# Patient Record
Sex: MALE | Race: WHITE | NOT HISPANIC OR LATINO | Employment: OTHER | URBAN - NONMETROPOLITAN AREA
[De-identification: names, ages, dates, MRNs, and addresses within clinical notes are randomized per-mention and may not be internally consistent; named-entity substitution may affect disease eponyms.]

---

## 2017-11-01 ENCOUNTER — TRANSFERRED RECORDS (OUTPATIENT)
Dept: HEALTH INFORMATION MANAGEMENT | Facility: HOSPITAL | Age: 62
End: 2017-11-01

## 2018-07-24 ENCOUNTER — HOSPITAL ENCOUNTER (OUTPATIENT)
Dept: NUCLEAR MEDICINE | Facility: HOSPITAL | Age: 63
Setting detail: NUCLEAR MEDICINE
End: 2018-07-24
Attending: FAMILY MEDICINE
Payer: MEDICARE

## 2018-07-24 ENCOUNTER — HOSPITAL ENCOUNTER (OUTPATIENT)
Dept: NUCLEAR MEDICINE | Facility: HOSPITAL | Age: 63
Setting detail: NUCLEAR MEDICINE
Discharge: HOME | End: 2018-07-24
Attending: FAMILY MEDICINE | Admitting: FAMILY MEDICINE
Payer: MEDICARE

## 2018-07-24 ENCOUNTER — HOSPITAL ENCOUNTER (OUTPATIENT)
Dept: GENERAL RADIOLOGY | Facility: HOSPITAL | Age: 63
Setting detail: NUCLEAR MEDICINE
End: 2018-07-24
Attending: FAMILY MEDICINE
Payer: MEDICARE

## 2018-07-24 DIAGNOSIS — Z94.0 H/O KIDNEY TRANSPLANT: ICD-10-CM

## 2018-07-24 DIAGNOSIS — Z95.5 H/O HEART ARTERY STENT: ICD-10-CM

## 2018-07-24 DIAGNOSIS — R06.02 SOB (SHORTNESS OF BREATH) ON EXERTION: ICD-10-CM

## 2018-07-24 DIAGNOSIS — E11.22 TYPE 2 DIABETES MELLITUS WITH CHRONIC KIDNEY DISEASE (H): ICD-10-CM

## 2018-07-24 PROCEDURE — 93017 CV STRESS TEST TRACING ONLY: CPT

## 2018-07-24 PROCEDURE — 25000128 H RX IP 250 OP 636: Performed by: INTERNAL MEDICINE

## 2018-07-24 PROCEDURE — 78452 HT MUSCLE IMAGE SPECT MULT: CPT | Mod: TC

## 2018-07-24 PROCEDURE — 93016 CV STRESS TEST SUPVJ ONLY: CPT | Performed by: INTERNAL MEDICINE

## 2018-07-24 PROCEDURE — A9500 TC99M SESTAMIBI: HCPCS | Performed by: RADIOLOGY

## 2018-07-24 PROCEDURE — 93018 CV STRESS TEST I&R ONLY: CPT | Performed by: INTERNAL MEDICINE

## 2018-07-24 PROCEDURE — 34300033 ZZH RX 343: Performed by: RADIOLOGY

## 2018-07-24 RX ORDER — REGADENOSON 0.08 MG/ML
0.4 INJECTION, SOLUTION INTRAVENOUS ONCE
Status: COMPLETED | OUTPATIENT
Start: 2018-07-24 | End: 2018-07-24

## 2018-07-24 RX ORDER — AMINOPHYLLINE 25 MG/ML
INJECTION, SOLUTION INTRAVENOUS
Status: DISCONTINUED
Start: 2018-07-24 | End: 2018-07-24 | Stop reason: WASHOUT

## 2018-07-24 RX ADMIN — Medication 30 MILLICURIE: at 09:37

## 2018-07-24 RX ADMIN — Medication 10 MILLICURIE: at 07:23

## 2018-07-24 RX ADMIN — REGADENOSON 0.4 MG: 0.08 INJECTION, SOLUTION INTRAVENOUS at 09:39

## 2018-12-26 ENCOUNTER — RESULTS ONLY (OUTPATIENT)
Dept: LAB | Age: 63
End: 2018-12-26

## 2018-12-26 LAB
ALT SERPL W P-5'-P-CCNC: 43 U/L (ref 0–70)
BILIRUB SERPL-MCNC: 0.4 MG/DL (ref 0.2–1.3)
BUN SERPL-MCNC: 88 MG/DL (ref 7–30)
CREAT SERPL-MCNC: 6.51 MG/DL (ref 0.66–1.25)
ERYTHROCYTE [DISTWIDTH] IN BLOOD BY AUTOMATED COUNT: 14 % (ref 10–15)
GFR SERPL CREATININE-BSD FRML MDRD: 8 ML/MIN/{1.73_M2}
HCT VFR BLD AUTO: 22 % (ref 40–53)
HGB BLD-MCNC: 7.3 G/DL (ref 13.3–17.7)
MCH RBC QN AUTO: 29.9 PG (ref 26.5–33)
MCHC RBC AUTO-ENTMCNC: 33.2 G/DL (ref 31.5–36.5)
MCV RBC AUTO: 90 FL (ref 78–100)
PLATELET # BLD AUTO: 125 10E9/L (ref 150–450)
RBC # BLD AUTO: 2.44 10E12/L (ref 4.4–5.9)
WBC # BLD AUTO: 4.3 10E9/L (ref 4–11)

## 2019-06-21 DIAGNOSIS — N17.9 ACUTE KIDNEY FAILURE, UNSPECIFIED (H): Primary | ICD-10-CM

## 2019-06-24 ENCOUNTER — HOSPITAL ENCOUNTER (OUTPATIENT)
Dept: ULTRASOUND IMAGING | Facility: HOSPITAL | Age: 64
Discharge: HOME | End: 2019-06-24
Attending: INTERNAL MEDICINE | Admitting: INTERNAL MEDICINE
Payer: MEDICARE

## 2019-06-24 DIAGNOSIS — N17.9 ACUTE KIDNEY INJURY (H): ICD-10-CM

## 2019-06-24 DIAGNOSIS — N17.9 ACUTE KIDNEY FAILURE, UNSPECIFIED (H): ICD-10-CM

## 2019-06-24 LAB
ALBUMIN SERPL-MCNC: 3.7 G/DL (ref 3.4–5)
ALBUMIN UR-MCNC: 30 MG/DL
ANION GAP SERPL CALCULATED.3IONS-SCNC: 10 MMOL/L (ref 3–14)
APPEARANCE UR: CLEAR
BACTERIA #/AREA URNS HPF: ABNORMAL /HPF
BASOPHILS # BLD AUTO: 0 10E9/L (ref 0–0.2)
BASOPHILS NFR BLD AUTO: 0.4 %
BILIRUB UR QL STRIP: NEGATIVE
BUN SERPL-MCNC: 62 MG/DL (ref 7–30)
CALCIUM SERPL-MCNC: 10 MG/DL (ref 8.5–10.1)
CHLORIDE SERPL-SCNC: 101 MMOL/L (ref 94–109)
CO2 SERPL-SCNC: 26 MMOL/L (ref 20–32)
COLOR UR AUTO: ABNORMAL
CREAT SERPL-MCNC: 2.15 MG/DL (ref 0.66–1.25)
DIFFERENTIAL METHOD BLD: ABNORMAL
EOSINOPHIL # BLD AUTO: 0.1 10E9/L (ref 0–0.7)
EOSINOPHIL NFR BLD AUTO: 1.2 %
ERYTHROCYTE [DISTWIDTH] IN BLOOD BY AUTOMATED COUNT: 14.2 % (ref 10–15)
ERYTHROCYTE [SEDIMENTATION RATE] IN BLOOD BY WESTERGREN METHOD: 40 MM/H (ref 0–20)
GFR SERPL CREATININE-BSD FRML MDRD: 32 ML/MIN/{1.73_M2}
GLUCOSE SERPL-MCNC: 135 MG/DL (ref 70–99)
GLUCOSE UR STRIP-MCNC: NEGATIVE MG/DL
HCT VFR BLD AUTO: 35.8 % (ref 40–53)
HGB BLD-MCNC: 12.2 G/DL (ref 13.3–17.7)
HGB UR QL STRIP: ABNORMAL
HYALINE CASTS #/AREA URNS LPF: 1 /LPF
IMM GRANULOCYTES # BLD: 0.1 10E9/L (ref 0–0.4)
IMM GRANULOCYTES NFR BLD: 0.9 %
KETONES UR STRIP-MCNC: NEGATIVE MG/DL
LEUKOCYTE ESTERASE UR QL STRIP: NEGATIVE
LYMPHOCYTES # BLD AUTO: 2.4 10E9/L (ref 0.8–5.3)
LYMPHOCYTES NFR BLD AUTO: 22.7 %
MCH RBC QN AUTO: 30.2 PG (ref 26.5–33)
MCHC RBC AUTO-ENTMCNC: 34.1 G/DL (ref 31.5–36.5)
MCV RBC AUTO: 89 FL (ref 78–100)
MONOCYTES # BLD AUTO: 1 10E9/L (ref 0–1.3)
MONOCYTES NFR BLD AUTO: 9.1 %
MUCOUS THREADS #/AREA URNS LPF: PRESENT /LPF
NEUTROPHILS # BLD AUTO: 6.9 10E9/L (ref 1.6–8.3)
NEUTROPHILS NFR BLD AUTO: 65.7 %
NITRATE UR QL: NEGATIVE
NRBC # BLD AUTO: 0 10*3/UL
NRBC BLD AUTO-RTO: 0 /100
PH UR STRIP: 5.5 PH (ref 4.7–8)
PHOSPHATE SERPL-MCNC: 4 MG/DL (ref 2.5–4.5)
PLATELET # BLD AUTO: 181 10E9/L (ref 150–450)
POTASSIUM SERPL-SCNC: 4 MMOL/L (ref 3.4–5.3)
RBC # BLD AUTO: 4.04 10E12/L (ref 4.4–5.9)
RBC #/AREA URNS AUTO: 1 /HPF (ref 0–2)
SODIUM SERPL-SCNC: 137 MMOL/L (ref 133–144)
SOURCE: ABNORMAL
SP GR UR STRIP: 1.01 (ref 1–1.03)
UROBILINOGEN UR STRIP-MCNC: NORMAL MG/DL (ref 0–2)
WBC # BLD AUTO: 10.5 10E9/L (ref 4–11)
WBC #/AREA URNS AUTO: 5 /HPF (ref 0–5)

## 2019-06-24 PROCEDURE — 85652 RBC SED RATE AUTOMATED: CPT | Performed by: INTERNAL MEDICINE

## 2019-06-24 PROCEDURE — 36415 COLL VENOUS BLD VENIPUNCTURE: CPT | Performed by: INTERNAL MEDICINE

## 2019-06-24 PROCEDURE — 76776 US EXAM K TRANSPL W/DOPPLER: CPT | Mod: TC

## 2019-06-24 PROCEDURE — 85025 COMPLETE CBC W/AUTO DIFF WBC: CPT | Performed by: INTERNAL MEDICINE

## 2019-06-24 PROCEDURE — 80069 RENAL FUNCTION PANEL: CPT | Performed by: INTERNAL MEDICINE

## 2019-06-24 PROCEDURE — 81001 URINALYSIS AUTO W/SCOPE: CPT | Performed by: INTERNAL MEDICINE

## 2019-11-08 ENCOUNTER — TRANSFERRED RECORDS (OUTPATIENT)
Dept: HEALTH INFORMATION MANAGEMENT | Facility: CLINIC | Age: 64
End: 2019-11-08

## 2019-11-08 ENCOUNTER — MEDICAL CORRESPONDENCE (OUTPATIENT)
Dept: HEALTH INFORMATION MANAGEMENT | Facility: CLINIC | Age: 64
End: 2019-11-08

## 2019-11-27 ENCOUNTER — HOSPITAL ENCOUNTER (OUTPATIENT)
Dept: RESPIRATORY THERAPY | Facility: HOSPITAL | Age: 64
Discharge: HOME | End: 2019-11-27
Attending: FAMILY MEDICINE | Admitting: FAMILY MEDICINE
Payer: MEDICARE

## 2019-11-27 LAB
BASE EXCESS BLDA CALC-SCNC: 0.5 MMOL/L
COHGB MFR BLD: 0.9 % (ref 0–2)
HCO3 BLD-SCNC: 25 MMOL/L (ref 21–28)
HGB BLD-MCNC: 12.4 G/DL (ref 13.3–17.7)
O2/TOTAL GAS SETTING VFR VENT: ABNORMAL %
OXYHGB MFR BLD: 95 % (ref 92–100)
PCO2 BLD: 39 MM HG (ref 35–45)
PH BLD: 7.42 PH (ref 7.35–7.45)
PO2 BLD: 78 MM HG (ref 80–105)

## 2019-11-27 PROCEDURE — 94729 DIFFUSING CAPACITY: CPT | Mod: 26 | Performed by: INTERNAL MEDICINE

## 2019-11-27 PROCEDURE — 36600 WITHDRAWAL OF ARTERIAL BLOOD: CPT

## 2019-11-27 PROCEDURE — 94010 BREATHING CAPACITY TEST: CPT | Mod: 26 | Performed by: INTERNAL MEDICINE

## 2019-11-27 PROCEDURE — 94729 DIFFUSING CAPACITY: CPT

## 2019-11-27 PROCEDURE — 94726 PLETHYSMOGRAPHY LUNG VOLUMES: CPT

## 2019-11-27 PROCEDURE — 94010 BREATHING CAPACITY TEST: CPT

## 2019-11-27 PROCEDURE — 82805 BLOOD GASES W/O2 SATURATION: CPT | Performed by: FAMILY MEDICINE

## 2019-11-27 PROCEDURE — 94726 PLETHYSMOGRAPHY LUNG VOLUMES: CPT | Mod: 26 | Performed by: INTERNAL MEDICINE

## 2019-11-27 PROCEDURE — 82375 ASSAY CARBOXYHB QUANT: CPT | Performed by: FAMILY MEDICINE

## 2019-11-27 PROCEDURE — 85018 HEMOGLOBIN: CPT | Performed by: FAMILY MEDICINE

## 2019-11-27 RX ORDER — ALBUTEROL SULFATE 0.83 MG/ML
2.5 SOLUTION RESPIRATORY (INHALATION) ONCE
Status: DISCONTINUED | OUTPATIENT
Start: 2019-11-27 | End: 2019-11-28 | Stop reason: HOSPADM

## 2020-01-08 ENCOUNTER — TRANSFERRED RECORDS (OUTPATIENT)
Dept: HEALTH INFORMATION MANAGEMENT | Facility: CLINIC | Age: 65
End: 2020-01-08

## 2020-01-09 DIAGNOSIS — J44.9 COPD (CHRONIC OBSTRUCTIVE PULMONARY DISEASE) (H): Primary | ICD-10-CM

## 2020-01-21 ENCOUNTER — DOCUMENTATION ONLY (OUTPATIENT)
Dept: SLEEP MEDICINE | Facility: HOSPITAL | Age: 65
End: 2020-01-21
Attending: INTERNAL MEDICINE
Payer: MEDICARE

## 2020-01-21 PROCEDURE — 99207 ZZC NO CHARGE NURSE ONLY: CPT

## 2020-01-21 NOTE — PROGRESS NOTES
Patient returned the oximeter and the data was downloaded and a report sent to Dr Canchola and to be scanned The patient was also given a copy for his records.

## 2020-01-31 DIAGNOSIS — G47.33 OSA (OBSTRUCTIVE SLEEP APNEA): Primary | ICD-10-CM

## 2020-02-13 ENCOUNTER — TRANSFERRED RECORDS (OUTPATIENT)
Dept: HEALTH INFORMATION MANAGEMENT | Facility: CLINIC | Age: 65
End: 2020-02-13

## 2020-02-25 DIAGNOSIS — G47.33 OSA (OBSTRUCTIVE SLEEP APNEA): Primary | ICD-10-CM

## 2020-03-06 ENCOUNTER — TELEPHONE (OUTPATIENT)
Dept: SLEEP MEDICINE | Facility: HOSPITAL | Age: 65
End: 2020-03-06

## 2020-03-09 ENCOUNTER — OFFICE VISIT (OUTPATIENT)
Dept: SLEEP MEDICINE | Facility: HOSPITAL | Age: 65
End: 2020-03-09
Attending: INTERNAL MEDICINE
Payer: COMMERCIAL

## 2020-03-09 DIAGNOSIS — R09.02 HYPOXIA: Primary | ICD-10-CM

## 2020-03-09 PROCEDURE — 99207 ZZC NO CHARGE NURSE ONLY: CPT

## 2020-03-09 NOTE — NURSING NOTE
Patient picked up over night oximeter number [id number]. Patient was instructed on use of device. Patient verbalized understanding.     Patient will return device tomorrow by: noon

## 2020-03-10 ENCOUNTER — DOCUMENTATION ONLY (OUTPATIENT)
Dept: SLEEP MEDICINE | Facility: HOSPITAL | Age: 65
End: 2020-03-10
Attending: INTERNAL MEDICINE
Payer: COMMERCIAL

## 2020-03-10 PROCEDURE — 99207 ZZC NO CHARGE NURSE ONLY: CPT

## 2020-03-10 NOTE — PROGRESS NOTES
Patient returned the oximeter , the data was downloaded and the report sent to Dr. Canchola and to scan , I did do a compliance download and detail report to send with the oximetry.

## 2021-02-27 ENCOUNTER — HOSPITAL ENCOUNTER (EMERGENCY)
Facility: HOSPITAL | Age: 66
Discharge: HOME | End: 2021-02-27
Attending: PHYSICIAN ASSISTANT | Admitting: PHYSICIAN ASSISTANT
Payer: COMMERCIAL

## 2021-02-27 ENCOUNTER — APPOINTMENT (OUTPATIENT)
Dept: GENERAL RADIOLOGY | Facility: HOSPITAL | Age: 66
End: 2021-02-27
Attending: PHYSICIAN ASSISTANT
Payer: COMMERCIAL

## 2021-02-27 VITALS
SYSTOLIC BLOOD PRESSURE: 151 MMHG | TEMPERATURE: 99 F | HEART RATE: 59 BPM | RESPIRATION RATE: 16 BRPM | OXYGEN SATURATION: 97 % | DIASTOLIC BLOOD PRESSURE: 81 MMHG

## 2021-02-27 DIAGNOSIS — M79.671 RIGHT FOOT PAIN: ICD-10-CM

## 2021-02-27 PROCEDURE — 73630 X-RAY EXAM OF FOOT: CPT | Mod: RT

## 2021-02-27 PROCEDURE — 99213 OFFICE O/P EST LOW 20 MIN: CPT | Performed by: PHYSICIAN ASSISTANT

## 2021-02-27 PROCEDURE — G0463 HOSPITAL OUTPT CLINIC VISIT: HCPCS

## 2021-02-27 ASSESSMENT — ENCOUNTER SYMPTOMS
FATIGUE: 0
CHILLS: 0
CONFUSION: 0
DIZZINESS: 0
DYSURIA: 0
ABDOMINAL PAIN: 0
SORE THROAT: 0
WOUND: 0
SHORTNESS OF BREATH: 0
COUGH: 0
SINUS PAIN: 0
FEVER: 0

## 2021-02-27 NOTE — ED PROVIDER NOTES
History     Chief Complaint   Patient presents with     Foot Pain     right     HPI  Eduar Isaacs is a 65 year old male with history of R 5th met fracture s/p fixation, peripheral neuropathy and LE edema presents for evaluation of R foot pain.     Onset, duration: Yesterday 2/26 evening, he tripped over dog and landed on outer side of R foot. Pain has persisted without worsening.  Location: Lateral R foot  Assoc. symptoms: Denies new numbness, wounds, swelling, bruising or redness.  Quality, severity: Aching  Provocative: Weight bearing  Palliative: Rest  Previous Treatments: Tried left over oxycodone from surgery last night which helped some. Does not have any of these left.    Presents in wheelchair today.     Allergies:  Allergies   Allergen Reactions     Codeine Shortness Of Breath     Vancomycin Rash     Cefepime      Amoxicillin Itching and Rash     Niacin Rash     Prilosec [Omeprazole] Itching and Rash       Problem List:    There are no active problems to display for this patient.       Past Medical History:    History reviewed. No pertinent past medical history.    Past Surgical History:    Past Surgical History:   Procedure Laterality Date     COLONOSCOPY - HIM SCAN  04/08/2012    [facility], polyps, adenomatous       Family History:    History reviewed. No pertinent family history.    Social History:  Marital Status:   [5]  Social History     Tobacco Use     Smoking status: Former Smoker     Smokeless tobacco: Never Used   Substance Use Topics     Alcohol use: Yes     Comment: rare     Drug use: Never        Medications:    ALLOPURINOL PO  aspirin - buffered (ASCRIPTIN) 325 MG TABS  budesonide-formoterol (SYMBICORT) 160-4.5 MCG/ACT inhaler  CALCITRIOL PO  calcium carbonate (TUMS) 500 MG chewable tablet  CELLCEPT 500 MG PO TABLET  LEVOTHYROXINE SODIUM PO  Lisinopril (PRINIVIL PO)  metaxalone (SKELAXIN) 800 MG tablet  methylPREDNISolone (MEDROL DOSEPAK) 4 MG tablet  mupirocin (BACTROBAN) 2 %  ointment  Omega-3 Fatty Acids (OMEGA-3 FISH OIL PO)  Rosuvastatin Calcium (CRESTOR PO)  VITAMIN D, CHOLECALCIFEROL, PO  cetirizine (ZYRTEC) 10 MG tablet  DiphenhydrAMINE HCl (BENADRYL PO)  HYDROcodone-acetaminophen (NORCO) 5-325 MG per tablet  nitroglycerin (NITRODUR) 0.4 MG/HR  NITROGLYCERIN SL          Review of Systems   Constitutional: Negative for chills, fatigue and fever.   HENT: Negative for congestion, sinus pain and sore throat.    Respiratory: Negative for cough and shortness of breath.    Cardiovascular: Positive for leg swelling. Negative for chest pain.   Gastrointestinal: Negative for abdominal pain.   Genitourinary: Negative for dysuria.   Musculoskeletal:        Foot pain     Skin: Negative for wound.   Neurological: Negative for dizziness.   Psychiatric/Behavioral: Negative for confusion.       Physical Exam   BP: 151/81  Pulse: 59  Temp: 99  F (37.2  C)  Resp: 16  SpO2: 97 %      Physical Exam  Vitals signs reviewed.   Constitutional:       General: He is not in acute distress.     Appearance: Normal appearance. He is obese.   HENT:      Head: Normocephalic and atraumatic.      Right Ear: External ear normal.      Left Ear: External ear normal.      Nose: Nose normal.   Eyes:      Extraocular Movements: Extraocular movements intact.      Conjunctiva/sclera: Conjunctivae normal.   Neck:      Musculoskeletal: Normal range of motion.   Cardiovascular:      Pulses:           Dorsalis pedis pulses are 0 on the right side.        Posterior tibial pulses are 0 on the right side.   Pulmonary:      Effort: Pulmonary effort is normal.   Musculoskeletal:      Right foot: Decreased range of motion.   Feet:      Right foot:      Skin integrity: Dry skin present. No ulcer, erythema or warmth.      Toenail Condition: Right toenails are abnormally thick.      Comments: Tenderness to palpation of the 5th metatarsal head and shaft. No erythema, ecchymosis or edema.  Neurological:      Mental Status: He is alert.          ED Course       Results for orders placed or performed during the hospital encounter of 02/27/21 (from the past 24 hour(s))   Foot  XR, G/E 3 views, right    Narrative    PROCEDURE:  XR FOOT RT G/E 3 VW    HISTORY: pain to right lateral foot since yesterday when he was trying  to not step on his dog..    COMPARISON:  None.    TECHNIQUE:  3 views right foot.    FINDINGS:  Postoperative changes of remote prior fifth metatarsal  fracture fixation are seen. The fractures healed. The hardware is  intact. No new fracture is identified.       Impression    IMPRESSION: No acute fracture.      MARY PIEDRA MD       Medications - No data to display    Assessments & Plan (with Medical Decision Making)   Eduar Isaacs is a 65 year old year old male with history of peripheral neuropathy and R 5th metatarsal fracture s/p fixation who presented today with right foot injury and lateral right foot pain concerning for possible fracture, strain or contusion.      Right foot XRs were reviewed and upon interpretation was found to be negative, hardware intact.     A diagnosis of Right foot pain was made and patient will be treated with rest, ice, NSAIDs if tolerated. Wear CAM boot during the day while walking 1-2 weeks, then gradually reduce use.     Patient understood instructions provided and all questions were answered.   Recommend follow up with primary care provider as needed.  Follow up with podiatrist in 1-2 weeks if pain still persists.   Return to ED for new, persistent or worsening symptoms as needed.     Disposition: home    I have reviewed the nursing notes.    I have reviewed the findings, diagnosis, plan and need for follow up with the patient.    Discharge Medication List as of 2/27/2021  4:40 PM          Final diagnoses:   Right foot pain       2/27/2021   [facility]

## 2021-02-27 NOTE — ED TRIAGE NOTES
Pt presents with right lateral foot pain since yesterday when he was trying to avoid stepping on his little dog.

## 2021-02-27 NOTE — ED TRIAGE NOTES
Patient stepped wrong stepping over door frame trying to avoid the dog between his legs. Right foot painful. Not bad at rest can go up to 10 when stepping on it

## 2021-02-27 NOTE — DISCHARGE INSTRUCTIONS
No fractures were seen on XR, hardware is intact.   Avoid increased activity, rest foot, ice and elevate when possible. Tylenol as needed.  Wear walking boot every day for 1-2 weeks, then gradually reduce use if tolerated.  Follow up with podiatrist in 2 weeks if not improved or worsened.

## 2021-05-21 ENCOUNTER — APPOINTMENT (OUTPATIENT)
Dept: GENERAL RADIOLOGY | Facility: HOSPITAL | Age: 66
DRG: 872 | End: 2021-05-21
Attending: NURSE PRACTITIONER
Payer: COMMERCIAL

## 2021-05-21 ENCOUNTER — HOSPITAL ENCOUNTER (INPATIENT)
Facility: HOSPITAL | Age: 66
LOS: 6 days | Discharge: HOME | DRG: 872 | End: 2021-05-27
Attending: EMERGENCY MEDICINE | Admitting: INTERNAL MEDICINE
Payer: COMMERCIAL

## 2021-05-21 DIAGNOSIS — R50.9 FEVER, UNKNOWN ORIGIN: ICD-10-CM

## 2021-05-21 DIAGNOSIS — A41.9 SEPSIS (H): ICD-10-CM

## 2021-05-21 DIAGNOSIS — N10 PYELONEPHRITIS, ACUTE: Primary | ICD-10-CM

## 2021-05-21 LAB
ALBUMIN SERPL-MCNC: 3.3 G/DL (ref 3.4–5)
ALBUMIN UR-MCNC: 100 MG/DL
ALP SERPL-CCNC: 52 U/L (ref 40–150)
ALT SERPL W P-5'-P-CCNC: 18 U/L (ref 0–70)
ANION GAP SERPL CALCULATED.3IONS-SCNC: 9 MMOL/L (ref 3–14)
APPEARANCE UR: CLEAR
AST SERPL W P-5'-P-CCNC: 15 U/L (ref 0–45)
BACTERIA #/AREA URNS HPF: ABNORMAL /HPF
BASOPHILS # BLD AUTO: 0 10E9/L (ref 0–0.2)
BASOPHILS NFR BLD AUTO: 0.2 %
BILIRUB SERPL-MCNC: 0.7 MG/DL (ref 0.2–1.3)
BILIRUB UR QL STRIP: NEGATIVE
BUN SERPL-MCNC: 35 MG/DL (ref 7–30)
CALCIUM SERPL-MCNC: 8.8 MG/DL (ref 8.5–10.1)
CHLORIDE SERPL-SCNC: 95 MMOL/L (ref 94–109)
CO2 SERPL-SCNC: 26 MMOL/L (ref 20–32)
COLOR UR AUTO: ABNORMAL
CREAT SERPL-MCNC: 1.47 MG/DL (ref 0.66–1.25)
D DIMER PPP FEU-MCNC: 0.6 UG/ML FEU (ref 0–0.5)
DIFFERENTIAL METHOD BLD: ABNORMAL
EOSINOPHIL # BLD AUTO: 0 10E9/L (ref 0–0.7)
EOSINOPHIL NFR BLD AUTO: 0.1 %
ERYTHROCYTE [DISTWIDTH] IN BLOOD BY AUTOMATED COUNT: 14.6 % (ref 10–15)
EST. AVERAGE GLUCOSE BLD GHB EST-MCNC: 160 MG/DL
GFR SERPL CREATININE-BSD FRML MDRD: 50 ML/MIN/{1.73_M2}
GLUCOSE SERPL-MCNC: 172 MG/DL (ref 70–99)
GLUCOSE UR STRIP-MCNC: NEGATIVE MG/DL
HBA1C MFR BLD: 7.2 % (ref 0–5.6)
HCT VFR BLD AUTO: 38.8 % (ref 40–53)
HGB BLD-MCNC: 12.7 G/DL (ref 13.3–17.7)
HGB UR QL STRIP: ABNORMAL
IMM GRANULOCYTES # BLD: 0.1 10E9/L (ref 0–0.4)
IMM GRANULOCYTES NFR BLD: 0.4 %
KETONES UR STRIP-MCNC: NEGATIVE MG/DL
LABORATORY COMMENT REPORT: NORMAL
LACTATE BLD-SCNC: 1.8 MMOL/L (ref 0.7–2)
LEUKOCYTE ESTERASE UR QL STRIP: ABNORMAL
LYMPHOCYTES # BLD AUTO: 1.4 10E9/L (ref 0.8–5.3)
LYMPHOCYTES NFR BLD AUTO: 9.9 %
MCH RBC QN AUTO: 29.2 PG (ref 26.5–33)
MCHC RBC AUTO-ENTMCNC: 32.7 G/DL (ref 31.5–36.5)
MCV RBC AUTO: 89 FL (ref 78–100)
MONOCYTES # BLD AUTO: 1.8 10E9/L (ref 0–1.3)
MONOCYTES NFR BLD AUTO: 12.6 %
NEUTROPHILS # BLD AUTO: 10.7 10E9/L (ref 1.6–8.3)
NEUTROPHILS NFR BLD AUTO: 76.8 %
NITRATE UR QL: NEGATIVE
NRBC # BLD AUTO: 0 10*3/UL
NRBC BLD AUTO-RTO: 0 /100
NT-PROBNP SERPL-MCNC: 792 PG/ML (ref 0–900)
PH UR STRIP: 6 PH (ref 4.7–8)
PLATELET # BLD AUTO: 159 10E9/L (ref 150–450)
POTASSIUM SERPL-SCNC: 4.7 MMOL/L (ref 3.4–5.3)
PROCALCITONIN SERPL-MCNC: 0.74 NG/ML
PROT SERPL-MCNC: 7.1 G/DL (ref 6.8–8.8)
RBC # BLD AUTO: 4.35 10E12/L (ref 4.4–5.9)
RBC #/AREA URNS AUTO: <1 /HPF (ref 0–2)
SARS-COV-2 RNA RESP QL NAA+PROBE: NEGATIVE
SODIUM SERPL-SCNC: 130 MMOL/L (ref 133–144)
SOURCE: ABNORMAL
SP GR UR STRIP: 1.02 (ref 1–1.03)
SPECIMEN SOURCE: NORMAL
SQUAMOUS #/AREA URNS AUTO: 0 /HPF (ref 0–1)
TROPONIN I SERPL-MCNC: <0.015 UG/L (ref 0–0.04)
UROBILINOGEN UR STRIP-MCNC: NORMAL MG/DL (ref 0–2)
WBC # BLD AUTO: 14 10E9/L (ref 4–11)
WBC #/AREA URNS AUTO: 12 /HPF (ref 0–5)

## 2021-05-21 PROCEDURE — 81001 URINALYSIS AUTO W/SCOPE: CPT | Performed by: NURSE PRACTITIONER

## 2021-05-21 PROCEDURE — 250N000011 HC RX IP 250 OP 636: Performed by: NURSE PRACTITIONER

## 2021-05-21 PROCEDURE — 87086 URINE CULTURE/COLONY COUNT: CPT | Performed by: NURSE PRACTITIONER

## 2021-05-21 PROCEDURE — 250N000013 HC RX MED GY IP 250 OP 250 PS 637: Performed by: NURSE PRACTITIONER

## 2021-05-21 PROCEDURE — 87186 SC STD MICRODIL/AGAR DIL: CPT | Performed by: NURSE PRACTITIONER

## 2021-05-21 PROCEDURE — 87040 BLOOD CULTURE FOR BACTERIA: CPT | Performed by: NURSE PRACTITIONER

## 2021-05-21 PROCEDURE — 999N001182 HC STATISTIC ESTIMATED AVERAGE GLUCOSE: Performed by: INTERNAL MEDICINE

## 2021-05-21 PROCEDURE — 36415 COLL VENOUS BLD VENIPUNCTURE: CPT | Performed by: NURSE PRACTITIONER

## 2021-05-21 PROCEDURE — 83880 ASSAY OF NATRIURETIC PEPTIDE: CPT | Performed by: INTERNAL MEDICINE

## 2021-05-21 PROCEDURE — 83036 HEMOGLOBIN GLYCOSYLATED A1C: CPT | Performed by: INTERNAL MEDICINE

## 2021-05-21 PROCEDURE — 87088 URINE BACTERIA CULTURE: CPT | Performed by: NURSE PRACTITIONER

## 2021-05-21 PROCEDURE — 84484 ASSAY OF TROPONIN QUANT: CPT | Performed by: NURSE PRACTITIONER

## 2021-05-21 PROCEDURE — 258N000003 HC RX IP 258 OP 636: Performed by: INTERNAL MEDICINE

## 2021-05-21 PROCEDURE — U0003 INFECTIOUS AGENT DETECTION BY NUCLEIC ACID (DNA OR RNA); SEVERE ACUTE RESPIRATORY SYNDROME CORONAVIRUS 2 (SARS-COV-2) (CORONAVIRUS DISEASE [COVID-19]), AMPLIFIED PROBE TECHNIQUE, MAKING USE OF HIGH THROUGHPUT TECHNOLOGIES AS DESCRIBED BY CMS-2020-01-R: HCPCS | Performed by: NURSE PRACTITIONER

## 2021-05-21 PROCEDURE — 99223 1ST HOSP IP/OBS HIGH 75: CPT | Performed by: INTERNAL MEDICINE

## 2021-05-21 PROCEDURE — C9803 HOPD COVID-19 SPEC COLLECT: HCPCS

## 2021-05-21 PROCEDURE — 99285 EMERGENCY DEPT VISIT HI MDM: CPT | Mod: 25

## 2021-05-21 PROCEDURE — 85379 FIBRIN DEGRADATION QUANT: CPT | Performed by: NURSE PRACTITIONER

## 2021-05-21 PROCEDURE — U0005 INFEC AGEN DETEC AMPLI PROBE: HCPCS | Performed by: NURSE PRACTITIONER

## 2021-05-21 PROCEDURE — 96366 THER/PROPH/DIAG IV INF ADDON: CPT

## 2021-05-21 PROCEDURE — 99284 EMERGENCY DEPT VISIT MOD MDM: CPT | Performed by: NURSE PRACTITIONER

## 2021-05-21 PROCEDURE — 120N000001 HC R&B MED SURG/OB

## 2021-05-21 PROCEDURE — 96365 THER/PROPH/DIAG IV INF INIT: CPT

## 2021-05-21 PROCEDURE — 80053 COMPREHEN METABOLIC PANEL: CPT | Performed by: NURSE PRACTITIONER

## 2021-05-21 PROCEDURE — 250N000012 HC RX MED GY IP 250 OP 636 PS 637: Performed by: INTERNAL MEDICINE

## 2021-05-21 PROCEDURE — 84145 PROCALCITONIN (PCT): CPT | Performed by: NURSE PRACTITIONER

## 2021-05-21 PROCEDURE — 250N000011 HC RX IP 250 OP 636: Performed by: INTERNAL MEDICINE

## 2021-05-21 PROCEDURE — 258N000003 HC RX IP 258 OP 636: Performed by: NURSE PRACTITIONER

## 2021-05-21 PROCEDURE — 83605 ASSAY OF LACTIC ACID: CPT | Performed by: NURSE PRACTITIONER

## 2021-05-21 PROCEDURE — 71045 X-RAY EXAM CHEST 1 VIEW: CPT

## 2021-05-21 PROCEDURE — 250N000013 HC RX MED GY IP 250 OP 250 PS 637: Performed by: INTERNAL MEDICINE

## 2021-05-21 PROCEDURE — 85025 COMPLETE CBC W/AUTO DIFF WBC: CPT | Performed by: NURSE PRACTITIONER

## 2021-05-21 RX ORDER — DIPHENHYDRAMINE HCL 25 MG
25 CAPSULE ORAL
Status: DISCONTINUED | OUTPATIENT
Start: 2021-05-21 | End: 2021-05-27 | Stop reason: HOSPADM

## 2021-05-21 RX ORDER — ACETAMINOPHEN 325 MG/1
650 TABLET ORAL EVERY 4 HOURS PRN
Status: DISCONTINUED | OUTPATIENT
Start: 2021-05-21 | End: 2021-05-27 | Stop reason: HOSPADM

## 2021-05-21 RX ORDER — HYDROCODONE BITARTRATE AND ACETAMINOPHEN 5; 325 MG/1; MG/1
2 TABLET ORAL EVERY 6 HOURS PRN
Status: DISCONTINUED | OUTPATIENT
Start: 2021-05-21 | End: 2021-05-27 | Stop reason: HOSPADM

## 2021-05-21 RX ORDER — MYCOPHENOLATE MOFETIL 500 MG/1
1000 TABLET ORAL 2 TIMES DAILY
Status: DISCONTINUED | OUTPATIENT
Start: 2021-05-21 | End: 2021-05-27 | Stop reason: HOSPADM

## 2021-05-21 RX ORDER — CETIRIZINE HYDROCHLORIDE 10 MG/1
10 TABLET ORAL DAILY
Status: DISCONTINUED | OUTPATIENT
Start: 2021-05-22 | End: 2021-05-27 | Stop reason: HOSPADM

## 2021-05-21 RX ORDER — ALLOPURINOL 100 MG/1
100 TABLET ORAL DAILY
Status: DISCONTINUED | OUTPATIENT
Start: 2021-05-22 | End: 2021-05-27 | Stop reason: HOSPADM

## 2021-05-21 RX ORDER — METAXALONE 800 MG/1
800 TABLET ORAL 3 TIMES DAILY
Status: DISCONTINUED | OUTPATIENT
Start: 2021-05-21 | End: 2021-05-24

## 2021-05-21 RX ORDER — LEVOTHYROXINE SODIUM 50 UG/1
50 TABLET ORAL DAILY
Status: DISCONTINUED | OUTPATIENT
Start: 2021-05-22 | End: 2021-05-27 | Stop reason: HOSPADM

## 2021-05-21 RX ORDER — NALOXONE HYDROCHLORIDE 0.4 MG/ML
0.2 INJECTION, SOLUTION INTRAMUSCULAR; INTRAVENOUS; SUBCUTANEOUS
Status: DISCONTINUED | OUTPATIENT
Start: 2021-05-21 | End: 2021-05-27 | Stop reason: HOSPADM

## 2021-05-21 RX ORDER — CALCITRIOL 0.25 UG/1
0.25 CAPSULE, LIQUID FILLED ORAL DAILY
Status: DISCONTINUED | OUTPATIENT
Start: 2021-05-22 | End: 2021-05-24

## 2021-05-21 RX ORDER — NITROGLYCERIN 80 MG/1
1 PATCH TRANSDERMAL DAILY
Status: DISCONTINUED | OUTPATIENT
Start: 2021-05-22 | End: 2021-05-24

## 2021-05-21 RX ORDER — NALOXONE HYDROCHLORIDE 0.4 MG/ML
0.4 INJECTION, SOLUTION INTRAMUSCULAR; INTRAVENOUS; SUBCUTANEOUS
Status: DISCONTINUED | OUTPATIENT
Start: 2021-05-21 | End: 2021-05-27 | Stop reason: HOSPADM

## 2021-05-21 RX ORDER — NITROGLYCERIN 0.4 MG/1
0.4 TABLET SUBLINGUAL EVERY 5 MIN PRN
Status: DISCONTINUED | OUTPATIENT
Start: 2021-05-21 | End: 2021-05-27 | Stop reason: HOSPADM

## 2021-05-21 RX ORDER — ONDANSETRON 2 MG/ML
4 INJECTION INTRAMUSCULAR; INTRAVENOUS EVERY 6 HOURS PRN
Status: DISCONTINUED | OUTPATIENT
Start: 2021-05-21 | End: 2021-05-27 | Stop reason: HOSPADM

## 2021-05-21 RX ORDER — ASPIRIN 81 MG/1
324 TABLET, CHEWABLE ORAL DAILY
Status: DISCONTINUED | OUTPATIENT
Start: 2021-05-22 | End: 2021-05-27 | Stop reason: HOSPADM

## 2021-05-21 RX ORDER — CALCIUM CARBONATE 500 MG/1
1000 TABLET, CHEWABLE ORAL 4 TIMES DAILY PRN
Status: DISCONTINUED | OUTPATIENT
Start: 2021-05-21 | End: 2021-05-27 | Stop reason: HOSPADM

## 2021-05-21 RX ORDER — LIDOCAINE 40 MG/G
CREAM TOPICAL
Status: DISCONTINUED | OUTPATIENT
Start: 2021-05-21 | End: 2021-05-27 | Stop reason: HOSPADM

## 2021-05-21 RX ORDER — CIPROFLOXACIN 2 MG/ML
400 INJECTION, SOLUTION INTRAVENOUS EVERY 12 HOURS
Status: DISCONTINUED | OUTPATIENT
Start: 2021-05-22 | End: 2021-05-22

## 2021-05-21 RX ORDER — ACETAMINOPHEN 325 MG/1
975 TABLET ORAL ONCE
Status: COMPLETED | OUTPATIENT
Start: 2021-05-21 | End: 2021-05-21

## 2021-05-21 RX ORDER — LISINOPRIL 5 MG/1
5 TABLET ORAL DAILY
Status: DISCONTINUED | OUTPATIENT
Start: 2021-05-22 | End: 2021-05-27 | Stop reason: HOSPADM

## 2021-05-21 RX ORDER — ONDANSETRON 4 MG/1
4 TABLET, ORALLY DISINTEGRATING ORAL EVERY 6 HOURS PRN
Status: DISCONTINUED | OUTPATIENT
Start: 2021-05-21 | End: 2021-05-27 | Stop reason: HOSPADM

## 2021-05-21 RX ORDER — IBUPROFEN 400 MG/1
400 TABLET, FILM COATED ORAL EVERY 4 HOURS PRN
Status: DISCONTINUED | OUTPATIENT
Start: 2021-05-21 | End: 2021-05-21

## 2021-05-21 RX ORDER — LACTOBACILLUS RHAMNOSUS GG 10B CELL
1 CAPSULE ORAL 2 TIMES DAILY
Status: DISCONTINUED | OUTPATIENT
Start: 2021-05-21 | End: 2021-05-27 | Stop reason: HOSPADM

## 2021-05-21 RX ORDER — ROSUVASTATIN CALCIUM 10 MG/1
40 TABLET, COATED ORAL DAILY
Status: DISCONTINUED | OUTPATIENT
Start: 2021-05-22 | End: 2021-05-27 | Stop reason: HOSPADM

## 2021-05-21 RX ORDER — LEVOFLOXACIN 5 MG/ML
750 INJECTION, SOLUTION INTRAVENOUS ONCE
Status: COMPLETED | OUTPATIENT
Start: 2021-05-21 | End: 2021-05-21

## 2021-05-21 RX ADMIN — ONDANSETRON 4 MG: 2 INJECTION INTRAMUSCULAR; INTRAVENOUS at 20:48

## 2021-05-21 RX ADMIN — ACETAMINOPHEN 650 MG: 325 TABLET, FILM COATED ORAL at 22:01

## 2021-05-21 RX ADMIN — SODIUM CHLORIDE 250 ML: 9 INJECTION, SOLUTION INTRAVENOUS at 15:03

## 2021-05-21 RX ADMIN — Medication 1 CAPSULE: at 21:49

## 2021-05-21 RX ADMIN — MYCOPHENOLATE MOFETIL 1000 MG: 500 TABLET ORAL at 22:01

## 2021-05-21 RX ADMIN — LEVOFLOXACIN 750 MG: 5 INJECTION, SOLUTION INTRAVENOUS at 17:14

## 2021-05-21 RX ADMIN — METAXALONE 800 MG: 800 TABLET ORAL at 22:01

## 2021-05-21 RX ADMIN — SODIUM CHLORIDE 1000 ML: 9 INJECTION, SOLUTION INTRAVENOUS at 20:48

## 2021-05-21 RX ADMIN — ACETAMINOPHEN 975 MG: 325 TABLET, FILM COATED ORAL at 17:09

## 2021-05-21 ASSESSMENT — ENCOUNTER SYMPTOMS
DIARRHEA: 0
SINUS PAIN: 1
SHORTNESS OF BREATH: 1
FLANK PAIN: 0
LIGHT-HEADEDNESS: 0
VOMITING: 0
ACTIVITY CHANGE: 1
FEVER: 1
HEMATURIA: 0
HEADACHES: 0
DIZZINESS: 1
NAUSEA: 0
ABDOMINAL PAIN: 0
EYE DISCHARGE: 1
CHILLS: 1
COUGH: 1
BACK PAIN: 0
DYSURIA: 0
SINUS PRESSURE: 1

## 2021-05-21 ASSESSMENT — ACTIVITIES OF DAILY LIVING (ADL)
DRESSING/BATHING_DIFFICULTY: YES
DIFFICULTY_COMMUNICATING: NO
EQUIPMENT_CURRENTLY_USED_AT_HOME: CANE, STRAIGHT
DRESSING/BATHING: BATHING DIFFICULTY, REQUIRES EQUIPMENT;DRESSING DIFFICULTY, REQUIRES EQUIPMENT
WEAR_GLASSES_OR_BLIND: YES
DIFFICULTY_EATING/SWALLOWING: NO
DESCRIBE_HEARING_LOSS: HEARING LOSS ON RIGHT SIDE;HEARING LOSS ON LEFT SIDE
WALKING_OR_CLIMBING_STAIRS_DIFFICULTY: YES
WHICH_OF_THE_ABOVE_FUNCTIONAL_RISKS_HAD_A_RECENT_ONSET_OR_CHANGE?: AMBULATION;TRANSFERRING
FALL_HISTORY_WITHIN_LAST_SIX_MONTHS: NO
WERE_AUXILIARY_AIDS_OFFERED?: NO
THE_FOLLOWING_AIDS_WERE_PROVIDED;: PATIENT DECLINED OFFER OF AUXILIARY AIDS
PATIENT'S_PREFERRED_MEANS_OF_COMMUNICATION: VERBAL
TOILETING_ISSUES: NO
HEARING_DIFFICULTY_OR_DEAF: YES
CONCENTRATING,_REMEMBERING_OR_MAKING_DECISIONS_DIFFICULTY: NO
WALKING_OR_CLIMBING_STAIRS: AMBULATION DIFFICULTY, REQUIRES EQUIPMENT;TRANSFERRING DIFFICULTY, REQUIRES EQUIPMENT
DOING_ERRANDS_INDEPENDENTLY_DIFFICULTY: NO

## 2021-05-21 ASSESSMENT — MIFFLIN-ST. JEOR: SCORE: 2299.5

## 2021-05-21 NOTE — ED PROVIDER NOTES
History     Chief Complaint   Patient presents with     Fever     Shortness of Breath     HPI  Eduar Isaacs is a 66 year old male who is is is accompanied per his sister.  He has no history of being sick for the past 1 and half days.  He presents with symptoms fevers, chills, sinus pain and pressure, cough, shortness of breath, watery eyes, dizziness, and decreased urination.  History of kidney transplant 14 years ago and a cardiac stent last year. Is on cellcept. Is on medication for high blood pressure.  No known sick contacts.  Had his second dose of Covid vaccine at the beginning of March.  Has taken Tylenol at home without relief.  Denies chest pain and chest tightness.  Denies nausea, vomiting, diarrhea, and abdominal pain.    Allergies:  Allergies   Allergen Reactions     Codeine Shortness Of Breath     Vancomycin Rash     Cefepime      Amoxicillin Itching and Rash     Niacin Rash     Prilosec [Omeprazole] Itching and Rash       Problem List:    Patient Active Problem List    Diagnosis Date Noted     Fever, unknown origin 05/21/2021     Priority: Medium     Sepsis (H) 05/21/2021     Priority: Medium        Past Medical History:    No past medical history on file.    Past Surgical History:    Past Surgical History:   Procedure Laterality Date     COLONOSCOPY - HIM SCAN  04/08/2012    [place], polyps, adenomatous       Family History:    No family history on file.    Social History:  Marital Status:   5  Social History     Tobacco Use     Smoking status: Former Smoker     Smokeless tobacco: Never Used   Substance Use Topics     Alcohol use: Yes     Comment: rare     Drug use: Never        Medications:    ALLOPURINOL PO  aspirin - buffered (ASCRIPTIN) 325 MG TABS  budesonide-formoterol (SYMBICORT) 160-4.5 MCG/ACT inhaler  CALCITRIOL PO  calcium carbonate (TUMS) 500 MG chewable tablet  CELLCEPT 500 MG PO TABLET  cetirizine (ZYRTEC) 10 MG tablet  DiphenhydrAMINE HCl (BENADRYL  PO)  HYDROcodone-acetaminophen (NORCO) 5-325 MG per tablet  LEVOTHYROXINE SODIUM PO  Lisinopril (PRINIVIL PO)  metaxalone (SKELAXIN) 800 MG tablet  methylPREDNISolone (MEDROL DOSEPAK) 4 MG tablet  mupirocin (BACTROBAN) 2 % ointment  nitroglycerin (NITRODUR) 0.4 MG/HR  NITROGLYCERIN SL  Omega-3 Fatty Acids (OMEGA-3 FISH OIL PO)  Rosuvastatin Calcium (CRESTOR PO)  VITAMIN D, CHOLECALCIFEROL, PO          Review of Systems   Constitutional: Positive for activity change, chills and fever.   HENT: Positive for sinus pressure and sinus pain.    Eyes: Positive for discharge (watery eyes).   Respiratory: Positive for cough and shortness of breath.    Gastrointestinal: Negative for abdominal pain, diarrhea, nausea and vomiting.   Genitourinary: Positive for decreased urine volume. Negative for dysuria, flank pain and hematuria.   Musculoskeletal: Negative for back pain (not worse than normal).   Neurological: Positive for dizziness. Negative for light-headedness and headaches.       Physical Exam   BP: 148/73  Pulse: 75  Temp: (!) 102.8  F (39.3  C)  Resp: 24  SpO2: 96 %      Physical Exam  Vitals signs and nursing note reviewed.   Constitutional:       Appearance: He is overweight. He is ill-appearing.   HENT:      Head: Normocephalic.   Cardiovascular:      Rate and Rhythm: Normal rate and regular rhythm.      Heart sounds: Normal heart sounds. No murmur.   Pulmonary:      Effort: Pulmonary effort is normal. No respiratory distress.      Breath sounds: Normal breath sounds. No wheezing.   Abdominal:      General: Abdomen is protuberant. There is no distension.      Palpations: There is no hepatomegaly or splenomegaly.      Tenderness: There is no abdominal tenderness.   Skin:     General: Skin is warm and dry.   Neurological:      Mental Status: He is alert and oriented to person, place, and time.   Psychiatric:         Behavior: Behavior normal.         ED Course        Procedures             Results for orders placed or  performed during the hospital encounter of 05/21/21 (from the past 24 hour(s))   D dimer quantitative   Result Value Ref Range    D Dimer 0.6 (H) 0.0 - 0.50 ug/ml FEU   CBC with platelets differential   Result Value Ref Range    WBC 14.0 (H) 4.0 - 11.0 10e9/L    RBC Count 4.35 (L) 4.4 - 5.9 10e12/L    Hemoglobin 12.7 (L) 13.3 - 17.7 g/dL    Hematocrit 38.8 (L) 40.0 - 53.0 %    MCV 89 78 - 100 fl    MCH 29.2 26.5 - 33.0 pg    MCHC 32.7 31.5 - 36.5 g/dL    RDW 14.6 10.0 - 15.0 %    Platelet Count 159 150 - 450 10e9/L    Diff Method Automated Method     % Neutrophils 76.8 %    % Lymphocytes 9.9 %    % Monocytes 12.6 %    % Eosinophils 0.1 %    % Basophils 0.2 %    % Immature Granulocytes 0.4 %    Nucleated RBCs 0 0 /100    Absolute Neutrophil 10.7 (H) 1.6 - 8.3 10e9/L    Absolute Lymphocytes 1.4 0.8 - 5.3 10e9/L    Absolute Monocytes 1.8 (H) 0.0 - 1.3 10e9/L    Absolute Eosinophils 0.0 0.0 - 0.7 10e9/L    Absolute Basophils 0.0 0.0 - 0.2 10e9/L    Abs Immature Granulocytes 0.1 0 - 0.4 10e9/L    Absolute Nucleated RBC 0.0    Comprehensive metabolic panel   Result Value Ref Range    Sodium 130 (L) 133 - 144 mmol/L    Potassium 4.7 3.4 - 5.3 mmol/L    Chloride 95 94 - 109 mmol/L    Carbon Dioxide 26 20 - 32 mmol/L    Anion Gap 9 3 - 14 mmol/L    Glucose 172 (H) 70 - 99 mg/dL    Urea Nitrogen 35 (H) 7 - 30 mg/dL    Creatinine 1.47 (H) 0.66 - 1.25 mg/dL    GFR Estimate 50 (L) >60 mL/min/[1.73_m2]    GFR Estimate If Black 58 (L) >60 mL/min/[1.73_m2]    Calcium 8.8 8.5 - 10.1 mg/dL    Bilirubin Total 0.7 0.2 - 1.3 mg/dL    Albumin 3.3 (L) 3.4 - 5.0 g/dL    Protein Total 7.1 6.8 - 8.8 g/dL    Alkaline Phosphatase 52 40 - 150 U/L    ALT 18 0 - 70 U/L    AST 15 0 - 45 U/L   Lactic acid whole blood   Result Value Ref Range    Lactic Acid 1.8 0.7 - 2.0 mmol/L   Troponin I   Result Value Ref Range    Troponin I ES <0.015 0.000 - 0.045 ug/L   Procalcitonin   Result Value Ref Range    Procalcitonin 0.74 ng/ml   UA reflex to  Microscopic and Culture    Specimen: Urine clean catch; Midstream Urine   Result Value Ref Range    Color Urine Light Yellow     Appearance Urine Clear     Glucose Urine Negative NEG^Negative mg/dL    Bilirubin Urine Negative NEG^Negative    Ketones Urine Negative NEG^Negative mg/dL    Specific Gravity Urine 1.016 1.003 - 1.035    Blood Urine Small (A) NEG^Negative    pH Urine 6.0 4.7 - 8.0 pH    Protein Albumin Urine 100 (A) NEG^Negative mg/dL    Urobilinogen mg/dL Normal 0.0 - 2.0 mg/dL    Nitrite Urine Negative NEG^Negative    Leukocyte Esterase Urine Small (A) NEG^Negative    Source Midstream Urine     RBC Urine <1 0 - 2 /HPF    WBC Urine 12 (H) 0 - 5 /HPF    Bacteria Urine Few (A) NEG^Negative /HPF    Squamous Epithelial /HPF Urine 0 0 - 1 /HPF   Nt probnp inpatient   Result Value Ref Range    N-Terminal Pro BNP Inpatient 792 0 - 900 pg/mL   Symptomatic SARS-CoV-2 COVID-19 Virus (Coronavirus) by PCR    Specimen: Nasopharyngeal   Result Value Ref Range    SARS-CoV-2 Virus Specimen Source Nasopharyngeal     SARS-CoV-2 PCR Result NEGATIVE     SARS-CoV-2 PCR Comment       Testing was performed using the Xpert Xpress SARS-CoV-2 Assay on the Cepheid Gene-Xpert   Instrument Systems. Additional information about this Emergency Use Authorization (EUA)   assay can be found via the Lab Guide.     XR Chest Port 1 View    Narrative    Procedure:XR CHEST PORT 1 VIEW    Clinical history:Male, 66 years, fever unknown origin.    Technique: Single view was obtained.    Comparison: None    Findings: The cardiac silhouette is mildly enlarged. The pulmonary  vasculature is distended and indistinct.    The lungs demonstrate interstitial thickening without evidence of  pleural effusion or well-defined consolidation. Bony structures are  unremarkable.      Impression    Impression:   Findings suggesting congestive heart failure without evidence of  pulmonary edema or pleural effusion. No evidence of focal pneumonia.    AAMIR  MD THAD       Medications   0.9% sodium chloride BOLUS (0 mLs Intravenous Stopped 5/21/21 1534)   levofloxacin (LEVAQUIN) infusion 750 mg (0 mg Intravenous Stopped 5/21/21 1846)   acetaminophen (TYLENOL) tablet 975 mg (975 mg Oral Given 5/21/21 1709)       Assessments & Plan (with Medical Decision Making)     I have reviewed the nursing notes.    I have reviewed the findings, diagnosis, plan and need for follow up with the patient.    (R50.9) Fever, unknown origin  Comment: 66 year old male who is is is accompanied per his sister.  He has no history of being sick for the past 1 and half days.  He presents with symptoms fevers, chills, sinus pain and pressure, cough, shortness of breath, watery eyes, dizziness, and decreased urination.  History of kidney transplant 14 years ago and a cardiac stent last year. Is on cellcept. Is on medication for high blood pressure.  No known sick contacts.  Had his second dose of Covid vaccine at the beginning of March.  Has taken Tylenol at home without relief.  Denies chest pain and chest tightness.  Denies nausea, vomiting, diarrhea, and abdominal pain.    MDM:NHT. Lungs CTA.     Blood cultures pending.  Covid screen negative    Procalcitonin, lactic acid, and troponin negative.  CMP: creatinine 1.47, urea nitrogen 35, and GFR 58. Corrected sodium with glucose 172 is 132.  CBC has elevated WBC of 14 and absolute neutrophils of 10.7. hgb 12.7 and hematocrit 38.8  D-dimer 0.6.Will observe No CTA will be completed at this time.     BNP pending    UA has small amount of blood and leukocytes. WBC 12. Protein 100  UC pending    CXR reviewed and per radiology:  Impression:   Findings suggesting congestive heart failure without evidence of  pulmonary edema or pleural effusion. No evidence of focal pneumonia.    Levofloxacin given for possible UTI. history of kidney transplant 14 years ago. On Cellcept, immunocompromised.    Acetaminophen 975 mg did decrease his fever from 104 to  100.7    Plan: consulted Dr. Bell for admission. Will admit to Dr. Bell.      New Prescriptions    No medications on file       Final diagnoses:   Fever, unknown origin   Sepsis (H)       5/21/2021   [facility]     Angelika Castellon, CNP  05/21/21 [id number]

## 2021-05-21 NOTE — ED TRIAGE NOTES
"Pt states he developed a fever and tired feeling yesterday. Sister states pt is more SOB than usual. History of kidney transplant 20 years ago. States his 'flow\" has been slow the last 2 days. Dry cough in triage.  "

## 2021-05-22 ENCOUNTER — APPOINTMENT (OUTPATIENT)
Dept: CT IMAGING | Facility: HOSPITAL | Age: 66
DRG: 872 | End: 2021-05-22
Attending: INTERNAL MEDICINE
Payer: COMMERCIAL

## 2021-05-22 LAB
ALBUMIN SERPL-MCNC: 2.8 G/DL (ref 3.4–5)
ALP SERPL-CCNC: 49 U/L (ref 40–150)
ALT SERPL W P-5'-P-CCNC: 15 U/L (ref 0–70)
ANION GAP SERPL CALCULATED.3IONS-SCNC: 7 MMOL/L (ref 3–14)
AST SERPL W P-5'-P-CCNC: 18 U/L (ref 0–45)
BILIRUB SERPL-MCNC: 0.5 MG/DL (ref 0.2–1.3)
BUN SERPL-MCNC: 34 MG/DL (ref 7–30)
CALCIUM SERPL-MCNC: 8.2 MG/DL (ref 8.5–10.1)
CHLORIDE SERPL-SCNC: 98 MMOL/L (ref 94–109)
CK SERPL-CCNC: 583 U/L (ref 30–300)
CO2 SERPL-SCNC: 25 MMOL/L (ref 20–32)
CREAT SERPL-MCNC: 1.9 MG/DL (ref 0.66–1.25)
ERYTHROCYTE [DISTWIDTH] IN BLOOD BY AUTOMATED COUNT: 15 % (ref 10–15)
GFR SERPL CREATININE-BSD FRML MDRD: 36 ML/MIN/{1.73_M2}
GLUCOSE SERPL-MCNC: 163 MG/DL (ref 70–99)
HCT VFR BLD AUTO: 36.6 % (ref 40–53)
HGB BLD-MCNC: 11.9 G/DL (ref 13.3–17.7)
LACTATE BLD-SCNC: 1.4 MMOL/L (ref 0.7–2)
MCH RBC QN AUTO: 29.2 PG (ref 26.5–33)
MCHC RBC AUTO-ENTMCNC: 32.5 G/DL (ref 31.5–36.5)
MCV RBC AUTO: 90 FL (ref 78–100)
PLATELET # BLD AUTO: 139 10E9/L (ref 150–450)
POTASSIUM SERPL-SCNC: 4.4 MMOL/L (ref 3.4–5.3)
PROT SERPL-MCNC: 6.5 G/DL (ref 6.8–8.8)
RBC # BLD AUTO: 4.08 10E12/L (ref 4.4–5.9)
SODIUM SERPL-SCNC: 130 MMOL/L (ref 133–144)
WBC # BLD AUTO: 18.4 10E9/L (ref 4–11)

## 2021-05-22 PROCEDURE — 36415 COLL VENOUS BLD VENIPUNCTURE: CPT | Performed by: INTERNAL MEDICINE

## 2021-05-22 PROCEDURE — 99233 SBSQ HOSP IP/OBS HIGH 50: CPT | Performed by: INTERNAL MEDICINE

## 2021-05-22 PROCEDURE — 250N000013 HC RX MED GY IP 250 OP 250 PS 637: Performed by: INTERNAL MEDICINE

## 2021-05-22 PROCEDURE — 83605 ASSAY OF LACTIC ACID: CPT | Performed by: INTERNAL MEDICINE

## 2021-05-22 PROCEDURE — 82550 ASSAY OF CK (CPK): CPT | Performed by: INTERNAL MEDICINE

## 2021-05-22 PROCEDURE — 250N000012 HC RX MED GY IP 250 OP 636 PS 637: Performed by: INTERNAL MEDICINE

## 2021-05-22 PROCEDURE — 80053 COMPREHEN METABOLIC PANEL: CPT | Performed by: INTERNAL MEDICINE

## 2021-05-22 PROCEDURE — 87496 CYTOMEG DNA AMP PROBE: CPT | Performed by: INTERNAL MEDICINE

## 2021-05-22 PROCEDURE — 258N000003 HC RX IP 258 OP 636: Performed by: INTERNAL MEDICINE

## 2021-05-22 PROCEDURE — 85027 COMPLETE CBC AUTOMATED: CPT | Performed by: INTERNAL MEDICINE

## 2021-05-22 PROCEDURE — 120N000001 HC R&B MED SURG/OB

## 2021-05-22 PROCEDURE — 87449 NOS EACH ORGANISM AG IA: CPT | Performed by: INTERNAL MEDICINE

## 2021-05-22 PROCEDURE — 71250 CT THORAX DX C-: CPT

## 2021-05-22 PROCEDURE — 250N000011 HC RX IP 250 OP 636: Performed by: INTERNAL MEDICINE

## 2021-05-22 PROCEDURE — 87040 BLOOD CULTURE FOR BACTERIA: CPT | Performed by: INTERNAL MEDICINE

## 2021-05-22 PROCEDURE — 87799 DETECT AGENT NOS DNA QUANT: CPT | Performed by: INTERNAL MEDICINE

## 2021-05-22 PROCEDURE — 87899 AGENT NOS ASSAY W/OPTIC: CPT | Performed by: INTERNAL MEDICINE

## 2021-05-22 RX ORDER — METHYLPREDNISOLONE 4 MG/1
8 TABLET ORAL
Status: DISCONTINUED | OUTPATIENT
Start: 2021-05-23 | End: 2021-05-27 | Stop reason: HOSPADM

## 2021-05-22 RX ORDER — SODIUM CHLORIDE 9 MG/ML
INJECTION, SOLUTION INTRAVENOUS CONTINUOUS
Status: DISCONTINUED | OUTPATIENT
Start: 2021-05-22 | End: 2021-05-27 | Stop reason: HOSPADM

## 2021-05-22 RX ORDER — LEVOFLOXACIN 5 MG/ML
500 INJECTION, SOLUTION INTRAVENOUS EVERY 24 HOURS
Status: DISCONTINUED | OUTPATIENT
Start: 2021-05-22 | End: 2021-05-23

## 2021-05-22 RX ADMIN — ACETAMINOPHEN 650 MG: 325 TABLET, FILM COATED ORAL at 21:35

## 2021-05-22 RX ADMIN — ALLOPURINOL 100 MG: 100 TABLET ORAL at 08:40

## 2021-05-22 RX ADMIN — FLUTICASONE FUROATE AND VILANTEROL TRIFENATATE 1 PUFF: 200; 25 POWDER RESPIRATORY (INHALATION) at 08:40

## 2021-05-22 RX ADMIN — METAXALONE 800 MG: 800 TABLET ORAL at 08:40

## 2021-05-22 RX ADMIN — DAPTOMYCIN 615 MG: 350 INJECTION, POWDER, LYOPHILIZED, FOR SOLUTION INTRAVENOUS at 09:45

## 2021-05-22 RX ADMIN — GUAIFENESIN 10 ML: 100 SOLUTION ORAL at 08:44

## 2021-05-22 RX ADMIN — CALCITRIOL 0.25 MCG: 0.25 CAPSULE, LIQUID FILLED ORAL at 08:39

## 2021-05-22 RX ADMIN — ACETAMINOPHEN 650 MG: 325 TABLET, FILM COATED ORAL at 02:26

## 2021-05-22 RX ADMIN — Medication 1 CAPSULE: at 08:40

## 2021-05-22 RX ADMIN — SODIUM CHLORIDE 500 ML: 9 INJECTION, SOLUTION INTRAVENOUS at 17:40

## 2021-05-22 RX ADMIN — CETIRIZINE HYDROCHLORIDE 10 MG: 10 TABLET, FILM COATED ORAL at 08:39

## 2021-05-22 RX ADMIN — ONDANSETRON 4 MG: 4 TABLET, ORALLY DISINTEGRATING ORAL at 09:49

## 2021-05-22 RX ADMIN — ACETAMINOPHEN 650 MG: 325 TABLET, FILM COATED ORAL at 12:47

## 2021-05-22 RX ADMIN — GUAIFENESIN 5 ML: 100 SOLUTION ORAL at 21:35

## 2021-05-22 RX ADMIN — MYCOPHENOLATE MOFETIL 1000 MG: 500 TABLET ORAL at 08:39

## 2021-05-22 RX ADMIN — METAXALONE 800 MG: 800 TABLET ORAL at 21:34

## 2021-05-22 RX ADMIN — LEVOFLOXACIN 500 MG: 5 INJECTION, SOLUTION INTRAVENOUS at 17:34

## 2021-05-22 RX ADMIN — ACETAMINOPHEN 650 MG: 325 TABLET, FILM COATED ORAL at 06:12

## 2021-05-22 RX ADMIN — ACETAMINOPHEN 650 MG: 325 TABLET, FILM COATED ORAL at 16:38

## 2021-05-22 RX ADMIN — LEVOTHYROXINE SODIUM 50 MCG: 0.05 TABLET ORAL at 06:13

## 2021-05-22 RX ADMIN — GUAIFENESIN 10 ML: 100 SOLUTION ORAL at 16:37

## 2021-05-22 RX ADMIN — GUAIFENESIN 10 ML: 100 SOLUTION ORAL at 12:47

## 2021-05-22 RX ADMIN — METAXALONE 800 MG: 800 TABLET ORAL at 14:32

## 2021-05-22 RX ADMIN — NITROGLYCERIN 1 PATCH: 0.4 PATCH TRANSDERMAL at 08:46

## 2021-05-22 RX ADMIN — LISINOPRIL 5 MG: 5 TABLET ORAL at 08:40

## 2021-05-22 RX ADMIN — MYCOPHENOLATE MOFETIL 1000 MG: 500 TABLET ORAL at 21:34

## 2021-05-22 RX ADMIN — ASPIRIN 324 MG: 81 TABLET, CHEWABLE ORAL at 08:39

## 2021-05-22 RX ADMIN — ROSUVASTATIN CALCIUM 40 MG: 10 TABLET, FILM COATED ORAL at 08:39

## 2021-05-22 RX ADMIN — SODIUM CHLORIDE, PRESERVATIVE FREE: 5 INJECTION INTRAVENOUS at 08:39

## 2021-05-22 RX ADMIN — Medication 1 CAPSULE: at 21:34

## 2021-05-22 RX ADMIN — SODIUM CHLORIDE, PRESERVATIVE FREE: 5 INJECTION INTRAVENOUS at 22:37

## 2021-05-22 ASSESSMENT — ACTIVITIES OF DAILY LIVING (ADL)
ADLS_ACUITY_SCORE: 20
ADLS_ACUITY_SCORE: 19
ADLS_ACUITY_SCORE: 18

## 2021-05-22 NOTE — H&P
[facility]    History and Physical - Hospitalist Service       Date of Admission:  5/21/2021    Assessment & Plan   Eduar Isaacs is a 66 year old male admitted on 5/21/2021.     Acute cystitis with hematuria: will use empiric Cipro for coverage and prostate penetration, monitor culture results, bladder scan    Associated sepsis. Sepsis criteria met (WBC 14.0 + fever 102.8). lactic acid is 1.8. IV fluids, trend lactic acid    Established renal transplant: trend renal function, continue with established medical management.    Hyperglycemia (172): check HgA1c     Diet:   combination  DVT Prophylaxis: Pneumatic Compression Devices  Manley Catheter: not present  Code Status:   modified: no intubation         Disposition Plan   Expected discharge: 2 - 3 days, recommended to prior living arrangement once discharge plan established.  Entered: Angelito Bell DO 05/21/2021, 7:59 PM     The patient's care was discussed with the Patient and his sister, who helps with cares    Angelito Bell DO  [facility]  Contact information available via [facility] Paging/Directory      ______________________________________________________________________    Chief Complaint   Fevers measured at home (>101) today, chills,  low grade fevers last night, urinary frequency, weakness    History is obtained from the patient, ER Provider, EHR review    History of Present Illness   Eduar Isaacs is a 66 year old male who is a twenty year renal transplant survivor; bilateral loss of renal function, blamed on HTN. He has had one other hospitalization for UTI about three years ago ([facility]). He has recent established CAD with stenting and COPD, as a former smoker    He had been feeling fine until the onset of fever last night. Today, the fevers were higher, he had chills and was beginning to feel ill.    ER Course: vitals showed fever (102.8) and /88; there was no distress. Lab diagnostics resulted a heme profile  with elevated WBC (14.0) and left shift. The chemistry profile showed a slight dehydration with borderline stage 2/3 renal performance. Lactic acid was 1.8, procalcitonin was 0.74, troponin was normal. the UA was with small leukocyte esterase and bacteria. The chest film was read as suggestive of CHF, but his respiratory status has remained stable (always short of breath) and BNP was normal. He was given fluids and Levaquin.     Review of Systems       Constitutional:  fever and chills, some generalized weakness, diminished appetite  Ears, Nose & Throat: no sore throat, no nasal drainage, no congestion. No ear pain, no ear drainage, no particular change in hearing  Eyes: no particular change in vision, no redness, no drainage  Cardiovascular: No chest pain at rest, no chest pain with familiar activities.  Pulmonary: No cough, no particular change in work of breathing (always short of breath), no particular change in shortness of breath with position changes or familiar activites  Gastrointestinal: No abdominal pain, no nausea, no vomiting, no diarrhea. No particular change in bowel movement pattern, no black stools, no bloody stools  Genitourinary:  He measures his urine at home and, starting yesterday, he was urinating more frequently and having much smaller amounts each time  Skin: No particular change in bruising, no rashes  Musculoskeletal: no particular change in strength, no particular change in muscle development  Neurological: no numbness and tingling, no headache, no particular change in balance, no dizziness  Psychologic: No particular change in depression and/or anxiety  Endocrine: No particular change in heat or cold intolerance        Past Medical History    I have reviewed this patient's medical history and updated it with pertinent information if needed.   No past medical history on file.    Past Surgical History   I have reviewed this patient's surgical history and updated it with pertinent  information if needed.  Past Surgical History:   Procedure Laterality Date     COLONOSCOPY - HIM SCAN  04/08/2012    [place], polyps, adenomatous       Social History   I have reviewed this patient's social history and updated it with pertinent information if needed.  Social History     Tobacco Use     Smoking status: Former Smoker     Smokeless tobacco: Never Used   Substance Use Topics     Alcohol use: Yes     Comment: rare     Drug use: Never       Family History      Not relevant to this admission    Prior to Admission Medications   Prior to Admission Medications   Prescriptions Last Dose Informant Patient Reported? Taking?   ALLOPURINOL PO   Yes No   Sig: Take 100 mg by mouth daily   CALCITRIOL PO   Yes No   Sig: Take 0.25 mcg by mouth daily   CELLCEPT 500 MG PO TABLET   Yes No   Sig: Take 1,000 mg by mouth 2 times daily   DiphenhydrAMINE HCl (BENADRYL PO)   Yes No   Sig: Take 25 mg by mouth nightly as needed   HYDROcodone-acetaminophen (NORCO) 5-325 MG per tablet   Yes No   Sig: Take 2 tablets by mouth every 6 hours as needed for moderate to severe pain   LEVOTHYROXINE SODIUM PO   Yes No   Sig: Take 50 mcg by mouth daily   Lisinopril (PRINIVIL PO)   Yes No   Sig: Take 5 mg by mouth daily   NITROGLYCERIN SL   Yes No   Sig: Place 0.4 mg under the tongue   Omega-3 Fatty Acids (OMEGA-3 FISH OIL PO)   Yes No   Sig: Take 1 g by mouth daily   Rosuvastatin Calcium (CRESTOR PO)   Yes No   Sig: Take 40 mg by mouth daily   VITAMIN D, CHOLECALCIFEROL, PO   Yes No   Sig: Take 1,000 Units by mouth daily   aspirin - buffered (ASCRIPTIN) 325 MG TABS   Yes No   Sig: Take 325 mg by mouth daily   budesonide-formoterol (SYMBICORT) 160-4.5 MCG/ACT inhaler   Yes No   Sig: Inhale 2 puffs into the lungs 2 times daily   calcium carbonate (TUMS) 500 MG chewable tablet   Yes No   Sig: Take 1 chew tab by mouth daily   cetirizine (ZYRTEC) 10 MG tablet   Yes No   Sig: Take 10 mg by mouth daily   metaxalone (SKELAXIN) 800 MG tablet   Yes  "No   Sig: Take 800 mg by mouth 3 times daily   methylPREDNISolone (MEDROL DOSEPAK) 4 MG tablet   Yes No   Sig: Take 2 mg by mouth See Admin Instructions follow package directions   mupirocin (BACTROBAN) 2 % ointment   Yes No   Sig: Apply topically 3 times daily   nitroglycerin (NITRODUR) 0.4 MG/HR   Yes No   Sig: Place 1 patch onto the skin daily      Facility-Administered Medications: None     Allergies   Allergies   Allergen Reactions     Codeine Shortness Of Breath     Vancomycin Rash     Cefepime      Amoxicillin Itching and Rash     Niacin Rash     Prilosec [Omeprazole] Itching and Rash       Physical Exam   Vital Signs: Temp: 100.7  F (38.2  C) Temp src: Oral BP: 138/65 Pulse: 84   Resp: 19 SpO2: 93 % O2 Device: None (Room air)    Weight: 0 lbs 0 oz     Case reviewed with the ER Provider, EHR reviewed; patient seen in ER room 5 with his sister present    Vital signs:  Temp: (S) (!) 102.3  F (39.1  C)(NP aware) Temp src: Oral BP: 160/80 Pulse: 92(SR)   Resp: 24 SpO2: 95 % O2 Device: None (Room air)        Estimated body mass index is 33.23 kg/m  as calculated from the following:    Height as of 4/28/16: 1.753 m (5' 9\").    Weight as of 4/28/16: 102.1 kg (225 lb).      General: No distress, interactive  Head: normocephalic, no obvious trauma  Eyes: Gaze directed normally, sclera clear, no discharge, no abnormal ocular movements  Ears: Normal appearing age-related external ears, no discharge, stable hearing acuity loss  Nose: Normal age-related appearance  Mouth: Normal appearing oral mucosa, Gums and throat appear age-related normal  Neck: Normal age-related appearance, age-related range of motion, supple, no adenopathy  Pulmonary: Normal work of breathing, at rest, some expiratory delay, no coarseness, no wheezing  Cardiovascular: Distant heart sounds, regular rhythm   Abdomen: No obvious distention, soft, bowel sounds present with normal frequency and pitch  Rectal: Deferred  Back: Age-related normal  Skin: " Age-related normal, no rashes  Extremities: Not tender, no lower extremity edema. Moving upper and lower extremities  Neurological: Grossly in tact  Psychiatric: Mood is stable, appropriately interactive        Data   Data reviewed today: I reviewed all medications, new labs and imaging results over the last 24 hours.

## 2021-05-22 NOTE — PLAN OF CARE
"Reason for hospital stay:  Fever of unknown origin   Living situation PTA: Home  Most recent vitals: /61   Pulse 92   Temp (!) 101.8  F (38.8  C) (Tympanic)   Resp 22   Ht 1.727 m (5' 8\")   Wt (!) 153.5 kg (338 lb 6.5 oz)   SpO2 93%   BMI 51.45 kg/m    Pt has been awake, on and off throughout shift, A&O x4 with VS and Assessments. Continues to deny pain but getting PRN Tylenol for fevers. MD aware of VS. Continued to ice pack, encourage fluids, popsicles, fan and sheet without blanket.     IVF:  NS per MAR  ABX: Administered per MAR  ADL's:  A1  Ambulation: SBA  Safety:  Call button within reach, calls appropriately and makes needs known.     Face to face report given with opportunity to observe patient.    Report given to LILIANA Tamayo RN   5/22/2021  3:41 PM    "

## 2021-05-22 NOTE — PLAN OF CARE
VSS with exception of Temp 101.6-102.6, administering Tylenol q4hrs et fan on pt all noc.Lung sounds clear et bowel sounds active. Saline locked after bolus completed.   Temp 0611 104.1 administered Tylenol, ICE, et fan. Updated

## 2021-05-22 NOTE — PLAN OF CARE
[facility] Inpatient Admission Note:    Patient admitted to 3232/3232-1 at approximately 2100 via bed accompanied by transport tech from emergency room . Report received from Fely FORD in SBAR format at 2000 via telephone. Patient ambulated to bed via self.. Patient is alert and oriented X 3, denies pain; rates at 0 on 0-10 scale.  Patient oriented to room, unit, hourly rounding, and plan of care. Explained admission packet and patient handbook with patient bill of rights brochure. Will continue to monitor and document as needed.     Inpatient Nursing criteria listed below was met:    Health care directives status obtained and documented: Yes    Care Everywhere authorization obtained No    MRSA swab completed for patient 65 years and older: N/A    Patient identifies a surrogate decision maker: No If yes, who:n/a Contact Information:n/a     If initial lactic acid >2.0, repeat lactic acid drawn within one hour of arrival to unit: No. If no, state reason: n/a    Vaccination assessment and education completed: Yes   Vaccinations received prior to admission: Pneumovax yes  Influenza(seasonal)  YES   Vaccination(s) ordered: patient declines    Clergy visit ordered if patient requests: No    Skin issues/needs documented: No    Isolation Patient: no Education given, correct sign in place and documentation row added to PCS:  No    Fall Prevention Yes: Care plan updated, education given and documented, sticker and magnet in place: Yes    Care Plan initiated: Yes    Education Documented (including assessment): Yes    Patient has discharge needs : No If yes, please explain: will continue to monitor

## 2021-05-22 NOTE — PLAN OF CARE
"Most recent vitals: BP (!) 91/47   Pulse 82   Temp 98.3  F (36.8  C) (Tympanic)   Resp 20   Ht 1.727 m (5' 8\")   Wt (!) 153.5 kg (338 lb 6.5 oz)   SpO2 97%   BMI 51.45 kg/m      Lethargic but oriented. Arouses to voice. Fever of 101.5, tylenol given. Poor historian. Heart rhythm regular, tachycardic with movement. Lungs clear bilaterally. RIVERA. Sats low 90s on room air. Bowel sounds active. Scattered bruising and scabs throughout. Varicose veins to lower extremities. Bolus of 1000 mls infusing. Assist x1. Alarms active.      Face to face report given with opportunity to observe patient.    Report given to Elina Worthy RN   5/21/2021  10:26 PM      "

## 2021-05-22 NOTE — PLAN OF CARE
Orders received for PT eval.  Per Dr. Almanzar pt having fevers and not appropriate today.  Will see how pt mobilizes with nursing, Dr. Almanzar is not sure if PT will be needed.  Will reassess on Monday and complete PT eval if indicated.

## 2021-05-22 NOTE — PLAN OF CARE
"Most recent vitals: BP (!) 147/45   Pulse 104   Temp 100.1  F (37.8  C) (Temporal)   Resp (!) 28   Ht 1.727 m (5' 8\")   Wt (!) 153.5 kg (338 lb 6.5 oz)   SpO2 95%   BMI 51.45 kg/m      Lethargic but oriented. Arouses to voice. Fever of 101.5, tylenol given, fever came down to 100.1. Poor historian. Heart rhythm regular, tachycardic with movement. Patient was nauseated and dry heaving when he got up to the floor, zofran given with effective relief. Lungs clear bilaterally. RIVERA. Sats low 90s on room air. Bowel sounds active. Scattered bruising and scabs throughout. Varicose veins to lower extremities. Bolus of 1000 mls infusing. Assist x1. Alarms active.    Face to face report given with opportunity to observe patient.    Report given to Elina Worthy RN   5/21/2021  11:20 PM        "

## 2021-05-22 NOTE — PLAN OF CARE
Had CT scan.  Dr. Almanzar seen pt. After scan.  Temp- 104.3- tylenol  Given. Resting in bed.  Has chills. Daughter here.

## 2021-05-22 NOTE — PHARMACY
Pharmacy Antimicrobial Stewardship Assessment     Current Antimicrobial Therapy:  Anti-infectives (From now, onward)    Start     Dose/Rate Route Frequency Ordered Stop    05/22/21 1700  levofloxacin (LEVAQUIN) infusion 500 mg      500 mg  100 mL/hr over 60 Minutes Intravenous EVERY 24 HOURS 05/22/21 1258      05/22/21 0900  DAPTOmycin (CUBICIN) 615 mg in sodium chloride 0.9 % 100 mL intermittent infusion      6 mg/kg × 102.4 kg (Adjusted)  over 30 Minutes Intravenous EVERY 24 HOURS 05/22/21 0645            Indication: Sepsis/UTI    Days of Therapy: 2 (Levaquin), 1 (daptomycin)     Pertinent Labs:  WBC   Date Value Ref Range Status   05/22/2021 18.4 (H) 4.0 - 11.0 10e9/L Final   05/21/2021 14.0 (H) 4.0 - 11.0 10e9/L Final   06/24/2019 10.5 4.0 - 11.0 10e9/L Final     Procalcitonin   Date Value Ref Range Status   05/21/2021 0.74 ng/ml Final     Comment:     0.50-1.99 ng/ml  Moderate risk of systemic infection.  Recommendation:   Recommend antibiotics. Evaluate culture results and clinical features to   target antibacterial therapy. Obtain blood cultures and other relevant   cultures if not done.  If empiric antibiotics were started, recheck PCT in: 2 days to guide   antibiotic de-escalation.  Discontinue or de-escalate antibiotics when PCT   concentration is <80% of peak or abs PCT <0.5. If empiric antibiotics were NOT   started, recheck PCT in 6-24 hours to re-evaluate need for antibiotics.       No results found for: CRP    Culture Results:   05/21/2021 1452 05/22/2021 0943 Urine Culture Aerobic Bacterial [[id number]]   (Abnormal)   Midstream Urine    Preliminary result Component Value   Specimen Description Midstream Urine   Culture Micro 50,000 to 100,000 colonies/mL   Gram positive cocci   Identification and susceptibility to follow.   Abnormal P        05/21/2021 1452 05/22/2021 0720 Blood culture [[id number]]   Blood    Preliminary result Component Value   Specimen Description Blood   Culture Micro No growth after 16  hours P        Collected Updated Procedure Result Status    05/22/2021 0652 05/22/2021 0653 Blood culture [[id number]]   Blood    In process Component Value   No component results           05/22/2021 0644 05/22/2021 0645 Blood culture [[id number]]   Blood    In process Component Value   No component results           05/21/2021 1516 05/22/2021 0720 Blood culture [[id number]]   Blood    Preliminary result Component Value   Specimen Description Blood   Culture Micro No growth after 16 hours P        5/21/21: COVID negative          Recommendations/Interventions:  1. On Daptomycin: monitor CK, Scr & weight as patient's weight >111 kg. Next CK level is on 5/25.  2. Sensitivities pending. Will continue to follow. Levaquin instead of Cipro due to interaction between Cipro & mycophenolate mofetil.       Batool Saini, [facility]  May 22, 2021

## 2021-05-23 ENCOUNTER — HOSPITAL ENCOUNTER (OUTPATIENT)
Age: 66
End: 2021-05-23
Attending: INTERNAL MEDICINE | Admitting: INTERNAL MEDICINE

## 2021-05-23 LAB
ALBUMIN SERPL-MCNC: 2.5 G/DL (ref 3.4–5)
ALBUMIN UR-MCNC: 30 MG/DL
ALP SERPL-CCNC: 53 U/L (ref 40–150)
ALT SERPL W P-5'-P-CCNC: 20 U/L (ref 0–70)
AMORPH CRY #/AREA URNS HPF: ABNORMAL /HPF
ANION GAP SERPL CALCULATED.3IONS-SCNC: 11 MMOL/L (ref 3–14)
ANION GAP SERPL CALCULATED.3IONS-SCNC: 9 MMOL/L (ref 3–14)
APPEARANCE UR: ABNORMAL
AST SERPL W P-5'-P-CCNC: 55 U/L (ref 0–45)
BACTERIA #/AREA URNS HPF: ABNORMAL /HPF
BILIRUB SERPL-MCNC: 0.5 MG/DL (ref 0.2–1.3)
BILIRUB UR QL STRIP: NEGATIVE
BUN SERPL-MCNC: 40 MG/DL (ref 7–30)
BUN SERPL-MCNC: 41 MG/DL (ref 7–30)
C DIFF TOX B STL QL: NEGATIVE
CALCIUM SERPL-MCNC: 7 MG/DL (ref 8.5–10.1)
CALCIUM SERPL-MCNC: 7.3 MG/DL (ref 8.5–10.1)
CHLORIDE SERPL-SCNC: 101 MMOL/L (ref 94–109)
CHLORIDE SERPL-SCNC: 97 MMOL/L (ref 94–109)
CO2 SERPL-SCNC: 19 MMOL/L (ref 20–32)
CO2 SERPL-SCNC: 21 MMOL/L (ref 20–32)
COLOR UR AUTO: ABNORMAL
CREAT SERPL-MCNC: 2.49 MG/DL (ref 0.66–1.25)
CREAT SERPL-MCNC: 2.7 MG/DL (ref 0.66–1.25)
CRYPTOC AG SPEC QL: NORMAL
ERYTHROCYTE [DISTWIDTH] IN BLOOD BY AUTOMATED COUNT: 15.2 % (ref 10–15)
GFR SERPL CREATININE-BSD FRML MDRD: 24 ML/MIN/{1.73_M2}
GFR SERPL CREATININE-BSD FRML MDRD: 26 ML/MIN/{1.73_M2}
GLUCOSE SERPL-MCNC: 142 MG/DL (ref 70–99)
GLUCOSE SERPL-MCNC: 219 MG/DL (ref 70–99)
GLUCOSE UR STRIP-MCNC: NEGATIVE MG/DL
HCT VFR BLD AUTO: 32.1 % (ref 40–53)
HGB BLD-MCNC: 10.6 G/DL (ref 13.3–17.7)
HGB UR QL STRIP: ABNORMAL
KETONES UR STRIP-MCNC: NEGATIVE MG/DL
LACTATE BLD-SCNC: 1.4 MMOL/L (ref 0.7–2)
LEUKOCYTE ESTERASE UR QL STRIP: ABNORMAL
MCH RBC QN AUTO: 29.2 PG (ref 26.5–33)
MCHC RBC AUTO-ENTMCNC: 33 G/DL (ref 31.5–36.5)
MCV RBC AUTO: 88 FL (ref 78–100)
NITRATE UR QL: NEGATIVE
PH UR STRIP: 5.5 PH (ref 4.7–8)
PLATELET # BLD AUTO: 121 10E9/L (ref 150–450)
POTASSIUM SERPL-SCNC: 4 MMOL/L (ref 3.4–5.3)
POTASSIUM SERPL-SCNC: 4.2 MMOL/L (ref 3.4–5.3)
PROCALCITONIN SERPL-MCNC: 37.4 NG/ML
PROT SERPL-MCNC: 6.1 G/DL (ref 6.8–8.8)
RBC # BLD AUTO: 3.63 10E12/L (ref 4.4–5.9)
RBC #/AREA URNS AUTO: 1 /HPF (ref 0–2)
SODIUM SERPL-SCNC: 129 MMOL/L (ref 133–144)
SODIUM SERPL-SCNC: 129 MMOL/L (ref 133–144)
SOURCE: ABNORMAL
SP GR UR STRIP: 1.01 (ref 1–1.03)
SPECIMEN SOURCE: NORMAL
SPECIMEN SOURCE: NORMAL
SQUAMOUS #/AREA URNS AUTO: 0 /HPF (ref 0–1)
UROBILINOGEN UR STRIP-MCNC: NORMAL MG/DL (ref 0–2)
WBC # BLD AUTO: 17.5 10E9/L (ref 4–11)
WBC #/AREA URNS AUTO: 10 /HPF (ref 0–5)

## 2021-05-23 PROCEDURE — 120N000001 HC R&B MED SURG/OB

## 2021-05-23 PROCEDURE — 250N000013 HC RX MED GY IP 250 OP 250 PS 637: Performed by: INTERNAL MEDICINE

## 2021-05-23 PROCEDURE — 85027 COMPLETE CBC AUTOMATED: CPT | Performed by: INTERNAL MEDICINE

## 2021-05-23 PROCEDURE — 87493 C DIFF AMPLIFIED PROBE: CPT | Performed by: INTERNAL MEDICINE

## 2021-05-23 PROCEDURE — 99233 SBSQ HOSP IP/OBS HIGH 50: CPT | Performed by: INTERNAL MEDICINE

## 2021-05-23 PROCEDURE — 80048 BASIC METABOLIC PNL TOTAL CA: CPT | Performed by: INTERNAL MEDICINE

## 2021-05-23 PROCEDURE — 81001 URINALYSIS AUTO W/SCOPE: CPT | Performed by: INTERNAL MEDICINE

## 2021-05-23 PROCEDURE — 36415 COLL VENOUS BLD VENIPUNCTURE: CPT | Performed by: INTERNAL MEDICINE

## 2021-05-23 PROCEDURE — 80053 COMPREHEN METABOLIC PANEL: CPT | Performed by: INTERNAL MEDICINE

## 2021-05-23 PROCEDURE — 250N000011 HC RX IP 250 OP 636: Performed by: INTERNAL MEDICINE

## 2021-05-23 PROCEDURE — 83605 ASSAY OF LACTIC ACID: CPT | Performed by: INTERNAL MEDICINE

## 2021-05-23 PROCEDURE — 84145 PROCALCITONIN (PCT): CPT | Performed by: INTERNAL MEDICINE

## 2021-05-23 PROCEDURE — 258N000003 HC RX IP 258 OP 636: Performed by: INTERNAL MEDICINE

## 2021-05-23 PROCEDURE — 250N000012 HC RX MED GY IP 250 OP 636 PS 637: Performed by: INTERNAL MEDICINE

## 2021-05-23 RX ORDER — LEVOFLOXACIN 5 MG/ML
250 INJECTION, SOLUTION INTRAVENOUS EVERY 24 HOURS
Status: DISCONTINUED | OUTPATIENT
Start: 2021-05-23 | End: 2021-05-26

## 2021-05-23 RX ADMIN — CETIRIZINE HYDROCHLORIDE 10 MG: 10 TABLET, FILM COATED ORAL at 09:01

## 2021-05-23 RX ADMIN — SODIUM CHLORIDE, PRESERVATIVE FREE: 5 INJECTION INTRAVENOUS at 18:16

## 2021-05-23 RX ADMIN — ASPIRIN 324 MG: 81 TABLET, CHEWABLE ORAL at 09:01

## 2021-05-23 RX ADMIN — DAPTOMYCIN 615 MG: 350 INJECTION, POWDER, LYOPHILIZED, FOR SOLUTION INTRAVENOUS at 08:55

## 2021-05-23 RX ADMIN — Medication 1 CAPSULE: at 21:30

## 2021-05-23 RX ADMIN — MYCOPHENOLATE MOFETIL 1000 MG: 500 TABLET ORAL at 09:01

## 2021-05-23 RX ADMIN — SODIUM CHLORIDE 1000 ML: 9 INJECTION, SOLUTION INTRAVENOUS at 07:42

## 2021-05-23 RX ADMIN — LISINOPRIL 5 MG: 5 TABLET ORAL at 09:01

## 2021-05-23 RX ADMIN — SODIUM CHLORIDE 500 ML: 9 INJECTION, SOLUTION INTRAVENOUS at 16:38

## 2021-05-23 RX ADMIN — ALLOPURINOL 100 MG: 100 TABLET ORAL at 09:01

## 2021-05-23 RX ADMIN — ACETAMINOPHEN 650 MG: 325 TABLET, FILM COATED ORAL at 01:47

## 2021-05-23 RX ADMIN — NITROGLYCERIN 1 PATCH: 0.4 PATCH TRANSDERMAL at 09:18

## 2021-05-23 RX ADMIN — Medication 1 CAPSULE: at 09:01

## 2021-05-23 RX ADMIN — SODIUM CHLORIDE, PRESERVATIVE FREE: 5 INJECTION INTRAVENOUS at 13:33

## 2021-05-23 RX ADMIN — CALCITRIOL 0.25 MCG: 0.25 CAPSULE, LIQUID FILLED ORAL at 09:01

## 2021-05-23 RX ADMIN — METHYLPREDNISOLONE 8 MG: 4 TABLET ORAL at 09:01

## 2021-05-23 RX ADMIN — LEVOTHYROXINE SODIUM 50 MCG: 0.05 TABLET ORAL at 07:41

## 2021-05-23 RX ADMIN — LEVOFLOXACIN 250 MG: 5 INJECTION, SOLUTION INTRAVENOUS at 21:30

## 2021-05-23 RX ADMIN — METAXALONE 800 MG: 800 TABLET ORAL at 09:01

## 2021-05-23 RX ADMIN — FLUTICASONE FUROATE AND VILANTEROL TRIFENATATE 1 PUFF: 200; 25 POWDER RESPIRATORY (INHALATION) at 08:59

## 2021-05-23 RX ADMIN — SODIUM CHLORIDE, PRESERVATIVE FREE: 5 INJECTION INTRAVENOUS at 08:54

## 2021-05-23 RX ADMIN — ACETAMINOPHEN 650 MG: 325 TABLET, FILM COATED ORAL at 06:03

## 2021-05-23 RX ADMIN — MYCOPHENOLATE MOFETIL 1000 MG: 500 TABLET ORAL at 21:30

## 2021-05-23 RX ADMIN — METAXALONE 800 MG: 800 TABLET ORAL at 14:17

## 2021-05-23 RX ADMIN — SODIUM CHLORIDE, PRESERVATIVE FREE: 5 INJECTION INTRAVENOUS at 06:06

## 2021-05-23 RX ADMIN — ROSUVASTATIN CALCIUM 40 MG: 10 TABLET, FILM COATED ORAL at 09:01

## 2021-05-23 RX ADMIN — METAXALONE 800 MG: 800 TABLET ORAL at 21:30

## 2021-05-23 RX ADMIN — SODIUM CHLORIDE 500 ML: 9 INJECTION, SOLUTION INTRAVENOUS at 12:53

## 2021-05-23 ASSESSMENT — ACTIVITIES OF DAILY LIVING (ADL)
ADLS_ACUITY_SCORE: 18
ADLS_ACUITY_SCORE: 19
ADLS_ACUITY_SCORE: 18
ADLS_ACUITY_SCORE: 19

## 2021-05-23 NOTE — PROGRESS NOTES
Urine cultures are back now with Enterococcus faecalis.  Certainly should be covered with the daptomycin.  Did discuss with [facility] they currently have no beds available.  They are more than happy to accept in one of the bed does become available.

## 2021-05-23 NOTE — PROGRESS NOTES
[facility]  Transfer Triage Note    Date of call: 05/23/21  Time of call: 9:09 AM    Is pandemic COVID-19 a concern? NO    Reason for transfer: Further diagnostic work up, management, and consultation for specialized care   Diagnosis: KAYLAN in renal transplant    Outside Records: Available  Additional records requested to be faxed to [phone].    Stability of Patient: Patient is vitally stable, with no critical labs, and will likely remain stable throughout the transfer process  ICU: No    Expected Time of Arrival for Transfer: 8-24 hours    Arrival Location:  [address] [address] Phone: [phone]    Recommendations for Management and Stabilization: Given    Additional Comments     Received transfer request from [facility]    66M h/o renal transplant 20 years ago  P/w fevers, rigors two days ago  t104F there  Cr 1.3 baseline, now 2.7   Mental status alert  Hemodynamically stable  Lactate 1.4   No source identified  Blood cultures NGTD  CT c/a/p unremarkable  50-100k enterococcus colonies in urine  On daptomycin and levofloxacin for sepsis  Had interstitial nephritis on cephalosporin in past  Continuing to get IVF  covid negative     Accepted for med/surg bed at [facility] to manage KAYLAN in renal transplant. No beds available this morning, hope later today/evening. Currently stable at sending facility. Offered renal transplant consult on phone while awaiting bed, sending provider deferred for now. Will call back if any clinical change.     Celestine Garvey MD

## 2021-05-23 NOTE — PROGRESS NOTES
[facility]    Hospitalist Progress Note      Assessment & Plan   Eduar Isaacs is a 66 year old male who was admitted on 5/21/2021.      1.  Sepsis and renal transplant patient: 66-year-old gentleman 20 years status post living related donor transplant.  Baseline creatinine 1.3.  Remains on mycophenolate and methylprednisolone.  Presented with couple days of not feeling well chills and rigors.  No other specific symptoms or complaints.  Initial presentation temp 102.8.  Hemodynamically stable.  Normal room air sats.  Blood cultures performed.  Urinalysis with a few bacteria 12 white cells 1 red cell.  White blood cell count 14,000 procalcitonin low.  Initially started on IV Cipro by the admitting physician.  Switch patient over to IV daptomycin and IV Levaquin.  He has had allergies to vancomycin, penicillin and cefepime.  Continue to have fevers up to 104.  2 sets of blood cultures have been performed remain negative.  Urine is growing out some gram-positive cocci ,000 colonies.  Has remained hemodynamically stable.  Has had some diarrhea.  C. difficile was negative.  CT scan of chest abdomen pelvis performed.  Unremarkable.  Does have gallstones but no evidence of cholecystitis.  Procalcitonin today is now up to 37.  White count 17,500.  Lactic acid 1.4.  Temperature this morning is now 101.5.  He has no other new specific complaints.  Source for his sepsis remains unclear.  Did send off some viral studies.  His Covid was negative on admission.    2.  Acute renal injury: Patient presented with a creatinine of 1.44.  Went to 1.90 yesterday and today is 2.70.  Potassium is okay at 4.2 bicarb is 21.  Some of this may be volume related.  He has been hemodynamically stable throughout.  Although has had diarrhea.  Giving him a further IV fluid bolus this morning 1 L of saline.  Urine outputs been marginal although he has been incontinent at times.  So exact output is not been calculated.  Have not  placed Manley catheter.  He remains on his mycophenolate and methylprednisolone.    3.  History of coronary artery disease.  He is status post stenting.  Back in August 2019 he had interventions performed at [facility].  Last echocardiogram showed an EF of 55% with no regional wall motion abnormalities.  He has not had any recent chest pains or evidence of congestive heart failure.    4.  Left testicular enlargement.  This is been evaluated by urology as an outpatient they feel that it is a cystocele.    5.  GI status: Liver function tests remain normal.  Has had some diarrhea.  Negative for C. difficile last evening.    6.  Pulmonary status: His O2 sats remained stable on room air.  His lungs and chest x-ray are clear.      Given patient's current status with fevers with unclear source.  And now worsening renal function in a transplant patient feel that he may be best served at a tertiary care center.  Unfortunately [facility] is on divert.  [facility] where he had transplant is also on divert they have a waiting list.  Currently talking to the [facility] as to whether or not they would be able to accept the patient in transfer.      Diet: Combination Diet Low Saturated Fat Na <2400mg Diet  Fluids: Saline 100 cc an hour    DVT Prophylaxis: Heparin  Code Status: Special Code    Disposition: Expected discharge unclear given his current illness    [facility]    Interval History   This morning patient remains alert.  Does not have any new complaints.  Does have increased diarrhea last night.  C. difficile is negative.  No abdominal pain.  Temperature is down somewhat this morning finally 101.5.  White count remains elevated.  Procalcitonin is markedly higher today at 37.  Creatinine has bumped up to 2.7.  Some of this may be volume related.  Is on clear we will give him further IV fluids.  Blood pressure remained stable.  Lactic acid is normal.  Blood cultures remain  negative.  He has had 2 separate sets.  Urine culture has gram-positive cocci ,000 colonies.    Patient remains on daptomycin and IV Levaquin.  Patient had a previous hospital stay back in 2018 around December.  He came in with fevers.  He had extensive work-up which was basically unrevealing.  Actually had a renal biopsy which showed severe pyelonephritis with microabscesses.  He was on broad-spectrum antibiotics which included cefepime and vancomycin.  However he developed a significant rash that they felt was due to the vancomycin that was stopped and the rash went away.  He then a week after his discharge developed acute interstitial nephritis with a creatinine of 7 they felt this was likely due to acute interstitial nephritis from the cephalosporin.  The cefepime was stopped and his renal function did improve.  All of his cultures never grew out anything.  His PCR studies were negative for Mycoplasma hominis, Vitallium and Ureaplasma.  His BK virus is negative as was his tick panel, histo, blasto, TB, fungi tell, cryptococcus, CMV and EBV.    For the first time his temperatures are 101 today maybe he is turning the corner however at this point I still have no clear-cut source for his infection.  With his worsening of his renal function it is concerning that he may be better served at a tertiary care center with subspecialists available.    -Data reviewed today: I reviewed all new labs and imaging results over the last 24 hours. I personally reviewed no images or EKG's today.    Physical Exam   Temp: 101.2  F (38.4  C) Temp src: Tympanic BP: 142/61 Pulse: 104   Resp: 22 SpO2: 96 % O2 Device: None (Room air)    Vitals:    05/21/21 2100   Weight: (!) 153.5 kg (338 lb 6.5 oz)     Vital Signs with Ranges  Temp:  [99.5  F (37.5  C)-104.3  F (40.2  C)] 101.2  F (38.4  C)  Pulse:  [] 104  Resp:  [22] 22  BP: (123-142)/(47-69) 142/61  SpO2:  [93 %-96 %] 96 %  I/O last 3 completed shifts:  In: 2142 [P.O.:120;  I.V.:2022]  Out: 201 [Urine:200; Stool:1]    Peripheral IV 05/21/21 Right Other (Comment) (Active)   Site Assessment WDL 05/22/21 1600   Line Status Saline locked 05/22/21 1600   Phlebitis Scale 0-->no symptoms 05/22/21 1600   Infiltration Scale 0 05/22/21 1600   Number of days: 2     No line/device    Constitutional: Alert and oriented x3. No distress    HEENT: Normocephalic/atraumatic, PERRL, EOMI, mouth moist, neck supple, no LN.     Cardiovascular: RRR. no Murmur, no  rubs, or gallops.  JVD unable to assess.  Bruits no.  Pulses 2+    Respiratory: Clear to auscultation bilaterally    Abdomen:Obese Soft, nontender, nondistended, no organomegaly. Bowel sounds present  Back: previous surgery site is  well healed no open areas or drainage.    Extremities: Warm/dry. Trace some chronic venous stasis changes noted. No open areas    Neuro:   Non focal, cranial nerves intact, Moves all extremities.  Medications     sodium chloride 100 mL/hr at 05/23/21 0606       sodium chloride 0.9%  1,000 mL Intravenous Once     allopurinol  100 mg Oral Daily     aspirin  324 mg Oral Daily     calcitRIOL  0.25 mcg Oral Daily     cetirizine  10 mg Oral Daily     DAPTOmycin  6 mg/kg (Adjusted) Intravenous Q24H     fluticasone-vilanterol  1 puff Inhalation Daily     lactobacillus rhamnosus (GG)  1 capsule Oral BID     levofloxacin  500 mg Intravenous Q24H     levothyroxine  50 mcg Oral Daily     lisinopril  5 mg Oral Daily     metaxalone  800 mg Oral TID     methylPREDNISolone  8 mg Oral Q48H     mycophenolate  1,000 mg Oral BID     nitroGLYcerin  1 patch Transdermal Daily     nitroGLYcerin   Transdermal Q8H     rosuvastatin  40 mg Oral Daily     sodium chloride (PF)  3 mL Intracatheter Q8H       Data   Recent Labs   Lab 05/23/21  0545 05/22/21  0529 05/21/21  1452   WBC 17.5* 18.4* 14.0*   HGB 10.6* 11.9* 12.7*   MCV 88 90 89   * 139* 159   * 130* 130*   POTASSIUM 4.2 4.4 4.7   CHLORIDE 97 98 95   CO2 21 25 26   BUN 40* 34*  35*   CR 2.70* 1.90* 1.47*   ANIONGAP 11 7 9   HANNAH 7.3* 8.2* 8.8   * 163* 172*   ALBUMIN 2.5* 2.8* 3.3*   PROTTOTAL 6.1* 6.5* 7.1   BILITOTAL 0.5 0.5 0.7   ALKPHOS 53 49 52   ALT 20 15 18   AST 55* 18 15   TROPI  --   --  <0.015       Recent Results (from the past 24 hour(s))   CT Chest Abdomen Pelvis w/o Contrast    Narrative    CT CHEST ABDOMEN PELVIS W/O CONTRAST    CLINICAL HISTORY: Male, age 66 years, Sepsis;    Comparison:  None.    TECHNIQUE:  CT was performed of the chest, abdomen and pelvis without  contrast.   Sagittal, coronal, axial and MIP reconstructions were reviewed.     FINDINGS:  Chest CT:   Aside from minimal peripheral atelectasis and tiny calcified nodules  in the dependent portions of the right lung, lungs are clear.    Atherosclerotic calcifications are seen within the aortic arch. Dense  calcifications are seen within the coronary arteries. No evidence of  pericardial effusion.     No evidence of pathologic lymph node enlargement. The esophagus is  normal.    There is a healed fracture in the dorsal lateral aspect of the left  eighth rib. No evidence of acute bony abnormality within the thoracic  spine or rib cage.    Abdomen/Pelvis CT:  The stomach and duodenum are normal.    Written stones are seen within the gallbladder lumen. There is no CT  evidence that would suggest cholecystitis.    The liver, spleen and pancreas are normal. Adrenal glands are normal.    Native kidneys are atrophic. There is a transplant kidney seen in the  left lower quadrant/left hemipelvis. The urinary bladder and the  transplant ureter are grossly normal.    Atherosclerotic calcifications are seen within the origins of the  renal arteries as well as the superior mesenteric artery. Number of  normal-sized lymph nodes are seen throughout the retroperitoneum.  Large and small bowel are grossly normal.    No evidence of abnormal intraperitoneal or retrograde. No fluid  collection.    Bony structures demonstrate  postoperative changes in the lower lumbar  spine and ankylosis of the left SI joint.      Impression    IMPRESSION:   No evidence of acute abnormality within the chest, abdomen or pelvis.  Chronic changes as described above.    Radiodense gallstones. No CT evidence of cholecystitis. If concern  exists, ultrasound evaluation may be helpful.    AAMIR ONEIL MD

## 2021-05-23 NOTE — PLAN OF CARE
HR tachy Temp 99.5-101.8. Administered Tylenol every 4hrs to manage temps. Lung sounds clear sats 96% on room air. Bowel sounds audible et active x4, reports of diarrhea, obtained order to check C-Diff et was negative. NS at 100/hr, started 1liter bolus at 0715.  Face to face report given with opportunity to observe patient.    Report given to [facility] LILIANA Parekh RN   5/23/2021  8:22 AM

## 2021-05-23 NOTE — PLAN OF CARE
"Reason for hospital stay:  Fever of unknown origin   Living situation PTA: Home  Most recent vitals: /57   Pulse 93   Temp 99.5  F (37.5  C) (Tympanic)   Resp 20   Ht 1.727 m (5' 8\")   Wt (!) 153.5 kg (338 lb 6.5 oz)   SpO2 96%   BMI 51.45 kg/m    Pt has been awake on and off throughout shift, A&Ox4. VS and Assessments as charted. Was bolused this afternoon dt soft BP 80s/40s and asymptomatic. NS bolus improved BP to 101/57. Temps decreased throughout day, did not given any PRN Tylenol this shift.  LOC:  Awake on and off throughout out shift. A&O x4   Respiratory:  Dyspnea appears to be improving when he is sleeping   Skin Issues:    IVF:  NS infusing 150 ml/ hr.   ABX:  Administered per MAR    Nutrition: regular diet   ADL's:  A1  Ambulation:SBA   Safety:  Call button within reach,calls appropriately and makes needs known. Daughter remains in room.       Face to face report given with opportunity to observe patient.    Report given to LILIANA Tamayo RN   5/23/2021  3:36 PM      "

## 2021-05-23 NOTE — PROVIDER NOTIFICATION
DATE:  5/23/2021   TIME OF RECEIPT FROM LAB:  0630  LAB TEST:  Procal  LAB VALUE:  37.40  RESULTS GIVEN WITH READ-BACK TO (PROVIDER):  Dr. Almanzar  TIME LAB VALUE REPORTED TO PROVIDER:   0613

## 2021-05-23 NOTE — PHARMACY
Pharmacy Antimicrobial Stewardship Assessment     Current Antimicrobial Therapy:  Anti-infectives (From now, onward)    Start     Dose/Rate Route Frequency Ordered Stop    05/23/21 1700  levofloxacin (LEVAQUIN) infusion 250 mg      250 mg  50 mL/hr over 60 Minutes Intravenous EVERY 24 HOURS 05/23/21 1433      05/22/21 0900  DAPTOmycin (CUBICIN) 615 mg in sodium chloride 0.9 % 100 mL intermittent infusion      6 mg/kg × 102.4 kg (Adjusted)  over 30 Minutes Intravenous EVERY 24 HOURS 05/22/21 0645          Indication: Sepsis     Days of Therapy: 2 (Dapto), 3 (Levaquin)      Pertinent Labs:  WBC   Date Value Ref Range Status   05/23/2021 17.5 (H) 4.0 - 11.0 10e9/L Final   05/22/2021 18.4 (H) 4.0 - 11.0 10e9/L Final   05/21/2021 14.0 (H) 4.0 - 11.0 10e9/L Final     Procalcitonin   Date Value Ref Range Status   05/23/2021 37.40 (HH) ng/ml Final     Comment:     >/= 10.00 ng/ml   Very high likelihood of severe sepsis or septic shock.  Recommendation: Strongly recommend initiating or continuing antibiotics.   Evaluate culture results and clinical features to target antibacterial   therapy. Obtain blood cultures and other relevant cultures if not done.Repeat   PCT in 2 days to guide antibiotic de-escalation. Consider de-escalating   antibiotics when PCT concentration is <80% of peak or abs PCT <1.  Critical Value called to and read back by  CARMEN GRAFF @ 0631 ON 05/23/21 BY [facility]     05/21/2021 0.74 ng/ml Final     Comment:     0.50-1.99 ng/ml  Moderate risk of systemic infection.  Recommendation:   Recommend antibiotics. Evaluate culture results and clinical features to   target antibacterial therapy. Obtain blood cultures and other relevant   cultures if not done.  If empiric antibiotics were started, recheck PCT in: 2 days to guide   antibiotic de-escalation.  Discontinue or de-escalate antibiotics when PCT   concentration is <80% of peak or abs PCT <0.5. If empiric antibiotics were NOT   started, recheck PCT in 6-24  hours to re-evaluate need for antibiotics.       No results found for: CRP    Culture Results:   05/21/2021 1452 05/23/2021 0815 Urine Culture Aerobic Bacterial [[id number]]    (Abnormal)   Midstream Urine    Final result Component Value   Specimen Description Midstream Urine   Culture Micro 50,000 to 100,000 colonies/mL   Enterococcus faecalis   Abnormal              05/22/2021 1733 05/22/2021 1733 1,3 Beta D glucan fungitell [[id number]]   Blood    In process Component Value   No component results           05/22/2021 1732 05/23/2021 1246 Cytomegalovirus Qualitative PCR [[id number]]   Blood    Edited Result - FINAL Component Value   CMV Qualitative PCR Specimen Source Whole blood, EDTA anticoagulant C   CORRECTED ON 05/23 AT 1246: PREVIOUSLY REPORTED AS Blood   CMV Qualitative PCR PENDING           05/22/2021 0652 05/23/2021 0713 Blood culture [[id number]]   Blood    Preliminary result Component Value   Specimen Description Blood   Culture Micro No growth after 1 day P           05/22/2021 0644 05/23/2021 0713 Blood culture [[id number]]   Blood    Preliminary result Component Value   Specimen Description Blood   Culture Micro No growth after 1 day P           05/21/2021 1516 05/23/2021 0713 Blood culture [[id number]]   Blood    Preliminary result Component Value   Specimen Description Blood   Culture Micro No growth after 2 days P        05/21/2021 1452 05/23/2021 0713 Blood culture [[id number]]   Blood    Preliminary result Component Value   Specimen Description Blood   Culture Micro No growth after 2 days P        5/23/21: C. Diff negative  5/21/21: COVID negative    Recommendations/Interventions:  1. Renally dose adjusted Levaquin to 250 mg q24h. Dapto covers E faecalis. Unsure clear source of infection per provider. Will continue to follow.  2. On Daptomycin: monitor CK, Scr & weight as patient's weight >111 kg. Next CK level is on 5/25.  No renal dose adjustment necessary yet for Dapto per [facility]al Dose Guideline.     Batool HEBERT  [place], [facility]  May 23, 2021

## 2021-05-23 NOTE — PLAN OF CARE
"BP (!) 87/45   Pulse 93   Temp 99.5  F (37.5  C) (Tympanic)   Resp 20   Ht 1.727 m (5' 8\")   Wt (!) 153.5 kg (338 lb 6.5 oz)   SpO2 96%   BMI 51.45 kg/m    MD sent FYI text page with VS.   "

## 2021-05-23 NOTE — PLAN OF CARE
Pt alert and orientated. Sipping on fluids.  Pt states having loose stools and voiding small amount- measured one void 200cc-cloudy. Will check stool, specie pans. on toilet for output for stool and urine.  Temp- 104, 102.4 after tylenol, and 104.3 at 2130, Tylenol given. Ice pack put on back of neck. Face to face report given with opportunity to observe patient.    Report given to fady Hawthorne RN   5/22/2021  11:37 PM

## 2021-05-24 ENCOUNTER — APPOINTMENT (OUTPATIENT)
Dept: PHYSICAL THERAPY | Facility: HOSPITAL | Age: 66
DRG: 872 | End: 2021-05-24
Attending: INTERNAL MEDICINE
Payer: COMMERCIAL

## 2021-05-24 LAB
1,3 BETA GLUCAN SER-MCNC: <31 PG/ML
ALBUMIN SERPL-MCNC: 2.3 G/DL (ref 3.4–5)
ALP SERPL-CCNC: 55 U/L (ref 40–150)
ALT SERPL W P-5'-P-CCNC: 25 U/L (ref 0–70)
ANION GAP SERPL CALCULATED.3IONS-SCNC: 8 MMOL/L (ref 3–14)
AST SERPL W P-5'-P-CCNC: 55 U/L (ref 0–45)
B-D GLUCAN INTERPRETATION (1,3): NEGATIVE
BACTERIA SPEC CULT: ABNORMAL
BILIRUB SERPL-MCNC: 0.3 MG/DL (ref 0.2–1.3)
BKV DNA # SPEC NAA+PROBE: NORMAL COPIES/ML
BKV DNA SPEC NAA+PROBE-LOG#: NORMAL LOG COPIES/ML
BUN SERPL-MCNC: 41 MG/DL (ref 7–30)
CALCIUM SERPL-MCNC: 7.3 MG/DL (ref 8.5–10.1)
CHLORIDE SERPL-SCNC: 108 MMOL/L (ref 94–109)
CO2 SERPL-SCNC: 20 MMOL/L (ref 20–32)
CREAT SERPL-MCNC: 1.84 MG/DL (ref 0.66–1.25)
ERYTHROCYTE [DISTWIDTH] IN BLOOD BY AUTOMATED COUNT: 15.3 % (ref 10–15)
GFR SERPL CREATININE-BSD FRML MDRD: 38 ML/MIN/{1.73_M2}
GLUCOSE SERPL-MCNC: 191 MG/DL (ref 70–99)
HCT VFR BLD AUTO: 30.7 % (ref 40–53)
HGB BLD-MCNC: 10.1 G/DL (ref 13.3–17.7)
MCH RBC QN AUTO: 28.9 PG (ref 26.5–33)
MCHC RBC AUTO-ENTMCNC: 32.9 G/DL (ref 31.5–36.5)
MCV RBC AUTO: 88 FL (ref 78–100)
PLATELET # BLD AUTO: 140 10E9/L (ref 150–450)
POTASSIUM SERPL-SCNC: 4.2 MMOL/L (ref 3.4–5.3)
PROCALCITONIN SERPL-MCNC: 23.38 NG/ML
PROT SERPL-MCNC: 6.2 G/DL (ref 6.8–8.8)
RBC # BLD AUTO: 3.49 10E12/L (ref 4.4–5.9)
SODIUM SERPL-SCNC: 136 MMOL/L (ref 133–144)
SPECIMEN SOURCE: ABNORMAL
SPECIMEN SOURCE: NORMAL
WBC # BLD AUTO: 11.5 10E9/L (ref 4–11)

## 2021-05-24 PROCEDURE — 85027 COMPLETE CBC AUTOMATED: CPT | Performed by: INTERNAL MEDICINE

## 2021-05-24 PROCEDURE — 120N000001 HC R&B MED SURG/OB

## 2021-05-24 PROCEDURE — 250N000013 HC RX MED GY IP 250 OP 250 PS 637: Performed by: INTERNAL MEDICINE

## 2021-05-24 PROCEDURE — 97161 PT EVAL LOW COMPLEX 20 MIN: CPT | Mod: GP

## 2021-05-24 PROCEDURE — 250N000012 HC RX MED GY IP 250 OP 636 PS 637: Performed by: INTERNAL MEDICINE

## 2021-05-24 PROCEDURE — 258N000003 HC RX IP 258 OP 636: Performed by: INTERNAL MEDICINE

## 2021-05-24 PROCEDURE — 250N000011 HC RX IP 250 OP 636: Performed by: INTERNAL MEDICINE

## 2021-05-24 PROCEDURE — 84145 PROCALCITONIN (PCT): CPT | Performed by: INTERNAL MEDICINE

## 2021-05-24 PROCEDURE — 80053 COMPREHEN METABOLIC PANEL: CPT | Performed by: INTERNAL MEDICINE

## 2021-05-24 PROCEDURE — 99233 SBSQ HOSP IP/OBS HIGH 50: CPT | Performed by: INTERNAL MEDICINE

## 2021-05-24 PROCEDURE — 97530 THERAPEUTIC ACTIVITIES: CPT | Mod: GP

## 2021-05-24 PROCEDURE — 36415 COLL VENOUS BLD VENIPUNCTURE: CPT | Performed by: INTERNAL MEDICINE

## 2021-05-24 RX ORDER — LORAZEPAM 0.5 MG/1
0.5 TABLET ORAL
Status: ON HOLD | COMMUNITY
End: 2023-06-26

## 2021-05-24 RX ORDER — MUPIROCIN 20 MG/G
OINTMENT TOPICAL 2 TIMES DAILY PRN
Status: ON HOLD | COMMUNITY
Start: 2021-02-01 | End: 2023-06-26

## 2021-05-24 RX ORDER — MYCOPHENOLATE MOFETIL 500 MG/1
1000 TABLET ORAL 2 TIMES DAILY
COMMUNITY
Start: 2021-04-22

## 2021-05-24 RX ORDER — ALBUTEROL SULFATE 90 UG/1
2 AEROSOL, METERED RESPIRATORY (INHALATION) EVERY 6 HOURS PRN
Status: DISCONTINUED | OUTPATIENT
Start: 2021-05-24 | End: 2021-05-27 | Stop reason: HOSPADM

## 2021-05-24 RX ORDER — CLOPIDOGREL BISULFATE 75 MG/1
75 TABLET ORAL EVERY MORNING
COMMUNITY
Start: 2021-03-10

## 2021-05-24 RX ORDER — ATENOLOL 25 MG/1
25 TABLET ORAL AT BEDTIME
Status: ON HOLD | COMMUNITY
Start: 2021-04-29 | End: 2024-04-29

## 2021-05-24 RX ORDER — METHOCARBAMOL 750 MG/1
750 TABLET, FILM COATED ORAL 4 TIMES DAILY
Status: DISCONTINUED | OUTPATIENT
Start: 2021-05-24 | End: 2021-05-27 | Stop reason: HOSPADM

## 2021-05-24 RX ORDER — FUROSEMIDE 40 MG
80 TABLET ORAL ONCE
Status: COMPLETED | OUTPATIENT
Start: 2021-05-24 | End: 2021-05-24

## 2021-05-24 RX ORDER — EZETIMIBE 10 MG/1
10 TABLET ORAL EVERY MORNING
COMMUNITY
Start: 2021-04-22

## 2021-05-24 RX ORDER — HYDROCODONE BITARTRATE AND ACETAMINOPHEN 5; 325 MG/1; MG/1
1 TABLET ORAL EVERY 6 HOURS PRN
Status: ON HOLD | COMMUNITY
End: 2021-05-24

## 2021-05-24 RX ORDER — METHOCARBAMOL 750 MG/1
750 TABLET, FILM COATED ORAL 4 TIMES DAILY
Status: ON HOLD | COMMUNITY
Start: 2021-04-19 | End: 2022-11-14

## 2021-05-24 RX ORDER — FUROSEMIDE 40 MG
40 TABLET ORAL AT BEDTIME
Status: ON HOLD | COMMUNITY
Start: 2021-04-22 | End: 2024-05-02

## 2021-05-24 RX ORDER — CLOPIDOGREL BISULFATE 75 MG/1
75 TABLET ORAL DAILY
Status: DISCONTINUED | OUTPATIENT
Start: 2021-05-25 | End: 2021-05-27 | Stop reason: HOSPADM

## 2021-05-24 RX ORDER — ALBUTEROL SULFATE 90 UG/1
2 AEROSOL, METERED RESPIRATORY (INHALATION) EVERY 6 HOURS PRN
COMMUNITY
Start: 2021-05-18

## 2021-05-24 RX ORDER — GABAPENTIN 300 MG/1
300 CAPSULE ORAL 2 TIMES DAILY
COMMUNITY
Start: 2021-05-19

## 2021-05-24 RX ADMIN — ASPIRIN 324 MG: 81 TABLET, CHEWABLE ORAL at 08:37

## 2021-05-24 RX ADMIN — METAXALONE 800 MG: 800 TABLET ORAL at 08:37

## 2021-05-24 RX ADMIN — METHOCARBAMOL 750 MG: 750 TABLET ORAL at 21:11

## 2021-05-24 RX ADMIN — METHOCARBAMOL 750 MG: 750 TABLET ORAL at 08:51

## 2021-05-24 RX ADMIN — DAPTOMYCIN 615 MG: 350 INJECTION, POWDER, LYOPHILIZED, FOR SOLUTION INTRAVENOUS at 08:38

## 2021-05-24 RX ADMIN — METHOCARBAMOL 750 MG: 750 TABLET ORAL at 17:41

## 2021-05-24 RX ADMIN — CALCITRIOL 0.25 MCG: 0.25 CAPSULE, LIQUID FILLED ORAL at 08:37

## 2021-05-24 RX ADMIN — GUAIFENESIN 10 ML: 100 SOLUTION ORAL at 21:13

## 2021-05-24 RX ADMIN — FLUTICASONE FUROATE AND VILANTEROL TRIFENATATE 1 PUFF: 200; 25 POWDER RESPIRATORY (INHALATION) at 08:38

## 2021-05-24 RX ADMIN — LEVOTHYROXINE SODIUM 50 MCG: 0.05 TABLET ORAL at 08:37

## 2021-05-24 RX ADMIN — MYCOPHENOLATE MOFETIL 1000 MG: 500 TABLET ORAL at 08:37

## 2021-05-24 RX ADMIN — METHOCARBAMOL 750 MG: 750 TABLET ORAL at 13:06

## 2021-05-24 RX ADMIN — GUAIFENESIN 10 ML: 100 SOLUTION ORAL at 08:42

## 2021-05-24 RX ADMIN — ACETAMINOPHEN 650 MG: 325 TABLET, FILM COATED ORAL at 17:43

## 2021-05-24 RX ADMIN — ACETAMINOPHEN 650 MG: 325 TABLET, FILM COATED ORAL at 21:45

## 2021-05-24 RX ADMIN — FUROSEMIDE 80 MG: 40 TABLET ORAL at 17:41

## 2021-05-24 RX ADMIN — MYCOPHENOLATE MOFETIL 1000 MG: 500 TABLET ORAL at 21:11

## 2021-05-24 RX ADMIN — Medication 1 CAPSULE: at 08:37

## 2021-05-24 RX ADMIN — ROSUVASTATIN CALCIUM 40 MG: 10 TABLET, FILM COATED ORAL at 08:37

## 2021-05-24 RX ADMIN — Medication 1 CAPSULE: at 21:11

## 2021-05-24 RX ADMIN — SODIUM CHLORIDE, PRESERVATIVE FREE: 5 INJECTION INTRAVENOUS at 01:15

## 2021-05-24 RX ADMIN — ALLOPURINOL 100 MG: 100 TABLET ORAL at 08:38

## 2021-05-24 RX ADMIN — LEVOFLOXACIN 250 MG: 5 INJECTION, SOLUTION INTRAVENOUS at 17:38

## 2021-05-24 RX ADMIN — CETIRIZINE HYDROCHLORIDE 10 MG: 10 TABLET, FILM COATED ORAL at 08:37

## 2021-05-24 RX ADMIN — SODIUM CHLORIDE, PRESERVATIVE FREE: 5 INJECTION INTRAVENOUS at 08:45

## 2021-05-24 ASSESSMENT — ACTIVITIES OF DAILY LIVING (ADL)
ADLS_ACUITY_SCORE: 20
ADLS_ACUITY_SCORE: 18
ADLS_ACUITY_SCORE: 18
ADLS_ACUITY_SCORE: 19

## 2021-05-24 ASSESSMENT — MIFFLIN-ST. JEOR
SCORE: 2342.5
SCORE: 2354.5

## 2021-05-24 NOTE — PROGRESS NOTES
[facility]    Hospitalist Progress Note      Assessment & Plan   Eduar Isaacs is a 66 year old male who was admitted on 5/21/2021.        1.  Sepsis renal transplant patient: Came in with several days of fevers and rigors came in here with a temperature of 102.8.  Is been going up as high as 104 degrees over the first 24 to 48 hours.  Blood cultures were drawn and was negative so far.  Urinalysis has grown out some Enterococcus only ,000 colonies.  No other clear source of infection.  CT scan of chest abdomen pelvis showed no focal abnormalities.  He does have gallstones however no evidence of cholecystitis.  He has been on daptomycin and Levaquin due to multiple allergies.  Likely now after 48 hours he has defervesced.  He has had no significant fevers for the last 24 hours.  Blood cultures remain negative.  His procalcitonin dropped down to 23.4.  White counts 11,500.  Blood pressures were little soft but now remain stable.  Sepsis from most part has resolved.    2.  ID: As above all cultures negative with the exception of the urine showing Enterococcus.  Treating with Levaquin and daptomycin.  It appears that he is turning the corner at this time with no fevers.  Procalcitonin is coming down.  The big question that I will have is what to put him on for oral antibiotics.  I will call infectious disease and get their recommendations.    3.  Acute renal injury on chronic kidney disease stage III: Patient's renal transplantation.  Remains on mycophenolate and steroids.  His creatinine bumped up to 2.7 yesterday.  Had a lot of concerns.  Felt that he was dry was given IV fluids and has improved down today to 1.84 potassium stable at 4.2.  Urine output has picked up.  Blood pressure is 125/61.  We will continue to monitor closely.  He did receive a fair amount of fluid given his sepsis and softer blood pressures.  Does have about 2-3+ edema of his lower extremities although has been sitting in  the chair most of the time.  Likely be able to restart some of his Lasix as time goes on here.    4.  Coronary artery disease: History of this no chest pains no arrhythmias at all no signs of heart failure.  His last echo had EF of 55% roughly about a year ago.    5.  Disposition: At this time really feel that he probably can stay here at our facility.  I attempted yesterday multiple facilities including [facility] both [facility] and [facility] all were unable to accept the patient.  He is much more stable today with improving renal function defervesced seen in terms of his fevers.  We will continue to treat the patient here.  We will consult renal and infectious disease regarding further cares.            Diet: Combination Diet Regular Diet Adult  Fluids: Saline will cut back to 50 cc an hour    DVT Prophylaxis: Heparin SQ  Code Status: Special Code    Disposition: Expected discharge in 2-5 days once on oral agents for infection.    Carlos Almanzar    Interval History   Patient this morning is maybe a bit more crabby.  Kind of aches everywhere.  Feet are little more swollen.  But overall clinically is markedly improved.  No significant fevers the last 24 hours.  Renal function is improved.  Procalcitonin is coming down.  Overall I think he is turning the corner with all this.  Discussed with him staying here rather than being transferred.  He is okay with that.  Will discuss later today with infectious disease and renal regarding ongoing treatment.  We feel that the source of his infection at this point appears to be renal once again with Enterococcus growing out.  Left physical therapy see him.  Cut back on his IV fluids.  Perhaps give him some diuresis later today or tomorrow.    -Data reviewed today: I reviewed all new labs and imaging results over the last 24 hours. I personally reviewed no images or EKG's today.    Physical Exam   Temp: 99.1  F (37.3  C) Temp src: Tympanic BP: 125/61  Pulse: 82   Resp: 20 SpO2: 97 % O2 Device: None (Room air)    Vitals:    05/21/21 2100 05/24/21 0508   Weight: (!) 153.5 kg (338 lb 6.5 oz) (!) 157.8 kg (347 lb 14.2 oz)     Vital Signs with Ranges  Temp:  [98.2  F (36.8  C)-100  F (37.8  C)] 99.1  F (37.3  C)  Pulse:  [72-93] 82  Resp:  [20] 20  BP: ()/(45-61) 125/61  SpO2:  [96 %-99 %] 97 %  I/O last 3 completed shifts:  In: 4243 [I.V.:4243]  Out: 1750 [Urine:1750]    Peripheral IV 05/21/21 Right Other (Comment) (Active)   Site Assessment WDL 05/24/21 0200   Line Status Infusing 05/24/21 0200   Phlebitis Scale 0-->no symptoms 05/24/21 0200   Infiltration Scale 0 05/24/21 0200   Number of days: 3     No line/device    Constitutional: Alert and oriented x3. No distress    HEENT: Normocephalic/atraumatic, PERRL, EOMI, mouth moist, neck supple, no LN.     Cardiovascular: RRR. no Murmur, no  rubs, or gallops.  JVD unable.  Bruits no.  Pulses 2+    Respiratory: Clear to auscultation bilaterally    Abdomen:obse Soft, nontender, nondistended, no organomegaly. Bowel sounds present    Extremities: Warm/dry. 2-3+ legs edema    Neuro:   Non focal, cranial nerves intact, Moves all extremities.  Medications     sodium chloride 150 mL/hr at 05/24/21 0845       allopurinol  100 mg Oral Daily     aspirin  324 mg Oral Daily     cetirizine  10 mg Oral Daily     DAPTOmycin  6 mg/kg (Adjusted) Intravenous Q24H     lactobacillus rhamnosus (GG)  1 capsule Oral BID     levofloxacin  250 mg Intravenous Q24H     levothyroxine  50 mcg Oral Daily     [Held by provider] lisinopril  5 mg Oral Daily     methocarbamol  750 mg Oral 4x Daily     methylPREDNISolone  8 mg Oral Q48H     mycophenolate  1,000 mg Oral BID     rosuvastatin  40 mg Oral Daily     sodium chloride (PF)  3 mL Intracatheter Q8H       Data   Recent Labs   Lab 05/24/21  0526 05/23/21  1401 05/23/21  0545 05/22/21  0529 05/21/21  1452   WBC 11.5*  --  17.5* 18.4* 14.0*   HGB 10.1*  --  10.6* 11.9* 12.7*   MCV 88  --  88 90  89   *  --  121* 139* 159    129* 129* 130* 130*   POTASSIUM 4.2 4.0 4.2 4.4 4.7   CHLORIDE 108 101 97 98 95   CO2 20 19* 21 25 26   BUN 41* 41* 40* 34* 35*   CR 1.84* 2.49* 2.70* 1.90* 1.47*   ANIONGAP 8 9 11 7 9   HANNAH 7.3* 7.0* 7.3* 8.2* 8.8   * 219* 142* 163* 172*   ALBUMIN 2.3*  --  2.5* 2.8* 3.3*   PROTTOTAL 6.2*  --  6.1* 6.5* 7.1   BILITOTAL 0.3  --  0.5 0.5 0.7   ALKPHOS 55  --  53 49 52   ALT 25  --  20 15 18   AST 55*  --  55* 18 15   TROPI  --   --   --   --  <0.015       No results found for this or any previous visit (from the past 24 hour(s)).

## 2021-05-24 NOTE — PLAN OF CARE
Prior to Admission Medication Reconciliation:     Medications added:   [] None  [x] As listed below:    Albuterol    Asa    Atenolol    plavix    zetia    Lasix    Gabapentin    Lorazepam    Robaxin    Mupirocin    trelegy    Medications deleted:   [] None  [x] As listed below:    symbicort (ordered)    Calcitriol (ordered)    Benadryl (ordered)    norco (ordered)    Skelaxin 800 mg (ordered)    Nitrostat patch (ordered)    Zyrtec (ordered)    Medications marked for review/removal by attending:  [x] None  [] As listed below:    Changes made to existing medications:   [x] None  [] As listed below:    Last times/dates taken verified with patient:  [x] Yes- completed myself  [] Prepared PTA medlist for review only. (will not be available to review personally)  [] Did not review with patient. Rx verification only. Review completed by nursing.    [] Nurse completed no changes made (double checked entries)  [] Unable to review with patient at this time:  [] Nurse completed/changes made:     Allergies listed at another location:  []Not applicable   []See below:    Allergy review:    [x]Did not review: reviewed by nursing  []Did not review: pt unable at this time  []Patient/MAR verified NKDA  []Patient/MAR verified current existing allergies: no changes made    Medication reconciliation sources:   [x]Patient  []Patient family member/emergency contact: **  [x][facility] Report Review:  Home Medications     - Last Reconciled 05/20/21 by Jennie Macias, Med Assist    [x]albuterol sulfate 90 mcg/actuation aerosol inhaler (Ventolin HFA) 2 puffs inhalation Q6H PRN   [x]allopurinol 100 mg tablet 100 mg PO DAILY   [x]aspirin 81 mg tablet,delayed release 81 mg PO DAILY   [x]atenolol 25 mg tablet 25 mg PO BID   blood sugar diagnostic (Accu-Chek Shweta Plus test strp) test daily   blood-glucose meter (Accu-Chek Shweta Plus Meter) Test daily Dx: E11.22   [x]calcium carbonate 400 mg calcium (1,000 mg) chewable tablet (Tums Ultra) 800 mg PO  DAILY   [x]cholecalciferol (vitamin D3) 125 mcg (5,000 unit) tablet 5,000 units PO DAILY   [x]clopidogrel 75 mg tablet 75 mg PO DAILY   [x]ezetimibe 10 mg tablet 10 mg PO DAILY   [x]fluticasone fur. 100 mcg-umeclid 62.5 mcg-vilant 25 mcg inhalat.powder (Trelegy Ellipta) 1 inh inhalation DAILY   [x]furosemide 40 mg tablet 80 mg (2 x 40 mg) PO DAILY   [x]gabapentin 100 mg capsule 100 mg PO TID   lancets (Accu-Chek Softclix Lancets)TESTING DAILY   [x]levothyroxine 50 mcg tablet 50 mcg PO DAILY   [x]lisinopril 5 mg tablet 5 mg PO DAILY   [x]lorazepam 0.5 mg tablet 0.5 mg PO BEDTIME PRN   [x]methocarbamol 750 mg tablet 750 mg PO QID   [x]methylprednisolone 4 mg tablet 8 mg orally every 2 days   [x]mupirocin 2 % topical ointment 1 applic topical BID PRN   [x]mycophenolate mofetil 500 mg tablet 1,000 mg PO BID   [x]nitroglycerin 0.4 mg sublingual tablet 0.4 mg sublingual Q5M PRN   [x]omega 3-dha-epa-fish oil 1,000 mg (120 mg-180 mg) capsule (Fish Oil) 1 cap PO DAILY   [x]rosuvastatin 40 mg tablet (Crestor) 40 mg PO QPM   []Epic Chart Review  []Care Everywhere review  []Pharmacy med list: **  []Pharmacy phone call  []Outside meds dispense report  []Nursing home or Assisted Living MAR:  []Other: **    [facility]    Is patient on coumadin?  [x]No      Requests for consultation by provider or pharmacist:   [x] Patient understands why all of their meds were prescribed and how to take them. No questions.   [] Managing party has no questions.   [] Patient/ managing party has questions about the following:  [] Did not review with patient. Cannot assess.     Fill dates and reported compliancy:  [x] Fill dates coincide with compliancy for all/most maintenance meds.   [] Fill dates do not coincide with compliancy with maintenance meds. See notes in PTA medlist and in comments.    [] Fill dates do not coincide with the following medications but pt reports compliancy:  [] Did not review with patient. Cannot  assess.     Historian accuracy:  [] Excellent- alert and oriented, understands why meds were prescribed and how to take, able to answer specifics  [x] Good- alert and oriented, understands why meds were prescribed and how to take, some confusion   [] Fair- alert and oriented, doesn't know medications without list, cannot answer specifics about medications, but has a decent process for which to take at home  [] Poor- does not know medications, may not have a process to take at home, may be cognitively unable to review at this time  []Medication management done by family member or facility, no concerns about historian accuracy.   [] Did not review with patient. Cannot assess.     Medication Management:  [x] Manages meds independently  [] Family member/ other party manages meds:  [] Meds managed by staff at facility  [] Meds set up by home care, family/other party helps administer  [] Meds set up by home care, self administers  [] Did not review with patient. Cannot assess.     Other medications aside from PTA:  [x] Denies taking any medications aside from those listed in PTA meds  [] Reports taking another medication(s) but cannot recall the name(s)  [] Refuses to say.  [] Did not review with patient. Cannot assess.     Comments: none.     Krista Mattson on 5/24/2021 at 7:31 AM       Discrepancies: [x] No []Not Applicable []Yes: listed below    Time spent on medication reconciliation:   []5-20 mins  [x]20-40 mins  []> 40 mins    Issues completing PTA medication reconciliation:  [] On hold for a long time  [] Waited for a call back  [] Fax didn't come through  [] Fax took a long time  [] Other:    Notifying appropriate party of changes/additions/discrepancies:  []No pertinent changes made, notification not necessary.   [x] Notified attending provider via text page/phone call  [] Notified attending provider in person  [] Notified pharmacy  [] Notified nurse  [] Attending provider not available, left detailed notes  [] Pt  is not admitted to floor yet, PTA meds completed before admission.   Medications Prior to Admission   Medication Sig Dispense Refill Last Dose     allopurinol (ZYLOPRIM) 100 MG tablet Take 100 mg by mouth daily    5/21/2021 at am     aspirin (ASA) 81 MG EC tablet Take 81 mg by mouth At Bedtime    Past Week at HS     atenolol (TENORMIN) 25 MG tablet Take 25 mg by mouth 2 times daily   Past Week at AM/HS     calcium carbonate (TUMS) 500 MG chewable tablet Take 1 chew tab by mouth daily   5/21/2021 at Unknown time     cholecalciferol (VITAMIN D3) 125 mcg (5000 units) capsule Take 125 mcg by mouth daily   Past Week at AM     clopidogrel (PLAVIX) 75 MG tablet Take 75 mg by mouth daily   Past Week at AM     ezetimibe (ZETIA) 10 MG tablet Take 10 mg by mouth daily   Past Week at AM     fish oil-omega-3 fatty acids (OMEGA-3 FISH OIL) 1000 MG capsule Take 1 g by mouth daily    Past Week at AM     furosemide (LASIX) 40 MG tablet Take 80 mg by mouth daily And take an additional 40 mg daily as needed.   Past Week at unknown     gabapentin (NEURONTIN) 100 MG capsule Take 100 mg by mouth 3 times daily   Past Month at Unknown time     levothyroxine (SYNTHROID/LEVOTHROID) 50 MCG tablet Take 50 mcg by mouth daily    5/21/2021 at am     lisinopril (PRINIVIL) 5 MG tablet Take 5 mg by mouth daily    5/21/2021 at am     methocarbamol (ROBAXIN) 750 MG tablet Take 750 mg by mouth 4 times daily   Past Week at Unknown time     methylPREDNISolone (MEDROL) 4 MG tablet Take 8 mg by mouth every other day    5/21/2021 at am     mycophenolate (GENERIC EQUIVALENT) 500 MG tablet Take 1,000 mg by mouth 2 times daily   Past Week at AM/HS     rosuvastatin (CRESTOR) 40 MG tablet Take 40 mg by mouth At Bedtime    5/21/2021 at Unknown time     TRELEGY ELLIPTA 100-62.5-25 MCG/INH oral inhaler Inhale 1 puff into the lungs daily   Past Week at AM     albuterol (PROAIR HFA/PROVENTIL HFA/VENTOLIN HFA) 108 (90 Base) MCG/ACT inhaler Inhale 2 puffs into the  lungs every 6 hours as needed   More than a month at Unknown time     LORazepam (ATIVAN) 0.5 MG tablet Take 0.5 mg by mouth nightly as needed   More than a month at Unknown time     mupirocin (BACTROBAN) 2 % external ointment Apply topically 2 times daily as needed   More than a month at Unknown time     nitroGLYcerin (NITROSTAT) 0.4 MG sublingual tablet Place 0.4 mg under the tongue every 5 minutes as needed    Unknown at Unknown time         Medication Dispense History (from 5/24/2020 to 5/24/2021)  Expand All  Collapse All  Albuterol Sulfate   Dispensed Days Supply Quantity Provider Pharmacy   albuterol sulfate HFA 90 mcg/actuation aerosol inhaler 05/18/2021 25 8.5 g [facility] - ...         Allopurinol   Dispensed Days Supply Quantity Provider Pharmacy   ALLOPURINOL 100 MG TABLET 04/26/2021 90 90 Units MEIR [facility] 14966 [facility] - ...   ALLOPURINOL 100 MG TABLET 01/27/2021 90 90 Units [facility] 53441 [facility] - ...   ALLOPURINOL 100 MG TABLET 11/05/2020 90 90 Units [facility] 02219 [facility] - ...   ALLOPURINOL 100 MG TABLET 08/18/2020 90 90 Units [facility] 29919 [facility] - ...         Atenolol   Dispensed Days Supply Quantity Provider Pharmacy   atenolol 25 mg tablet 04/29/2021 90 180 tablet [facility] - [facility]...   atenolol 25 mg tablet 02/09/2021 90 180 tablet [facility] - Min...   atenolol 25 mg tablet 11/16/2020 90 180 tablet [facility] - Min...   atenolol 25 mg tablet 08/17/2020 90 180 tablet [facility] - Min...         Blood Glucose Monitoring Suppl   Dispensed Days Supply Quantity Provider Pharmacy   ACCU-CHEK BASILIO PLUS METER 10/14/2020 30 1 Units [facility]IR [facility] 57000 [facility] - ...   ACCU-CHEK BASILIO PLUS METER 07/08/2020 1 1 Units [facility] 14311 [facility] - ...         Clopidogrel Bisulfate   Dispensed Days Supply Quantity Provider  Pharmacy   CLOPIDOGREL 75 MG TABLET 03/10/2021 90 90 Units LINA [facility] 05264 [facility] - ...   CLOPIDOGREL 75 MG TABLET 12/14/2020 90 90 Units [facility] 02889 [facility] - ...   CLOPIDOGREL 75 MG TABLET 09/13/2020 90 90 Units [facility] 63686 [facility] - ...   CLOPIDOGREL 75 MG TABLET 06/17/2020 90 90 Units [facility] 81072 [facility] - ...         Ezetimibe   Dispensed Days Supply Quantity Provider Pharmacy   ezetimibe 10 mg tablet 04/22/2021 90 90 tablet [facility] 71191 [facility] - ...   EZETIMIBE 10 MG TABLET 01/27/2021 90 90 Units [facility]INA [facility] 67614 [facility] - ...   EZETIMIBE 10 MG TABLET 11/05/2020 90 90 Units [facility] 99821 [facility] - ...   EZETIMIBE 10 MG TABLET 08/20/2020 90 90 Units [facility] 39995 [facility] - ...   EZETIMIBE 10 MG TABLET 05/28/2020 90 90 Units MARISSA MARCH [facility] 77617 [facility] - ...         Fluticasone-Umeclidin-Vilant   Dispensed Days Supply Quantity Provider Pharmacy   TRELEGY ELLIPTA 100-62.5-25 05/18/2021 30 60 Units [facility] 00892 [facility] - ...   TRELEGY ELLIPTA 100-62.5-25 03/29/2021 30 60 Units [facility] 56686 [facility] - ...   TRELEGY ELLIPTA 100-62.5-25 02/22/2021 30 60 Units [facility] 45363 [facility] - ...   TRELEGY ELLIPTA 100-62.5-25 12/16/2020 30 60 Units [facility] 44897 [facility] - ...   TRELEGY ELLIPTA 100-62.5-25 11/04/2020 30 60 Units [facility] 48135 [facility] - ...   TRELEGY ELLIPTA 100-62.5-25 10/06/2020 30 60 Units [facility] 23992 [facility] - ...   TRELEGY ELLIPTA 100-62.5-25 08/19/2020 30 60 Units [facility] 71862 [facility] - ...   TRELEGY ELLIPTA 100-62.5-25 07/13/2020 30 60 Units [facility] 55592 [facility] - ...   TRELEGY ELLIPTA 100-62.5-25 05/27/2020 30 60 Units [facility] 81052 [facility] - ...         Furosemide   Dispensed  Days Supply Quantity Provider Pharmacy   FUROSEMIDE 40 MG TABLET 04/22/2021 90 180 Units [facility] 58453 [facility] - ...   FUROSEMIDE 40 MG TABLET 01/21/2021 90 180 Units [facility]IR BANUG[facility] 30433 [facility] - ...   FUROSEMIDE 40 MG TABLET 10/22/2020 90 180 Units [facility] 02559 [facility] - ...         Gabapentin   Dispensed Days Supply Quantity Provider Pharmacy   gabapentin 100 mg capsule 05/19/2021 30 90 capsule RADHA [facility] 74477 [facility] - ...   GABAPENTIN 100 MG CAPSULE 03/18/2021 30 90 Units DAINA P[facility]REZ [facility] 14384 [facility] - ...   GABAPENTIN 100 MG CAPSULE 02/15/2021 30 90 Units [facility] P[facility]REZ [facility] 36828 [facility] - ...   GABAPENTIN 100 MG CAPSULE 01/06/2021 30 90 Units RADHA [facility] 39297 [facility] - ...   GABAPENTIN 100 MG CAPSULE 12/10/2020 30 90 Units [facility] 65293 [facility] - ...         Glucose Blood   Dispensed Days Supply Quantity Provider Pharmacy   ACCU-CHEK BASILIO PLUS TEST STRP 03/28/2021 90 100 Units [facility]IR [facility] 93710 [facility] - ...   ACCU-CHEK BASILIO PLUS TEST STRP 12/23/2020 90 100 Units LE KAY [facility] 53624 [facility] - ...   ACCU-CHEK BASILIO PLUS TEST STRP 10/07/2020 90 100 Units [facility] 08713 [facility] - ...   ACCU-CHEK BASILIO PLUS TEST STRP 07/21/2020 90 100 Units LE KAY [facility] 69048 [facility] - ...         HYDROcodone-Acetaminophen   Dispensed Days Supply Quantity Provider Pharmacy   HYDROCODONE-ACETAMIN 5-325 MG 01/13/2021 7 30 Units [facility] 93199 [facility] - ...   HYDROCODONE-ACETAMIN 5-325 MG 12/10/2020 7 30 Units RADHA VERON[facility] 41812 [facility] - ...   HYDROCODONE-ACETAMIN 5-325 MG 11/13/2020 7 30 Units [facility] 16713 [facility] - ...   HYDROCODONE-ACETAMIN 5-325 MG 11/05/2020 7 28 Units [facility] 50005 [facility] - ...         Lancets   Dispensed Days Supply Quantity Provider Pharmacy   ACCU-CHEK SOFTCLIX LANCETS 02/22/2021  90 100 Units [facility] 75743 [facility] - ...   ACCU-CHEK SOFTCLIX LANCETS 10/04/2020 90 100 Units [facility] 23686 [facility] - ...   ACCU-CHEK SOFTCLIX LANCETS 07/21/2020 90 100 Units [facility] 23775 [facility] - ...         Levothyroxine Sodium   Dispensed Days Supply Quantity Provider Pharmacy   levothyroxine 50 mcg tablet 04/29/2021 90 90 tablet [facility] - Min...   levothyroxine 50 mcg tablet 02/09/2021 90 90 tablet [facility] - Min...   levothyroxine 50 mcg tablet 11/16/2020 90 90 tablet [facility] - Min...   levothyroxine 50 mcg tablet 08/17/2020 90 90 tablet [facility] - Min...         Lisinopril   Dispensed Days Supply Quantity Provider Pharmacy   LISINOPRIL 5 MG TABLET 04/22/2021 90 90 Units [facility] 89394 [facility] - ...   LISINOPRIL 5 MG TABLET 01/21/2021 90 90 Units [facility] 07914 [facility] - ...   LISINOPRIL 5 MG TABLET 10/26/2020 90 90 Units [facility] 05278 [facility] - ...   LISINOPRIL 5 MG TABLET 07/29/2020 90 90 Units [facility] 90274 [facility] - ...         Methocarbamol   Dispensed Days Supply Quantity Provider Pharmacy   METHOCARBAMOL 750 MG TABLET 04/19/2021 22 90 Units [facility]HA [facility] 08685 [facility] - ...   METHOCARBAMOL 750 MG TABLET 03/31/2021 22 90 Units [facility]HA [facility] 65076 [facility] - ...   METHOCARBAMOL 750 MG TABLET 02/21/2021 22 90 Units [facility]HA [facility] 87966 [facility] - ...   METHOCARBAMOL 750 MG TABLET 02/02/2021 22 90 Units [facility] 63776 [facility] - ...         Mupirocin   Dispensed Days Supply Quantity Provider Pharmacy   MUPIROCIN 2% OINTMENT 02/01/2021 22 22 g [facility] 33687 [facility] - ...   MUPIROCIN 2% OINTMENT 01/13/2021 22 22 g MEIR BANGURA [facility] 80071 IN [facility] - ...         Mycophenolate Mofetil   Dispensed Days Supply Quantity Provider Pharmacy    MYCOPHENOLATE 500 MG TABLET 04/22/2021 30 120 Units SILKENSEN,MERYL [facility] [place]...   Mycophenolate 500 MG Tablet 03/23/2021 30 120 Units SILKENSEN,MERYL [facility] [place], M...   MYCOPHENOLATE 500 MG TABLET 02/22/2021 30 120 Units SILKENSEN,MERYL [facility] [place], M...   MYCOPHENOLATE 500 MG TABLET 01/25/2021 30 120 Units SILKENSEN,MERYL [facility] [place], M...   MYCOPHENOLATE 500 MG TABLET 12/28/2020 30 120 Units SILKENSEN,MERYL [facility] [place], M...   MYCOPHENOLATE 500 MG TABLET 11/25/2020 30 120 Units SILKENSEN,MERYL [facility] [place], M...   MYCOPHENOLATE 500 MG TABLET 10/26/2020 30 120 Units [facility]IPIN CH[facility]STINA M...   MYCOPHENOLATE 500 MG TABLET 09/28/2020 30 120 Units [facility]IPIN CH[facility]STINA M...   MYCOPHENOLATE 500 MG TABLET 05/27/2020 30 120 each [facility]STINA M...         Nitroglycerin   Dispensed Days Supply Quantity Provider Pharmacy   NITROGLYCERIN 0.4 MG TABLET SL 03/31/2021 8 25 Units [facility]INA [facility]98 [facility] - ...         Rosuvastatin Calcium   Dispensed Days Supply Quantity Provider Pharmacy   rosuvastatin 40 mg tablet 04/29/2021 90 90 tablet [facility] - Min...   rosuvastatin 40 mg tablet 02/09/2021 90 90 tablet [facility] - Min...   rosuvastatin 40 mg tablet 11/16/2020 90 90 tablet [facility] - Min...   rosuvastatin 40 mg tablet 08/17/2020 90 90 tablet [facility] - Min...         methylPREDNISolone   Dispensed Days Supply Quantity Provider Pharmacy   methylprednisolone 4 mg tablet 04/29/2021 90 90 tablet [facility] - Min...   methylprednisolone 4 mg tablet 02/09/2021 90 90 tablet [facility] - Min...   methylprednisolone 4 mg tablet 11/16/2020 90 90 tablet [facility] - Min...   methylprednisolone 4 mg tablet 08/17/2020 90 90 tablet [facility] - Min...         oxyCODONE  HCl   Dispensed Days Supply Quantity Provider Pharmacy   OXYCODONE HCL 5 MG TABLET 02/02/2021 7 60 Units RADHA VERONICA [facility] 09115 [facility] - ...

## 2021-05-24 NOTE — PLAN OF CARE
Face to face report given with opportunity to observe patient.    Report given to Staci Parekh RN   5/24/2021  2:52 PM

## 2021-05-24 NOTE — PLAN OF CARE
VSS Afebrile. Lung sounds clear, has dry non-productive cough-administered Alejandro DM per request with adequate relief. Denies pain.  NS 50/hr et Daptomycin for abx

## 2021-05-24 NOTE — PLAN OF CARE
Face to face report given with opportunity to observe patient.    Report given to fady Hawthorne RN   5/23/2021  11:26 PM

## 2021-05-24 NOTE — PROVIDER NOTIFICATION
DATE:  5/24/2021   TIME OF RECEIPT FROM LAB:  0617  LAB TEST:  P Tyrese  LAB VALUE: 23.38   RESULTS GIVEN WITH READ-BACK TO (PROVIDER):  Carlos Almanzar MD  TIME LAB VALUE REPORTED TO PROVIDER:   0621

## 2021-05-24 NOTE — PROGRESS NOTES
I discussed the case with Dr. Lilly Vagn of infectious disease at [facility].  She recommends we eventually switch over to oral linezolid would treat him for 2 weeks 600 mg every 12 hours.  Monitor his renal function etc. very closely.  She agreed with the current regimen of daptomycin and Levaquin.  Patient's been basically afebrile for 24 hours and like to go at least another 24 hours and then consider switching him over to oral agents.

## 2021-05-24 NOTE — PROGRESS NOTES
Inpatient Physical Therapy Evaluation    Name: Eduar Isaacs MRN# [id number]   Age: 66 year old YOB: 1955     Date of Consultation: May 24, 2021  Consultation is requested by:  Dr. Almanzar  Specific Consultation Request:  Assess needs, prevent further deconditioning  Primary care provider: [place] Fry    General Information:   Onset Date: 2021     History of Current Problem/Admission: Fever, unknown origin [R50.9]  Sepsis (H) [A41.9]    Prior Level of Function: Independent, though patient recently had spinal fusion.  Just finished PT for his back and had restrictions lifted, but previously was using FWW during recovery stage.  He is familiar with FWW and set up for it.   Ambulation: 0 - Independent   Transferrin - Independent   Toiletin - Independent    Bathin - Independent   Dressin - Not assessed  Eatin - Not assessed  Communication: 0 - Understands/communicates without difficulty  Swallowing:   Cognition: 0 - no cognitive issues reported    Fall history within the last 6 months: No    Current Living Situation: Patient has 1 stairs to enter the home. Patient does have family available to assist with getting groceries, meals, and other home tasks.     Current Equipment Used at Home: FWW intermittently     Patient & Family Goals: To return to walking w/o assistive device     Past Medical History:   No past medical history on file.    Past Surgical History:  Past Surgical History:   Procedure Laterality Date     COLONOSCOPY - HIM SCAN  2012    [facility], polyps, adenomatous       Medications:   Current Facility-Administered Medications   Medication     acetaminophen (TYLENOL) tablet 650 mg     albuterol (PROAIR HFA/PROVENTIL HFA/VENTOLIN HFA) 108 (90 Base) MCG/ACT inhaler 2 puff     allopurinol (ZYLOPRIM) tablet 100 mg     aspirin (ASA) chewable tablet 324 mg     calcium carbonate (TUMS) chewable tablet 1,000 mg     cetirizine (zyrTEC) tablet 10 mg     DAPTOmycin  (CUBICIN) 615 mg in sodium chloride 0.9 % 100 mL intermittent infusion     diphenhydrAMINE (BENADRYL) capsule 25 mg     guaiFENesin (ROBITUSSIN) 20 mg/mL solution 10 mL     HYDROcodone-acetaminophen (NORCO) 5-325 MG per tablet 2 tablet     lactobacillus rhamnosus (GG) (CULTURELL) capsule 1 capsule     levofloxacin (LEVAQUIN) infusion 250 mg     levothyroxine (SYNTHROID/LEVOTHROID) tablet 50 mcg     lidocaine (LMX4) kit     lidocaine 1 % 0.1-1 mL     [Held by provider] lisinopril (ZESTRIL) tablet 5 mg     magnesium hydroxide (MILK OF MAGNESIA) suspension 30 mL     melatonin tablet 5 mg     methocarbamol (ROBAXIN) tablet 750 mg     methylPREDNISolone (MEDROL) tablet 8 mg     mycophenolate (GENERIC EQUIVALENT) tablet 1,000 mg     naloxone (NARCAN) injection 0.2 mg    Or     naloxone (NARCAN) injection 0.4 mg    Or     naloxone (NARCAN) injection 0.2 mg    Or     naloxone (NARCAN) injection 0.4 mg     nitroGLYcerin (NITROSTAT) sublingual tablet 0.4 mg     ondansetron (ZOFRAN-ODT) ODT tab 4 mg    Or     ondansetron (ZOFRAN) injection 4 mg     rosuvastatin (CRESTOR) tablet 40 mg     sodium chloride (PF) 0.9% PF flush 3 mL     sodium chloride (PF) 0.9% PF flush 3 mL     sodium chloride 0.9% infusion       Weight Bearing Status: WBAT     Cognitive Status Examination:  Orientation: awake and alert  Level of Consciousness: alert  Follows Commands and Answers Questions: 100% of the time  Personal Safety and Judgement: Intact  Memory: Immediate recall intact  Comments:     Pain:   Currently in pain? Yes  Pain Location? low back  Pain Rating: aching in low back - from sitting/laying too much    Physical Therapy Evaluation:   Integumentary/Edema: Yes - Increased pitting edema in lower extremity, patient c/o increased fluid in LE with hospitalization  ROM: WFL  Strength: WFL  Bed Mobility: NT  Transfers: CGA for sit <> stand  Gait: 200' w/ FWW w/ multiple standing rest breaks   Stairs: NT  Balance: Good  Sensory:  Good  Coordination: Good    Physical Therapy Interventions: Strengthening and Progressive Activity/Exercise     Clinical Impressions:  Criteria for Skilled Therapeutic Intervention Met: Yes, treatment indicated  PT Diagnosis: General Weakness, Gait impairments   Influenced by the following impairments: General weakness, decreased activity tolerance  Functional limitations due to impairment: walking, self care, transfers  Clinical presentation: Stable/Uncomplicated  Clinical presentation rationale: therapist discretion  Clinical Decision making (complexity): Low Complexity  Frequency: 6 times/week  Predicted Duration of Therapy Intervention (days/wks): 5 days  Anticipated Discharge Disposition: Home with Outpatient Therapy   Anticipated Equipment Needs at Discharge:   Risks and Benefits of therapy have been explained: Yes  Patient, Family & other staff in agreement with plan of care: Yes  Clinical Impression Comments: Patient presents w/ episode of sepsis, causing general weakness and malaise.  Fatigue and shortness of breath w/ minimal activity.  Decreased activity tolerance able to ambulate short distances.  Recently had lumbar fusion, so patient is comfortable w/ FWW and has one available as well as his home being set up appropriately.  He will have assistance from his children, and I anticipate he will be able to return home safely w/ OP PT for activity tolerance when stable to discharge.         Total Eval Time: 10

## 2021-05-24 NOTE — PROGRESS NOTES
Care Transitions focused note:      Pt assessment was done face to face. Pt has [employer] Medicare Advantage.Pt's PCP is Dr. Fry. Pt receives medications through [facility] et [facility]. Pt lives alone in [facility] [place] [facility]. Pt is independent w/all adl's. Pt does use a CPAP and 2 inhalers. Pt does not have any needs from CTS at this time. Pt intends to return home via family to provide transportation.

## 2021-05-24 NOTE — PLAN OF CARE
Pt alert and orientated. Ate well for supper- good appetite .  Sitting up in chair.  IV infusing. Bolus x1 for BP91/47, now 101/59- sitting in chair. Temps 98.3, 97.8.  feet puffy and legs 2+ edema.

## 2021-05-24 NOTE — PHARMACY-MEDICATION REGIMEN REVIEW
Pharmacy Antimicrobial Stewardship Assessment     Current Antimicrobial Therapy:  Anti-infectives (From now, onward)    Start     Dose/Rate Route Frequency Ordered Stop    05/23/21 1700  levofloxacin (LEVAQUIN) infusion 250 mg      250 mg  50 mL/hr over 60 Minutes Intravenous EVERY 24 HOURS 05/23/21 1433      05/22/21 0900  DAPTOmycin (CUBICIN) 615 mg in sodium chloride 0.9 % 100 mL intermittent infusion      6 mg/kg × 102.4 kg (Adjusted)  over 30 Minutes Intravenous EVERY 24 HOURS 05/22/21 0645            Indication: Sepsis    Days of Therapy: 3 Daptomycin, Day 4 Levaquin     Pertinent Labs:  WBC   Date Value Ref Range Status   05/24/2021 11.5 (H) 4.0 - 11.0 10e9/L Final   05/23/2021 17.5 (H) 4.0 - 11.0 10e9/L Final   05/22/2021 18.4 (H) 4.0 - 11.0 10e9/L Final     Procalcitonin   Date Value Ref Range Status   05/24/2021 23.38 (HH) ng/ml Final     Comment:     Critical Value called to and read back by  Yulia Sanchez at 0616 on 5/24/21 by NMVICTORIA  >/= 10.00 ng/ml   Very high likelihood of severe sepsis or septic shock.  Recommendation: Strongly recommend initiating or continuing antibiotics.   Evaluate culture results and clinical features to target antibacterial   therapy. Obtain blood cultures and other relevant cultures if not done.Repeat   PCT in 2 days to guide antibiotic de-escalation. Consider de-escalating   antibiotics when PCT concentration is <80% of peak or abs PCT <1.     05/23/2021 37.40 (HH) ng/ml Final     Comment:     >/= 10.00 ng/ml   Very high likelihood of severe sepsis or septic shock.  Recommendation: Strongly recommend initiating or continuing antibiotics.   Evaluate culture results and clinical features to target antibacterial   therapy. Obtain blood cultures and other relevant cultures if not done.Repeat   PCT in 2 days to guide antibiotic de-escalation. Consider de-escalating   antibiotics when PCT concentration is <80% of peak or abs PCT <1.  Critical Value called to and read back by  CARMEN  PRERNA @ 0631 ON 05/23/21 BY [facility]     05/21/2021 0.74 ng/ml Final     Comment:     0.50-1.99 ng/ml  Moderate risk of systemic infection.  Recommendation:   Recommend antibiotics. Evaluate culture results and clinical features to   target antibacterial therapy. Obtain blood cultures and other relevant   cultures if not done.  If empiric antibiotics were started, recheck PCT in: 2 days to guide   antibiotic de-escalation.  Discontinue or de-escalate antibiotics when PCT   concentration is <80% of peak or abs PCT <0.5. If empiric antibiotics were NOT   started, recheck PCT in 6-24 hours to re-evaluate need for antibiotics.             Culture Results:   (5/23/21) C. Diff = negative  (5/22/21) 1,3 Beta D glucan fungitell  (5/22/21) CMV   (5/22/21) Blood  (5/21/21) Blood  (5/21/21) COVID  (5/21/21) Urine = Enterococcus faecalis     Recommendations/Interventions:  1. Trend procalcitonin  2. Oral options based on sensitivities would be Augmentin (pencillin allergy = itching/rash), Nitrofurantoin, Linezolid (pt takes no SSRIs), or Fluroquinolone  3. Stop daptomycin as Levaquin covers enterococcus in urine per discussion with microbiology lab  4. If daptomycin continued,[place] Daptomycin Recommendations:      Daniel Car, [facility]  May 24, 2021

## 2021-05-25 LAB
ALBUMIN SERPL-MCNC: 2.5 G/DL (ref 3.4–5)
ALP SERPL-CCNC: 54 U/L (ref 40–150)
ALT SERPL W P-5'-P-CCNC: 55 U/L (ref 0–70)
ANION GAP SERPL CALCULATED.3IONS-SCNC: 9 MMOL/L (ref 3–14)
AST SERPL W P-5'-P-CCNC: 87 U/L (ref 0–45)
BILIRUB SERPL-MCNC: 0.4 MG/DL (ref 0.2–1.3)
BUN SERPL-MCNC: 33 MG/DL (ref 7–30)
CALCIUM SERPL-MCNC: 8.2 MG/DL (ref 8.5–10.1)
CHLORIDE SERPL-SCNC: 106 MMOL/L (ref 94–109)
CK SERPL-CCNC: 789 U/L (ref 30–300)
CMV DNA SPEC QL NAA+PROBE: NOT DETECTED
CO2 SERPL-SCNC: 21 MMOL/L (ref 20–32)
CREAT SERPL-MCNC: 1.71 MG/DL (ref 0.66–1.25)
ERYTHROCYTE [DISTWIDTH] IN BLOOD BY AUTOMATED COUNT: 15.1 % (ref 10–15)
GFR SERPL CREATININE-BSD FRML MDRD: 41 ML/MIN/{1.73_M2}
GLUCOSE SERPL-MCNC: 144 MG/DL (ref 70–99)
HCT VFR BLD AUTO: 32.1 % (ref 40–53)
HGB BLD-MCNC: 10.6 G/DL (ref 13.3–17.7)
MCH RBC QN AUTO: 29 PG (ref 26.5–33)
MCHC RBC AUTO-ENTMCNC: 33 G/DL (ref 31.5–36.5)
MCV RBC AUTO: 88 FL (ref 78–100)
PLATELET # BLD AUTO: 153 10E9/L (ref 150–450)
POTASSIUM SERPL-SCNC: 4 MMOL/L (ref 3.4–5.3)
PROCALCITONIN SERPL-MCNC: 12.76 NG/ML
PROT SERPL-MCNC: 6.4 G/DL (ref 6.8–8.8)
RBC # BLD AUTO: 3.66 10E12/L (ref 4.4–5.9)
SODIUM SERPL-SCNC: 136 MMOL/L (ref 133–144)
SPECIMEN SOURCE: NORMAL
WBC # BLD AUTO: 7.9 10E9/L (ref 4–11)

## 2021-05-25 PROCEDURE — 250N000013 HC RX MED GY IP 250 OP 250 PS 637: Performed by: INTERNAL MEDICINE

## 2021-05-25 PROCEDURE — 120N000001 HC R&B MED SURG/OB

## 2021-05-25 PROCEDURE — 82550 ASSAY OF CK (CPK): CPT | Performed by: INTERNAL MEDICINE

## 2021-05-25 PROCEDURE — 258N000003 HC RX IP 258 OP 636: Performed by: INTERNAL MEDICINE

## 2021-05-25 PROCEDURE — 80053 COMPREHEN METABOLIC PANEL: CPT | Performed by: INTERNAL MEDICINE

## 2021-05-25 PROCEDURE — 250N000012 HC RX MED GY IP 250 OP 636 PS 637: Performed by: INTERNAL MEDICINE

## 2021-05-25 PROCEDURE — 36415 COLL VENOUS BLD VENIPUNCTURE: CPT | Performed by: INTERNAL MEDICINE

## 2021-05-25 PROCEDURE — 85027 COMPLETE CBC AUTOMATED: CPT | Performed by: INTERNAL MEDICINE

## 2021-05-25 PROCEDURE — 250N000011 HC RX IP 250 OP 636: Performed by: INTERNAL MEDICINE

## 2021-05-25 PROCEDURE — 99232 SBSQ HOSP IP/OBS MODERATE 35: CPT | Performed by: INTERNAL MEDICINE

## 2021-05-25 PROCEDURE — 84145 PROCALCITONIN (PCT): CPT | Performed by: INTERNAL MEDICINE

## 2021-05-25 RX ORDER — FUROSEMIDE 40 MG
80 TABLET ORAL DAILY
Status: DISCONTINUED | OUTPATIENT
Start: 2021-05-25 | End: 2021-05-27 | Stop reason: HOSPADM

## 2021-05-25 RX ADMIN — METHOCARBAMOL 750 MG: 750 TABLET ORAL at 17:03

## 2021-05-25 RX ADMIN — LEVOTHYROXINE SODIUM 50 MCG: 0.05 TABLET ORAL at 06:40

## 2021-05-25 RX ADMIN — GUAIFENESIN 10 ML: 100 SOLUTION ORAL at 09:20

## 2021-05-25 RX ADMIN — METHOCARBAMOL 750 MG: 750 TABLET ORAL at 13:07

## 2021-05-25 RX ADMIN — ROSUVASTATIN CALCIUM 40 MG: 10 TABLET, FILM COATED ORAL at 09:08

## 2021-05-25 RX ADMIN — METHOCARBAMOL 750 MG: 750 TABLET ORAL at 09:08

## 2021-05-25 RX ADMIN — CLOPIDOGREL BISULFATE 75 MG: 75 TABLET ORAL at 09:08

## 2021-05-25 RX ADMIN — LEVOFLOXACIN 250 MG: 5 INJECTION, SOLUTION INTRAVENOUS at 17:04

## 2021-05-25 RX ADMIN — Medication 1 CAPSULE: at 21:22

## 2021-05-25 RX ADMIN — DAPTOMYCIN 615 MG: 350 INJECTION, POWDER, LYOPHILIZED, FOR SOLUTION INTRAVENOUS at 09:08

## 2021-05-25 RX ADMIN — METHOCARBAMOL 750 MG: 750 TABLET ORAL at 21:22

## 2021-05-25 RX ADMIN — METHYLPREDNISOLONE 8 MG: 4 TABLET ORAL at 09:23

## 2021-05-25 RX ADMIN — Medication 5 MG: at 01:35

## 2021-05-25 RX ADMIN — CETIRIZINE HYDROCHLORIDE 10 MG: 10 TABLET, FILM COATED ORAL at 09:08

## 2021-05-25 RX ADMIN — FUROSEMIDE 80 MG: 40 TABLET ORAL at 10:05

## 2021-05-25 RX ADMIN — Medication 1 CAPSULE: at 09:08

## 2021-05-25 RX ADMIN — MYCOPHENOLATE MOFETIL 1000 MG: 500 TABLET ORAL at 09:08

## 2021-05-25 RX ADMIN — MYCOPHENOLATE MOFETIL 1000 MG: 500 TABLET ORAL at 21:22

## 2021-05-25 RX ADMIN — ALLOPURINOL 100 MG: 100 TABLET ORAL at 09:08

## 2021-05-25 RX ADMIN — ASPIRIN 324 MG: 81 TABLET, CHEWABLE ORAL at 09:07

## 2021-05-25 RX ADMIN — HYDROCODONE BITARTRATE AND ACETAMINOPHEN 2 TABLET: 5; 325 TABLET ORAL at 01:35

## 2021-05-25 RX ADMIN — ACETAMINOPHEN 650 MG: 325 TABLET, FILM COATED ORAL at 09:20

## 2021-05-25 ASSESSMENT — ACTIVITIES OF DAILY LIVING (ADL)
ADLS_ACUITY_SCORE: 18

## 2021-05-25 ASSESSMENT — MIFFLIN-ST. JEOR: SCORE: 2328.5

## 2021-05-25 NOTE — PLAN OF CARE
Pt declined PT this morning. Reports legs aren't feeling good and he doesn't want to leave room due to having taken lasix. Will attempt again when able

## 2021-05-25 NOTE — PROGRESS NOTES
[facility]    Hospitalist Progress Note      Assessment & Plan   Eduar Isaacs is a 66 year old male who was admitted on 5/21/2021.      1.  Sepsis/renal transplant: The most part this has resolved.  Did have a temperature spike last night 102.  Hemodynamically stable.  White count continues to decrease as does his procalcitonin.    2.  Enterococcus UTI/probable pyelonephritis: The only positive culture has been Enterococcus faecalis.  It is pansensitive also to Levaquin which microneedle did test.  Given his allergies he has been on the daptomycin and the Levaquin.  For the most part things seem to be resolving with exception of this temperature spike last evening.  We will continue with daptomycin and Levaquin through today tomorrow likely will switch over to oral linezolid as recommended by infectious disease.  Probably also oral Levaquin.    3.  Renal transplant: Patient's creatinine continues to decrease.  Down to 1.71 today.  Electrolytes remain normal.  His urine outputs have been extremely good he started to mobilize all the third spaced fluid indicating 80 mg of oral Lasix.  He put out 7200 cc.    4.  Edema: He is probably still has at least 10 kg of fluid.  Patient states in the chair most of the time so is a lot of dependent extremity edema.  Continue with his overall diuresis.  Hemodynamically he is stable for this.    5.  Weakness: Did get up with physical therapy yesterday.  He is a little bit hesitant to do so but encouraged him he has to get up and be more mobile otherwise by time of discharge, scale from the nursing home    6.  Coronary artery disease: He had no chest pains or arrhythmias.  No real signs of failure he is just third spacing a lot of fluid.  He has normal function on last echo 55%.  I did restart his aspirin and Plavix.  I held them initially + sure if any interventions were going to be performed.    7.  Mild hyponatremia: Been running 129-130 range since admission.   Likely this is due to his underlying excess volume status.  He is being diuresed at this time.  Do not feel is playing a significant role in his current medical status    Diet: Combination Diet Regular Diet Adult  Fluids: lock    DVT Prophylaxis: Heparin SQ  Code Status: Special Code    Disposition: Expected discharge in 1-2 days once no further fever spikes.    Carlos Almanzar    Interval History   Patient looks morning.  Very talkative.  Still has 3+ edema in his lower extremities does not like lying in the bed.  States breathing is better still a little tickle in his throat.  No nausea vomiting at all.  One fever spike 102 last evening.  Otherwise labs are better.  Creatinine is continues to decrease.  Procalcitonin down to 12 white count normal range.  Continue with IV antibiotics through today consider switching tomorrow continue diuresis and ambulation.    -Data reviewed today: I reviewed all new labs and imaging results over the last 24 hours. I personally reviewed no images or EKG's today.    Physical Exam   Temp: 100.5  F (38.1  C) Temp src: Tympanic BP: 127/60 Pulse: 80   Resp: 18 SpO2: 97 % O2 Device: None (Room air)    Vitals:    05/24/21 0508 05/24/21 1601 05/25/21 0441   Weight: (!) 157.8 kg (347 lb 14.2 oz) (!) 159 kg (350 lb 8.5 oz) (!) 156.4 kg (344 lb 12.8 oz)     Vital Signs with Ranges  Temp:  [99.4  F (37.4  C)-102  F (38.9  C)] 100.5  F (38.1  C)  Pulse:  [80-96] 80  Resp:  [18-20] 18  BP: (127-153)/(60-80) 127/60  SpO2:  [97 %-98 %] 97 %  I/O last 3 completed shifts:  In: 2108 [P.O.:600; I.V.:1508]  Out: 7200 [Urine:7200]    Peripheral IV 05/21/21 Right Other (Comment) (Active)   Site Assessment WDL 05/24/21 1540   Line Status Infusing 05/24/21 1540   Phlebitis Scale 0-->no symptoms 05/24/21 1540   Infiltration Scale 0 05/24/21 1540   Number of days: 4     No line/device    Constitutional: Alert and oriented x3. No distress    HEENT: Normocephalic/atraumatic, PERRL, EOMI, mouth moist, neck  supple, no LN.     Cardiovascular: RRR. no Murmur, no  rubs, or gallops.  JVD no.  Bruits no.  Pulses 2+    Respiratory: Clear to auscultation bilaterally    Abdomen:Obese Soft, nontender, nondistended, no organomegaly. Bowel sounds present    Extremities: Warm/dry. 3+edema    Neuro:   Non focal, cranial nerves intact, Moves all extremities.  Medications     sodium chloride 10 mL/hr at 05/24/21 1544       allopurinol  100 mg Oral Daily     aspirin  324 mg Oral Daily     cetirizine  10 mg Oral Daily     clopidogrel  75 mg Oral Daily     DAPTOmycin  6 mg/kg (Adjusted) Intravenous Q24H     lactobacillus rhamnosus (GG)  1 capsule Oral BID     levofloxacin  250 mg Intravenous Q24H     levothyroxine  50 mcg Oral Daily     [Held by provider] lisinopril  5 mg Oral Daily     methocarbamol  750 mg Oral 4x Daily     methylPREDNISolone  8 mg Oral Q48H     mycophenolate  1,000 mg Oral BID     rosuvastatin  40 mg Oral Daily     sodium chloride (PF)  3 mL Intracatheter Q8H       Data   Recent Labs   Lab 05/25/21  0527 05/24/21  0526 05/23/21  1401 05/23/21  0545 05/21/21  1452 05/21/21  1452   WBC 7.9 11.5*  --  17.5*   < > 14.0*   HGB 10.6* 10.1*  --  10.6*   < > 12.7*   MCV 88 88  --  88   < > 89    140*  --  121*   < > 159    136 129* 129*   < > 130*   POTASSIUM 4.0 4.2 4.0 4.2   < > 4.7   CHLORIDE 106 108 101 97   < > 95   CO2 21 20 19* 21   < > 26   BUN 33* 41* 41* 40*   < > 35*   CR 1.71* 1.84* 2.49* 2.70*   < > 1.47*   ANIONGAP 9 8 9 11   < > 9   HANNAH 8.2* 7.3* 7.0* 7.3*   < > 8.8   * 191* 219* 142*   < > 172*   ALBUMIN 2.5* 2.3*  --  2.5*   < > 3.3*   PROTTOTAL 6.4* 6.2*  --  6.1*   < > 7.1   BILITOTAL 0.4 0.3  --  0.5   < > 0.7   ALKPHOS 54 55  --  53   < > 52   ALT 55 25  --  20   < > 18   AST 87* 55*  --  55*   < > 15   TROPI  --   --   --   --   --  <0.015    < > = values in this interval not displayed.       No results found for this or any previous visit (from the past 24 hour(s)).

## 2021-05-25 NOTE — PLAN OF CARE
"Alert and oriented.  Lungs clear.  Dyspnea with exertion.  Requested and medicated with Robitussin at HS for \"tickle in throat\".  Temp 100.1 at the highest this shift.  Lasix po given, voiding without difficulty.  Urine pale.  Levaquin continues.  Up to chair for shift.  Refuses to get into bed.  Encouraged to elevated legs, refused.  3-4+ edema to BLE.   Requested IVF to be held at night, locked flushed.    Face to face report given with opportunity to observe patient.    Report given to Gomez Luna RN   5/24/2021  11:19 PM    "

## 2021-05-25 NOTE — PLAN OF CARE
"Most recent vitals: /60 (BP Location: Left arm)   Pulse 80   Temp 100.5  F (38.1  C) (Tympanic)   Resp 18   Ht 1.727 m (5' 8\")   Wt (!) 156.4 kg (344 lb 12.8 oz)   SpO2 97%   BMI 52.43 kg/m    Febrile throughout the night   Pain Management:  Utilized PRN oral medications for body aches.   LOC:  A&O  Cardiac:  Tachycardic following activity. BP stable   Respiratory:  RIVERA. Room air   GI: Denies nausea   : Voiding without difficulty   Skin Issues: As charted     IVF:  IV SL   ABX: Daptomycin & levaquin     Nutrition: Reg  ADL's:  Up independently in room   Ambulation: Denies dizziness or lightheadedness when ambulating   Safety:  Call light within reach     Face to face report given with opportunity to observe patient.    Report given to Sharif Gutierres RN   5/25/2021  7:59 AM     "

## 2021-05-25 NOTE — PLAN OF CARE
"Reason for hospital stay:  Fever   Living situation PTA: Home   Most recent vitals: /66 (BP Location: Left arm)   Pulse 79   Temp 99.1  F (37.3  C) (Tympanic)   Resp 18   Ht 1.727 m (5' 8\")   Wt (!) 156.4 kg (344 lb 12.8 oz)   SpO2 96%   BMI 52.43 kg/m    Pt has been awake on and off throughout shift, A&Ox4 with VS and Assessments, both as charted. Continues to deny pain. No changes to edema.     IVF:  SL  ABX:  Given per MAR.     Nutrition: Regular   ADL's: A1  Ambulation: SBA  Safety:  Call button within reach, calls appropriately and makes needs known.     Discharge care conference held.   Attendees: Nursing, Physical Therapy, Occupational Therapy, Pharmacy, Respiratory Therapy, and Physician  Comments:    Face to face report given with opportunity to observe patient.    Report given to LILIANA Small RN   5/25/2021  3:57 PM      "

## 2021-05-25 NOTE — PHARMACY-MEDICATION REGIMEN REVIEW
Pharmacy Antimicrobial Stewardship Assessment     Current Antimicrobial Therapy:  Anti-infectives (From now, onward)    Start     Dose/Rate Route Frequency Ordered Stop    05/23/21 1700  levofloxacin (LEVAQUIN) infusion 250 mg      250 mg  50 mL/hr over 60 Minutes Intravenous EVERY 24 HOURS 05/23/21 1433      05/22/21 0900  DAPTOmycin (CUBICIN) 615 mg in sodium chloride 0.9 % 100 mL intermittent infusion      6 mg/kg × 102.4 kg (Adjusted)  over 30 Minutes Intravenous EVERY 24 HOURS 05/22/21 0645          Indication: Sepsis    Days of Therapy: 5 (Levaquin), 4 (Daptomycin)     Pertinent Labs:  WBC   Date Value Ref Range Status   05/25/2021 7.9 4.0 - 11.0 10e9/L Final   05/24/2021 11.5 (H) 4.0 - 11.0 10e9/L Final   05/23/2021 17.5 (H) 4.0 - 11.0 10e9/L Final     Procalcitonin   Date Value Ref Range Status   05/25/2021 12.76 (HH) ng/ml Final     Comment:     Critical Value called to and read back by  Pia Menchaca at 0631 on 5/25/21 by NMA.  >/= 10.00 ng/ml   Very high likelihood of severe sepsis or septic shock.  Recommendation: Strongly recommend initiating or continuing antibiotics.   Evaluate culture results and clinical features to target antibacterial   therapy. Obtain blood cultures and other relevant cultures if not done.Repeat   PCT in 2 days to guide antibiotic de-escalation. Consider de-escalating   antibiotics when PCT concentration is <80% of peak or abs PCT <1.     05/24/2021 23.38 (HH) ng/ml Final     Comment:     Critical Value called to and read back by  Yluia Sanchez at 0616 on 5/24/21 by NMA  >/= 10.00 ng/ml   Very high likelihood of severe sepsis or septic shock.  Recommendation: Strongly recommend initiating or continuing antibiotics.   Evaluate culture results and clinical features to target antibacterial   therapy. Obtain blood cultures and other relevant cultures if not done.Repeat   PCT in 2 days to guide antibiotic de-escalation. Consider de-escalating   antibiotics when PCT concentration is  <80% of peak or abs PCT <1.     05/23/2021 37.40 (HH) ng/ml Final     Comment:     >/= 10.00 ng/ml   Very high likelihood of severe sepsis or septic shock.  Recommendation: Strongly recommend initiating or continuing antibiotics.   Evaluate culture results and clinical features to target antibacterial   therapy. Obtain blood cultures and other relevant cultures if not done.Repeat   PCT in 2 days to guide antibiotic de-escalation. Consider de-escalating   antibiotics when PCT concentration is <80% of peak or abs PCT <1.  Critical Value called to and read back by  CARMEN PRERNA @ 3250 ON 05/23/21 BY [facility]       No results found for: CRP    Culture Results:   5/32/21: C diff=  negative  5/22/21: Beta-glucan fungitell = negative  5/22/21: Cytomegalovirus Qualitave PCR = pending  5/22/21: Blood culture = no growth after 3 days  5/21/21: Blood culture = no growth after 4 days  5/21/21: COVID = negative  05/21/2021 1452 05/24/2021 1535 Urine Culture Aerobic Bacterial [[id number]]    (Abnormal)   Midstream Urine    Edited Result - FINAL Component Value   Specimen Description Midstream Urine   Culture Micro 50,000 to 100,000 colonies/mL   Enterococcus faecalis               Recommendations/Interventions:  1. Per provider's note, plan is to switch to PO Linezolid + Levaquin tomorrow. No recommendations at this time. Will continue to monitor.   2. Of note, CK continues to be elevated but does not meet criteria for discontinuing Daptomycin.       Batool Saini, [facility]  May 25, 2021

## 2021-05-26 ENCOUNTER — APPOINTMENT (OUTPATIENT)
Dept: PHYSICAL THERAPY | Facility: HOSPITAL | Age: 66
DRG: 872 | End: 2021-05-26
Payer: COMMERCIAL

## 2021-05-26 LAB
ALBUMIN SERPL-MCNC: 2.4 G/DL (ref 3.4–5)
ALP SERPL-CCNC: 56 U/L (ref 40–150)
ALT SERPL W P-5'-P-CCNC: 51 U/L (ref 0–70)
ANION GAP SERPL CALCULATED.3IONS-SCNC: 6 MMOL/L (ref 3–14)
AST SERPL W P-5'-P-CCNC: 51 U/L (ref 0–45)
BILIRUB SERPL-MCNC: 0.3 MG/DL (ref 0.2–1.3)
BUN SERPL-MCNC: 29 MG/DL (ref 7–30)
CALCIUM SERPL-MCNC: 8.4 MG/DL (ref 8.5–10.1)
CHLORIDE SERPL-SCNC: 104 MMOL/L (ref 94–109)
CO2 SERPL-SCNC: 26 MMOL/L (ref 20–32)
CREAT SERPL-MCNC: 1.46 MG/DL (ref 0.66–1.25)
ERYTHROCYTE [DISTWIDTH] IN BLOOD BY AUTOMATED COUNT: 14.7 % (ref 10–15)
GFR SERPL CREATININE-BSD FRML MDRD: 50 ML/MIN/{1.73_M2}
GLUCOSE SERPL-MCNC: 161 MG/DL (ref 70–99)
HCT VFR BLD AUTO: 32.3 % (ref 40–53)
HGB BLD-MCNC: 10.8 G/DL (ref 13.3–17.7)
MCH RBC QN AUTO: 29.1 PG (ref 26.5–33)
MCHC RBC AUTO-ENTMCNC: 33.4 G/DL (ref 31.5–36.5)
MCV RBC AUTO: 87 FL (ref 78–100)
PLATELET # BLD AUTO: 160 10E9/L (ref 150–450)
POTASSIUM SERPL-SCNC: 3.9 MMOL/L (ref 3.4–5.3)
PROCALCITONIN SERPL-MCNC: 6.27 NG/ML
PROT SERPL-MCNC: 6.7 G/DL (ref 6.8–8.8)
RBC # BLD AUTO: 3.71 10E12/L (ref 4.4–5.9)
SODIUM SERPL-SCNC: 136 MMOL/L (ref 133–144)
WBC # BLD AUTO: 7.3 10E9/L (ref 4–11)

## 2021-05-26 PROCEDURE — 85027 COMPLETE CBC AUTOMATED: CPT | Performed by: INTERNAL MEDICINE

## 2021-05-26 PROCEDURE — 84145 PROCALCITONIN (PCT): CPT | Performed by: INTERNAL MEDICINE

## 2021-05-26 PROCEDURE — 120N000001 HC R&B MED SURG/OB

## 2021-05-26 PROCEDURE — 94640 AIRWAY INHALATION TREATMENT: CPT

## 2021-05-26 PROCEDURE — 250N000013 HC RX MED GY IP 250 OP 250 PS 637: Performed by: INTERNAL MEDICINE

## 2021-05-26 PROCEDURE — 99232 SBSQ HOSP IP/OBS MODERATE 35: CPT | Performed by: NURSE PRACTITIONER

## 2021-05-26 PROCEDURE — 97530 THERAPEUTIC ACTIVITIES: CPT | Mod: GP

## 2021-05-26 PROCEDURE — 80053 COMPREHEN METABOLIC PANEL: CPT | Performed by: INTERNAL MEDICINE

## 2021-05-26 PROCEDURE — 258N000003 HC RX IP 258 OP 636: Performed by: INTERNAL MEDICINE

## 2021-05-26 PROCEDURE — 36415 COLL VENOUS BLD VENIPUNCTURE: CPT | Performed by: INTERNAL MEDICINE

## 2021-05-26 PROCEDURE — 250N000012 HC RX MED GY IP 250 OP 636 PS 637: Performed by: INTERNAL MEDICINE

## 2021-05-26 PROCEDURE — 250N000013 HC RX MED GY IP 250 OP 250 PS 637: Performed by: NURSE PRACTITIONER

## 2021-05-26 PROCEDURE — 250N000011 HC RX IP 250 OP 636: Performed by: INTERNAL MEDICINE

## 2021-05-26 RX ORDER — LEVOFLOXACIN 250 MG/1
250 TABLET, FILM COATED ORAL DAILY
Status: DISCONTINUED | OUTPATIENT
Start: 2021-05-26 | End: 2021-05-27 | Stop reason: HOSPADM

## 2021-05-26 RX ORDER — LINEZOLID 600 MG/1
600 TABLET, FILM COATED ORAL EVERY 12 HOURS SCHEDULED
Status: DISCONTINUED | OUTPATIENT
Start: 2021-05-27 | End: 2021-05-27 | Stop reason: HOSPADM

## 2021-05-26 RX ADMIN — METHOCARBAMOL 750 MG: 750 TABLET ORAL at 17:06

## 2021-05-26 RX ADMIN — ASPIRIN 324 MG: 81 TABLET, CHEWABLE ORAL at 08:45

## 2021-05-26 RX ADMIN — CETIRIZINE HYDROCHLORIDE 10 MG: 10 TABLET, FILM COATED ORAL at 08:46

## 2021-05-26 RX ADMIN — Medication 1 CAPSULE: at 20:55

## 2021-05-26 RX ADMIN — FUROSEMIDE 80 MG: 40 TABLET ORAL at 08:46

## 2021-05-26 RX ADMIN — Medication 1 CAPSULE: at 08:46

## 2021-05-26 RX ADMIN — CLOPIDOGREL BISULFATE 75 MG: 75 TABLET ORAL at 08:46

## 2021-05-26 RX ADMIN — METHOCARBAMOL 750 MG: 750 TABLET ORAL at 08:46

## 2021-05-26 RX ADMIN — LEVOTHYROXINE SODIUM 50 MCG: 0.05 TABLET ORAL at 07:00

## 2021-05-26 RX ADMIN — MYCOPHENOLATE MOFETIL 1000 MG: 500 TABLET ORAL at 20:55

## 2021-05-26 RX ADMIN — FLUTICASONE FUROATE AND VILANTEROL TRIFENATATE 1 PUFF: 100; 25 POWDER RESPIRATORY (INHALATION) at 08:28

## 2021-05-26 RX ADMIN — ACETAMINOPHEN 650 MG: 325 TABLET, FILM COATED ORAL at 20:54

## 2021-05-26 RX ADMIN — METHOCARBAMOL 750 MG: 750 TABLET ORAL at 20:55

## 2021-05-26 RX ADMIN — UMECLIDINIUM 1 PUFF: 62.5 AEROSOL, POWDER ORAL at 08:28

## 2021-05-26 RX ADMIN — DAPTOMYCIN 615 MG: 350 INJECTION, POWDER, LYOPHILIZED, FOR SOLUTION INTRAVENOUS at 08:46

## 2021-05-26 RX ADMIN — ALLOPURINOL 100 MG: 100 TABLET ORAL at 08:46

## 2021-05-26 RX ADMIN — METHOCARBAMOL 750 MG: 750 TABLET ORAL at 14:12

## 2021-05-26 RX ADMIN — LEVOFLOXACIN 250 MG: 250 TABLET, FILM COATED ORAL at 17:06

## 2021-05-26 RX ADMIN — ROSUVASTATIN CALCIUM 40 MG: 10 TABLET, FILM COATED ORAL at 08:45

## 2021-05-26 RX ADMIN — MYCOPHENOLATE MOFETIL 1000 MG: 500 TABLET ORAL at 08:45

## 2021-05-26 ASSESSMENT — ACTIVITIES OF DAILY LIVING (ADL)
ADLS_ACUITY_SCORE: 18
ADLS_ACUITY_SCORE: 19

## 2021-05-26 ASSESSMENT — MIFFLIN-ST. JEOR: SCORE: 2291.5

## 2021-05-26 NOTE — PLAN OF CARE
Face to face report given with opportunity to observe patient.    Report given to Yulia Parekh RN   5/26/2021  3:02 PM

## 2021-05-26 NOTE — PROVIDER NOTIFICATION
DATE:  5/26/2021   TIME OF RECEIPT FROM LAB: 0647  LAB TEST:  Procalcitonin  LAB VALUE:  6.27  RESULTS GIVEN WITH READ-BACK TO (PROVIDER):  Nicolette Cooper, NP  TIME LAB VALUE REPORTED TO PROVIDER:   0651

## 2021-05-26 NOTE — PLAN OF CARE
Pt with max temp of 100.1- received Tylenol, VS as charted.  His O2 sats are in the upper 90's on RA, lung sounds are clear.  He has denied any pain/discomfort.  His saline lock was symptomatic - removed, good oral intake/output. He does have 3-4+ dependent edema.  His antibiotics converted or oral. He has been up ambulating in hallway with wife.  He has remained free of falls, call light within reach - does make his needs known, frequent rounding done to assess needs.

## 2021-05-26 NOTE — PROGRESS NOTES
05/26/21 0857   Signing Clinician's Name / Credentials   Signing clinician's name / credentials Brooke Maynard PTA   Quick Adds   Rehab Discipline PT   PT Assistant Visit Number 1   Therapeutic Activity   Minutes of Treatment 23 minutes   Symptoms Noted During/After Treatment Fatigue;Shortness of breath;Increased pain   Treatment Detail Pt was up in the recliner this morning but was willing to participate in PT this morning. Pt ambulated 300 feet with FWW CGA1 to SBA1 SOB but otherwise did not feel to bad this morning. Pt was SBA1 with transferring in and out of the recliner.    PT Discharge Planning    PT Discharge Recommendation (DC Rec) home with outpatient physical therapy   PT Rationale for DC Rec Patient very steady w/ transfers and gait, limited by fatigue, COPD, and general deconditioning due to illness.  Has a FWW at home, and his home is set up already for FWW following his previous lumbar fusion.  Would benefit from continued outpatient therapy for motivation and strengthening to return to ambulation w/o assitive device.  Previously went to [facility].   PT Brief overview of current status  Pt was SBA1 to CGA1 with sit to stands and pivot transfers with the walker. Pt walked 300 feet with FWW CGA1 to SBA1.    Additional Documentation   PT Plan progress activity tolerance as able   Total Session Time   Total Session Time (minutes) 23 minutes

## 2021-05-26 NOTE — PLAN OF CARE
Afebrile.  Continues on Levaquin.  Appetite good.  Denies pain.  Voiding without difficulty.  Edema 3+ BLE.  Room air.  IV remains locked.   Remains up in chair, refuses to elevate legs.  IV locked.      Face to face report given with opportunity to observe patient.    Report given to Pia Luna RN   5/25/2021  11:34 PM

## 2021-05-26 NOTE — PLAN OF CARE
VSS-Afebrile. Up ad abbi in room independently, uses urinal at bedside. IV  Daptomycin otherwise saline locked. Lung sounds clear et Bowel sounds active.

## 2021-05-26 NOTE — PHARMACY
Pharmacy Antimicrobial Stewardship Assessment     Current Antimicrobial Therapy:  Anti-infectives (From now, onward)    Start     Dose/Rate Route Frequency Ordered Stop    05/27/21 0800  linezolid (ZYVOX) tablet 600 mg      600 mg Oral EVERY 12 HOURS SCHEDULED 05/26/21 1120      05/26/21 1700  levofloxacin (LEVAQUIN) tablet 250 mg      250 mg Oral DAILY 05/26/21 1120            Indication: UTI    Days of Therapy: 1 (Linezolid), 6 (Levaquin--day 1 of oral)  ~previously on 4 days Daptomycin    Patient has many drug allergies listed below:        Pertinent Labs:  WBC   Date Value Ref Range Status   05/26/2021 7.3 4.0 - 11.0 10e9/L Final   05/25/2021 7.9 4.0 - 11.0 10e9/L Final   05/24/2021 11.5 (H) 4.0 - 11.0 10e9/L Final     Procalcitonin   Date Value Ref Range Status   05/26/2021 6.27 (HH) ng/ml Final     Comment:     Critical Value called to and read back by  Rafaela Carroll at 0650 on 5/26/21 by NMA  2.00-9.99 ng/ml  High risk for progression to severe sepsis.  Recommendation: Strongly recommend initiating or continuing antibiotics.   Evaluate culture results and clinical features to target antibacterial   therapy. Obtain blood cultures and other relevant cultures if not done. Repeat   PCT in 2 days to guide antibiotic de-escalation. Consider de-escalating   antibiotics when PCT concentration is <80% of peak or abs PCT <0.5.     05/25/2021 12.76 (HH) ng/ml Final     Comment:     Critical Value called to and read back by  Pia Menchaca at 0631 on 5/25/21 by NMA.  >/= 10.00 ng/ml   Very high likelihood of severe sepsis or septic shock.  Recommendation: Strongly recommend initiating or continuing antibiotics.   Evaluate culture results and clinical features to target antibacterial   therapy. Obtain blood cultures and other relevant cultures if not done.Repeat   PCT in 2 days to guide antibiotic de-escalation. Consider de-escalating   antibiotics when PCT concentration is <80% of peak or abs PCT <1.     05/24/2021  23.38 (HH) ng/ml Final     Comment:     Critical Value called to and read back by  Yulia Sanchez at 0616 on 5/24/21 by NMA  >/= 10.00 ng/ml   Very high likelihood of severe sepsis or septic shock.  Recommendation: Strongly recommend initiating or continuing antibiotics.   Evaluate culture results and clinical features to target antibacterial   therapy. Obtain blood cultures and other relevant cultures if not done.Repeat   PCT in 2 days to guide antibiotic de-escalation. Consider de-escalating   antibiotics when PCT concentration is <80% of peak or abs PCT <1.       No results found for: CRP    Culture Results:   5/32/21: C diff=  negative  5/22/21: Beta-glucan fungitell = negative  5/22/21: Cytomegalovirus Qualitave PCR = not detected  5/22/21: Blood culture = no growth after 3 days  5/21/21: Blood culture = no growth after 4 days  5/21/21: COVID = negative    05/21/2021 1452 05/24/2021 1535 Urine Culture Aerobic Bacterial [[id number]]    (Abnormal)   Midstream Urine    Edited Result - FINAL Component Value   Specimen Description Midstream Urine   Culture Micro 50,000 to 100,000 colonies/mL   Enterococcus faecalis   Abnormal           Recommendations/Interventions:  1. Linezolid Monitoring:           LFTs           CBC with differential           Platelets weekly in any patient on linezolid for greater than 2 weeks (linezolid can cause bone marrow suppression).           Peripheral neuropathy and retinopathy have also been reported with prolonged use      Batool Saini, [facility]  May 26, 2021

## 2021-05-26 NOTE — PLAN OF CARE
Pt is A&O x 4. VS as charted, afebrile, denies pain. Independent in room. Slept most of night in bed. Iv saline locked. Call light in reach makes needs known.      Face to face report given with opportunity to observe patient.    Report given to LILIANA Antoine RN   5/26/2021  7:04 AM

## 2021-05-27 VITALS
HEART RATE: 96 BPM | TEMPERATURE: 98.5 F | DIASTOLIC BLOOD PRESSURE: 69 MMHG | OXYGEN SATURATION: 97 % | HEIGHT: 68 IN | WEIGHT: 315 LBS | BODY MASS INDEX: 47.74 KG/M2 | RESPIRATION RATE: 18 BRPM | SYSTOLIC BLOOD PRESSURE: 128 MMHG

## 2021-05-27 LAB
ANION GAP SERPL CALCULATED.3IONS-SCNC: 6 MMOL/L (ref 3–14)
BACTERIA SPEC CULT: NORMAL
BACTERIA SPEC CULT: NORMAL
BASOPHILS # BLD AUTO: 0 10E9/L (ref 0–0.2)
BASOPHILS NFR BLD AUTO: 0.4 %
BUN SERPL-MCNC: 28 MG/DL (ref 7–30)
CALCIUM SERPL-MCNC: 8.8 MG/DL (ref 8.5–10.1)
CHLORIDE SERPL-SCNC: 102 MMOL/L (ref 94–109)
CO2 SERPL-SCNC: 26 MMOL/L (ref 20–32)
CREAT SERPL-MCNC: 1.45 MG/DL (ref 0.66–1.25)
DIFFERENTIAL METHOD BLD: ABNORMAL
EOSINOPHIL # BLD AUTO: 0.1 10E9/L (ref 0–0.7)
EOSINOPHIL NFR BLD AUTO: 0.8 %
ERYTHROCYTE [DISTWIDTH] IN BLOOD BY AUTOMATED COUNT: 14.7 % (ref 10–15)
GFR SERPL CREATININE-BSD FRML MDRD: 51 ML/MIN/{1.73_M2}
GLUCOSE SERPL-MCNC: 148 MG/DL (ref 70–99)
HCT VFR BLD AUTO: 35.4 % (ref 40–53)
HGB BLD-MCNC: 11.6 G/DL (ref 13.3–17.7)
IMM GRANULOCYTES # BLD: 0.1 10E9/L (ref 0–0.4)
IMM GRANULOCYTES NFR BLD: 0.7 %
LYMPHOCYTES # BLD AUTO: 1.3 10E9/L (ref 0.8–5.3)
LYMPHOCYTES NFR BLD AUTO: 16.1 %
MCH RBC QN AUTO: 28.6 PG (ref 26.5–33)
MCHC RBC AUTO-ENTMCNC: 32.8 G/DL (ref 31.5–36.5)
MCV RBC AUTO: 87 FL (ref 78–100)
MONOCYTES # BLD AUTO: 0.9 10E9/L (ref 0–1.3)
MONOCYTES NFR BLD AUTO: 11.4 %
NEUTROPHILS # BLD AUTO: 5.8 10E9/L (ref 1.6–8.3)
NEUTROPHILS NFR BLD AUTO: 70.6 %
NRBC # BLD AUTO: 0 10*3/UL
NRBC BLD AUTO-RTO: 0 /100
PLATELET # BLD AUTO: 205 10E9/L (ref 150–450)
POTASSIUM SERPL-SCNC: 3.7 MMOL/L (ref 3.4–5.3)
PROCALCITONIN SERPL-MCNC: 3.8 NG/ML
RBC # BLD AUTO: 4.05 10E12/L (ref 4.4–5.9)
SODIUM SERPL-SCNC: 134 MMOL/L (ref 133–144)
SPECIMEN SOURCE: NORMAL
SPECIMEN SOURCE: NORMAL
WBC # BLD AUTO: 8.2 10E9/L (ref 4–11)

## 2021-05-27 PROCEDURE — 99239 HOSP IP/OBS DSCHRG MGMT >30: CPT | Performed by: NURSE PRACTITIONER

## 2021-05-27 PROCEDURE — 250N000013 HC RX MED GY IP 250 OP 250 PS 637: Performed by: INTERNAL MEDICINE

## 2021-05-27 PROCEDURE — 85025 COMPLETE CBC W/AUTO DIFF WBC: CPT | Performed by: NURSE PRACTITIONER

## 2021-05-27 PROCEDURE — 36415 COLL VENOUS BLD VENIPUNCTURE: CPT | Performed by: NURSE PRACTITIONER

## 2021-05-27 PROCEDURE — 250N000012 HC RX MED GY IP 250 OP 636 PS 637: Performed by: INTERNAL MEDICINE

## 2021-05-27 PROCEDURE — 84145 PROCALCITONIN (PCT): CPT | Performed by: NURSE PRACTITIONER

## 2021-05-27 PROCEDURE — 80048 BASIC METABOLIC PNL TOTAL CA: CPT | Performed by: NURSE PRACTITIONER

## 2021-05-27 PROCEDURE — 250N000013 HC RX MED GY IP 250 OP 250 PS 637: Performed by: NURSE PRACTITIONER

## 2021-05-27 RX ORDER — LINEZOLID 600 MG/1
600 TABLET, FILM COATED ORAL EVERY 12 HOURS
Qty: 20 TABLET | Refills: 0 | Status: SHIPPED | OUTPATIENT
Start: 2021-05-27 | End: 2021-06-06

## 2021-05-27 RX ADMIN — CLOPIDOGREL BISULFATE 75 MG: 75 TABLET ORAL at 08:27

## 2021-05-27 RX ADMIN — CETIRIZINE HYDROCHLORIDE 10 MG: 10 TABLET, FILM COATED ORAL at 08:26

## 2021-05-27 RX ADMIN — ROSUVASTATIN CALCIUM 40 MG: 10 TABLET, FILM COATED ORAL at 08:26

## 2021-05-27 RX ADMIN — ALLOPURINOL 100 MG: 100 TABLET ORAL at 08:27

## 2021-05-27 RX ADMIN — LINEZOLID 600 MG: 600 TABLET, FILM COATED ORAL at 08:27

## 2021-05-27 RX ADMIN — METHYLPREDNISOLONE 8 MG: 4 TABLET ORAL at 08:26

## 2021-05-27 RX ADMIN — FLUTICASONE FUROATE AND VILANTEROL TRIFENATATE 1 PUFF: 100; 25 POWDER RESPIRATORY (INHALATION) at 08:26

## 2021-05-27 RX ADMIN — METHOCARBAMOL 750 MG: 750 TABLET ORAL at 08:26

## 2021-05-27 RX ADMIN — FUROSEMIDE 80 MG: 40 TABLET ORAL at 08:26

## 2021-05-27 RX ADMIN — ASPIRIN 324 MG: 81 TABLET, CHEWABLE ORAL at 08:26

## 2021-05-27 RX ADMIN — UMECLIDINIUM 1 PUFF: 62.5 AEROSOL, POWDER ORAL at 08:26

## 2021-05-27 RX ADMIN — ACETAMINOPHEN 650 MG: 325 TABLET, FILM COATED ORAL at 05:08

## 2021-05-27 RX ADMIN — MYCOPHENOLATE MOFETIL 1000 MG: 500 TABLET ORAL at 08:26

## 2021-05-27 RX ADMIN — Medication 1 CAPSULE: at 08:26

## 2021-05-27 RX ADMIN — LEVOTHYROXINE SODIUM 50 MCG: 0.05 TABLET ORAL at 06:46

## 2021-05-27 ASSESSMENT — MIFFLIN-ST. JEOR: SCORE: 2274.5

## 2021-05-27 ASSESSMENT — ACTIVITIES OF DAILY LIVING (ADL)
ADLS_ACUITY_SCORE: 18
ADLS_ACUITY_SCORE: 18
ADLS_ACUITY_SCORE: 19
ADLS_ACUITY_SCORE: 18

## 2021-05-27 NOTE — PLAN OF CARE
Face to face report given with opportunity to observe patient.    Report given to Tierney Sanchez RN   5/26/2021  11:14 PM

## 2021-05-27 NOTE — PLAN OF CARE
"Most recent vitals: /84 (BP Location: Left arm)   Pulse 89   Temp 99.3  F (37.4  C) (Tympanic)   Resp 17   Ht 1.727 m (5' 8\")   Wt (!) 151 kg (332 lb 14.3 oz)   SpO2 95%   BMI 50.62 kg/m       A&O, makes needs known, uses call light appropriately, uses urinal to void, independent in room. Temp 99.2 and 100.6 this AM. Patient given acetaminophen x1 this shift, after reassessment, temp 99.3. Denies pain. Lungs clear, oxygen 95% on RA, denies SOB, some RIVERA. Edema +3-4 in BLE. PO levaquin daily. Patient slept most of this shift. Call light and personal items within reach.     Face to face report given with opportunity to observe patient.    Report given to LILIANA Antoine RN   5/27/2021  7:04 AM      "

## 2021-05-27 NOTE — PLAN OF CARE
Patient discharged at 12:15 PM via wheel chair accompanied by daughter and staff. Prescriptions - Sent to preferred pharmacy. All belongings sent with patient.     Discharge instructions reviewed with pt. Listed belongings gathered and returned to patient. yes    Patient discharged to home.   Report called to n/a    Core Measures and Vaccines  Core Measures applicable during stay: No. If yes, state diagnosis:  n/a  Pneumonia and Influenza given prior to discharge, if indicated: N/A    Surgical Patient   Surgical Procedures during stay: no  Did patient receive discharge instruction on wound care and recognition of infection symptoms? N/A    MISC  Follow up appointment made:  Yes  Home and hospital aquired medications returned to patient: Yes  Patient reports pain was well managed at discharge: Yes

## 2021-05-27 NOTE — PROGRESS NOTES
Name: Eduar Isaacs    MRN#: [id number]    Reason for Hospitalization: Fever, unknown origin [R50.9]  Sepsis (H) [A41.9]    JONATHAN: Low    Discharge Date: May 27, 2021    Medicare IM Discharge letter reviewed with patient    Patient / Family response to discharge plan: Patient was involved in discharge planning.    Follow-Up Appt: No future appointments.    Other Providers (Care Coordinator, County Services, PCA services etc): No    Discharge Disposition: home via dtr/ Leeanne        Pt or health care agent verbalized understanding.         Snow Arambula CM RN

## 2021-05-27 NOTE — DISCHARGE INSTRUCTIONS
Appointments: You have Lab scheduled for 9:30am on June1st at [facility]. You also have a Hospital Follow up appointment scheduled at 10:30am on June 2nd with Dr. Fry if you need to reschedule please call [phone]

## 2021-05-27 NOTE — PROGRESS NOTES
[facility]    Hospitalist Progress Note    Date of Service (when I saw the patient): 05/26/2021    Assessment & Plan     1.  Sepsis/renal transplant: The most part this has resolved.  Did have a temperature spike last night 102.  Hemodynamically stable.  White count continues to decrease as does his procalcitonin.     2.  Enterococcus UTI/probable pyelonephritis: The only positive culture has been Enterococcus faecalis.  Started on daptomycin and leavquin. Discussed with ID on 5/25 with recommendation for 14 days linezolid. Will stop dapto and start linezolid in AM. Watch fever curve.      3.  Renal transplant: Patient's creatinine continues to decrease.   Electrolytes remain normal.  His urine outputs have been extremely good he started to mobilize all the third spaced fluid, continue 80mg lasix as at home.      4.  Edema:  Patient states in the chair most of the time so is a lot of dependent extremity edema.  Continue with his overall diuresis as at home. He is mobilizing well.     5.  Weakness: Did get up with physical therapy yesterday. Needs encouragement to increase activity.      6.  Coronary artery disease: He had no chest pains or arrhythmias.  No real signs of failure he is just third spacing a lot of fluid.  He has normal function on last echo 55%.  I did restart his aspirin and Plavix.  I held them initially + sure if any interventions were going to be performed.      DVT Prophylaxis: Pneumatic Compression Devices  Code Status: Special Code    Disposition: Expected discharge in 1-2 days once fever curve stable.    Nicolette Cooper,  CNP    Interval History   Feels well. Denies back pain, abdominal pain or dyspnea. No chest pain or dyspnea.    -Data reviewed today: I reviewed all new labs and imaging results over the last 24 hours. I personally reviewed .    Physical Exam   Temp: 98.6  F (37  C) Temp src: Tympanic BP: 152/76 Pulse: 86   Resp: 20 SpO2: 97 % O2 Device: None (Room air)     Vitals:    05/24/21 1601 05/25/21 0441 05/26/21 0521   Weight: (!) 159 kg (350 lb 8.5 oz) (!) 156.4 kg (344 lb 12.8 oz) (!) 152.7 kg (336 lb 10.3 oz)     Vital Signs with Ranges  Temp:  [98.1  F (36.7  C)-98.9  F (37.2  C)] 98.6  F (37  C)  Pulse:  [78-86] 86  Resp:  [20] 20  BP: (132-152)/(69-76) 152/76  SpO2:  [96 %-97 %] 97 %  I/O last 3 completed shifts:  In: -   Out: 7300 [Urine:7300]       Line/device assessment(s) completed for medical necessity    Constitutional: Awake,alert, no acute distress  Respiratory: Clear bilaterally, no wheezes,crackles, rhonchi.  Cardiovascular: HRR, no murmurs, rubs, thrills.   GI: Firm, nontender, bowel sounds positive.  Skin/Integumen: No rashes, open areas or bruising.         Medications     sodium chloride 10 mL/hr at 05/24/21 1544       allopurinol  100 mg Oral Daily     aspirin  324 mg Oral Daily     cetirizine  10 mg Oral Daily     clopidogrel  75 mg Oral Daily     fluticasone-vilanterol  1 puff Inhalation Daily     furosemide  80 mg Oral Daily     lactobacillus rhamnosus (GG)  1 capsule Oral BID     levofloxacin  250 mg Oral Daily     levothyroxine  50 mcg Oral Daily     [START ON 5/27/2021] linezolid  600 mg Oral Q12H [facility]     [Held by provider] lisinopril  5 mg Oral Daily     methocarbamol  750 mg Oral 4x Daily     methylPREDNISolone  8 mg Oral Q48H     mycophenolate  1,000 mg Oral BID     rosuvastatin  40 mg Oral Daily     sodium chloride (PF)  3 mL Intracatheter Q8H     umeclidinium  1 puff Inhalation Daily       Data   Recent Labs   Lab 05/26/21  0615 05/25/21  0527 05/24/21  0526 05/21/21  1452 05/21/21  1452   WBC 7.3 7.9 11.5*   < > 14.0*   HGB 10.8* 10.6* 10.1*   < > 12.7*   MCV 87 88 88   < > 89    153 140*   < > 159    136 136   < > 130*   POTASSIUM 3.9 4.0 4.2   < > 4.7   CHLORIDE 104 106 108   < > 95   CO2 26 21 20   < > 26   BUN 29 33* 41*   < > 35*   CR 1.46* 1.71* 1.84*   < > 1.47*   ANIONGAP 6 9 8   < > 9   HANNAH 8.4* 8.2* 7.3*   < > 8.8   GLC  161* 144* 191*   < > 172*   ALBUMIN 2.4* 2.5* 2.3*   < > 3.3*   PROTTOTAL 6.7* 6.4* 6.2*   < > 7.1   BILITOTAL 0.3 0.4 0.3   < > 0.7   ALKPHOS 56 54 55   < > 52   ALT 51 55 25   < > 18   AST 51* 87* 55*   < > 15   TROPI  --   --   --   --  <0.015    < > = values in this interval not displayed.       No results found for this or any previous visit (from the past 24 hour(s)).

## 2021-05-27 NOTE — PLAN OF CARE
Physical Therapy Discharge Summary    Reason for therapy discharge:    Discharged to home.    Progress towards therapy goal(s). [facility] for goal details.  Goals not met.  Barriers to achieving goals:   discharge from facility.    Therapy recommendation(s):    No further therapy is recommended. Pt not seen by this writer, refer to notes for further details.

## 2021-05-27 NOTE — PHARMACY
Pharmacy Antimicrobial Stewardship Assessment     Current Antimicrobial Therapy:  Anti-infectives (From now, onward)    Start     Dose/Rate Route Frequency Ordered Stop    05/27/21 0800  linezolid (ZYVOX) tablet 600 mg      600 mg Oral EVERY 12 HOURS SCHEDULED 05/26/21 1120      05/26/21 1700  levofloxacin (LEVAQUIN) tablet 250 mg      250 mg Oral DAILY 05/26/21 1120          Indication: UTI    Days of Therapy: 1 (Linezolid), 7 (Levaquin--day 2 oral)  ~previously on 4 days Daptomycin     Pertinent Labs:  WBC   Date Value Ref Range Status   05/27/2021 8.2 4.0 - 11.0 10e9/L Final   05/26/2021 7.3 4.0 - 11.0 10e9/L Final   05/25/2021 7.9 4.0 - 11.0 10e9/L Final     Procalcitonin   Date Value Ref Range Status   05/27/2021 3.80 ng/ml Final     Comment:     2.00-9.99 ng/ml  High risk for progression to severe sepsis.  Recommendation: Strongly recommend initiating or continuing antibiotics.   Evaluate culture results and clinical features to target antibacterial   therapy. Obtain blood cultures and other relevant cultures if not done. Repeat   PCT in 2 days to guide antibiotic de-escalation. Consider de-escalating   antibiotics when PCT concentration is <80% of peak or abs PCT <0.5.     05/26/2021 6.27 (HH) ng/ml Final     Comment:     Critical Value called to and read back by  Rafaela Carroll at 0650 on 5/26/21 by NMA  2.00-9.99 ng/ml  High risk for progression to severe sepsis.  Recommendation: Strongly recommend initiating or continuing antibiotics.   Evaluate culture results and clinical features to target antibacterial   therapy. Obtain blood cultures and other relevant cultures if not done. Repeat   PCT in 2 days to guide antibiotic de-escalation. Consider de-escalating   antibiotics when PCT concentration is <80% of peak or abs PCT <0.5.     05/25/2021 12.76 (HH) ng/ml Final     Comment:     Critical Value called to and read back by  Pia Menchaca at 0631 on 5/25/21 by NMA.  >/= 10.00 ng/ml   Very high  likelihood of severe sepsis or septic shock.  Recommendation: Strongly recommend initiating or continuing antibiotics.   Evaluate culture results and clinical features to target antibacterial   therapy. Obtain blood cultures and other relevant cultures if not done.Repeat   PCT in 2 days to guide antibiotic de-escalation. Consider de-escalating   antibiotics when PCT concentration is <80% of peak or abs PCT <1.       No results found for: CRP    Culture Results:   5/32/21: C diff=  negative  5/22/21: Beta-glucan fungitell = negative  5/22/21: Cytomegalovirus Qualitave PCR = not detected  5/22/21: Blood culture = no growth after 5 days  5/21/21: Blood culture = no growth after 6 days  5/21/21: Blood culture = no growth after 6 days  5/21/21: COVID = negative        Patient has many drug allergies listed below:        Recommendations/Interventions:  1. Linezolid Monitoring:          - LFTs          - CBC with differential          - Platelets weekly in any patient on linezolid for greater than 2 weeks (linezolid can cause bone marrow suppression).          - Peripheral neuropathy and retinopathy have also been reported with prolonged use    Batool Saini, [facility]  May 27, 2021

## 2021-05-27 NOTE — DISCHARGE SUMMARY
[facility]    Discharge Summary  Hospitalist    Date of Admission:  5/21/2021  Date of Discharge:  5/27/2021 12:15 PM  Discharging Provider: Nicolette Cooper CNP  Date of Service (when I saw the patient): 5/27/21    Discharge Diagnoses   Active Problems:    Acute cystitis without hematuria    Sepsis (H)      History of Present Illness   From admission: Eduar Isaacs is a 66 year old male who is a twenty year renal transplant survivor; bilateral loss of renal function, blamed on HTN. He has had one other hospitalization for UTI about three years ago ([facility]). He has recent established CAD with stenting and COPD, as a former smoker     He had been feeling fine until the onset of fever last night. Today, the fevers were higher, he had chills and was beginning to feel ill.     ER Course: vitals showed fever (102.8) and /88; there was no distress. Lab diagnostics resulted a heme profile with elevated WBC (14.0) and left shift. The chemistry profile showed a slight dehydration with borderline stage 2/3 renal performance. Lactic acid was 1.8, procalcitonin was 0.74, troponin was normal. the UA was with small leukocyte esterase and bacteria. The chest film was read as suggestive of CHF, but his respiratory status has remained stable (always short of breath) and BNP was normal. He was given fluids and Levaquin.     Hospital Course     1.  Sepsis/renal transplant: Resolved.  Did have a temperature spike last night 102 48 hours ago, now less than 101.  Hemodynamically stable.  White count continues to decrease as does his procalcitonin.     2.  Enterococcus UTI/probable pyelonephritis: The only positive culture has been Enterococcus faecalis.  Started on daptomycin and leavquin. Discussed with ID on 5/25 with recommendation for 14 days linezolid. Linezolid was started today. Will have him follow-up as already scheduled with nephrology at [facility] next week. He will watch for fever sat home and  return if he gets fevers, or any other return of symptoms.       3.  Renal transplant with KAYLAN: Patient's creatinine elevated on arrival but no near baseline and stable x48 hours.   Electrolytes remain normal.  His urine outputs have been extremely good he started to mobilize all the third spaced fluid, continue 80mg lasix at home as previous.      4.  Edema:  Patient states in the chair most of the time so is a lot of dependent extremity edema which is not unusual for him periodically. He is mobilizing well, continue regular home dose of lasix.      5.  Weakness: resolved. He is ambulating good distances.     6.  Coronary artery disease: He had no chest pains or arrhythmias.  No real signs of failure he is just third spacing a lot of fluid.  He has normal function on last echo 55%.  I did restart his aspirin and Plavix.  I held them initially + sure if any interventions were going to be performed.        Nicolette Cooper CNP        Pending Results     Unresulted Labs Ordered in the Past 30 Days of this Admission     Date and Time Order Name Status Description    5/22/2021 0624 Blood culture Preliminary     5/22/2021 0624 Blood culture Preliminary           Code Status   Full Code       Primary Care Physician   Chico Fry    Discharge Disposition   Discharged to home  Condition at discharge: Stable    Consultations This Hospital Stay   PHYSICAL THERAPY ADULT IP CONSULT  PHARMACY TO DOSE VANCO  PHYSICAL THERAPY ADULT IP CONSULT    Time Spent on this Encounter   INicolette NP, personally saw the patient today and spent greater than 30 minutes discharging this patient.    Discharge Orders      Reason for your hospital stay    Pyelonephritis     Follow-up and recommended labs and tests     Follow up with primary care provider, Chico Fry, within 4 days for hospital follow- up.  The following labs/tests are recommended: CBC,BMP.  Follow-up as already scheduled with neurology at [facility] on June  7th.     Activity    Your activity upon discharge: activity as tolerated     When to contact your care team    Call your primary doctor if you have any of the following: fever, chills, low back pain, decreased urine output. .     Full Code     Diet    Follow this diet upon discharge: Orders Placed This Encounter      Combination Diet Regular Diet Adult     Discharge Medications   Current Discharge Medication List      START taking these medications    Details   linezolid (ZYVOX) 600 MG tablet Take 1 tablet (600 mg) by mouth every 12 hours for 10 days  Qty: 20 tablet, Refills: 0    Associated Diagnoses: Pyelonephritis, acute         CONTINUE these medications which have NOT CHANGED    Details   allopurinol (ZYLOPRIM) 100 MG tablet Take 100 mg by mouth daily       aspirin (ASA) 81 MG EC tablet Take 81 mg by mouth At Bedtime       atenolol (TENORMIN) 25 MG tablet Take 25 mg by mouth 2 times daily      calcium carbonate (TUMS) 500 MG chewable tablet Take 1 chew tab by mouth daily      cholecalciferol (VITAMIN D3) 125 mcg (5000 units) capsule Take 125 mcg by mouth daily      clopidogrel (PLAVIX) 75 MG tablet Take 75 mg by mouth daily      ezetimibe (ZETIA) 10 MG tablet Take 10 mg by mouth daily      fish oil-omega-3 fatty acids (OMEGA-3 FISH OIL) 1000 MG capsule Take 1 g by mouth daily       furosemide (LASIX) 40 MG tablet Take 80 mg by mouth daily And take an additional 40 mg daily as needed.      gabapentin (NEURONTIN) 100 MG capsule Take 100 mg by mouth 3 times daily      levothyroxine (SYNTHROID/LEVOTHROID) 50 MCG tablet Take 50 mcg by mouth daily       lisinopril (PRINIVIL) 5 MG tablet Take 5 mg by mouth daily       methocarbamol (ROBAXIN) 750 MG tablet Take 750 mg by mouth 4 times daily      methylPREDNISolone (MEDROL) 4 MG tablet Take 8 mg by mouth every other day       mycophenolate (GENERIC EQUIVALENT) 500 MG tablet Take 1,000 mg by mouth 2 times daily      rosuvastatin (CRESTOR) 40 MG tablet Take 40 mg by mouth  At Bedtime       TRELEGY ELLIPTA 100-62.5-25 MCG/INH oral inhaler Inhale 1 puff into the lungs daily      albuterol (PROAIR HFA/PROVENTIL HFA/VENTOLIN HFA) 108 (90 Base) MCG/ACT inhaler Inhale 2 puffs into the lungs every 6 hours as needed    Comments: Pharmacy may dispense brand covered by insurance (Proair, or proventil or ventolin or generic albuterol inhaler)      LORazepam (ATIVAN) 0.5 MG tablet Take 0.5 mg by mouth nightly as needed      mupirocin (BACTROBAN) 2 % external ointment Apply topically 2 times daily as needed      nitroGLYcerin (NITROSTAT) 0.4 MG sublingual tablet Place 0.4 mg under the tongue every 5 minutes as needed          STOP taking these medications       CELLCEPT 500 MG PO TABLET Comments: Duplicate entry  Reason for Stopping:         cetirizine (ZYRTEC) 10 MG tablet Comments:   Reason for Stopping:         DiphenhydrAMINE HCl (BENADRYL PO) Comments:   Reason for Stopping:         HYDROcodone-acetaminophen (NORCO) 5-325 MG per tablet Comments:   Reason for Stopping:             Allergies   Allergies   Allergen Reactions     Codeine Shortness Of Breath     Vancomycin Rash     Cefepime      Amoxicillin Itching and Rash     Niacin Rash     Prilosec [Omeprazole] Itching and Rash     Data   Most Recent 3 CBC's:  Recent Labs   Lab Test 05/27/21 0524 05/26/21 0615 05/25/21 0527   WBC 8.2 7.3 7.9   HGB 11.6* 10.8* 10.6*   MCV 87 87 88    160 153      Most Recent 3 BMP's:  Recent Labs   Lab Test 05/27/21 0524 05/26/21  0615 05/25/21 0527    136 136   POTASSIUM 3.7 3.9 4.0   CHLORIDE 102 104 106   CO2 26 26 21   BUN 28 29 33*   CR 1.45* 1.46* 1.71*   ANIONGAP 6 6 9   HANNAH 8.8 8.4* 8.2*   * 161* 144*     Most Recent 2 LFT's:  Recent Labs   Lab Test 05/26/21 0615 05/25/21 0527   AST 51* 87*   ALT 51 55   ALKPHOS 56 54   BILITOTAL 0.3 0.4     Most Recent INR's and Anticoagulation Dosing History:  Anticoagulation Dose History     There is no flowsheet data to display.         Most Recent 3 Troponin's:  Recent Labs   Lab Test 05/21/21  1452   TROPI <0.015     Most Recent Cholesterol Panel:No lab results found.  Most Recent 6 Bacteria Isolates From Any Culture (See EPIC Reports for Culture Details):  Recent Labs   Lab Test 05/22/21  0652 05/22/21  0644 05/21/21  1516 05/21/21  1452   CULT No growth after 6 days No growth after 6 days No growth after 6 days No growth after 6 days  50,000 to 100,000 colonies/mL  Enterococcus faecalis  *     Most Recent TSH, T4 and A1c Labs:  Recent Labs   Lab Test 05/21/21  1452   A1C 7.2*     Results for orders placed or performed during the hospital encounter of 05/21/21   XR Chest Port 1 View    Narrative    Procedure:XR CHEST PORT 1 VIEW    Clinical history:Male, 66 years, fever unknown origin.    Technique: Single view was obtained.    Comparison: None    Findings: The cardiac silhouette is mildly enlarged. The pulmonary  vasculature is distended and indistinct.    The lungs demonstrate interstitial thickening without evidence of  pleural effusion or well-defined consolidation. Bony structures are  unremarkable.      Impression    Impression:   Findings suggesting congestive heart failure without evidence of  pulmonary edema or pleural effusion. No evidence of focal pneumonia.    AAMIR ONEIL MD   CT Chest Abdomen Pelvis w/o Contrast    Narrative    CT CHEST ABDOMEN PELVIS W/O CONTRAST    CLINICAL HISTORY: Male, age 66 years, Sepsis;    Comparison:  None.    TECHNIQUE:  CT was performed of the chest, abdomen and pelvis without  contrast.   Sagittal, coronal, axial and MIP reconstructions were reviewed.     FINDINGS:  Chest CT:   Aside from minimal peripheral atelectasis and tiny calcified nodules  in the dependent portions of the right lung, lungs are clear.    Atherosclerotic calcifications are seen within the aortic arch. Dense  calcifications are seen within the coronary arteries. No evidence of  pericardial effusion.     No evidence of  pathologic lymph node enlargement. The esophagus is  normal.    There is a healed fracture in the dorsal lateral aspect of the left  eighth rib. No evidence of acute bony abnormality within the thoracic  spine or rib cage.    Abdomen/Pelvis CT:  The stomach and duodenum are normal.    Written stones are seen within the gallbladder lumen. There is no CT  evidence that would suggest cholecystitis.    The liver, spleen and pancreas are normal. Adrenal glands are normal.    Native kidneys are atrophic. There is a transplant kidney seen in the  left lower quadrant/left hemipelvis. The urinary bladder and the  transplant ureter are grossly normal.    Atherosclerotic calcifications are seen within the origins of the  renal arteries as well as the superior mesenteric artery. Number of  normal-sized lymph nodes are seen throughout the retroperitoneum.  Large and small bowel are grossly normal.    No evidence of abnormal intraperitoneal or retrograde. No fluid  collection.    Bony structures demonstrate postoperative changes in the lower lumbar  spine and ankylosis of the left SI joint.      Impression    IMPRESSION:   No evidence of acute abnormality within the chest, abdomen or pelvis.  Chronic changes as described above.    Radiodense gallstones. No CT evidence of cholecystitis. If concern  exists, ultrasound evaluation may be helpful.    AAMIR ONEIL MD

## 2021-05-28 LAB
BACTERIA SPEC CULT: NORMAL
BACTERIA SPEC CULT: NORMAL
SPECIMEN SOURCE: NORMAL
SPECIMEN SOURCE: NORMAL

## 2021-07-20 ENCOUNTER — HOSPITAL ENCOUNTER (OUTPATIENT)
Dept: CT IMAGING | Facility: HOSPITAL | Age: 66
Discharge: HOME | End: 2021-07-20
Attending: STUDENT IN AN ORGANIZED HEALTH CARE EDUCATION/TRAINING PROGRAM | Admitting: STUDENT IN AN ORGANIZED HEALTH CARE EDUCATION/TRAINING PROGRAM
Payer: COMMERCIAL

## 2021-07-20 DIAGNOSIS — M48.061 LUMBAR STENOSIS: ICD-10-CM

## 2021-07-20 PROCEDURE — 72131 CT LUMBAR SPINE W/O DYE: CPT

## 2022-02-02 DIAGNOSIS — J44.9 COPD (CHRONIC OBSTRUCTIVE PULMONARY DISEASE) (H): Primary | ICD-10-CM

## 2022-04-26 ENCOUNTER — HOSPITAL ENCOUNTER (OUTPATIENT)
Dept: RESPIRATORY THERAPY | Facility: HOSPITAL | Age: 67
Discharge: HOME | End: 2022-04-26
Attending: INTERNAL MEDICINE | Admitting: INTERNAL MEDICINE
Payer: COMMERCIAL

## 2022-04-26 DIAGNOSIS — J44.9 COPD (CHRONIC OBSTRUCTIVE PULMONARY DISEASE) (H): ICD-10-CM

## 2022-04-26 PROCEDURE — 94726 PLETHYSMOGRAPHY LUNG VOLUMES: CPT

## 2022-04-26 PROCEDURE — 94010 BREATHING CAPACITY TEST: CPT

## 2022-04-26 PROCEDURE — 94729 DIFFUSING CAPACITY: CPT

## 2022-04-26 RX ORDER — ALBUTEROL SULFATE 0.83 MG/ML
2.5 SOLUTION RESPIRATORY (INHALATION)
Status: DISCONTINUED | OUTPATIENT
Start: 2022-04-26 | End: 2022-04-27 | Stop reason: HOSPADM

## 2022-08-23 ENCOUNTER — TRANSFERRED RECORDS (OUTPATIENT)
Dept: HEALTH INFORMATION MANAGEMENT | Facility: CLINIC | Age: 67
End: 2022-08-23

## 2022-08-23 LAB — RETINOPATHY: NEGATIVE

## 2022-09-14 ENCOUNTER — TRANSFERRED RECORDS (OUTPATIENT)
Dept: HEALTH INFORMATION MANAGEMENT | Facility: CLINIC | Age: 67
End: 2022-09-14

## 2022-10-18 ENCOUNTER — TRANSFERRED RECORDS (OUTPATIENT)
Dept: HEALTH INFORMATION MANAGEMENT | Facility: CLINIC | Age: 67
End: 2022-10-18

## 2022-10-18 LAB
CHOLESTEROL (EXTERNAL): 145 MG/DL
HBA1C MFR BLD: 11.1 % (ref 4–5.6)
HDLC SERPL-MCNC: 30 MG/DL
LDL CHOLESTEROL CALCULATED (EXTERNAL): 49 MG/DL
TRIGLYCERIDES (EXTERNAL): 528 MG/DL

## 2022-11-12 ENCOUNTER — HOSPITAL ENCOUNTER (INPATIENT)
Facility: HOSPITAL | Age: 67
LOS: 1 days | Discharge: HOME | DRG: 638 | End: 2022-11-15
Attending: EMERGENCY MEDICINE | Admitting: INTERNAL MEDICINE
Payer: COMMERCIAL

## 2022-11-12 DIAGNOSIS — E11.9 DIABETES MELLITUS WITHOUT COMPLICATION (H): Primary | ICD-10-CM

## 2022-11-12 DIAGNOSIS — E86.0 DEHYDRATION: ICD-10-CM

## 2022-11-12 DIAGNOSIS — R73.9 HYPERGLYCEMIA: ICD-10-CM

## 2022-11-12 DIAGNOSIS — E87.1 HYPONATREMIA: ICD-10-CM

## 2022-11-12 PROBLEM — E66.01 MORBID OBESITY (H): Status: ACTIVE | Noted: 2022-11-12

## 2022-11-12 PROBLEM — I25.10 CORONARY ARTERY DISEASE INVOLVING NATIVE CORONARY ARTERY: Status: ACTIVE | Noted: 2022-11-12

## 2022-11-12 PROBLEM — R79.89 ELEVATED SERUM CREATININE: Status: ACTIVE | Noted: 2022-11-12

## 2022-11-12 PROBLEM — G47.33 OSA (OBSTRUCTIVE SLEEP APNEA): Status: ACTIVE | Noted: 2022-11-12

## 2022-11-12 LAB
ALBUMIN SERPL BCG-MCNC: 4 G/DL (ref 3.5–5.2)
ALBUMIN UR-MCNC: 100 MG/DL
ALP SERPL-CCNC: 82 U/L (ref 40–129)
ALT SERPL W P-5'-P-CCNC: 16 U/L (ref 10–50)
ANION GAP SERPL CALCULATED.3IONS-SCNC: 12 MMOL/L (ref 7–15)
ANION GAP SERPL CALCULATED.3IONS-SCNC: 12 MMOL/L (ref 7–15)
ANION GAP SERPL CALCULATED.3IONS-SCNC: 14 MMOL/L (ref 7–15)
APPEARANCE UR: CLEAR
AST SERPL W P-5'-P-CCNC: 23 U/L (ref 10–50)
BACTERIA #/AREA URNS HPF: ABNORMAL /HPF
BASE EXCESS BLDV CALC-SCNC: 13.7 MMOL/L (ref -7.7–1.9)
BASOPHILS # BLD AUTO: 0 10E3/UL (ref 0–0.2)
BASOPHILS NFR BLD AUTO: 0 %
BILIRUB SERPL-MCNC: 0.7 MG/DL
BILIRUB UR QL STRIP: NEGATIVE
BUN SERPL-MCNC: 50.1 MG/DL (ref 8–23)
BUN SERPL-MCNC: 50.3 MG/DL (ref 8–23)
BUN SERPL-MCNC: 54.5 MG/DL (ref 8–23)
CALCIUM SERPL-MCNC: 10 MG/DL (ref 8.8–10.2)
CALCIUM SERPL-MCNC: 10.4 MG/DL (ref 8.8–10.2)
CALCIUM SERPL-MCNC: 10.6 MG/DL (ref 8.8–10.2)
CHLORIDE SERPL-SCNC: 69 MMOL/L (ref 98–107)
CHLORIDE SERPL-SCNC: 74 MMOL/L (ref 98–107)
CHLORIDE SERPL-SCNC: 75 MMOL/L (ref 98–107)
COLOR UR AUTO: ABNORMAL
CREAT SERPL-MCNC: 1.9 MG/DL (ref 0.67–1.17)
CREAT SERPL-MCNC: 1.91 MG/DL (ref 0.67–1.17)
CREAT SERPL-MCNC: 2.07 MG/DL (ref 0.67–1.17)
DEPRECATED HCO3 PLAS-SCNC: 32 MMOL/L (ref 22–29)
DEPRECATED HCO3 PLAS-SCNC: 35 MMOL/L (ref 22–29)
DEPRECATED HCO3 PLAS-SCNC: 37 MMOL/L (ref 22–29)
EOSINOPHIL # BLD AUTO: 0 10E3/UL (ref 0–0.7)
EOSINOPHIL NFR BLD AUTO: 0 %
ERYTHROCYTE [DISTWIDTH] IN BLOOD BY AUTOMATED COUNT: 13.5 % (ref 10–15)
GFR SERPL CREATININE-BSD FRML MDRD: 35 ML/MIN/1.73M2
GFR SERPL CREATININE-BSD FRML MDRD: 38 ML/MIN/1.73M2
GFR SERPL CREATININE-BSD FRML MDRD: 39 ML/MIN/1.73M2
GLUCOSE BLDC GLUCOMTR-MCNC: >600 MG/DL (ref 70–99)
GLUCOSE BLDC GLUCOMTR-MCNC: >600 MG/DL (ref 70–99)
GLUCOSE SERPL-MCNC: 559 MG/DL (ref 70–99)
GLUCOSE SERPL-MCNC: 642 MG/DL (ref 70–99)
GLUCOSE SERPL-MCNC: 781 MG/DL (ref 70–99)
GLUCOSE UR STRIP-MCNC: >1000 MG/DL
HCO3 BLDV-SCNC: 39 MMOL/L (ref 21–28)
HCT VFR BLD AUTO: 40.5 % (ref 40–53)
HGB BLD-MCNC: 14.1 G/DL (ref 13.3–17.7)
HGB UR QL STRIP: ABNORMAL
HOLD SPECIMEN: NORMAL
HOLD SPECIMEN: NORMAL
IMM GRANULOCYTES # BLD: 0.1 10E3/UL
IMM GRANULOCYTES NFR BLD: 1 %
KETONES BLD-SCNC: 0.6 MMOL/L (ref 0–0.6)
KETONES UR STRIP-MCNC: NEGATIVE MG/DL
LEUKOCYTE ESTERASE UR QL STRIP: NEGATIVE
LYMPHOCYTES # BLD AUTO: 1.1 10E3/UL (ref 0.8–5.3)
LYMPHOCYTES NFR BLD AUTO: 12 %
MCH RBC QN AUTO: 29.6 PG (ref 26.5–33)
MCHC RBC AUTO-ENTMCNC: 34.8 G/DL (ref 31.5–36.5)
MCV RBC AUTO: 85 FL (ref 78–100)
MONOCYTES # BLD AUTO: 0.7 10E3/UL (ref 0–1.3)
MONOCYTES NFR BLD AUTO: 7 %
NEUTROPHILS # BLD AUTO: 7.8 10E3/UL (ref 1.6–8.3)
NEUTROPHILS NFR BLD AUTO: 80 %
NITRATE UR QL: NEGATIVE
NRBC # BLD AUTO: 0 10E3/UL
NRBC BLD AUTO-RTO: 0 /100
O2/TOTAL GAS SETTING VFR VENT: 21 %
OXYHGB MFR BLDV: 81 % (ref 70–75)
PCO2 BLDV: 50 MM HG (ref 40–50)
PH BLDV: 7.5 [PH] (ref 7.32–7.43)
PH UR STRIP: 6 [PH] (ref 4.7–8)
PLATELET # BLD AUTO: 206 10E3/UL (ref 150–450)
PO2 BLDV: 45 MM HG (ref 25–47)
POTASSIUM SERPL-SCNC: 2.9 MMOL/L (ref 3.4–5.3)
POTASSIUM SERPL-SCNC: 3.3 MMOL/L (ref 3.4–5.3)
POTASSIUM SERPL-SCNC: 3.3 MMOL/L (ref 3.4–5.3)
PROT SERPL-MCNC: 6.7 G/DL (ref 6.4–8.3)
RBC # BLD AUTO: 4.76 10E6/UL (ref 4.4–5.9)
RBC URINE: 1 /HPF
SARS-COV-2 RNA RESP QL NAA+PROBE: NEGATIVE
SODIUM SERPL-SCNC: 116 MMOL/L (ref 136–145)
SODIUM SERPL-SCNC: 120 MMOL/L (ref 136–145)
SODIUM SERPL-SCNC: 124 MMOL/L (ref 136–145)
SP GR UR STRIP: 1.02 (ref 1–1.03)
SQUAMOUS EPITHELIAL: <1 /HPF
T4 FREE SERPL-MCNC: 1.32 NG/DL (ref 0.9–1.7)
TSH SERPL DL<=0.005 MIU/L-ACNC: 4.82 UIU/ML (ref 0.3–4.2)
UROBILINOGEN UR STRIP-MCNC: NORMAL MG/DL
WBC # BLD AUTO: 9.8 10E3/UL (ref 4–11)
WBC URINE: 3 /HPF

## 2022-11-12 PROCEDURE — 99285 EMERGENCY DEPT VISIT HI MDM: CPT | Performed by: EMERGENCY MEDICINE

## 2022-11-12 PROCEDURE — 99285 EMERGENCY DEPT VISIT HI MDM: CPT | Mod: 25

## 2022-11-12 PROCEDURE — 250N000012 HC RX MED GY IP 250 OP 636 PS 637: Performed by: INTERNAL MEDICINE

## 2022-11-12 PROCEDURE — 93005 ELECTROCARDIOGRAM TRACING: CPT

## 2022-11-12 PROCEDURE — U0005 INFEC AGEN DETEC AMPLI PROBE: HCPCS | Performed by: EMERGENCY MEDICINE

## 2022-11-12 PROCEDURE — 82805 BLOOD GASES W/O2 SATURATION: CPT | Performed by: EMERGENCY MEDICINE

## 2022-11-12 PROCEDURE — 80048 BASIC METABOLIC PNL TOTAL CA: CPT | Performed by: INTERNAL MEDICINE

## 2022-11-12 PROCEDURE — 96372 THER/PROPH/DIAG INJ SC/IM: CPT | Performed by: INTERNAL MEDICINE

## 2022-11-12 PROCEDURE — 250N000011 HC RX IP 250 OP 636

## 2022-11-12 PROCEDURE — 82010 KETONE BODYS QUAN: CPT | Performed by: EMERGENCY MEDICINE

## 2022-11-12 PROCEDURE — 85048 AUTOMATED LEUKOCYTE COUNT: CPT | Performed by: EMERGENCY MEDICINE

## 2022-11-12 PROCEDURE — 36415 COLL VENOUS BLD VENIPUNCTURE: CPT | Performed by: EMERGENCY MEDICINE

## 2022-11-12 PROCEDURE — 250N000012 HC RX MED GY IP 250 OP 636 PS 637: Performed by: EMERGENCY MEDICINE

## 2022-11-12 PROCEDURE — 250N000011 HC RX IP 250 OP 636: Performed by: INTERNAL MEDICINE

## 2022-11-12 PROCEDURE — 36415 COLL VENOUS BLD VENIPUNCTURE: CPT | Performed by: INTERNAL MEDICINE

## 2022-11-12 PROCEDURE — C9803 HOPD COVID-19 SPEC COLLECT: HCPCS

## 2022-11-12 PROCEDURE — G0378 HOSPITAL OBSERVATION PER HR: HCPCS

## 2022-11-12 PROCEDURE — 84443 ASSAY THYROID STIM HORMONE: CPT | Performed by: EMERGENCY MEDICINE

## 2022-11-12 PROCEDURE — 250N000013 HC RX MED GY IP 250 OP 250 PS 637: Performed by: INTERNAL MEDICINE

## 2022-11-12 PROCEDURE — 96372 THER/PROPH/DIAG INJ SC/IM: CPT | Performed by: EMERGENCY MEDICINE

## 2022-11-12 PROCEDURE — 258N000003 HC RX IP 258 OP 636: Performed by: EMERGENCY MEDICINE

## 2022-11-12 PROCEDURE — 81001 URINALYSIS AUTO W/SCOPE: CPT | Performed by: EMERGENCY MEDICINE

## 2022-11-12 PROCEDURE — 93010 ELECTROCARDIOGRAM REPORT: CPT | Performed by: INTERNAL MEDICINE

## 2022-11-12 PROCEDURE — 99223 1ST HOSP IP/OBS HIGH 75: CPT | Mod: AI | Performed by: INTERNAL MEDICINE

## 2022-11-12 PROCEDURE — 84439 ASSAY OF FREE THYROXINE: CPT | Performed by: EMERGENCY MEDICINE

## 2022-11-12 PROCEDURE — 80053 COMPREHEN METABOLIC PANEL: CPT | Performed by: EMERGENCY MEDICINE

## 2022-11-12 RX ORDER — GABAPENTIN 100 MG/1
200 CAPSULE ORAL 3 TIMES DAILY
Status: DISCONTINUED | OUTPATIENT
Start: 2022-11-12 | End: 2022-11-12

## 2022-11-12 RX ORDER — DEXTROSE MONOHYDRATE 25 G/50ML
25-50 INJECTION, SOLUTION INTRAVENOUS
Status: DISCONTINUED | OUTPATIENT
Start: 2022-11-12 | End: 2022-11-15 | Stop reason: HOSPADM

## 2022-11-12 RX ORDER — ALBUTEROL SULFATE 90 UG/1
2 AEROSOL, METERED RESPIRATORY (INHALATION) EVERY 6 HOURS PRN
Status: DISCONTINUED | OUTPATIENT
Start: 2022-11-12 | End: 2022-11-15 | Stop reason: HOSPADM

## 2022-11-12 RX ORDER — ASPIRIN 81 MG/1
81 TABLET ORAL AT BEDTIME
Status: DISCONTINUED | OUTPATIENT
Start: 2022-11-12 | End: 2022-11-15 | Stop reason: HOSPADM

## 2022-11-12 RX ORDER — LIDOCAINE 40 MG/G
CREAM TOPICAL
Status: DISCONTINUED | OUTPATIENT
Start: 2022-11-12 | End: 2022-11-15 | Stop reason: HOSPADM

## 2022-11-12 RX ORDER — SODIUM CHLORIDE AND POTASSIUM CHLORIDE 150; 900 MG/100ML; MG/100ML
INJECTION, SOLUTION INTRAVENOUS
Status: DISPENSED
Start: 2022-11-12 | End: 2022-11-13

## 2022-11-12 RX ORDER — ATENOLOL 25 MG/1
25 TABLET ORAL 2 TIMES DAILY
Status: DISCONTINUED | OUTPATIENT
Start: 2022-11-12 | End: 2022-11-14

## 2022-11-12 RX ORDER — CLOPIDOGREL BISULFATE 75 MG/1
75 TABLET ORAL DAILY
Status: DISCONTINUED | OUTPATIENT
Start: 2022-11-13 | End: 2022-11-15 | Stop reason: HOSPADM

## 2022-11-12 RX ORDER — MYCOPHENOLATE MOFETIL 500 MG/1
1000 TABLET ORAL 2 TIMES DAILY
Status: DISCONTINUED | OUTPATIENT
Start: 2022-11-12 | End: 2022-11-15 | Stop reason: HOSPADM

## 2022-11-12 RX ORDER — LISINOPRIL 5 MG/1
5 TABLET ORAL DAILY
Status: DISCONTINUED | OUTPATIENT
Start: 2022-11-13 | End: 2022-11-12

## 2022-11-12 RX ORDER — FLUTICASONE FUROATE AND VILANTEROL 100; 25 UG/1; UG/1
1 POWDER RESPIRATORY (INHALATION) DAILY
Status: DISCONTINUED | OUTPATIENT
Start: 2022-11-12 | End: 2022-11-13 | Stop reason: ALTCHOICE

## 2022-11-12 RX ORDER — LEVOTHYROXINE SODIUM 25 UG/1
50 TABLET ORAL DAILY
Status: DISCONTINUED | OUTPATIENT
Start: 2022-11-13 | End: 2022-11-15 | Stop reason: HOSPADM

## 2022-11-12 RX ORDER — GABAPENTIN 300 MG/1
300 CAPSULE ORAL 2 TIMES DAILY
Status: DISCONTINUED | OUTPATIENT
Start: 2022-11-13 | End: 2022-11-15 | Stop reason: HOSPADM

## 2022-11-12 RX ORDER — ONDANSETRON 2 MG/ML
4 INJECTION INTRAMUSCULAR; INTRAVENOUS EVERY 6 HOURS PRN
Status: DISCONTINUED | OUTPATIENT
Start: 2022-11-12 | End: 2022-11-15 | Stop reason: HOSPADM

## 2022-11-12 RX ORDER — EZETIMIBE 10 MG/1
10 TABLET ORAL DAILY
Status: DISCONTINUED | OUTPATIENT
Start: 2022-11-13 | End: 2022-11-15 | Stop reason: HOSPADM

## 2022-11-12 RX ORDER — LORAZEPAM 0.5 MG/1
0.5 TABLET ORAL
Status: DISCONTINUED | OUTPATIENT
Start: 2022-11-12 | End: 2022-11-15 | Stop reason: HOSPADM

## 2022-11-12 RX ORDER — CALCIUM CARBONATE 500 MG/1
500 TABLET, CHEWABLE ORAL DAILY
Status: DISCONTINUED | OUTPATIENT
Start: 2022-11-13 | End: 2022-11-15 | Stop reason: HOSPADM

## 2022-11-12 RX ORDER — PREDNISONE 5 MG/1
5 TABLET ORAL DAILY
Status: DISCONTINUED | OUTPATIENT
Start: 2022-11-13 | End: 2022-11-15 | Stop reason: HOSPADM

## 2022-11-12 RX ORDER — ROSUVASTATIN CALCIUM 10 MG/1
40 TABLET, COATED ORAL AT BEDTIME
Status: DISCONTINUED | OUTPATIENT
Start: 2022-11-12 | End: 2022-11-15 | Stop reason: HOSPADM

## 2022-11-12 RX ORDER — NICOTINE POLACRILEX 4 MG
15-30 LOZENGE BUCCAL
Status: DISCONTINUED | OUTPATIENT
Start: 2022-11-12 | End: 2022-11-15 | Stop reason: HOSPADM

## 2022-11-12 RX ORDER — ALLOPURINOL 100 MG/1
100 TABLET ORAL DAILY
Status: DISCONTINUED | OUTPATIENT
Start: 2022-11-13 | End: 2022-11-15 | Stop reason: HOSPADM

## 2022-11-12 RX ORDER — SODIUM CHLORIDE AND POTASSIUM CHLORIDE 150; 900 MG/100ML; MG/100ML
INJECTION, SOLUTION INTRAVENOUS CONTINUOUS
Status: DISCONTINUED | OUTPATIENT
Start: 2022-11-12 | End: 2022-11-15 | Stop reason: HOSPADM

## 2022-11-12 RX ORDER — ONDANSETRON 4 MG/1
4 TABLET, ORALLY DISINTEGRATING ORAL EVERY 6 HOURS PRN
Status: DISCONTINUED | OUTPATIENT
Start: 2022-11-12 | End: 2022-11-15 | Stop reason: HOSPADM

## 2022-11-12 RX ORDER — METHYLPREDNISOLONE 4 MG/1
8 TABLET ORAL EVERY OTHER DAY
Status: DISCONTINUED | OUTPATIENT
Start: 2022-11-13 | End: 2022-11-13

## 2022-11-12 RX ORDER — HEPARIN SODIUM 5000 [USP'U]/.5ML
5000 INJECTION, SOLUTION INTRAVENOUS; SUBCUTANEOUS EVERY 12 HOURS
Status: DISCONTINUED | OUTPATIENT
Start: 2022-11-12 | End: 2022-11-15 | Stop reason: HOSPADM

## 2022-11-12 RX ORDER — PREDNISONE 5 MG/1
10 TABLET ORAL EVERY MORNING
COMMUNITY

## 2022-11-12 RX ADMIN — HEPARIN SODIUM 5000 UNITS: 5000 INJECTION, SOLUTION INTRAVENOUS; SUBCUTANEOUS at 22:56

## 2022-11-12 RX ADMIN — GABAPENTIN 200 MG: 100 CAPSULE ORAL at 22:55

## 2022-11-12 RX ADMIN — ASPIRIN 81 MG: 81 TABLET, COATED ORAL at 22:55

## 2022-11-12 RX ADMIN — MYCOPHENOLATE MOFETIL 1000 MG: 500 TABLET ORAL at 22:56

## 2022-11-12 RX ADMIN — SODIUM CHLORIDE 1000 ML: 9 INJECTION, SOLUTION INTRAVENOUS at 15:24

## 2022-11-12 RX ADMIN — INSULIN ASPART 10 UNITS: 100 INJECTION, SOLUTION INTRAVENOUS; SUBCUTANEOUS at 16:11

## 2022-11-12 RX ADMIN — POTASSIUM CHLORIDE AND SODIUM CHLORIDE: 900; 150 INJECTION, SOLUTION INTRAVENOUS at 21:39

## 2022-11-12 ASSESSMENT — ACTIVITIES OF DAILY LIVING (ADL)
ADLS_ACUITY_SCORE: 35
ADLS_ACUITY_SCORE: 33
ADLS_ACUITY_SCORE: 35
ADLS_ACUITY_SCORE: 24
ADLS_ACUITY_SCORE: 35

## 2022-11-12 NOTE — ED NOTES
DATE:  11/12/2022   TIME OF RECEIPT FROM LAB:  5282  LAB TEST:  Na+  Blood Glucose   LAB VALUE:  116 >600   RESULTS GIVEN WITH READ-BACK TO (PROVIDER):  Tj Gonzales MD  TIME LAB VALUE REPORTED TO PROVIDER:   1842

## 2022-11-12 NOTE — ED TRIAGE NOTES
"C/o high blood sugars and dizziness for 6 days. States his glucometer has not been working properly and he has come in to be seen for this recently. States he was seen in  for the dizziness a few days ago. Denies pain. BEFAST negative. States \"I just feel dry, like I need to keep drinking and drinking.\"       "

## 2022-11-12 NOTE — ED PROVIDER NOTES
EMERGENCY DEPARTMENT ENCOUNTER      NAME: Eduar Isaacs  AGE: 67 year old male  YOB: 1955  MRN: [id number]  EVALUATION DATE & TIME: 2022  2:55 PM    PCP: [place] Fry    ED PROVIDER: Tj Gonzales M.D.      Chief Complaint   Patient presents with     Hyperglycemia     Dizziness         FINAL IMPRESSION:  1. Hyperglycemia    2. Hyponatremia    3. Dehydration          ED COURSE & MEDICAL DECISION MAKIN year old male presents to the Emergency Department for evaluation of dizziness and elevated blood sugar.  He has a history of kidney transplant in the remote past, also with insulin-dependent type 2 diabetes.  He is vitally stable when he arrives to the emergency department.  Did undergo lab evaluation which was notable for severe hyperglycemia to greater than 700, with some associated hyponatremia and acute kidney injury.  He was started on IV fluids, initially with a single liter of saline bolus here, will try to use caution to avoid over correcting his sodium acutely given the unclear chronicity probably at least 2 weeks of the symptoms.  This also could be some degree of pseudohyponatremia given the severe hyperglycemia.  He does not have any anion gap or severe acidosis associated with this so I think he is stable for subcutaneous insulin administration to start to correct things along with IV fluids.  He was given 10 units of aspart insulin here, will be cautious given his renal disease and the fact that he is only on 14 units of long-acting insulin daily.  No signs of infection, he is afebrile, white blood cell count normal, urine analysis negative for infection, no pulmonary symptoms.   patient will be kept in the hospital under observation for continued insulin and management of hyperglycemia and dehydration.  Discussed case with hospitalist.        MEDICATIONS GIVEN IN THE EMERGENCY:  Medications   0.9% sodium chloride BOLUS (1,000 mLs Intravenous New Bag 22 1524)  "  insulin aspart (NovoLOG) injection (RAPID ACTING) (10 Units Subcutaneous Given 11/12/22 1611)       NEW PRESCRIPTIONS STARTED AT TODAY'S ER VISIT  New Prescriptions    No medications on file          =================================================================    HPI    Patient information was obtained from: Patient      Eduar Isaacs is a 67 year old male with a pertinent history of DMII, CHF, COPD, renal transplant who presents to this ED today for evaluation of dizziness.  Patient has a history of insulin-dependent diabetes, reports that he recently started taking insulin and sugars have been severely increased, intermittently reading greater than 600 over the last couple of weeks.  He feels like he has become progressively dizzy, particularly over the last few days.  He is now needing a cane to ambulate where he was previously able to ambulate independently.  He denies any specific pain concerns namely headache, chest pain, abdominal pain.  Denies any nausea or vomiting.  He says he has been using 14 units of Lantus most recently, does not take any short acting insulin or other oral agents for his diabetes currently.  He says he has been following in clinic for this.  Did follow-up a couple of weeks ago with his transplant nephrologist.  He says he will intermittently have blurred vision, says he gets this transiently and it seems like his vision will \"click \"and adjust.  He denies any current vision symptoms but does say he still feels somewhat unsteady and dizzy.  Denies any syncope.      REVIEW OF SYSTEMS   All systems reviewed and negative except as noted in HPI.    PAST MEDICAL HISTORY:  Past Medical History:   Diagnosis Date     Arthritis      Congestive heart failure (H)      COPD (chronic obstructive pulmonary disease) (H)      Diabetes (H)      Hypertension      Myocardial infarction (H)      Neuromuscular disorder (H)        PAST SURGICAL HISTORY:  Past Surgical History:   Procedure " Laterality Date     CARDIAC SURGERY       COLONOSCOPY - HIM SCAN  04/08/2012    [facility], polyps, adenomatous     ORTHOPEDIC SURGERY       TRANSPLANT             CURRENT MEDICATIONS:    No current facility-administered medications for this encounter.     Current Outpatient Medications   Medication     allopurinol (ZYLOPRIM) 100 MG tablet     aspirin (ASA) 81 MG EC tablet     atenolol (TENORMIN) 25 MG tablet     clopidogrel (PLAVIX) 75 MG tablet     furosemide (LASIX) 40 MG tablet     gabapentin (NEURONTIN) 100 MG capsule     levothyroxine (SYNTHROID/LEVOTHROID) 50 MCG tablet     lisinopril (ZESTRIL) 5 MG tablet     methocarbamol (ROBAXIN) 750 MG tablet     methylPREDNISolone (MEDROL) 4 MG tablet     mupirocin (BACTROBAN) 2 % external ointment     nitroGLYcerin (NITROSTAT) 0.4 MG sublingual tablet     rosuvastatin (CRESTOR) 40 MG tablet     TRELEGY ELLIPTA 100-62.5-25 MCG/INH oral inhaler     albuterol (PROAIR HFA/PROVENTIL HFA/VENTOLIN HFA) 108 (90 Base) MCG/ACT inhaler     calcium carbonate (TUMS) 500 MG chewable tablet     cholecalciferol (VITAMIN D3) 125 mcg (5000 units) capsule     ezetimibe (ZETIA) 10 MG tablet     fish oil-omega-3 fatty acids (OMEGA-3 FISH OIL) 1000 MG capsule     LORazepam (ATIVAN) 0.5 MG tablet     mycophenolate (GENERIC EQUIVALENT) 500 MG tablet         ALLERGIES:  Allergies   Allergen Reactions     Codeine Shortness Of Breath     Vancomycin Rash     Atorvastatin Other (See Comments)     Cefepime      Semaglutide Diarrhea     Amoxicillin Itching and Rash     Niacin Rash     Prilosec [Omeprazole] Itching and Rash       FAMILY HISTORY:  History reviewed. No pertinent family history.    SOCIAL HISTORY:   Social History     Socioeconomic History     Marital status:      Spouse name: None     Number of children: None     Years of education: None     Highest education level: None   Tobacco Use     Smoking status: Former     Types: Cigarettes     Smokeless tobacco: Never   Substance and  "Sexual Activity     Alcohol use: Yes     Comment: rare     Drug use: Never       VITALS:  /77   Pulse 63   Temp (!) 96.5  F (35.8  C) (Tympanic)   Resp 18   Ht 1.727 m (5' 8\")   Wt 134.7 kg (297 lb)   SpO2 94%   BMI 45.16 kg/m      PHYSICAL EXAM    Constitutional: Obese elderly male patient, sitting in bed, no acute distress  HENT: Normocephalic.  Mucous membranes are dry.  Neck is supple  Eyes: EOMI, Conjunctiva normal.  Respiratory: Breathing comfortably on room air. Speaks full sentences easily. Lungs clear to ascultation.  Cardiovascular: Normal heart rate, Regular rhythm. No peripheral edema.  Abdomen: Soft  Musculoskeletal: Good range of motion in all major joints. No major deformities noted.  Integument: Warm, Dry.  Neurologic: Awake, alert, oriented.  Face symmetric.  Speech is normal.  Strength is 5 out of 5 throughout bilateral upper and lower extremities.  Sensation light touch intact x4.  Patient is ambulatory although with a somewhat unsteady gait requiring a cane.  He does have minimal dysmetria with finger-nose-finger testing, this is bilateral  Psychiatric: Cooperative. Affect appropriate.     LAB:  All pertinent labs reviewed and interpreted.  Labs Ordered and Resulted from Time of ED Arrival to Time of ED Departure   GLUCOSE BY METER - Abnormal       Result Value    GLUCOSE BY METER POCT >600 (*)    BLOOD GAS VENOUS WITH OXYHEMOGLOBIN - Abnormal    pH Venous 7.50 (*)     pCO2 Venous 50      pO2 Venous 45      Bicarbonate Venous 39 (*)     FIO2 21      Oxyhemoglobin Venous 81 (*)     Base Excess/Deficit (+/-) 13.7 (*)    ROUTINE UA WITH MICROSCOPIC REFLEX TO CULTURE - Abnormal    Color Urine Light Yellow      Appearance Urine Clear      Glucose Urine >1000 (*)     Bilirubin Urine Negative      Ketones Urine Negative      Specific Gravity Urine 1.021      Blood Urine Small (*)     pH Urine 6.0      Protein Albumin Urine 100 (*)     Urobilinogen Urine Normal      Nitrite Urine Negative   "    Leukocyte Esterase Urine Negative      Bacteria Urine Few (*)     RBC Urine 1      WBC Urine 3      Squamous Epithelials Urine <1     COMPREHENSIVE METABOLIC PANEL - Abnormal    Sodium 116 (*)     Potassium 3.3 (*)     Chloride 69 (*)     Carbon Dioxide (CO2) 35 (*)     Anion Gap 12      Urea Nitrogen 54.5 (*)     Creatinine 2.07 (*)     Calcium 10.4 (*)     Glucose 781 (*)     Alkaline Phosphatase 82      AST 23      ALT 16      Protein Total 6.7      Albumin 4.0      Bilirubin Total 0.7      GFR Estimate 35 (*)    TSH WITH FREE T4 REFLEX - Abnormal    TSH 4.82 (*)    GLUCOSE BY METER - Abnormal    GLUCOSE BY METER POCT >600 (*)    KETONE BETA-HYDROXYBUTYRATE QUANTITATIVE, RAPID - Normal    Ketone (Beta-Hydroxybutyrate) Quantitative 0.6     COVID-19 VIRUS (CORONAVIRUS) BY PCR - Normal    SARS CoV2 PCR Negative     T4 FREE - Normal    Free T4 1.32     CBC WITH PLATELETS AND DIFFERENTIAL    WBC Count 9.8      RBC Count 4.76      Hemoglobin 14.1      Hematocrit 40.5      MCV 85      MCH 29.6      MCHC 34.8      RDW 13.5      Platelet Count 206      % Neutrophils 80      % Lymphocytes 12      % Monocytes 7      % Eosinophils 0      % Basophils 0      % Immature Granulocytes 1      NRBCs per 100 WBC 0      Absolute Neutrophils 7.8      Absolute Lymphocytes 1.1      Absolute Monocytes 0.7      Absolute Eosinophils 0.0      Absolute Basophils 0.0      Absolute Immature Granulocytes 0.1      Absolute NRBCs 0.0     BASIC METABOLIC PANEL       RADIOLOGY:  Reviewed all pertinent imaging. Please see official radiology report.  No orders to display       EKG:  Performed at: 1600  Impression: Sinus bradycardia, nonspecific ST-T wave abnormality  Intervals: Normal  Axis: Normal  ST changes: Nonspecific ST-T wave abnormality  Comparison: None available      Tj Gonzales M.D.  Emergency Medicine  [facility]  [address] [place] [phone]  Dept: [phone]       Tj Gonzales,  MD  11/12/22 1912

## 2022-11-12 NOTE — ED NOTES
DATE:  11/12/2022   TIME OF RECEIPT FROM LAB:  6137    LAB TEST:  Blood Glucose    LAB VALUE:  781   RESULTS GIVEN WITH READ-BACK TO (PROVIDER):  Tj Gonzales MD  TIME LAB VALUE REPORTED TO PROVIDER:   2407

## 2022-11-13 LAB
ANION GAP SERPL CALCULATED.3IONS-SCNC: 11 MMOL/L (ref 7–15)
ANION GAP SERPL CALCULATED.3IONS-SCNC: 8 MMOL/L (ref 7–15)
ANION GAP SERPL CALCULATED.3IONS-SCNC: 9 MMOL/L (ref 7–15)
BUN SERPL-MCNC: 44.9 MG/DL (ref 8–23)
BUN SERPL-MCNC: 48.1 MG/DL (ref 8–23)
BUN SERPL-MCNC: 50.6 MG/DL (ref 8–23)
CALCIUM SERPL-MCNC: 10.1 MG/DL (ref 8.8–10.2)
CHLORIDE SERPL-SCNC: 76 MMOL/L (ref 98–107)
CHLORIDE SERPL-SCNC: 83 MMOL/L (ref 98–107)
CHLORIDE SERPL-SCNC: 85 MMOL/L (ref 98–107)
CREAT SERPL-MCNC: 1.73 MG/DL (ref 0.67–1.17)
CREAT SERPL-MCNC: 1.86 MG/DL (ref 0.67–1.17)
CREAT SERPL-MCNC: 1.92 MG/DL (ref 0.67–1.17)
DEPRECATED HCO3 PLAS-SCNC: 36 MMOL/L (ref 22–29)
DEPRECATED HCO3 PLAS-SCNC: 37 MMOL/L (ref 22–29)
DEPRECATED HCO3 PLAS-SCNC: 37 MMOL/L (ref 22–29)
ERYTHROCYTE [DISTWIDTH] IN BLOOD BY AUTOMATED COUNT: 13.4 % (ref 10–15)
FASTING STATUS PATIENT QL REPORTED: ABNORMAL
GFR SERPL CREATININE-BSD FRML MDRD: 38 ML/MIN/1.73M2
GFR SERPL CREATININE-BSD FRML MDRD: 40 ML/MIN/1.73M2
GFR SERPL CREATININE-BSD FRML MDRD: 43 ML/MIN/1.73M2
GLUCOSE BLDC GLUCOMTR-MCNC: 255 MG/DL (ref 70–99)
GLUCOSE BLDC GLUCOMTR-MCNC: 275 MG/DL (ref 70–99)
GLUCOSE BLDC GLUCOMTR-MCNC: 277 MG/DL (ref 70–99)
GLUCOSE BLDC GLUCOMTR-MCNC: 294 MG/DL (ref 70–99)
GLUCOSE BLDC GLUCOMTR-MCNC: 350 MG/DL (ref 70–99)
GLUCOSE BLDC GLUCOMTR-MCNC: 401 MG/DL (ref 70–99)
GLUCOSE BLDC GLUCOMTR-MCNC: 453 MG/DL (ref 70–99)
GLUCOSE BLDC GLUCOMTR-MCNC: >600 MG/DL (ref 70–99)
GLUCOSE SERPL-MCNC: 256 MG/DL (ref 70–99)
GLUCOSE SERPL-MCNC: 440 MG/DL (ref 70–99)
GLUCOSE SERPL-MCNC: 532 MG/DL (ref 70–99)
GLUCOSE SERPL-MCNC: 623 MG/DL (ref 70–99)
HCT VFR BLD AUTO: 38.2 % (ref 40–53)
HGB BLD-MCNC: 13.4 G/DL (ref 13.3–17.7)
MCH RBC QN AUTO: 29.8 PG (ref 26.5–33)
MCHC RBC AUTO-ENTMCNC: 35.1 G/DL (ref 31.5–36.5)
MCV RBC AUTO: 85 FL (ref 78–100)
PLATELET # BLD AUTO: 200 10E3/UL (ref 150–450)
POTASSIUM SERPL-SCNC: 3.1 MMOL/L (ref 3.4–5.3)
POTASSIUM SERPL-SCNC: 3.2 MMOL/L (ref 3.4–5.3)
POTASSIUM SERPL-SCNC: 3.3 MMOL/L (ref 3.4–5.3)
RBC # BLD AUTO: 4.49 10E6/UL (ref 4.4–5.9)
SODIUM SERPL-SCNC: 124 MMOL/L (ref 136–145)
SODIUM SERPL-SCNC: 128 MMOL/L (ref 136–145)
SODIUM SERPL-SCNC: 130 MMOL/L (ref 136–145)
WBC # BLD AUTO: 7.6 10E3/UL (ref 4–11)

## 2022-11-13 PROCEDURE — 99232 SBSQ HOSP IP/OBS MODERATE 35: CPT | Performed by: INTERNAL MEDICINE

## 2022-11-13 PROCEDURE — 80048 BASIC METABOLIC PNL TOTAL CA: CPT | Performed by: INTERNAL MEDICINE

## 2022-11-13 PROCEDURE — 96372 THER/PROPH/DIAG INJ SC/IM: CPT | Performed by: INTERNAL MEDICINE

## 2022-11-13 PROCEDURE — 250N000012 HC RX MED GY IP 250 OP 636 PS 637: Performed by: INTERNAL MEDICINE

## 2022-11-13 PROCEDURE — G0378 HOSPITAL OBSERVATION PER HR: HCPCS

## 2022-11-13 PROCEDURE — 36415 COLL VENOUS BLD VENIPUNCTURE: CPT | Performed by: INTERNAL MEDICINE

## 2022-11-13 PROCEDURE — 82947 ASSAY GLUCOSE BLOOD QUANT: CPT | Performed by: INTERNAL MEDICINE

## 2022-11-13 PROCEDURE — 250N000011 HC RX IP 250 OP 636: Performed by: INTERNAL MEDICINE

## 2022-11-13 PROCEDURE — 85027 COMPLETE CBC AUTOMATED: CPT | Performed by: INTERNAL MEDICINE

## 2022-11-13 PROCEDURE — 250N000013 HC RX MED GY IP 250 OP 250 PS 637: Performed by: INTERNAL MEDICINE

## 2022-11-13 PROCEDURE — 83036 HEMOGLOBIN GLYCOSYLATED A1C: CPT | Performed by: NURSE PRACTITIONER

## 2022-11-13 PROCEDURE — 82962 GLUCOSE BLOOD TEST: CPT

## 2022-11-13 PROCEDURE — 250N000011 HC RX IP 250 OP 636

## 2022-11-13 RX ORDER — POTASSIUM CHLORIDE 20MEQ/15ML
20 LIQUID (ML) ORAL
Status: COMPLETED | OUTPATIENT
Start: 2022-11-13 | End: 2022-11-13

## 2022-11-13 RX ORDER — SODIUM CHLORIDE AND POTASSIUM CHLORIDE 150; 900 MG/100ML; MG/100ML
INJECTION, SOLUTION INTRAVENOUS
Status: COMPLETED
Start: 2022-11-13 | End: 2022-11-13

## 2022-11-13 RX ORDER — ACETAMINOPHEN 325 MG/1
975 TABLET ORAL EVERY 6 HOURS PRN
Status: DISCONTINUED | OUTPATIENT
Start: 2022-11-13 | End: 2022-11-15 | Stop reason: HOSPADM

## 2022-11-13 RX ADMIN — CALCIUM CARBONATE 500 MG: 500 TABLET, CHEWABLE ORAL at 08:56

## 2022-11-13 RX ADMIN — MYCOPHENOLATE MOFETIL 1000 MG: 500 TABLET ORAL at 08:56

## 2022-11-13 RX ADMIN — POTASSIUM CHLORIDE 20 MEQ: 20 SOLUTION ORAL at 04:36

## 2022-11-13 RX ADMIN — PREDNISONE 5 MG: 5 TABLET ORAL at 08:57

## 2022-11-13 RX ADMIN — HEPARIN SODIUM 5000 UNITS: 5000 INJECTION, SOLUTION INTRAVENOUS; SUBCUTANEOUS at 08:57

## 2022-11-13 RX ADMIN — EZETIMIBE 10 MG: 10 TABLET ORAL at 08:56

## 2022-11-13 RX ADMIN — LEVOTHYROXINE SODIUM 50 MCG: 0.03 TABLET ORAL at 08:56

## 2022-11-13 RX ADMIN — ROSUVASTATIN CALCIUM 40 MG: 10 TABLET, FILM COATED ORAL at 21:35

## 2022-11-13 RX ADMIN — CLOPIDOGREL BISULFATE 75 MG: 75 TABLET ORAL at 08:57

## 2022-11-13 RX ADMIN — POTASSIUM CHLORIDE AND SODIUM CHLORIDE: 900; 150 INJECTION, SOLUTION INTRAVENOUS at 04:46

## 2022-11-13 RX ADMIN — ATENOLOL 25 MG: 25 TABLET ORAL at 21:35

## 2022-11-13 RX ADMIN — POTASSIUM CHLORIDE AND SODIUM CHLORIDE: 900; 150 INJECTION, SOLUTION INTRAVENOUS at 22:17

## 2022-11-13 RX ADMIN — MYCOPHENOLATE MOFETIL 1000 MG: 500 TABLET ORAL at 21:35

## 2022-11-13 RX ADMIN — INSULIN GLARGINE 28 UNITS: 100 INJECTION, SOLUTION SUBCUTANEOUS at 08:55

## 2022-11-13 RX ADMIN — ALLOPURINOL 100 MG: 100 TABLET ORAL at 08:56

## 2022-11-13 RX ADMIN — POTASSIUM CHLORIDE 20 MEQ: 20 SOLUTION ORAL at 06:49

## 2022-11-13 RX ADMIN — GABAPENTIN 300 MG: 300 CAPSULE ORAL at 21:35

## 2022-11-13 RX ADMIN — ASPIRIN 81 MG: 81 TABLET, COATED ORAL at 21:35

## 2022-11-13 RX ADMIN — ACETAMINOPHEN 975 MG: 325 TABLET ORAL at 21:43

## 2022-11-13 RX ADMIN — HEPARIN SODIUM 5000 UNITS: 5000 INJECTION, SOLUTION INTRAVENOUS; SUBCUTANEOUS at 21:35

## 2022-11-13 RX ADMIN — POTASSIUM CHLORIDE 20 MEQ: 20 SOLUTION ORAL at 01:57

## 2022-11-13 RX ADMIN — INSULIN GLARGINE 28 UNITS: 100 INJECTION, SOLUTION SUBCUTANEOUS at 21:36

## 2022-11-13 RX ADMIN — GABAPENTIN 300 MG: 300 CAPSULE ORAL at 09:00

## 2022-11-13 ASSESSMENT — ACTIVITIES OF DAILY LIVING (ADL)
ADLS_ACUITY_SCORE: 24
ADLS_ACUITY_SCORE: 26
ADLS_ACUITY_SCORE: 24

## 2022-11-13 NOTE — PLAN OF CARE
"Goal Outcome Evaluation:      Plan of Care Reviewed With: patient    Overall Patient Progress: improvingOverall Patient Progress: improving         VSS, afebrile, HRR, lungs clear, bowels active. Pt denies pain. He has been awake off and on throughout the night. Pt states that his legs are \"skinny\", there is some edema, and his lower legs are honey. Pt states that he hasn't been taking his lasix because he was worried he was getting dehydrated. BG has been high, 440 this AM given 10 units of Novalog.     Face to face report given with opportunity to observe patient.    Report given to LILIANA Salamanca RN   11/13/2022  7:26 AM    "

## 2022-11-13 NOTE — PROGRESS NOTES
[facility]  Hospitalist Progress Note          Assessment and Plan:     Assessment & Plan     Eduar Isaacs is a 67 year old male admitted on 11/12/2022. He presents to ED because of high glycemia, dehydration. His insulin meter is not working. Recently started on Lantus 14 units.     Hospital problems;   1. Uncontrolled DM-2. No DKA  - hydration  11/13: Ideally this patient would have been on an insulin drip.  However, no ICU beds available. Today I will discharge diet and make NPO, increase Lantus to 28 units and reassess this afternoon.        2. Hypokalemia/Hyponatremia  - will use NS with K  - BMP q 4 hr  11/13: Potassium 3.2, Na 128       3. Elevated creatinine due to acute on chronic renal failure  - renal transplant from living donor (sister 2000)  - last seen nephrologist 9.14.2022. CKD 3b per notes. Creatinine depends on hydration and diuresis. Creatinine 10.18.2022 was 1.6. Today, on admission - 2.07. Th  - hold lasix (takes 140 mg po daily)and zaroxylin  - contineu cellcept 1000 mg po bid and prednisone 5 mg po daily  -11/13: Cr 1.86 today     4. JYOTSNA:  - reports not comfortable using mask. He states due to frequent trips to the bathroom he is not comfortable to wear the mask.  - monitor oxygenation over night     5. CAD:   - remote stenting (2019)  - EF 8.16.2022 - no PAH, LVEF 55%  - no recent ischemic sx     6. Dehydration  - NS + Kch 20 mEQ 150 ml/hr     7. COPD  - nebs prn and O2 when he sleeps  - assess O2 requirement before discharge (with ambulation)     Diet:   diabetic  DVT Prophylaxis: Heparin SQ  Manley Catheter: Not present  Central Lines: None  Cardiac Monitoring: None  Code Status:   full code    Dispo: Once glucose levels are adequately controlled and electrolytes return to normal ranges.        Clinically Significant Risk Factors Present on Admission        # Hypokalemia: Lowest K = 2.9 mmol/L (Ref range: 3.4-5.3) in last 2 days, will replace as needed  # Hyponatremia:  "Lowest Na = 116 mmol/L (Ref range: 136-145) in last 2 days, will monitor as appropriate   # Hypercalcemia: Highest Ca = 10.6 mg/dL (Ref range: 8.5 - 10.1 mg/dL) in last 2 days, will monitor as appropriate      # Drug Induced Platelet Defect: home medication list includes an antiplatelet medication   # Hypertension: home medication list includes antihypertensive(s)     # Severe Obesity: Estimated body mass index is 45.89 kg/m  as calculated from the following:    Height as of this encounter: 1.727 m (5' 8\").    Weight as of this encounter: 136.9 kg (301 lb 13 oz).                 Interval History:     Patient remains stable but glucose values remain elevated. No acute issues overnight.               Medications:       allopurinol  100 mg Oral Daily     aspirin  81 mg Oral At Bedtime     atenolol  25 mg Oral BID     calcium carbonate  500 mg Oral Daily     clopidogrel  75 mg Oral Daily     ezetimibe  10 mg Oral Daily     fluticasone-vilanterol  1 puff Inhalation Daily    And     umeclidinium  1 puff Inhalation Daily     gabapentin  300 mg Oral BID     heparin ANTICOAGULANT  5,000 Units Subcutaneous Q12H     insulin aspart  1-10 Units Subcutaneous TID AC     insulin glargine  28 Units Subcutaneous BID     levothyroxine  50 mcg Oral Daily     mycophenolate  1,000 mg Oral BID     predniSONE  5 mg Oral Daily     rosuvastatin  40 mg Oral At Bedtime     sodium chloride (PF)  3 mL Intracatheter Q8H                  Physical Exam:     Vitals:    22 2342 22 0434 22 0750 22 0850   BP: 138/75 116/73 99/57 156/92   BP Location: Left arm Left arm     Patient Position:       Cuff Size:       Pulse:   55 55   Resp: 24 20 18 18   Temp: 98.8  F (37.1  C) 97.9  F (36.6  C) 97.1  F (36.2  C)    TempSrc: Tympanic Tympanic Tympanic    SpO2: 95% 95% 95%    Weight:       Height:             Vital Sign Ranges  Temperature Temp  Av.8  F (36.6  C)  Min: 96.5  F (35.8  C)  Max: 98.8  F (37.1  C)   Blood pressure " Systolic (24hrs), Av , Min:99 , Max:156        Diastolic (24hrs), Av, Min:57, Max:92      Pulse Pulse  Av.4  Min: 54  Max: 65   Respirations Resp  Av.8  Min: 17  Max: 26   Pulse oximetry SpO2  Av.7 %  Min: 94 %  Max: 95 %         Intake/Output Summary (Last 24 hours) at 2022 1016  Last data filed at 2022 0900  Gross per 24 hour   Intake 4838 ml   Output 2300 ml   Net 2538 ml       General:  Alert and Orientated.  NAD.  Heart:  RRR, S1 S2, No murmurs, no rubs, no gallops.  Resp: CTA bilaterally.  No wheezes, no rhonchi, no crackles.  Abd: Obese-Soft/NT/ND/Positve BS.  Ext: Venous stasis changes with +2 LE edema.   Neuro:  No focal Neurologic deficits noted.    Peripheral IV 22 Anterior;Right (Active)   Site Assessment WDL 22 0800   Line Status Infusing 22 0800   Phlebitis Scale 0-->no symptoms 22 0800   Infiltration Scale 0 22 0800   Number of days: 1     No line/device             Data:     Lab Results   Component Value Date    WBC 7.6 2022    HGB 13.4 2022    HCT 38.2 (L) 2022     2022     (L) 2022    POTASSIUM 3.2 (L) 2022    CHLORIDE 83 (L) 2022    CO2 37 (H) 2022    BUN 48.1 (H) 2022    CR 1.86 (H) 2022     (H) 2022    SED 40 (H) 2019    DD 0.6 (H) 2021    NTBNPI 792 2021    TROPI <0.015 2021    AST 23 2022    ALT 16 2022    ALKPHOS 82 2022    BILITOTAL 0.7 2022     Lactic Acid   Date Value Ref Range Status   2021 1.4 0.7 - 2.0 mmol/L Final   2021 1.4 0.7 - 2.0 mmol/L Final   2021 1.8 0.7 - 2.0 mmol/L Final       Ankush Cochran, DO

## 2022-11-13 NOTE — ED NOTES
"Patient presents to the ED after feeling \"off\" for the last couple days, his family made him come to be seen.   Has had an increase in urination, feeling thirsty. Patient stopped taking his lasix.  Patient stated that his glucometer has stopped working, the reading is \"high\".  Patient lives at home alone.  Hx of kidney transplant.   Took some of his medications today.   Not much of an appetite.   "

## 2022-11-13 NOTE — ED NOTES
Face to face report given with opportunity to observe patient.    Report given to LILIANA Smiley RN   11/12/2022  7:42 PM

## 2022-11-13 NOTE — H&P
[facility]    History and Physical - Hospitalist Service       Date of Admission:  11/12/2022    Assessment & Plan      Eduar Isaacs is a 67 year old male admitted on 11/12/2022. He presents to ED because of high glycemia, dehydration. His insulin meter is not working. Recently started on Lantus 14 units.    Hospital problems;   1. Uncontrolled DM-2. No DKA  - hydration  - sliding scale for now.   - diabetic diet    2. Hypokalemia  - will use NS with K  - BMP q 4 hr    3. Elevated creatinine due to acute on chronic renal failure  - renal transplant from living donor (sister 2000)  - last seen nephrologist 9.14.2022. CKD 3b per notes. Creatinine depends on hydration and diuresis. Creatinine 10.18.2022 was 1.6. Today, on admission - 2.07. Th  - hold lasix (takes 140 mg po daily), zaroxylin  - contineu cellcept 1000 mg po bid and prednisone 5 mg po daily    4. Lucretia:  - reports not comfortable using mask. He states due to frequent trips to the bathroom he is not comfortable to wear the mask.  - monitor oxygenation over night    5. CAD:   - remote stenting (2019)  - EF 8.16.2022 - no PAH, LVEF 55%  - no recent ischemic sx      6. Dehydration  - NS + Kch 20 mEQ 150 ml/hr    7. Hypokalemia  - will replace and monitor    8. COPD  - nebs prn and O2 when he sleeps  - assess O2 requirement before discharge (with ambulation)    Diet:   diabetic  DVT Prophylaxis: Heparin SQ  Manley Catheter: Not present  Central Lines: None  Cardiac Monitoring: None  Code Status:   full code    Clinically Significant Risk Factors Present on Admission        # Hypokalemia: Lowest K = 3.3 mmol/L (Ref range: 3.4-5.3) in last 2 days, will replace as needed  # Hyponatremia: Lowest Na = 116 mmol/L (Ref range: 136-145) in last 2 days, will monitor as appropriate   # Hypercalcemia: Highest Ca = 10.4 mg/dL (Ref range: 8.5 - 10.1 mg/dL) in last 2 days, will monitor as appropriate      # Drug Induced Platelet Defect: home medication list  "includes an antiplatelet medication   # Hypertension: home medication list includes antihypertensive(s)     # Severe Obesity: Estimated body mass index is 45.16 kg/m  as calculated from the following:    Height as of this encounter: 1.727 m (5' 8\").    Weight as of this encounter: 134.7 kg (297 lb).           Disposition Plan     home  The patient's care was discussed with the Bedside Nurse, Patient and Patient's Family.    Osiel Parker MD  Hospitalist Service  [facility]  Securely message with the Vocera Web Console (learn more here)  Text page via [facility] Paging/Directory         ______________________________________________________________________    Chief Complaint   High glucose    History is obtained from the patient, electronic health record and emergency department physician    History of Present Illness   Eduar Isaacs is a 67 year old male who has h/o DM-2 (was relatively well controlled according to the patient) and who was just recently started on insulin (1.5 weeks ago, only lantus, no short acting), who also carries PMH of JYOTSNA, morbid obesity, kidney transplant, CAD, COPD who comes to ED because of high glucose. He states that his glucose reader was broken.   Was seen in urgent care 11.10.2022 and diagnosed with Upper respiratory infection. He follows with nephrology and takes cellcept and prednisone for his immunosuppression.   His cardiac condition is stable.   He presents to ED hyperglycemic and dehydrated. His blood work r/o DKA.   He was given IVF and 10 of aspart to start here in ED. Another dose of 10 untis given in ED but he is still hyperglycemic.   He will be admitted for hydration and glycemia control. He needs long acting and short acting insulin before he goes home.   12 points of ROS obtained. He denies chest pain, nausea, vomiting, abdominal pain, dysuria, hematuria, diarrhea. The rest is also negative.     Review of Systems    See HPI for pertinent positives and " negatives.     Past Medical History    I have reviewed this patient's medical history and updated it with pertinent information if needed.   Past Medical History:   Diagnosis Date     Arthritis      Congestive heart failure (H)      COPD (chronic obstructive pulmonary disease) (H)      Diabetes (H)      Hypertension      Myocardial infarction (H)      Neuromuscular disorder (H)        Past Surgical History   I have reviewed this patient's surgical history and updated it with pertinent information if needed.  Past Surgical History:   Procedure Laterality Date     CARDIAC SURGERY       COLONOSCOPY - HIM SCAN  04/08/2012    [place], polyps, adenomatous     ORTHOPEDIC SURGERY       TRANSPLANT         Social History   I have reviewed this patient's social history and updated it with pertinent information if needed.  Social History     Tobacco Use     Smoking status: Former     Types: Cigarettes     Smokeless tobacco: Never   Substance Use Topics     Alcohol use: Yes     Comment: rare     Drug use: Never       Family History     Negative for kidney disease    Prior to Admission Medications   Prior to Admission Medications   Prescriptions Last Dose Informant Patient Reported? Taking?   LORazepam (ATIVAN) 0.5 MG tablet   Yes No   Sig: Take 0.5 mg by mouth nightly as needed   TRELEGY ELLIPTA 100-62.5-25 MCG/INH oral inhaler 11/12/2022  Yes Yes   Sig: Inhale 1 puff into the lungs daily   albuterol (PROAIR HFA/PROVENTIL HFA/VENTOLIN HFA) 108 (90 Base) MCG/ACT inhaler   Yes No   Sig: Inhale 2 puffs into the lungs every 6 hours as needed   allopurinol (ZYLOPRIM) 100 MG tablet 11/12/2022  Yes Yes   Sig: Take 100 mg by mouth daily    aspirin (ASA) 81 MG EC tablet 11/11/2022  Yes Yes   Sig: Take 81 mg by mouth At Bedtime    atenolol (TENORMIN) 25 MG tablet 11/11/2022  Yes Yes   Sig: Take 25 mg by mouth 2 times daily   calcium carbonate (TUMS) 500 MG chewable tablet   Yes No   Sig: Take 1 chew tab by mouth daily   cholecalciferol  (VITAMIN D3) 125 mcg (5000 units) capsule   Yes No   Sig: Take 125 mcg by mouth daily   clopidogrel (PLAVIX) 75 MG tablet 11/12/2022  Yes Yes   Sig: Take 75 mg by mouth daily   ezetimibe (ZETIA) 10 MG tablet   Yes No   Sig: Take 10 mg by mouth daily   fish oil-omega-3 fatty acids (OMEGA-3 FISH OIL) 1000 MG capsule   Yes No   Sig: Take 1 g by mouth daily    furosemide (LASIX) 40 MG tablet 11/11/2022  Yes Yes   Sig: Take 80 mg by mouth daily And take an additional 40 mg daily as needed.   gabapentin (NEURONTIN) 100 MG capsule 11/12/2022  Yes Yes   Sig: Take 100 mg by mouth 3 times daily   levothyroxine (SYNTHROID/LEVOTHROID) 50 MCG tablet 11/12/2022  Yes Yes   Sig: Take 50 mcg by mouth daily    lisinopril (ZESTRIL) 5 MG tablet Unknown  Yes Yes   Sig: Take 5 mg by mouth daily    methocarbamol (ROBAXIN) 750 MG tablet 11/12/2022  Yes Yes   Sig: Take 750 mg by mouth 4 times daily   methylPREDNISolone (MEDROL) 4 MG tablet 11/12/2022  Yes Yes   Sig: Take 8 mg by mouth every other day    mupirocin (BACTROBAN) 2 % external ointment More than a month  Yes Yes   Sig: Apply topically 2 times daily as needed   mycophenolate (GENERIC EQUIVALENT) 500 MG tablet   Yes No   Sig: Take 1,000 mg by mouth 2 times daily   nitroGLYcerin (NITROSTAT) 0.4 MG sublingual tablet More than a month  Yes Yes   Sig: Place 0.4 mg under the tongue every 5 minutes as needed    rosuvastatin (CRESTOR) 40 MG tablet 11/12/2022  Yes Yes   Sig: Take 40 mg by mouth At Bedtime       Facility-Administered Medications: None     Allergies   Allergies   Allergen Reactions     Codeine Shortness Of Breath     Vancomycin Rash     Atorvastatin Other (See Comments)     Cefepime      Semaglutide Diarrhea     Amoxicillin Itching and Rash     Niacin Rash     Prilosec [Omeprazole] Itching and Rash       Physical Exam   Vital Signs: Temp: (!) 96.5  F (35.8  C) Temp src: Tympanic BP: 148/77 Pulse: 63   Resp: 18 SpO2: 94 % O2 Device: (P) None (Room air)    Weight: 297 lbs 0  oz    Physical exam:   General: very obese  HEENT: NC/AT.   Neck ;supple, thyroid not palpable.  Cardiac: regular, no S3, distant due to body habitus  Lungs: clear  Abd; soft, obese, not tender  : no gordon, no CVA tenderness  MS: OA changes  Skin; stasis dermatitis.   Neurological: non-focal exam  Psychiatric: awake, alert oriented x 4    Data   Data reviewed today: I reviewed all medications, new labs and imaging results over the last 24 hours. I personally reviewed no images or EKG's today.    Recent Labs   Lab 11/13/22  0002 11/12/22  2140 11/12/22  1852 11/12/22  1738 11/12/22  1516   WBC  --   --   --   --  9.8   HGB  --   --   --   --  14.1   MCV  --   --   --   --  85   PLT  --   --   --   --  206   * 124* 120*  --  116*   POTASSIUM 3.1* 3.3* 2.9*  --  3.3*   CHLORIDE 76* 75* 74*  --  69*   CO2 37* 37* 32*  --  35*   BUN 50.6* 50.3* 50.1*  --  54.5*   CR 1.92* 1.90* 1.91*  --  2.07*   ANIONGAP 11 12 14  --  12   HANNAH 10.1 10.6* 10.0  --  10.4*   * 559* 642*   < > 781*   ALBUMIN  --   --   --   --  4.0   PROTTOTAL  --   --   --   --  6.7   BILITOTAL  --   --   --   --  0.7   ALKPHOS  --   --   --   --  82   ALT  --   --   --   --  16   AST  --   --   --   --  23    < > = values in this interval not displayed.     9.8    \    14.1    /    206   N 80    L N/A    124 (L)    76 (L)    50.6 (H) /   ------------------------------------ 532 (HH)   ALT 16   AST 23   AP 82   ALB 4.0   Ca 10.1  3.1 (L)    37 (H)    1.92 (H) \    % RETIC N/A    LDH N/A  Troponin N/A    BNP N/A    CK N/A  INR N/A   PTT N/A    D-dimer N/A    Fibrinogen N/A    Antithrombin N/A  Ferritin N/A  CRP N/A    IL-6 N/A  No results found for this or any previous visit (from the past 24 hour(s)).

## 2022-11-13 NOTE — PLAN OF CARE
Goal Outcome Evaluation:  VSS on room air. Remained NPO until this afternoon 's after lantus and novolog. Denies pain. IV fluids infusing to Right IV, WNL. Lung sounds clear and equal. Bowel sounds active. Alert and oriented ,using call light appropriately. Up in room SBA. Using urinal at bedside.     Face to face report given with opportunity to observe patient.    Report given to LILIANA Blake RN   11/13/2022  3:23 PM

## 2022-11-13 NOTE — PLAN OF CARE
Goal Outcome Evaluation:      Plan of Care Reviewed With: patient    [facility] Inpatient Admission Note:    Patient admitted to 3212/3212-1 at approximately 2130 via wheel chair accompanied by daughter from emergency room . Report received from Puja in SBAR format at 2100 via telephone. Patient ambulated to bed via self.. Patient is alert and oriented X 3, denies pain; rates at 0 on 0-10 scale.  Patient oriented to room, unit, hourly rounding, and plan of care. Explained admission packet and patient handbook with patient bill of rights brochure. Will continue to monitor and document as needed.     Inpatient Nursing criteria listed below was met:    Health care directives status obtained and documented: Yes    Patient identifies a surrogate decision maker: Yes If yes, who:sister Michelle   Clergy visit ordered if patient requests: No    Isolation Patient: no Education given, correct sign in place and documentation row added to PCS:   NA    Fall Prevention Yes: Care plan updated, education given and documented, sticker and magnet in place: Yes    Care Plan initiated: Yes    Education Documented (including assessment): Yes    Patient has discharge needs : No     Face to face report given with opportunity to observe patient.    Report given to Delfino FORD.     Lou Roy RN   11/12/2022  11:51 PM

## 2022-11-14 PROBLEM — E87.6 HYPOKALEMIA: Status: ACTIVE | Noted: 2022-11-14

## 2022-11-14 PROBLEM — R73.9 HYPERGLYCEMIA: Status: ACTIVE | Noted: 2022-11-14

## 2022-11-14 LAB
ANION GAP SERPL CALCULATED.3IONS-SCNC: 10 MMOL/L (ref 7–15)
BUN SERPL-MCNC: 37.1 MG/DL (ref 8–23)
CALCIUM SERPL-MCNC: 9.6 MG/DL (ref 8.8–10.2)
CHLORIDE SERPL-SCNC: 89 MMOL/L (ref 98–107)
CREAT SERPL-MCNC: 1.63 MG/DL (ref 0.67–1.17)
DEPRECATED HCO3 PLAS-SCNC: 32 MMOL/L (ref 22–29)
ERYTHROCYTE [DISTWIDTH] IN BLOOD BY AUTOMATED COUNT: 13.7 % (ref 10–15)
EST. AVERAGE GLUCOSE BLD GHB EST-MCNC: 329 MG/DL
GFR SERPL CREATININE-BSD FRML MDRD: 46 ML/MIN/1.73M2
GLUCOSE BLDC GLUCOMTR-MCNC: 229 MG/DL (ref 70–99)
GLUCOSE BLDC GLUCOMTR-MCNC: 236 MG/DL (ref 70–99)
GLUCOSE BLDC GLUCOMTR-MCNC: 294 MG/DL (ref 70–99)
GLUCOSE BLDC GLUCOMTR-MCNC: 314 MG/DL (ref 70–99)
GLUCOSE BLDC GLUCOMTR-MCNC: 349 MG/DL (ref 70–99)
GLUCOSE BLDC GLUCOMTR-MCNC: 429 MG/DL (ref 70–99)
GLUCOSE BLDC GLUCOMTR-MCNC: 466 MG/DL (ref 70–99)
GLUCOSE SERPL-MCNC: 236 MG/DL (ref 70–99)
HBA1C MFR BLD: 13.1 %
HCT VFR BLD AUTO: 40.3 % (ref 40–53)
HGB BLD-MCNC: 13.8 G/DL (ref 13.3–17.7)
MCH RBC QN AUTO: 29.8 PG (ref 26.5–33)
MCHC RBC AUTO-ENTMCNC: 34.2 G/DL (ref 31.5–36.5)
MCV RBC AUTO: 87 FL (ref 78–100)
PLATELET # BLD AUTO: 198 10E3/UL (ref 150–450)
POTASSIUM SERPL-SCNC: 3.1 MMOL/L (ref 3.4–5.3)
POTASSIUM SERPL-SCNC: 4 MMOL/L (ref 3.4–5.3)
RBC # BLD AUTO: 4.63 10E6/UL (ref 4.4–5.9)
SODIUM SERPL-SCNC: 131 MMOL/L (ref 136–145)
WBC # BLD AUTO: 7 10E3/UL (ref 4–11)

## 2022-11-14 PROCEDURE — 250N000011 HC RX IP 250 OP 636: Performed by: INTERNAL MEDICINE

## 2022-11-14 PROCEDURE — 250N000013 HC RX MED GY IP 250 OP 250 PS 637: Performed by: INTERNAL MEDICINE

## 2022-11-14 PROCEDURE — 250N000012 HC RX MED GY IP 250 OP 636 PS 637: Performed by: NURSE PRACTITIONER

## 2022-11-14 PROCEDURE — 250N000012 HC RX MED GY IP 250 OP 636 PS 637: Performed by: INTERNAL MEDICINE

## 2022-11-14 PROCEDURE — 84132 ASSAY OF SERUM POTASSIUM: CPT | Performed by: NURSE PRACTITIONER

## 2022-11-14 PROCEDURE — 99232 SBSQ HOSP IP/OBS MODERATE 35: CPT | Performed by: NURSE PRACTITIONER

## 2022-11-14 PROCEDURE — 82962 GLUCOSE BLOOD TEST: CPT

## 2022-11-14 PROCEDURE — 250N000013 HC RX MED GY IP 250 OP 250 PS 637: Performed by: NURSE PRACTITIONER

## 2022-11-14 PROCEDURE — 82310 ASSAY OF CALCIUM: CPT | Performed by: NURSE PRACTITIONER

## 2022-11-14 PROCEDURE — G0378 HOSPITAL OBSERVATION PER HR: HCPCS

## 2022-11-14 PROCEDURE — 120N000001 HC R&B MED SURG/OB

## 2022-11-14 PROCEDURE — 96372 THER/PROPH/DIAG INJ SC/IM: CPT | Performed by: INTERNAL MEDICINE

## 2022-11-14 PROCEDURE — 36415 COLL VENOUS BLD VENIPUNCTURE: CPT | Performed by: NURSE PRACTITIONER

## 2022-11-14 PROCEDURE — 85027 COMPLETE CBC AUTOMATED: CPT | Performed by: NURSE PRACTITIONER

## 2022-11-14 RX ORDER — OXYCODONE HYDROCHLORIDE 5 MG/1
5 TABLET ORAL EVERY 4 HOURS PRN
Status: ON HOLD | COMMUNITY
End: 2023-06-01

## 2022-11-14 RX ORDER — PEN NEEDLE, DIABETIC 31 GX5/16"
NEEDLE, DISPOSABLE MISCELLANEOUS SEE ADMIN INSTRUCTIONS
Status: ON HOLD | COMMUNITY
Start: 2022-10-26 | End: 2022-11-15

## 2022-11-14 RX ORDER — LANCETS
EACH MISCELLANEOUS DAILY
Status: ON HOLD | COMMUNITY
Start: 2022-09-06 | End: 2022-11-15

## 2022-11-14 RX ORDER — ATENOLOL 25 MG/1
25 TABLET ORAL EVERY EVENING
Status: DISCONTINUED | OUTPATIENT
Start: 2022-11-14 | End: 2022-11-15 | Stop reason: HOSPADM

## 2022-11-14 RX ORDER — POTASSIUM CHLORIDE 1500 MG/1
50 TABLET, EXTENDED RELEASE ORAL 2 TIMES DAILY WITH MEALS
COMMUNITY
Start: 2022-08-12 | End: 2024-04-19

## 2022-11-14 RX ORDER — METOLAZONE 2.5 MG/1
1 TABLET ORAL
Status: ON HOLD | COMMUNITY
Start: 2022-08-01 | End: 2024-04-29

## 2022-11-14 RX ORDER — ISOSORBIDE MONONITRATE 30 MG/1
2 TABLET, EXTENDED RELEASE ORAL EVERY MORNING
COMMUNITY
Start: 2022-07-27

## 2022-11-14 RX ORDER — BLOOD SUGAR DIAGNOSTIC
STRIP MISCELLANEOUS
Status: ON HOLD | COMMUNITY
Start: 2022-09-01 | End: 2022-11-15

## 2022-11-14 RX ORDER — DICLOFENAC SODIUM 30 MG/G
GEL TOPICAL 2 TIMES DAILY PRN
Status: ON HOLD | COMMUNITY
Start: 2022-06-22 | End: 2023-06-26

## 2022-11-14 RX ORDER — LISINOPRIL 5 MG/1
2.5 TABLET ORAL DAILY
Status: ON HOLD
Start: 2022-11-14 | End: 2023-06-01

## 2022-11-14 RX ORDER — POTASSIUM CHLORIDE 750 MG/1
40 CAPSULE, EXTENDED RELEASE ORAL ONCE
Status: COMPLETED | OUTPATIENT
Start: 2022-11-14 | End: 2022-11-14

## 2022-11-14 RX ORDER — GUAIFENESIN 600 MG/1
600 TABLET, EXTENDED RELEASE ORAL 2 TIMES DAILY PRN
Status: ON HOLD | COMMUNITY
End: 2023-06-26

## 2022-11-14 RX ADMIN — GABAPENTIN 300 MG: 300 CAPSULE ORAL at 21:24

## 2022-11-14 RX ADMIN — POTASSIUM CHLORIDE 40 MEQ: 10 CAPSULE, COATED, EXTENDED RELEASE ORAL at 08:15

## 2022-11-14 RX ADMIN — PREDNISONE 5 MG: 5 TABLET ORAL at 08:59

## 2022-11-14 RX ADMIN — MYCOPHENOLATE MOFETIL 1000 MG: 500 TABLET ORAL at 21:23

## 2022-11-14 RX ADMIN — ACETAMINOPHEN 975 MG: 325 TABLET ORAL at 22:49

## 2022-11-14 RX ADMIN — CLOPIDOGREL BISULFATE 75 MG: 75 TABLET ORAL at 08:59

## 2022-11-14 RX ADMIN — POTASSIUM CHLORIDE AND SODIUM CHLORIDE: 900; 150 INJECTION, SOLUTION INTRAVENOUS at 22:50

## 2022-11-14 RX ADMIN — ROSUVASTATIN CALCIUM 40 MG: 10 TABLET, FILM COATED ORAL at 21:24

## 2022-11-14 RX ADMIN — ALLOPURINOL 100 MG: 100 TABLET ORAL at 08:59

## 2022-11-14 RX ADMIN — HEPARIN SODIUM 5000 UNITS: 5000 INJECTION, SOLUTION INTRAVENOUS; SUBCUTANEOUS at 09:05

## 2022-11-14 RX ADMIN — ACETAMINOPHEN 975 MG: 325 TABLET ORAL at 06:08

## 2022-11-14 RX ADMIN — INSULIN ASPART 10 UNITS: 100 INJECTION, SOLUTION INTRAVENOUS; SUBCUTANEOUS at 21:13

## 2022-11-14 RX ADMIN — ASPIRIN 81 MG: 81 TABLET, COATED ORAL at 21:24

## 2022-11-14 RX ADMIN — LEVOTHYROXINE SODIUM 50 MCG: 0.03 TABLET ORAL at 08:59

## 2022-11-14 RX ADMIN — HEPARIN SODIUM 5000 UNITS: 5000 INJECTION, SOLUTION INTRAVENOUS; SUBCUTANEOUS at 21:23

## 2022-11-14 RX ADMIN — ATENOLOL 25 MG: 25 TABLET ORAL at 21:24

## 2022-11-14 RX ADMIN — EZETIMIBE 10 MG: 10 TABLET ORAL at 09:00

## 2022-11-14 RX ADMIN — INSULIN GLARGINE 28 UNITS: 100 INJECTION, SOLUTION SUBCUTANEOUS at 08:54

## 2022-11-14 RX ADMIN — GABAPENTIN 300 MG: 300 CAPSULE ORAL at 09:00

## 2022-11-14 RX ADMIN — CALCIUM CARBONATE 500 MG: 500 TABLET, CHEWABLE ORAL at 09:00

## 2022-11-14 RX ADMIN — POTASSIUM CHLORIDE AND SODIUM CHLORIDE: 900; 150 INJECTION, SOLUTION INTRAVENOUS at 08:18

## 2022-11-14 RX ADMIN — ACETAMINOPHEN 975 MG: 325 TABLET ORAL at 15:50

## 2022-11-14 RX ADMIN — MYCOPHENOLATE MOFETIL 1000 MG: 500 TABLET ORAL at 08:58

## 2022-11-14 ASSESSMENT — ACTIVITIES OF DAILY LIVING (ADL)
ADLS_ACUITY_SCORE: 24

## 2022-11-14 NOTE — DISCHARGE INSTRUCTIONS
You have a follow up appointment scheduled with Dr. Abarca on 11/21 at 10:20 AM. If you have questions about your appointment you can call [phone].       You have a DIABETIC EDUCATION appointment scheduled 11/22 at 09:15 AM.  Come to the [facility], if you have questions about your appointment you can call the Diabetic Education department desk at [phone].

## 2022-11-14 NOTE — PROGRESS NOTES
"[facility]    Hospitalist Progress Note    Date of Service (when I saw the patient): 11/14/2022    Assessment & Plan     Diabetes mellitus Type 2 with long term insulin with hyperglycemia (H): Patient recently transitioned from oral medications to insulin. He was started on Lantus but continued to have severe hyperglycemia until he presented with feeling dehydrated and noted glucose was quite high on home glucometer. His 1st glucose was >700. Hgb A1C is >13. His lantus was rapidly increased to 28 units BID, sugars came down to 200's, however still spiking so prandial insulin was added at 5u with meals. His glucose has now been <350 even with meals. Certainly not ideal but a significant improvement.Will get him into diabetic education as he will likely need further adjustments. He is instructed to check glucose at least 4 times per day, return for highs or lows.        Chronic kidney disease, stage IIIb (moderate) (H): At his normal baseline at discharge.        Hyponatremia: Due to polydipsia from hyperglycemia, resolved.        Hypokalemia: Due to high insulin dosing on arrival, replaced this AM.        Morbid obesity (H)       JYOTSNA (obstructive sleep apnea)      # Hypokalemia: Lowest K = 2.9 mmol/L (Ref range: 3.4-5.3) in last 2 days, will replace as needed  # Hyponatremia: Lowest Na = 116 mmol/L (Ref range: 136-145) in last 2 days, will monitor as appropriate   # Hypercalcemia: Highest Ca = 10.6 mg/dL (Ref range: 8.5 - 10.1 mg/dL) in last 2 days, will monitor as appropriate      # Drug Induced Platelet Defect: home medication list includes an antiplatelet medication   # Hypertension: home medication list includes antihypertensive(s)     # Severe Obesity: Estimated body mass index is 45.89 kg/m  as calculated from the following:    Height as of this encounter: 1.727 m (5' 8\").    Weight as of this encounter: 136.9 kg (301 lb 13 oz).           DVT Prophylaxis: Low Risk/Ambulatory with no VTE prophylaxis " indicated  Code Status: Full Code    Disposition: Expected discharge in 1-2 days once glucose better controlled.    Nicolette Cooper, CNP    Interval History   Denies chest pain, abdominal pain, nausea. No dyspnea. Feels less dry.     -Data reviewed today: I reviewed all new labs and imaging results over the last 24 hours.     Physical Exam   Temp: 97  F (36.1  C) Temp src: Tympanic BP: 129/57 Pulse: 57   Resp: 18 SpO2: 95 % O2 Device: None (Room air)    Vitals:    11/12/22 1520 11/12/22 2128   Weight: 134.7 kg (297 lb) 136.9 kg (301 lb 13 oz)     Vital Signs with Ranges  Temp:  [97  F (36.1  C)-98.1  F (36.7  C)] 97  F (36.1  C)  Pulse:  [53-62] 57  Resp:  [18-20] 18  BP: (108-129)/(57-68) 129/57  SpO2:  [95 %] 95 %  I/O last 3 completed shifts:  In: 3539 [P.O.:480; I.V.:3059]  Out: 1600 [Urine:1600]    Peripheral IV 11/12/22 Anterior;Right (Active)   Site Assessment WDL 11/14/22 1300   Line Status Saline locked 11/14/22 1300   Phlebitis Scale 0-->no symptoms 11/14/22 1300   Infiltration Scale 0 11/14/22 1300   Number of days: 2     Line/device assessment(s) completed for medical necessity    Constitutional: Awake,alert, no acute distress  Respiratory: Clear bilaterally, no crackles, wheezes, rhonchi  Cardiovascular: HRR, no murmurs, rubs,thrills.   GI: Soft,nontender, bowel sounds positive   Skin/Integumen: No rashes, open areas or bruising.        Medications     0.9% sodium chloride + KCl 20 mEq/L 100 mL/hr at 11/14/22 0818       allopurinol  100 mg Oral Daily     aspirin  81 mg Oral At Bedtime     atenolol  25 mg Oral QPM     calcium carbonate  500 mg Oral Daily     clopidogrel  75 mg Oral Daily     ezetimibe  10 mg Oral Daily     Fluticasone-Umeclidin-Vilant  1 puff Inhalation Daily     gabapentin  300 mg Oral BID     heparin ANTICOAGULANT  5,000 Units Subcutaneous Q12H     insulin aspart  5 Units Subcutaneous TID w/meals     insulin aspart  1-10 Units Subcutaneous TID AC     insulin glargine  28 Units  Subcutaneous BID     levothyroxine  50 mcg Oral Daily     mycophenolate  1,000 mg Oral BID     predniSONE  5 mg Oral Daily     rosuvastatin  40 mg Oral At Bedtime     sodium chloride (PF)  3 mL Intracatheter Q8H       Data   Recent Labs   Lab 11/14/22  1414 11/14/22  1124 11/14/22  0847 11/14/22  0713 11/13/22  1035 11/13/22  1002 11/13/22  0751 11/13/22  0612 11/12/22  1738 11/12/22  1516   WBC  --   --   --  7.0  --   --   --  7.6  --  9.8   HGB  --   --   --  13.8  --   --   --  13.4  --  14.1   MCV  --   --   --  87  --   --   --  85  --  85   PLT  --   --   --  198  --   --   --  200  --  206   NA  --   --   --  131*  --  130*  --  128*   < > 116*   POTASSIUM  --   --   --  3.1*  --  3.3*  --  3.2*   < > 3.3*   CHLORIDE  --   --   --  89*  --  85*  --  83*   < > 69*   CO2  --   --   --  32*  --  36*  --  37*   < > 35*   BUN  --   --   --  37.1*  --  44.9*  --  48.1*   < > 54.5*   CR  --   --   --  1.63*  --  1.73*  --  1.86*   < > 2.07*   ANIONGAP  --   --   --  10  --  9  --  8   < > 12   HANNAH  --   --   --  9.6  --  10.1  --  10.1   < > 10.4*   * 349* 236* 236*   < > 256*   < > 440*   < > 781*   ALBUMIN  --   --   --   --   --   --   --   --   --  4.0   PROTTOTAL  --   --   --   --   --   --   --   --   --  6.7   BILITOTAL  --   --   --   --   --   --   --   --   --  0.7   ALKPHOS  --   --   --   --   --   --   --   --   --  82   ALT  --   --   --   --   --   --   --   --   --  16   AST  --   --   --   --   --   --   --   --   --  23    < > = values in this interval not displayed.       No results found for this or any previous visit (from the past 24 hour(s)).

## 2022-11-14 NOTE — CONSULTS
Consult for obesity, uncontrolled DM    Pt seen for review of nutrition recommendations for diabetes. Pt reports he went through diabetes education at the [facility] several years ago.     Reviewed some information on carb counting/portions and meal planning, eating regular meals. He has been working on reducing portions. Discussion on gradually increasing movement. Plans to start using his recumbent bike at home again. Encouraged pt to start carrying around source of carbohydrate and reviewed signs of low BG. He might be interested in going back to outpatient diabetes education in the future. Provided RD's contact information if he has further nutrition questions.

## 2022-11-14 NOTE — CARE PLAN
Prior to Admission Medication Reconciliation:     Medications added:   [] None  [x] As listed below:    imdur    Diabetic supplies    Diclofenac    Potassium    mucinex    Oxycodone    metolazone    Medications deleted:   [] None  [x] As listed below:    Robaxin- no longer active with primary    Medications marked for review/removal by attending:  [x] None  [] As listed below:    Changes made to existing medications:   [] None  [x] Updated time of day, strengths and frequencies to most current.     Lisinopril from 5 mg (ordered) to 2.5 mg     tums from 500 mg daily (ordered) to 800 mg daily     Pertinent notes/medications patient takes different than prescribed:     Gabapentin- prescribed 200 mg TID, pt takes 300 mg BID, reports a provider okay'd the way he takes it- input as 300 mg BID      Lisinopril- pt reports he was told to hold this medication by his kidney doctor. He will be discussing with that provider at next appt.       Diclofenac- uses very seldomly- reports ineffective      Lorazepam and oxy- uses very seldomly, last filled in 2021    Last times/dates taken verified with patient:  [] Yes- completed myself  [] Did not review with patient. Rx verification only. Review completed by nursing.    [x] Nurse completed no changes made (double checked entries)  [] Unable to review with patient at this time:    Allergy review:    []Did not review: reviewed by nursing  [x]Allergies reviewed and updated if needed.     Medication reconciliation sources:   [x]Patient  []Patient family member/emergency contact: **  [x][facility] Report Review  [x]Albuterol 90 mcg 2 puff q6h prn   [x]Allopurinol 100 mg daily   [x]Asa 81 mg daily  [x]Atenolol 25 mg daily   [x]Calcium carbonate 400 mg chewable tablet 800 mg daily  [x]Cholecalciferol 5,000 units daily  [x]Clopidogrel 75 mg daily   [x]diclofenac 3% topical gel topical BID  [x]Ezetimibe 10 mg daily   [x]trelegy 100/62.5/25 mcg 1 inhalation daily   [x]Lasix 40 mg 120 mg daily    [x]Gabapentin 100 mg 200 mg TID   [x]Guaifenesin 600 mg ER q12h   [x]Insulin glargine 100 unit/ml pen  8 units in the AM, increase by 1 unit every 3 days until  fasting blood sugar is 140 or less. (pt reports up to 14 units)  [x]imdur 30 mg 60 mg daily CAD  [x]Levothyroxine 50 mcg daily   [x]Lisinopril 2.5 mg daily   [x]Lorazepam 0.5 mg at bedtime prn anxiety  [x]Metolazone 2.5 mg 2xweekly   [x]Mupirocin 2% ointment BID PRN   [x]cellcept 1000 mg BID  [x]Nitrostat 0.4 mg q5min prn   [x]Omega 3-dha-epa-fish oil 1000 mg (120 mg-180 mg) capsule 2 caps daily   [x]Oxycodone 5 mg q4h prn breakthrough pain  [x]Potassium cl 20 meq daily   [x]Prednisone 5 mg daily   [x]Rosuvastatin 40 mg qPM  []Epic Chart Review  []Care Everywhere review  [x]Pharmacy med list/phone call: Specialty pharmacy [phone]    Atenolol 25 mg every evening 90 ds 11/3/22    Levothyroxine 50 mcg daily 28 ds 11/3/22    rosuvastatin 40 mg at bedtime 9/21/22 90 ds   [facility]    Oxycodone filled 9/29/21 20 tabs    Lorazepam filled 10/27/21 30 tabs  [x]Outside meds dispense report:   []Fill dates reflect compliancy. No concerns.  [x]Fill dates do not reflect compliancy on the following medications:     imdur- pt reports having excess from switching pharmacies and getting 90 ds     Lisinopril- pt reports advised to hold med by kidney doctor    Mupirocin- older script. Pt uses PRN nasal dryness  []HomeCare medlist, Nursing home or Assisted Living MAR:  []Behavioral Health Provider:  []Other: **    Pharmacy desired at discharge[facility]    Is patient on coumadin?   [x]No  []Yes      Fill dates and reported compliancy:  [x] Patient reports compliancy on all scheduled medications- 90 %.   [] Not applicable. Patient is not taking any maintenance medications at this time.   [] Fill dates do not coincide with compliancy, but patient reports compliancy.    [] Did not review with patient. Cannot assess.     Historian accuracy:  [x] Excellent- alert and oriented,  understands why meds were prescribed and how to take, able to answer specifics  [] Good- alert and oriented, understands why meds were prescribed and how to take, some confusion   [] Fair- alert and oriented, doesn't know medications without list, cannot answer specifics about medications, but has a decent process for which to take at home  [] Poor- does not know medications, may not have a process to take at home, may be cognitively unable to review at this time  []Medication management done by family member or facility, no concerns about historian accuracy.   [] Did not review with patient. Cannot assess.     Medication Management:  [x] Manages meds independently  [] Family member/ other party manages meds/assists:  [] Meds managed by staff at facility  [] Meds set up by home care, family/other party helps administer  [] Meds set up by home care, self administers  [] Did not review with patient. Cannot assess.     Other medications aside from PTA:  [x] Denies taking any other medications aside from those listed in PTA meds, this includes over-the-counter vitamins, supplements and analgesics.       Krista Mattson on 11/14/2022 at 7:40 AM       Notifying appropriate party of changes/additions/discrepancies:  []No pertinent changes made, notification not necessary.   [x] Notified attending provider via text page/phone call/sticky note or other:  [] Notified other:  [] Medications have not been reconciled by a provider yet, notification not necessary  [] Pt is not admitted to floor yet or patient is boarding, PTA meds completed before admission.   Medications Prior to Admission   Medication Sig Dispense Refill Last Dose     ACCU-CHEK BASILIO PLUS test strip Use to test blood glucose as directed.   Past Week     albuterol (PROAIR HFA/PROVENTIL HFA/VENTOLIN HFA) 108 (90 Base) MCG/ACT inhaler Inhale 2 puffs into the lungs every 6 hours as needed   Past Week     allopurinol (ZYLOPRIM) 100 MG tablet Take 100 mg by mouth daily     11/12/2022 at 0930     aspirin (ASA) 81 MG EC tablet Take 81 mg by mouth At Bedtime    11/11/2022 at 2100     atenolol (TENORMIN) 25 MG tablet Take 25 mg by mouth every evening   11/11/2022 at 2100     B-D U/F 31G X 8 MM insulin pen needle See Admin Instructions Use for insulin administration as directed.   Past Week     blood glucose monitoring (SOFTCLIX) lancets daily Use to monitor blood glucose once daily as directed.   Past Week     Calcium Carbonate Antacid 400 MG CHEW Take 2 chew tab by mouth daily   11/12/2022 at 0900     cholecalciferol (VITAMIN D3) 125 mcg (5000 units) capsule Take 125 mcg by mouth daily   11/12/2022 at 0900     clopidogrel (PLAVIX) 75 MG tablet Take 75 mg by mouth daily   11/12/2022 at 0900     Diclofenac Sodium 3 % GEL Apply topically 2 times daily as needed   More than a month     ezetimibe (ZETIA) 10 MG tablet Take 10 mg by mouth daily   11/12/2022 at 0900     fish oil-omega-3 fatty acids 1000 MG capsule Take 1 g by mouth 2 times daily   11/12/2022 at 0900     furosemide (LASIX) 40 MG tablet Take 120 mg by mouth daily   11/11/2022 at 0900     gabapentin (NEURONTIN) 100 MG capsule Take 200 mg by mouth 3 times daily   11/12/2022 at 0900     guaiFENesin (MUCINEX) 600 MG 12 hr tablet Take 600 mg by mouth 2 times daily   Past Week     insulin glargine (LANTUS PEN) 100 UNIT/ML pen Inject 14 Units Subcutaneous every morning increase by 1 unit every 3 days until fasting blood sugar is 140 or less   11/12/2022     isosorbide mononitrate (IMDUR) 30 MG 24 hr tablet Take 2 tablets by mouth daily   Past Week at AM     levothyroxine (SYNTHROID/LEVOTHROID) 50 MCG tablet Take 50 mcg by mouth daily    11/12/2022 at 0900     LORazepam (ATIVAN) 0.5 MG tablet Take 0.5 mg by mouth nightly as needed   Unknown     mupirocin (BACTROBAN) 2 % external ointment Apply topically 2 times daily as needed   More than a month     mycophenolate (GENERIC EQUIVALENT) 500 MG tablet Take 1,000 mg by mouth 2 times daily    11/12/2022 at 0800     nitroGLYcerin (NITROSTAT) 0.4 MG sublingual tablet Place 0.4 mg under the tongue every 5 minutes as needed    More than a month     oxyCODONE (ROXICODONE) 5 MG tablet Take 5 mg by mouth every 4 hours as needed for breakthrough pain   More than a month     potassium chloride ER (K-TAB) 20 MEQ CR tablet Take 1 tablet by mouth daily   Past Week     predniSONE (DELTASONE) 5 MG tablet Take 5 mg by mouth daily   11/12/2022 at 0900     rosuvastatin (CRESTOR) 40 MG tablet Take 40 mg by mouth At Bedtime    11/11/2022 at 2100     TRELEGY ELLIPTA 100-62.5-25 MCG/INH oral inhaler Inhale 1 puff into the lungs daily   11/12/2022 at 0900     lisinopril (ZESTRIL) 5 MG tablet Take 2.5 mg by mouth daily (Patient not taking: Reported on 11/14/2022)   Not Taking     metolazone (ZAROXOLYN) 2.5 MG tablet Take 1 tablet by mouth twice a week on Tuesday and Friday. Take with furosemide.   11/11/2022

## 2022-11-14 NOTE — PLAN OF CARE
"Reason for hospital stay: Obs- dehydration   Living situation PTA: Home  Most recent vitals: /68 (BP Location: Left arm, Patient Position: Sitting, Cuff Size: Adult Regular)   Pulse 53   Temp 97  F (36.1  C) (Tympanic)   Resp 18   Ht 1.727 m (5' 8\")   Wt 136.9 kg (301 lb 13 oz)   SpO2 95%   BMI 45.89 kg/m    Pt has been awake on and off t/o this shift. A&O with VS and Assessments. ASX bradycardia. Up independently in room. Voids spontaneously in urinal. With adequate output. PRN PO Tylenol 975 mg administered for bilateral knee aches. BS this shift 314 and 229. Denies SOB/ CP.  Scratched small scab in RLQ abd which resulted in small amount of bleeding. 2+ edema noted in BLE.     IVF:  PIV infusing NS with 20 mEq K+ additives at 100 ml/hr.  Safety: Bed in lowest position, call button within reach. Calls appropriately and makes needs known.         11/14/2022  6:15 AM  Sara Proctor RN        "

## 2022-11-14 NOTE — PLAN OF CARE
"Goal Outcome Evaluation:      Plan of Care Reviewed With: patient    Overall Patient Progress: improvingOverall Patient Progress: improving    Reason for hospital stay: hyperglycemia  Living situation PTA: home with family support  Most recent vitals: /59 (BP Location: Left arm, Patient Position: Chair, Cuff Size: Adult Regular)   Pulse 62   Temp 97.7  F (36.5  C) (Tympanic)   Resp 18   Ht 1.727 m (5' 8\")   Wt 136.9 kg (301 lb 13 oz)   SpO2 95%   BMI 45.89 kg/m      Pain Management:  having increased neuropathy discomfort to feet 5/10, medicated with tyleno, Neurontin administered as well.   LOC:  alert and oriented  Cardiac:  HR 62, denies chest pain  IVF:  NS +20 K@100/hour  ABX: none  Nutrition: consistent carb diet  ADL's: independent  Ambulation:up walking halls, denies any unsteady gait   Comments:  BG have been 401 at supper- rec'd 10 unit Novolog.  Has been counting carbs.   HS BG- 453- 28 units Lantus administered and 16 units Novolog- (1 time order) .  HS snack provided.     11/13/2022  10:06 PM  Lou Roy RN      Face to face report given with opportunity to observe patient.    Report given to Sharif FORD.     Lou Roy RN   11/13/2022  11:32 PM          "

## 2022-11-14 NOTE — DISCHARGE SUMMARY
[facility]    Discharge Summary  Hospitalist    Date of Admission:  11/12/2022  Date of Discharge:  11/15/2022  Discharging Provider: Nicolette Cooper CNP  Date of Service (when I saw the patient): 11/15/22    Discharge Diagnoses       Diabetes mellitus Type 2 with long term insulin with hyperglycemia (H)    Chronic kidney disease, stage IIIb (moderate) (H)     Hyponatremia    Hypokalemia    Morbid obesity (H)    JYOTSNA (obstructive sleep apnea)      History of Present Illness   67 year old male presents to the Emergency Department for evaluation of dizziness and elevated blood sugar.  He has a history of kidney transplant in the remote past, also with insulin-dependent type 2 diabetes.  He is vitally stable when he arrives to the emergency department.  Did undergo lab evaluation which was notable for severe hyperglycemia to greater than 700, with some associated hyponatremia and acute kidney injury.  He was started on IV fluids, initially with a single liter of saline bolus here, will try to use caution to avoid over correcting his sodium acutely given the unclear chronicity probably at least 2 weeks of the symptoms.  This also could be some degree of pseudohyponatremia given the severe hyperglycemia.  He does not have any anion gap or severe acidosis associated with this so I think he is stable for subcutaneous insulin administration to start to correct things along with IV fluids.  He was given 10 units of aspart insulin here, will be cautious given his renal disease and the fact that he is only on 14 units of long-acting insulin daily.  No signs of infection, he is afebrile, white blood cell count normal, urine analysis negative for infection, no pulmonary symptoms.   patient will be kept in the hospital under observation for continued insulin and management of hyperglycemia and dehydration.  Discussed case with hospitalist.    Hospital Course     Diabetes mellitus Type 2 with long term insulin with  hyperglycemia (H): Patient recently transitioned from oral medications to insulin. He was started on Lantus but continued to have severe hyperglycemia until he presented with feeling dehydrated and noted glucose was quite high on home glucometer. His 1st glucose was >700. Hgb A1C is >13. His lantus was rapidly increased to 30 units BID, sugars came down to 200's, however still spiking so prandial insulin was added at 5u with meals then increased to 15u with meals with glucose <250 2 hours postprandial. Certainly not ideal but a significant improvement.Will get him into diabetic education as he will likely need further adjustments. He is instructed to check glucose at least 4 times per day, return for highs or lows.       Chronic kidney disease, stage IIIb (moderate) (H): At his normal baseline at discharge.       Hyponatremia: Due to polydipsia from hyperglycemia, resolved.       Hypokalemia: Due to high insulin dosing on arrival, replaced and nromalized.       Morbid obesity (H)      JYOTSNA (obstructive sleep apnea)      Nicolette Cooper CNP      Pending Results     Unresulted Labs Ordered in the Past 30 Days of this Admission     No orders found from 10/13/2022 to 11/13/2022.          Code Status   Full Code       Primary Care Physician   Kody Abarca     Discharge Disposition   Discharged to home  Condition at discharge: Stable    Consultations This Hospital Stay   NUTR[employer] ADULT IP CONSULT    Time Spent on this Encounter   I, Nicolette Cooper NP, personally saw the patient today and spent greater than 30 minutes discharging this patient.    Discharge Orders      AMB Adult Diabetes Educator Referral      Reason for your hospital stay    Hyperglycemia     Follow-up and recommended labs and tests     Follow up with primary care provider, Chico Fry, within 7 days for hospital follow- up.  No follow up labs or test are needed.     Activity    Your activity upon discharge: activity as tolerated      Monitor and record    blood glucose 4 times a day, before meals and at bedtime and notify md for blood glucose less than 90 or greater than 400     Diet    Follow this diet upon discharge: Orders Placed This Encounter      Consistent Carbohydrate Diet     Discharge Medications   Current Discharge Medication List      START taking these medications    Details   !! blood glucose (NO BRAND SPECIFIED) test strip 1 strip by In Vitro route 4 times daily (before meals and nightly) Use to test blood sugar 4 times daily or as directed.  Qty: 100 strip, Refills: 0    Associated Diagnoses: Diabetes mellitus without complication (H)      insulin aspart (NOVOLOG PEN) 100 UNIT/ML pen Inject 15 Units Subcutaneous 3 times daily (with meals)  Qty: 15 mL, Refills: 0    Associated Diagnoses: Diabetes mellitus without complication (H)      !! insulin pen needle (31G X 8 MM) 31G X 8 MM miscellaneous Use 5 pen needles daily or as directed.  Qty: 100 each, Refills: 0    Associated Diagnoses: Diabetes mellitus without complication (H)       !! - Potential duplicate medications found. Please discuss with provider.      CONTINUE these medications which have CHANGED    Details   insulin glargine (LANTUS PEN) 100 UNIT/ML pen Inject 30 Units Subcutaneous BID    Comments: If Lantus is not covered by insurance, may substitute Basaglar or Semglee or other insulin glargine product per insurance preference at same dose and frequency.    Associated Diagnoses: Diabetes mellitus without complication (H)      lisinopril (ZESTRIL) 5 MG tablet Take 0.5 tablets (2.5 mg) by mouth daily . ----Start taking this after discussing with your kidney doctor--    Associated Diagnoses: Diabetes mellitus without complication (H)         CONTINUE these medications which have NOT CHANGED    Details   !! ACCU-CHEK BASILIO PLUS test strip Use to test blood glucose as directed.      albuterol (PROAIR HFA/PROVENTIL HFA/VENTOLIN HFA) 108 (90 Base) MCG/ACT inhaler Inhale 2  puffs into the lungs every 6 hours as needed    Comments: Pharmacy may dispense brand covered by insurance (Proair, or proventil or ventolin or generic albuterol inhaler)      allopurinol (ZYLOPRIM) 100 MG tablet Take 100 mg by mouth daily       aspirin (ASA) 81 MG EC tablet Take 81 mg by mouth At Bedtime       atenolol (TENORMIN) 25 MG tablet Take 25 mg by mouth every evening      !! B-D U/F 31G X 8 MM insulin pen needle See Admin Instructions Use for insulin administration as directed.      blood glucose monitoring (SOFTCLIX) lancets daily Use to monitor blood glucose once daily as directed.      Calcium Carbonate Antacid 400 MG CHEW Take 2 chew tab by mouth daily      cholecalciferol (VITAMIN D3) 125 mcg (5000 units) capsule Take 125 mcg by mouth daily      clopidogrel (PLAVIX) 75 MG tablet Take 75 mg by mouth daily      Diclofenac Sodium 3 % GEL Apply topically 2 times daily as needed      ezetimibe (ZETIA) 10 MG tablet Take 10 mg by mouth daily      fish oil-omega-3 fatty acids 1000 MG capsule Take 1 g by mouth 2 times daily      furosemide (LASIX) 40 MG tablet Take 120 mg by mouth daily      gabapentin (NEURONTIN) 100 MG capsule Take 300 mg by mouth 2 times daily      guaiFENesin (MUCINEX) 600 MG 12 hr tablet Take 600 mg by mouth 2 times daily      isosorbide mononitrate (IMDUR) 30 MG 24 hr tablet Take 2 tablets by mouth daily      levothyroxine (SYNTHROID/LEVOTHROID) 50 MCG tablet Take 50 mcg by mouth daily       LORazepam (ATIVAN) 0.5 MG tablet Take 0.5 mg by mouth nightly as needed      mupirocin (BACTROBAN) 2 % external ointment Apply topically 2 times daily as needed      mycophenolate (GENERIC EQUIVALENT) 500 MG tablet Take 1,000 mg by mouth 2 times daily      nitroGLYcerin (NITROSTAT) 0.4 MG sublingual tablet Place 0.4 mg under the tongue every 5 minutes as needed       oxyCODONE (ROXICODONE) 5 MG tablet Take 5 mg by mouth every 4 hours as needed for breakthrough pain      potassium chloride ER (K-TAB)  20 MEQ CR tablet Take 1 tablet by mouth daily      predniSONE (DELTASONE) 5 MG tablet Take 5 mg by mouth daily      rosuvastatin (CRESTOR) 40 MG tablet Take 40 mg by mouth At Bedtime       TRELEGY ELLIPTA 100-62.5-25 MCG/INH oral inhaler Inhale 1 puff into the lungs daily      metolazone (ZAROXOLYN) 2.5 MG tablet Take 1 tablet by mouth twice a week on Tuesday and Friday. Take with furosemide.       !! - Potential duplicate medications found. Please discuss with provider.        Allergies   Allergies   Allergen Reactions     Cefepime      pain, kidney function off.     Codeine Shortness Of Breath     Niacin Itching and Rash     Amoxicillin Itching and Rash     Prilosec [Omeprazole] Itching and Rash     Vancomycin Hives and Rash     Atorvastatin Other (See Comments)     Semaglutide Diarrhea     Spironolactone      Data   Most Recent 3 CBC's:Recent Labs   Lab Test 11/14/22  0713 11/13/22  0612 11/12/22  1516   WBC 7.0 7.6 9.8   HGB 13.8 13.4 14.1   MCV 87 85 85    200 206      Most Recent 3 BMP's:  Recent Labs   Lab Test 11/14/22  1414 11/14/22  1124 11/14/22  0847 11/14/22  0713 11/13/22  1035 11/13/22  1002 11/13/22  0751 11/13/22  0612   NA  --   --   --  131*  --  130*  --  128*   POTASSIUM  --   --   --  3.1*  --  3.3*  --  3.2*   CHLORIDE  --   --   --  89*  --  85*  --  83*   CO2  --   --   --  32*  --  36*  --  37*   BUN  --   --   --  37.1*  --  44.9*  --  48.1*   CR  --   --   --  1.63*  --  1.73*  --  1.86*   ANIONGAP  --   --   --  10  --  9  --  8   HANNAH  --   --   --  9.6  --  10.1  --  10.1   * 349* 236* 236*   < > 256*   < > 440*    < > = values in this interval not displayed.     Most Recent 2 LFT's:  Recent Labs   Lab Test 11/12/22  1516 05/26/21  0615   AST 23 51*   ALT 16 51   ALKPHOS 82 56   BILITOTAL 0.7 0.3     Most Recent INR's and Anticoagulation Dosing History:  Anticoagulation Dose History    There is no flowsheet data to display.       Most Recent 3 Troponin's:  Recent Labs    Lab Test 05/21/21  1452   TROPI <0.015     Most Recent Cholesterol Panel:No lab results found.  Most Recent 6 Bacteria Isolates From Any Culture (See EPIC Reports for Culture Details):  Recent Labs   Lab Test 05/22/21  0652 05/22/21  0644 05/21/21  1516 05/21/21  1452   CULT No growth after 6 days No growth after 6 days No growth after 6 days No growth after 6 days  50,000 to 100,000 colonies/mL  Enterococcus faecalis  *     Most Recent TSH, T4 and A1c Labs:  Recent Labs   Lab Test 11/13/22  0612 11/12/22  1516   TSH  --  4.82*   T4  --  1.32   A1C 13.1*  --      Results for orders placed or performed during the hospital encounter of 07/20/21   CT Lumbar Spine w/o Contrast    Narrative    CT LUMBAR SPINE W/O CONTRAST    HISTORY: LUMBAR FUSION 6 MONTHS AGO, ASSESS BONY FUSION; Lumbar  stenosis .    COMPARISON: 11/18/2020.    TECHNIQUE: Helical noncontrast CT images of the lumbar spine.    FINDINGS:    Lumbar vertebral body heights are preserved. Lumbar lordosis is  preserved.     Postoperative changes of prior L5-S1 hemilaminectomies and interbody  fusion are seen. The hardware is appropriately positioned and intact,  without evidence of loosening. No solid bony fusion of these levels is  seen at this time. There is vacuum disc phenomena at L5-S1.      Assessment the spinal canal and its contents is limited by noncontrast  CT technique.    At L1-2, L2-3 and L3-4, there are mild facet degenerative changes  without high-grade spinal or foraminal stenosis. A mild broad-based  disc bulge at L3-4 contusive mild spinal stenosis.    At L4-5, there is a mild broad-based disc bulge and mild facet  hypertrophic changes. Spinal and bilateral foraminal narrowing is  mild.    L5-S1, postoperative changes decompress the spinal canal. Residual  posterior lateral osteophytes contribute to residual moderate to  severe left and severe right lateral foraminal narrowing.      Impression    IMPRESSION:     Postoperative changes at  L5-S1 posterior and interbody fusion. The  hardware is intact without loosening. The spinal canal is  decompressed.      MARY PIEDRA MD         SYSTEM ID:  [id number]

## 2022-11-14 NOTE — PROGRESS NOTES
Pt has been awake, A&O with VS and Assessments. Has been up in chair majority of this AM. BS this shift have been 236 and 236, covered with 4 units of Novolog and 28 units Lantus per MAR. PIV continues to infuse NS w/ 20 mEq K+ @ 100 ml/hr. Uses urinal to void spontaneously. Call button within reach and calls appropriately with needs.

## 2022-11-14 NOTE — PROGRESS NOTES
Assessment completed with Duane .    LOC: alert     Dx: dehydration and hyperglycemia    Chronic Disease Management: JYOTSNA, CAD, COPD    Lives with: his cat  Living at:  Home in [facility]  Transportation: YES, drives self    Primary PCP: Kody Abarca  Insurance:  [facility] Medicare  Medicare BAZAN letter reviewed with Duane.    Support System:  family  Homecare/PCA: no  /County Services:   no  : NO      How was the [facility] notification completed: n/a    Health Care Directive: is working on one  Guardian: no  POA: no    Pharmacy: [facility]  Meds management: manages own    Adequate Resources for needs (housing, utilities, food/med): YES  Household chores: does own  Work/community/social activity: YES, gets out as desired    ADLs: independent  Ambulation:uses a cane  Falls: no  Nutrition: is obese, had a consult with dietician today  Sleep: sleeps well    Equipment used: cane, grab bars. Has a CPAP he does not use      Oxygen supplier: no      Does the supplier have valid oxygen orders: n/a    Mental health: denies  Substance abuse: denies  Exposure to violence/abuse: denies  Stressors: his blood sugars    Able to Return to Prior Living Arrangements: YES    Choice of Vendor: n/a    Barriers: could really use a diabetic education refresher    JONATHAN: low    Plan: home with family transporting.    Karina Arambula CM

## 2022-11-14 NOTE — PROGRESS NOTES
Face to face report given with opportunity to observe patient.    Report given to LILIANA Staples RN   11/14/2022  11:29 AM

## 2022-11-14 NOTE — PLAN OF CARE
Goal Outcome Evaluation:  Pt A&O, VSS.  BG being monitored.  Nutrition here to talk about nutrition.  Pt ordering lunch at this time, will recheck BG and administer Novolg as scheduled with a recheck in 2 hrs.  Pt independent in room, he did shower.  Makes needs known. S/l'd.  Call light in reach.      Face to face report given with opportunity to observe patient.    Report given to Heather Newman RN   11/14/2022  3:27 PM

## 2022-11-15 VITALS
SYSTOLIC BLOOD PRESSURE: 108 MMHG | OXYGEN SATURATION: 98 % | RESPIRATION RATE: 20 BRPM | TEMPERATURE: 97 F | HEART RATE: 56 BPM | HEIGHT: 68 IN | DIASTOLIC BLOOD PRESSURE: 59 MMHG | BODY MASS INDEX: 45.74 KG/M2 | WEIGHT: 301.81 LBS

## 2022-11-15 LAB
ANION GAP SERPL CALCULATED.3IONS-SCNC: 8 MMOL/L (ref 7–15)
BUN SERPL-MCNC: 31.6 MG/DL (ref 8–23)
CALCIUM SERPL-MCNC: 9.1 MG/DL (ref 8.8–10.2)
CHLORIDE SERPL-SCNC: 98 MMOL/L (ref 98–107)
CREAT SERPL-MCNC: 1.55 MG/DL (ref 0.67–1.17)
DEPRECATED HCO3 PLAS-SCNC: 30 MMOL/L (ref 22–29)
GFR SERPL CREATININE-BSD FRML MDRD: 49 ML/MIN/1.73M2
GLUCOSE BLDC GLUCOMTR-MCNC: 130 MG/DL (ref 70–99)
GLUCOSE BLDC GLUCOMTR-MCNC: 187 MG/DL (ref 70–99)
GLUCOSE BLDC GLUCOMTR-MCNC: 236 MG/DL (ref 70–99)
GLUCOSE BLDC GLUCOMTR-MCNC: 266 MG/DL (ref 70–99)
GLUCOSE SERPL-MCNC: 140 MG/DL (ref 70–99)
POTASSIUM SERPL-SCNC: 3.4 MMOL/L (ref 3.4–5.3)
SODIUM SERPL-SCNC: 136 MMOL/L (ref 136–145)

## 2022-11-15 PROCEDURE — 250N000013 HC RX MED GY IP 250 OP 250 PS 637: Performed by: INTERNAL MEDICINE

## 2022-11-15 PROCEDURE — 99239 HOSP IP/OBS DSCHRG MGMT >30: CPT | Performed by: NURSE PRACTITIONER

## 2022-11-15 PROCEDURE — 250N000013 HC RX MED GY IP 250 OP 250 PS 637: Performed by: NURSE PRACTITIONER

## 2022-11-15 PROCEDURE — 250N000011 HC RX IP 250 OP 636: Performed by: INTERNAL MEDICINE

## 2022-11-15 PROCEDURE — 250N000012 HC RX MED GY IP 250 OP 636 PS 637: Performed by: INTERNAL MEDICINE

## 2022-11-15 PROCEDURE — 36415 COLL VENOUS BLD VENIPUNCTURE: CPT | Performed by: NURSE PRACTITIONER

## 2022-11-15 PROCEDURE — 80048 BASIC METABOLIC PNL TOTAL CA: CPT | Performed by: NURSE PRACTITIONER

## 2022-11-15 RX ORDER — POTASSIUM CHLORIDE 1500 MG/1
20 TABLET, EXTENDED RELEASE ORAL DAILY
Status: DISCONTINUED | OUTPATIENT
Start: 2022-11-15 | End: 2022-11-15 | Stop reason: HOSPADM

## 2022-11-15 RX ORDER — METOLAZONE 2.5 MG/1
2.5 TABLET ORAL
Status: DISCONTINUED | OUTPATIENT
Start: 2022-11-17 | End: 2022-11-15 | Stop reason: HOSPADM

## 2022-11-15 RX ORDER — FUROSEMIDE 40 MG
120 TABLET ORAL DAILY
Status: DISCONTINUED | OUTPATIENT
Start: 2022-11-15 | End: 2022-11-15 | Stop reason: HOSPADM

## 2022-11-15 RX ORDER — LANCETS
1 EACH MISCELLANEOUS
Qty: 200 EACH | Refills: 0 | Status: SHIPPED | OUTPATIENT
Start: 2022-11-15

## 2022-11-15 RX ADMIN — EZETIMIBE 10 MG: 10 TABLET ORAL at 09:10

## 2022-11-15 RX ADMIN — CALCIUM CARBONATE 500 MG: 500 TABLET, CHEWABLE ORAL at 09:10

## 2022-11-15 RX ADMIN — PREDNISONE 5 MG: 5 TABLET ORAL at 09:09

## 2022-11-15 RX ADMIN — ACETAMINOPHEN 975 MG: 325 TABLET ORAL at 06:15

## 2022-11-15 RX ADMIN — HEPARIN SODIUM 5000 UNITS: 5000 INJECTION, SOLUTION INTRAVENOUS; SUBCUTANEOUS at 09:09

## 2022-11-15 RX ADMIN — POTASSIUM CHLORIDE 20 MEQ: 1500 TABLET, EXTENDED RELEASE ORAL at 09:10

## 2022-11-15 RX ADMIN — LEVOTHYROXINE SODIUM 50 MCG: 0.03 TABLET ORAL at 09:10

## 2022-11-15 RX ADMIN — MYCOPHENOLATE MOFETIL 1000 MG: 500 TABLET ORAL at 09:09

## 2022-11-15 RX ADMIN — INSULIN ASPART 10 UNITS: 100 INJECTION, SOLUTION INTRAVENOUS; SUBCUTANEOUS at 11:06

## 2022-11-15 RX ADMIN — CLOPIDOGREL BISULFATE 75 MG: 75 TABLET ORAL at 09:09

## 2022-11-15 RX ADMIN — GABAPENTIN 300 MG: 300 CAPSULE ORAL at 09:10

## 2022-11-15 RX ADMIN — ALLOPURINOL 100 MG: 100 TABLET ORAL at 09:10

## 2022-11-15 RX ADMIN — INSULIN ASPART 10 UNITS: 100 INJECTION, SOLUTION INTRAVENOUS; SUBCUTANEOUS at 07:30

## 2022-11-15 RX ADMIN — FUROSEMIDE 120 MG: 40 TABLET ORAL at 09:10

## 2022-11-15 ASSESSMENT — ACTIVITIES OF DAILY LIVING (ADL)
ADLS_ACUITY_SCORE: 24

## 2022-11-15 NOTE — PLAN OF CARE
"Goal Outcome Evaluation: Not Progressing     Reason for hospital stay:  Hyperglycemia   Living situation PTA: Lives at home by himself.   Most recent vitals: /72 (BP Location: Left arm, Patient Position: Chair, Cuff Size: Adult Regular)   Pulse 56   Temp 98  F (36.7  C) (Tympanic)   Resp 18   Ht 1.727 m (5' 8\")   Wt 136.9 kg (301 lb 13 oz)   SpO2 97%   BMI 45.89 kg/m      Pain Management:  Pt reported pain in bilateral knees. PO Tylenol given x 1. Per pt pain was tolerable after tylenol.   LOC: A&O x 4.   Cardiac:  Apical pulse regular.   Respiratory:  Room air. All fields clear, equal bilateral.   GI:  All quadrants audible and normoactive.   :  Voids spontaneously without difficulty.   Skin Issues: Skin is warm, dry, and honey. Skin is unremarkable with the exception of some scattered scabs. Generalized edema: 3+. Bilateral feet: 4+     IVF: NS/K 20 mEq @ 100 mL/hr. IV access: Right ante cubical. Iv infusing well.   ABX:  None at this time.     Nutrition: Adequate. Consist carb diet. Novolog given per orders and sliding scale. Lantus given per orders.   ADL's:  Up in the chair for most of the shift.   Ambulation: Up ad abbi. Steady on feet and A&O.   Safety:  Bed in the low position, call light within reach, ID band in place, personal items within reach, free from falls, steady on feet, use of call light appropriately, and makes needs known.     Comments: VSS. Afebrile. Pt is polite and cooperative. Blood sugars: 429 & Face to face report given with opportunity to observe patient.    Report given to Francie SANTOYO RN.     Heather Marshall RN   11/14/2022  11:15 PM                           "

## 2022-11-15 NOTE — UTILIZATION REVIEW
[facility] Utilization Review  Admission Status; Secondary Review Determination     Admission Date: 11/12/2022  2:55 PM      Under the authority of the Utilization Management Committee, the utilization review process indicated a secondary review on the above patient.  The review outcome is based on review of the medical records, discussions with staff, and applying clinical experience noted on the date of the review.        (X)      Inpatient Status Appropriate - This patient's medical care is consistent with medical management for inpatient care and reasonable inpatient medical practice.         RATIONALE FOR DETERMINATION   67-year-old male with history of diabetes mellitus, coronary artery disease, JYOTSNA, chronic kidney disease, admitted with hypoglycemia, dehydration, and insulin meter not working, patient presented with uncontrolled diabetes mellitus, no DKA.  Patient was recently started on Lantus, blood sugars greater than 400, started on normal saline with potassium replacement, BMP checks every 4 hours.  Blood sugars are improving significantly, started on Lantus and mealtime insulin, hyponatremia due to hyperglycemia, severe hypokalemia which is being replaced, blood sugars continue to be more than 400 patient's blood sugars still continue to be high,, no evidence of DKA, but needs close monitoring in the hospital with ongoing electrolyte replacement and check, adjustment of insulin with blood sugars, anticipate more than 2 midnight hospital stay, recommend change to inpatient status, communicated to       The severity of illness, intensity of service provided, expected LOS and risk for adverse outcome make the care complex, high risk and appropriate for hospital admission.The patient requires hospital based medical care which is anticipated to require a stay of 2 or more midnights; according to CMS guidelines the patient should be admitted as inpatient        The information on this  document is developed by the utilization review team in order for the business office to ensure compliance.  This only denotes the appropriateness of proper admission status and does not reflect the quality of care rendered.         The definitions of Inpatient Status and Observation Status used in making the determination above are those provided in the CMS Coverage Manual, Chapter 1 and Chapter 6, section 70.4.      Sincerely,       Pranav Londono MD  Physician Advisor  Utilization Review-[facility]    Phone: [phone]

## 2022-11-15 NOTE — PLAN OF CARE
VS as charted, no acute changes this shift. Up independently in room. 0200 . Was able to sleep for majority of shift with no complaints. PRN Tylenol for c/o chronic hip and knee pain. Good UOP. Loss of IV access this AM, DANGELO Will notified and ok to leave out. Calls appropriately.     Face to face report given with opportunity to observe patient.    Report given to LILIANA Bueno RN   11/15/2022  7:22 AM

## 2022-11-16 ENCOUNTER — TRANSFERRED RECORDS (OUTPATIENT)
Dept: HEALTH INFORMATION MANAGEMENT | Facility: CLINIC | Age: 67
End: 2022-11-16

## 2022-11-16 NOTE — PLAN OF CARE
Patient discharged at 2:30 PM via ambulation accompanied by sister and staff. Prescriptions sent to patients preferred pharmacy. All belongings sent with patient.     Discharge instructions reviewed with Duane. Listed belongings gathered and returned to patient. Yes    Patient discharged to Home.   Report called to N/A    Surgical Patient   Surgical Procedures during stay: No  Did patient receive discharge instruction on wound care and recognition of infection symptoms? No    MISC  Follow up appointment made:  Yes  Home medications returned to patient: Yes  Patient reports pain was well managed at discharge: Yes

## 2022-11-18 ENCOUNTER — ALLIED HEALTH/NURSE VISIT (OUTPATIENT)
Dept: EDUCATION SERVICES | Facility: OTHER | Age: 67
End: 2022-11-18
Attending: NURSE PRACTITIONER
Payer: COMMERCIAL

## 2022-11-18 VITALS
HEIGHT: 68 IN | BODY MASS INDEX: 46.83 KG/M2 | RESPIRATION RATE: 18 BRPM | WEIGHT: 309 LBS | HEART RATE: 68 BPM | DIASTOLIC BLOOD PRESSURE: 45 MMHG | OXYGEN SATURATION: 98 % | SYSTOLIC BLOOD PRESSURE: 86 MMHG

## 2022-11-18 DIAGNOSIS — I95.0 IDIOPATHIC HYPOTENSION: ICD-10-CM

## 2022-11-18 DIAGNOSIS — E11.65 TYPE 2 DIABETES MELLITUS WITH HYPERGLYCEMIA, WITH LONG-TERM CURRENT USE OF INSULIN (H): ICD-10-CM

## 2022-11-18 DIAGNOSIS — E08.42 DIABETIC POLYNEUROPATHY ASSOCIATED WITH DIABETES MELLITUS DUE TO UNDERLYING CONDITION (H): Primary | ICD-10-CM

## 2022-11-18 DIAGNOSIS — E11.9 DIABETES MELLITUS WITHOUT COMPLICATION (H): Primary | ICD-10-CM

## 2022-11-18 DIAGNOSIS — Z79.4 TYPE 2 DIABETES MELLITUS WITH HYPERGLYCEMIA, WITH LONG-TERM CURRENT USE OF INSULIN (H): ICD-10-CM

## 2022-11-18 PROCEDURE — 99215 OFFICE O/P EST HI 40 MIN: CPT | Performed by: NURSE PRACTITIONER

## 2022-11-18 PROCEDURE — G0463 HOSPITAL OUTPT CLINIC VISIT: HCPCS

## 2022-11-18 PROCEDURE — 99417 PROLNG OP E/M EACH 15 MIN: CPT | Performed by: NURSE PRACTITIONER

## 2022-11-18 RX ORDER — PROCHLORPERAZINE 25 MG/1
SUPPOSITORY RECTAL
Qty: 1 EACH | Refills: 3 | Status: SHIPPED | OUTPATIENT
Start: 2022-11-18 | End: 2022-11-21

## 2022-11-18 RX ORDER — PROCHLORPERAZINE 25 MG/1
SUPPOSITORY RECTAL
Qty: 1 EACH | Refills: 0 | Status: SHIPPED | OUTPATIENT
Start: 2022-11-18 | End: 2022-11-21

## 2022-11-18 RX ORDER — PROCHLORPERAZINE 25 MG/1
SUPPOSITORY RECTAL
Qty: 3 EACH | Refills: 11 | Status: SHIPPED | OUTPATIENT
Start: 2022-11-18 | End: 2022-11-21

## 2022-11-18 ASSESSMENT — PAIN SCALES - GENERAL: PAINLEVEL: WORST PAIN (10)

## 2022-11-18 NOTE — PROGRESS NOTES
Diabetes Education Self Management & Training    SUBJECTIVE/OBJECTIVE:  Presents for: Individual review  Accompanied by: Self  Diabetes education in the past 24mo:  (not for years)  Diabetes type: Type 2  Disease course:  (working on managing blood sugars)  How confident are you filling out medical forms by yourself:: Quite a bit  Transportation concerns: No  Difficulty affording diabetes medication?: Sometimes (when not covered by insurance)  Difficulty affording diabetes testing supplies?: Sometimes (depending on insurance)  Cultural Influences/Ethnic Background:  Not  or       Diabetes Follow-up      How often are you checking your blood sugar? 3-5 times a day     What concerns do you have today about your diabetes? Blood sugar is often over 200     Do you have any of these symptoms? (Select all that apply)  Numbness in feet and Burning in feet    Have you had a diabetic eye exam in the last 12 months? Yes- Date of last eye exam: this fall,  [facility]   History of kidney transplant.  He follows with nephrology Dr Hernandez    Diabetes Medications:  1. Lantus 30 units BID  2. Novolog 15 units with each meal        BP Readings from Last 2 Encounters:   11/18/22 (!) 86/45   11/15/22 108/59     Hemoglobin A1C (%)   Date Value   11/13/2022 13.1 (H)   05/21/2021 7.2 (H)         Problem list and histories reviewed & adjusted, as indicated.  Additional history: as documented    Lab Results   Component Value Date    A1C 13.1 11/13/2022    A1C 7.2 05/21/2021         Reviewed and updated as needed this visit by clinical staff   Tobacco   Meds            Reviewed and updated as needed this visit by Provider                  Diabetes Symptoms & Complications  Fatigue: Yes  Neuropathy: Yes  Polydipsia:  (prior to hospitalization)  Polyphagia: No  Polyuria:  (prior to hospitalization)  Visual change:  (improving since hospitalization)  Slow healing wounds: No  Symptom course: Improving  Weight trend:  "Decreasing  Complications assessed today?: Yes  Nephropathy: Yes  Peripheral neuropathy: Yes    Patient Problem List and Family Medical History reviewed for relevant medical history, current medical status, and diabetes risk factors.    Vitals:  BP (!) 86/45   Pulse 68   Resp 18   Ht 1.715 m (5' 7.5\")   Wt 140.2 kg (309 lb)   SpO2 98%   BMI 47.68 kg/m    Estimated body mass index is 47.68 kg/m  as calculated from the following:    Height as of this encounter: 1.715 m (5' 7.5\").    Weight as of this encounter: 140.2 kg (309 lb).   Last 3 BP:   BP Readings from Last 3 Encounters:   11/18/22 (!) 86/45   11/15/22 108/59   05/27/21 128/69       History   Smoking Status     Former     Types: Cigarettes   Smokeless Tobacco     Never       Labs:  Lab Results   Component Value Date    A1C 13.1 11/13/2022    A1C 7.2 05/21/2021     Lab Results   Component Value Date     11/15/2022     05/27/2021     No results found for: LDL  No results found for: HDL]  GFR Estimate   Date Value Ref Range Status   11/15/2022 49 (L) >60 mL/min/1.73m2 Final     Comment:     Effective December 21, 2021 eGFRcr in adults is calculated using the 2021 CKD-EPI creatinine equation which includes age and gender (Amy durbin al., [place], DOI: 10.1056/IUXCyw7665968)   05/27/2021 51 (L) >60 mL/min/[1.73_m2] Final     Comment:     Non  GFR Calc  Starting 12/18/2018, serum creatinine based estimated GFR (eGFR) will be   calculated using the Chronic Kidney Disease Epidemiology Collaboration   (CKD-EPI) equation.       GFR Estimate If Black   Date Value Ref Range Status   05/27/2021 59 (L) >60 mL/min/[1.73_m2] Final     Comment:      GFR Calc  Starting 12/18/2018, serum creatinine based estimated GFR (eGFR) will be   calculated using the Chronic Kidney Disease Epidemiology Collaboration   (CKD-EPI) equation.       Lab Results   Component Value Date    CR 1.55 11/15/2022    CR 1.45 05/27/2021     No results found " for: MICROALBUMIN    Healthy Eating  Healthy Eating Assessed Today: Yes  Cultural/Sikh diet restrictions?: No  Meal planning/habits: Carb counting, Keeps food records  How many times a week on average do you eat food made away from home (restaurant/take-out)?: 2  Breakfast: egg, meat,  Lunch: salad  Dinner: variety  Snacks: cookies, fruit,  Beverages: Water, Tea, Coffee, Milk, Juice, Diet soda  Has patient met with a dietitian in the past?:  (20 years ago)    Being Active  Being Active Assessed Today: Yes  Exercise:: Currently not exercising (planning on doing the recumbent bike)    Monitoring  Monitoring Assessed Today: Yes  Did patient bring glucose meter to appointment? : Yes  Blood Glucose Meter: Accu-chek  Times checking blood sugar at home (number): 3  Times checking blood sugar at home (per): Day  Blood glucose trend: Decreasing    Taking Medications  Diabetes Medication(s)     Insulin       insulin aspart (NOVOLOG PEN) 100 UNIT/ML pen    Inject 15 Units Subcutaneous 3 times daily (with meals) Hold if pre-meal glucose <100     insulin glargine (LANTUS PEN) 100 UNIT/ML pen    Inject 30 Units Subcutaneous 2 times daily          Taking Medication Assessed Today: Yes  Current Treatments: Diet, Insulin Injections  Dose schedule: Pre-breakfast, Pre-lunch, Pre-dinner  Given by: Patient  Injection/Infusion sites: Abdomen  Problems taking diabetes medications regularly?: Yes  Diabetes medication side effects?: Yes    Problem Solving                 Reducing Risks       Healthy Coping     Patient Activation Measure Survey Score:  No flowsheet data found.    ROS:  CONSTITUTIONAL: NEGATIVE for fever, chills, change in weight  INTEGUMENTARY/SKIN: bruising easy since hospitalization  EYES: slight improvement since hospitalization   ENT/MOUTH: NEGATIVE for ear, mouth and throat problems  RESP:Hx COPD  CV: NEGATIVE for chest pain, palpitations or peripheral edema  GI: NEGATIVE for nausea, abdominal pain, heartburn, or  change in bowel habits  : negative for dysuria, hematuria, decreased urinary stream, erectile dysfunction  MUSCULOSKELETAL: history of gout, arthritis   NEURO: numbness and burning into feet  ENDOCRINE: Hx diabetes  PSYCHIATRIC: NEGATIVE for changes in mood or affect    GENERAL: healthy, alert and no distress  NECK: no adenopathy, no asymmetry, masses, or scars and thyroid normal to palpation  RESP: lungs clear to auscultation - no rales, rhonchi or wheezes  CV: regular rate and rhythm, normal S1 S2, no S3 or S4, no murmur, click or rub, no peripheral edema and peripheral pulses strong  ABDOMEN: soft, nontender, no hepatosplenomegaly, no masses and bowel sounds normal  PSYCH: mentation appears normal, affect normal/bright    ASSESSMENT:  Reviewed BGs after hospitalization fasting BGs remain <150 for past 3 days.  Has had a couple over night BGs >200.  He can not had any low BGs.  Would like to consider CGM and possible omnipod insulin pump.  Plan to start with CGM and see if he can afford it.  Continue current insulin dosing     Previously tried GLP1 and had severe diarrhea     Patient's most recent   Lab Results   Component Value Date    A1C 13.1 11/13/2022    A1C 7.2 05/21/2021    is not meeting goal of <7.0    INTERVENTION:   Diabetes knowledge and skills assessment:     Patient is knowledgeable in diabetes management concepts related to: Monitoring and Taking Medication    Patient needs further education on the following diabetes management concepts: Healthy Eating and Problem Solving    Based on learning assessment above, most appropriate setting for further diabetes education would be: Individual setting.    Education provided today on:  AADE Self-Care Behaviors:  Insulin administration technique taught today. Patient verbalized understanding and was able to perform an accurate return demonstration of administration technique.    Opportunities for ongoing education and support in diabetes-self management  were discussed.    Pt verbalized understanding of concepts discussed and recommendations provided today.       Education Materials Provided:  CGM    PLAN:  (E08.42) Diabetic polyneuropathy associated with diabetes mellitus due to underlying condition (H)  (primary encounter diagnosis)  Comment: would benefit from following up with podiatry for calluses and neuropathy   Plan: Orthopedic  Referral            (E11.65,  Z79.4) Type 2 diabetes mellitus with hyperglycemia, with long-term current use of insulin (H)  Comment: continue current insulin.  Plan to order CGM for now and consider omnipod in the future.  He does not have any glucose tables - ordered   Plan: Continuous Blood Gluc Transmit (DEXCOM G6         TRANSMITTER) MISC, Continuous Blood Gluc Sensor        (DEXCOM G6 SENSOR) MISC, Continuous Blood Gluc          (DEXCOM G6 ) SANAZ, Orthopedic          Referral, glucose (BD GLUCOSE) 4 g         chewable tablet            (I95.0) Idiopathic hypotension  Comment: low BP with some dizziness.  Duane has reactions to some medications easily.    Plan: stop lisinopril and follow up with nephrology         See Patient Instructions      Time Spent: 80 minutes spent on assessment, teaching about diagnosis and treatment plan, discussed CGM and symptom management and chart and reviewing notes from nephrology   Encounter Type: Individual

## 2022-11-18 NOTE — PATIENT INSTRUCTIONS
Continue working on healthy eating and moving (start low and slow, work up to 30 min, 5x/week)    BG goals:  Fasting and before meals <180, >80  2 hour after eating <180    We only need 1/2 of these numbers to be within target then your A1c will be within target    Practicing health promotion can help improve diabetes (suggested by the ADA, March 2019)   Meditation    Apps: for IPhone and Android    -Calm    -Headspace    -Insight Timer   Yoga   Mindful eating - portion sizing     Medication changes   Continue with current insulin dosing     Recommendation  -try bike or walking 5-10 minutes after eating  -make sure to eat a protein with any all carb meal or snack     Follow up   1 month     Call me sooner if any problems/concerns and/or questions develop including consistent low BGs <70 or consistent high BGs >200  [phone] (Unit Coordinator)    [phone] (Nurse)

## 2022-11-21 DIAGNOSIS — E11.65 TYPE 2 DIABETES MELLITUS WITH HYPERGLYCEMIA, WITH LONG-TERM CURRENT USE OF INSULIN (H): ICD-10-CM

## 2022-11-21 DIAGNOSIS — Z79.4 TYPE 2 DIABETES MELLITUS WITH HYPERGLYCEMIA, WITH LONG-TERM CURRENT USE OF INSULIN (H): ICD-10-CM

## 2022-11-21 RX ORDER — PROCHLORPERAZINE 25 MG/1
SUPPOSITORY RECTAL
Qty: 3 EACH | Refills: 11 | Status: SHIPPED | OUTPATIENT
Start: 2022-11-21 | End: 2022-12-16

## 2022-11-21 RX ORDER — PROCHLORPERAZINE 25 MG/1
SUPPOSITORY RECTAL
Qty: 1 EACH | Refills: 0 | Status: SHIPPED | OUTPATIENT
Start: 2022-11-21 | End: 2022-12-16

## 2022-11-21 RX ORDER — PROCHLORPERAZINE 25 MG/1
SUPPOSITORY RECTAL
Qty: 1 EACH | Refills: 3 | Status: SHIPPED | OUTPATIENT
Start: 2022-11-21 | End: 2022-12-16

## 2022-11-21 ASSESSMENT — PATIENT HEALTH QUESTIONNAIRE - PHQ9: SUM OF ALL RESPONSES TO PHQ QUESTIONS 1-9: 4

## 2022-11-21 ASSESSMENT — ANXIETY QUESTIONNAIRES
GAD7 TOTAL SCORE: 5
1. FEELING NERVOUS, ANXIOUS, OR ON EDGE: NOT AT ALL
2. NOT BEING ABLE TO STOP OR CONTROL WORRYING: SEVERAL DAYS
7. FEELING AFRAID AS IF SOMETHING AWFUL MIGHT HAPPEN: SEVERAL DAYS
GAD7 TOTAL SCORE: 5
4. TROUBLE RELAXING: SEVERAL DAYS
IF YOU CHECKED OFF ANY PROBLEMS ON THIS QUESTIONNAIRE, HOW DIFFICULT HAVE THESE PROBLEMS MADE IT FOR YOU TO DO YOUR WORK, TAKE CARE OF THINGS AT HOME, OR GET ALONG WITH OTHER PEOPLE: NOT DIFFICULT AT ALL
5. BEING SO RESTLESS THAT IT IS HARD TO SIT STILL: NOT AT ALL
3. WORRYING TOO MUCH ABOUT DIFFERENT THINGS: SEVERAL DAYS
6. BECOMING EASILY ANNOYED OR IRRITABLE: SEVERAL DAYS

## 2022-11-21 NOTE — PROGRESS NOTES
[facility] pharmacy product not available - suggested [facility] pharmacy   New prescription sent for Dexcom

## 2022-11-29 ENCOUNTER — TRANSFERRED RECORDS (OUTPATIENT)
Dept: HEALTH INFORMATION MANAGEMENT | Facility: CLINIC | Age: 67
End: 2022-11-29

## 2022-12-07 ENCOUNTER — TRANSFERRED RECORDS (OUTPATIENT)
Dept: OTHER | Facility: HOSPITAL | Age: 67
End: 2022-12-07

## 2022-12-07 DIAGNOSIS — Z53.9 ERRONEOUS ENCOUNTER--DISREGARD: Primary | ICD-10-CM

## 2022-12-07 NOTE — ADDENDUM NOTE
Addended by: KVEAN BURGOS on: 12/7/2022 12:20 PM     Modules accepted: Level of Service, SmartSet

## 2022-12-14 DIAGNOSIS — Z79.4 TYPE 2 DIABETES MELLITUS WITH HYPERGLYCEMIA, WITH LONG-TERM CURRENT USE OF INSULIN (H): Primary | ICD-10-CM

## 2022-12-14 DIAGNOSIS — E11.65 TYPE 2 DIABETES MELLITUS WITH HYPERGLYCEMIA, WITH LONG-TERM CURRENT USE OF INSULIN (H): Primary | ICD-10-CM

## 2022-12-16 ENCOUNTER — TELEPHONE (OUTPATIENT)
Dept: EDUCATION SERVICES | Facility: OTHER | Age: 67
End: 2022-12-16

## 2022-12-16 ENCOUNTER — ALLIED HEALTH/NURSE VISIT (OUTPATIENT)
Dept: EDUCATION SERVICES | Facility: OTHER | Age: 67
End: 2022-12-16
Attending: NURSE PRACTITIONER
Payer: COMMERCIAL

## 2022-12-16 VITALS
DIASTOLIC BLOOD PRESSURE: 63 MMHG | BODY MASS INDEX: 46.07 KG/M2 | OXYGEN SATURATION: 95 % | HEART RATE: 74 BPM | WEIGHT: 304 LBS | HEIGHT: 68 IN | RESPIRATION RATE: 18 BRPM | SYSTOLIC BLOOD PRESSURE: 106 MMHG

## 2022-12-16 DIAGNOSIS — Z79.4 CONTROLLED TYPE 2 DIABETES MELLITUS WITH HYPERGLYCEMIA, WITH LONG-TERM CURRENT USE OF INSULIN (H): ICD-10-CM

## 2022-12-16 DIAGNOSIS — E11.65 CONTROLLED TYPE 2 DIABETES MELLITUS WITH HYPERGLYCEMIA, WITH LONG-TERM CURRENT USE OF INSULIN (H): ICD-10-CM

## 2022-12-16 DIAGNOSIS — E11.65 TYPE 2 DIABETES MELLITUS WITH HYPERGLYCEMIA, WITH LONG-TERM CURRENT USE OF INSULIN (H): ICD-10-CM

## 2022-12-16 DIAGNOSIS — Z79.4 TYPE 2 DIABETES MELLITUS WITH HYPERGLYCEMIA, WITH LONG-TERM CURRENT USE OF INSULIN (H): ICD-10-CM

## 2022-12-16 PROCEDURE — 99214 OFFICE O/P EST MOD 30 MIN: CPT | Performed by: NURSE PRACTITIONER

## 2022-12-16 PROCEDURE — G0463 HOSPITAL OUTPT CLINIC VISIT: HCPCS

## 2022-12-16 RX ORDER — PROCHLORPERAZINE 25 MG/1
SUPPOSITORY RECTAL
Qty: 3 EACH | Refills: 11 | Status: ON HOLD | OUTPATIENT
Start: 2022-12-16 | End: 2024-04-29

## 2022-12-16 RX ORDER — PROCHLORPERAZINE 25 MG/1
SUPPOSITORY RECTAL
Qty: 1 EACH | Refills: 3 | Status: ON HOLD | OUTPATIENT
Start: 2022-12-16 | End: 2024-04-29

## 2022-12-16 RX ORDER — PROCHLORPERAZINE 25 MG/1
SUPPOSITORY RECTAL
Qty: 1 EACH | Refills: 0 | Status: ON HOLD | OUTPATIENT
Start: 2022-12-16 | End: 2024-04-29

## 2022-12-16 ASSESSMENT — PAIN SCALES - GENERAL: PAINLEVEL: MODERATE PAIN (4)

## 2022-12-16 NOTE — PATIENT INSTRUCTIONS
Continue working on healthy eating and moving (start low and slow, work up to 30 min, 5x/week)    BG goals:  Fasting and before meals <180, >80  2 hour after eating <180    We only need 1/2 of these numbers to be within target then your A1c will be within target    Practicing health promotion can help improve diabetes (suggested by the ADA, March 2019)   Meditation    Apps: for IPhone and Android    -Calm    -Headspace    -Insight Timer   Yoga   Mindful eating - portion sizing     Medication changes   Continue Lantus 30 unties 2 times a day   Novolog 15 units with meals, hold  if BG <100      Recommendation  -try bike or walking 5-10 minutes after eating  -make sure to eat a protein with any all carb meal or snack   Reordered Dexcom continuous glucose monitor     Follow up   1 month     Call me sooner if any problems/concerns and/or questions develop including consistent low BGs <70 or consistent high BGs >200  [phone] (Unit Coordinator)    [phone] (Nurse)

## 2022-12-16 NOTE — PROGRESS NOTES
"  Assessment & Plan     Controlled type 2 diabetes mellitus with hyperglycemia, with long-term current use of insulin (H)  Try to reorder insulin and CGM today  No change in insulin TIR 79% doing a great job!!!  He has an appointment with nephrology coming up - history of kidney transplant   - insulin aspart (NOVOLOG PEN) 100 UNIT/ML pen; Inject 15 Units Subcutaneous 3 times daily (with meals) Hold if pre-meal glucose <100  - insulin glargine (LANTUS PEN) 100 UNIT/ML pen; Inject 30 Units Subcutaneous 2 times daily    Type 2 diabetes mellitus with hyperglycemia, with long-term current use of insulin (H)    - Continuous Blood Gluc  (DEXCOM G6 ) SANAZ; Use to read blood sugars as per 's instructions.  - Continuous Blood Gluc Sensor (DEXCOM G6 SENSOR) MISC; Change every 10 days.  - Continuous Blood Gluc Transmit (DEXCOM G6 TRANSMITTER) MISC; Change every 3 months.  - Albumin Random Urine Quantitative with Creat Ratio; Future      I spent a total of 38 minutes on the day of the visit.   Time spent doing chart review, history and exam, documentation and further activities per the note       BMI:   Estimated body mass index is 46.91 kg/m  as calculated from the following:    Height as of this encounter: 1.715 m (5' 7.5\").    Weight as of this encounter: 137.9 kg (304 lb).   Weight management plan: Discussed healthy diet and exercise guidelines    Patient Instructions   Continue working on healthy eating and moving (start low and slow, work up to 30 min, 5x/week)    BG goals:  Fasting and before meals <180, >80  2 hour after eating <180    We only need 1/2 of these numbers to be within target then your A1c will be within target    Practicing health promotion can help improve diabetes (suggested by the ADA, March 2019)   Meditation    Apps: for IPhone and Android    -Calm    -Headspace    -Insight Timer   Yoga   Mindful eating - portion sizing     Medication changes   Continue Lantus 30 unties 2 " times a day   Novolog 15 units with meals, hold  if BG <100      Recommendation  -try bike or walking 5-10 minutes after eating  -make sure to eat a protein with any all carb meal or snack   Reordered Dexcom continuous glucose monitor     Follow up   1 month     Call me sooner if any problems/concerns and/or questions develop including consistent low BGs <70 or consistent high BGs >200  [phone] (Unit Coordinator)    [phone] (Nurse)        No follow-ups on file.    SAKINA Morrissey [facility] - KAITLIN Zepeda is a 67 year old, presenting for the following health issues:  Diabetes      HPI     Diabetes Follow-up    How often are you checking your blood sugar? Four or more times daily  Blood sugar testing frequency justification:  Adjustment of medication(s), Risk of hypoglycemia with medication(s) and Patient modifying lifestyle changes (diet, exercise) with blood sugars  What time of day are you checking your blood sugars (select all that apply)?  Before and after meals  Have you had any blood sugars above 200?  Yes after eating   Have you had any blood sugars below 70?  No    What symptoms do you notice when your blood sugar is low?  None    What concerns do you have today about your diabetes? None     Do you have any of these symptoms? (Select all that apply)  Always numb - has had some improvement since starting insulin     He is following with orthopedics for his feet     Period Average (mg/dl): 162  Highest Value (mg/dl): 313  Lowest Value (mg/dl):100  Values Above Goal (180 mg/dl): 8  Values Within Goal ( mg/dl): 38  Values Below Goal (70 mg/dl): 0  Time in range 79%       Diabetes Medications:  1. Lantus 30 units 2 times a day  2. Novolog 15 unit with meals   Severe diarrhea from ozempic - cannot tolerate   ASA use: 81 mg daily   Statin use: Ezetimibe 10 mg daily and crestor 40 mg daily     BP Readings from Last 2 Encounters:   12/16/22 106/63   11/18/22  "(!) 86/45     Hemoglobin A1C (%)   Date Value   11/13/2022 13.1 (H)   05/21/2021 7.2 (H)         Review of Systems   CONSTITUTIONAL:NEGATIVE for fever, chills, change in weight  INTEGUMENTARY/SKIN: NEGATIVE for worrisome rashes, moles or lesions  EYES: NEGATIVE for vision changes or irritation  ENT/MOUTH: NEGATIVE for ear, mouth and throat problems  RESP:chronic cough and sob no change  CV: NEGATIVE for chest pain, palpitations or peripheral edema  GI: NEGATIVE for nausea, abdominal pain, heartburn, or change in bowel habits  : hx kidney transplant, denies dysuria   MUSCULOSKELETAL: arthritis   NEURO: dizziness when getting out of bed at times   ENDOCRINE: Hx diabetes  PSYCHIATRIC: NEGATIVE for changes in mood or affect      Objective    /63   Pulse 74   Resp 18   Ht 1.715 m (5' 7.5\")   Wt 137.9 kg (304 lb)   SpO2 95%   BMI 46.91 kg/m    Body mass index is 46.91 kg/m .  Physical Exam   GENERAL: alert, no distress and obese  NECK: no adenopathy, no asymmetry, masses, or scars and thyroid normal to palpation  RESP: lungs clear to auscultation - no rales, rhonchi or wheezes  CV: regular rate and rhythm, normal S1 S2, no S3 or S4, no murmur, click or rub, no peripheral edema and peripheral pulses strong  ABDOMEN: soft, nontender, no hepatosplenomegaly, no masses and bowel sounds normal  PSYCH: mentation appears normal, affect normal/bright  Diabetic foot exam: normal DP and PT pulses, no trophic changes or ulcerative lesions, no feeling to feet bilaterally with monofilament exam and dry cracking heels    Lab Results   Component Value Date    A1C 13.1 11/13/2022    A1C 7.2 05/21/2021                     "

## 2022-12-16 NOTE — TELEPHONE ENCOUNTER
I called [facility] to find out why the pt has not been able to refill his Novolog Rx, they state PA is needed. I requested they fax PA request. Fax number provided.

## 2022-12-19 DIAGNOSIS — E11.9 DIABETES MELLITUS WITHOUT COMPLICATION (H): ICD-10-CM

## 2022-12-23 ENCOUNTER — TELEPHONE (OUTPATIENT)
Dept: EDUCATION SERVICES | Facility: OTHER | Age: 67
End: 2022-12-23

## 2022-12-23 NOTE — TELEPHONE ENCOUNTER
Fax received from [facility] [employer] PA needed on the pt's Dexcom Rx. PA completed on [employer]. PA denied. Pharmacy and provider notified.

## 2022-12-27 ENCOUNTER — TELEPHONE (OUTPATIENT)
Dept: EDUCATION SERVICES | Facility: HOSPITAL | Age: 67
End: 2022-12-27
Payer: COMMERCIAL

## 2022-12-27 ENCOUNTER — TRANSFERRED RECORDS (OUTPATIENT)
Dept: HEALTH INFORMATION MANAGEMENT | Facility: CLINIC | Age: 67
End: 2022-12-27

## 2022-12-27 DIAGNOSIS — Z53.9 ERRONEOUS ENCOUNTER--DISREGARD: Primary | ICD-10-CM

## 2022-12-27 LAB
ANION GAP SERPL CALC-SCNC: 9 MMOL/L (ref 5–15)
BUN SERPL-MCNC: 52 MG/DL (ref 8–23)
CALCIUM (EXTERNAL): 9.1 MG/DL (ref 8.5–10.5)
CHLORIDE (EXTERNAL): 90 MMOL/L (ref 98–107)
CO2 (EXTERNAL): 38 MMOL/L (ref 23–32)
CREATININE (EXTERNAL): 1.72 MG/DL (ref 0.8–1.5)
GFR ESTIMATED (EXTERNAL): 40 ML/MIN/1.73M2
GFR ESTIMATED (IF AFRICAN AMERICAN) (EXTERNAL): ABNORMAL ML/MIN/1.73M2
GLUCOSE (EXTERNAL): 177 MG/DL (ref 60–99)
POTASSIUM (EXTERNAL): 2.9 MMOL/L (ref 3.5–5.1)
SODIUM (EXTERNAL): 137 MMOL/L (ref 136–145)

## 2022-12-27 NOTE — TELEPHONE ENCOUNTER
I called the pt and asked him if he would like to have this sent to Medicare DME supplier, pt would like this and we will send the order to [facility]. Pt provided with phone number for company and told to call them in 2 weeks to check on his order.

## 2022-12-29 ENCOUNTER — TELEPHONE (OUTPATIENT)
Dept: FAMILY MEDICINE | Facility: OTHER | Age: 67
End: 2022-12-29

## 2022-12-29 NOTE — TELEPHONE ENCOUNTER
1:02 PM    Reason for Call: Phone Call    Description: Pt would like to discuss diabetic medication that isn't covered by insurance. Would also like information ok diabetic schooling for meals. Please call to discuss.    Was an appointment offered for this call? No  If yes : Appointment type              Date    Preferred method for responding to this message: Telephone Call  What is your phone number ? [phone]    If we cannot reach you directly, may we leave a detailed response at the number you provided? Yes    Can this message wait until your PCP/provider returns, if available today? YES    Mounika Santiago

## 2022-12-30 ENCOUNTER — TRANSFERRED RECORDS (OUTPATIENT)
Dept: HEALTH INFORMATION MANAGEMENT | Facility: CLINIC | Age: 67
End: 2022-12-30

## 2023-01-04 ENCOUNTER — TRANSFERRED RECORDS (OUTPATIENT)
Dept: OTHER | Facility: HOSPITAL | Age: 68
End: 2023-01-04

## 2023-01-09 ENCOUNTER — TELEPHONE (OUTPATIENT)
Dept: NUTRITION | Facility: HOSPITAL | Age: 68
End: 2023-01-09

## 2023-01-09 NOTE — TELEPHONE ENCOUNTER
"Attempt # 1  Outcome: Left Message   Comment: Called and LVM for pt to call and schedule a \"DM Dietary Training\" with Rylee Guerrero     "

## 2023-01-10 NOTE — TELEPHONE ENCOUNTER
I called the pt and scheduled him with the dietician. Pt states his medication problems have been fixed. His Novolog was changed to Humalog which is covered by his insurance.

## 2023-01-19 ENCOUNTER — TRANSFERRED RECORDS (OUTPATIENT)
Dept: HEALTH INFORMATION MANAGEMENT | Facility: CLINIC | Age: 68
End: 2023-01-19

## 2023-01-19 ENCOUNTER — HOSPITAL ENCOUNTER (OUTPATIENT)
Dept: EDUCATION SERVICES | Facility: HOSPITAL | Age: 68
Discharge: HOME | End: 2023-01-19
Attending: NURSE PRACTITIONER | Admitting: FAMILY MEDICINE
Payer: COMMERCIAL

## 2023-01-19 VITALS
RESPIRATION RATE: 18 BRPM | HEIGHT: 68 IN | HEART RATE: 67 BPM | OXYGEN SATURATION: 95 % | WEIGHT: 311.3 LBS | BODY MASS INDEX: 47.18 KG/M2 | SYSTOLIC BLOOD PRESSURE: 115 MMHG | DIASTOLIC BLOOD PRESSURE: 67 MMHG

## 2023-01-19 DIAGNOSIS — Z79.4 TYPE 2 DIABETES MELLITUS WITH HYPERGLYCEMIA, WITH LONG-TERM CURRENT USE OF INSULIN (H): Primary | ICD-10-CM

## 2023-01-19 DIAGNOSIS — E11.65 TYPE 2 DIABETES MELLITUS WITH HYPERGLYCEMIA, WITH LONG-TERM CURRENT USE OF INSULIN (H): Primary | ICD-10-CM

## 2023-01-19 LAB
ANION GAP SERPL CALC-SCNC: 9 MMOL/L (ref 5–15)
BUN SERPL-MCNC: 46 MG/DL (ref 8–23)
CALCIUM (EXTERNAL): 9.4 MG/DL (ref 8.5–10.5)
CHLORIDE (EXTERNAL): 95 MMOL/L (ref 98–107)
CO2 (EXTERNAL): 34 MMOL/L (ref 23–32)
CREATININE (EXTERNAL): 1.52 MG/DL (ref 0.8–1.5)
GFR ESTIMATED (EXTERNAL): 46 ML/MIN/1.73M2
GFR ESTIMATED (IF AFRICAN AMERICAN) (EXTERNAL): ABNORMAL ML/MIN/1.73M2
GLUCOSE (EXTERNAL): 138 MG/DL (ref 60–99)
HBA1C MFR BLD: 6.8 %
POTASSIUM (EXTERNAL): 3.7 MMOL/L (ref 3.5–5.1)
SODIUM (EXTERNAL): 138 MMOL/L (ref 136–145)

## 2023-01-19 PROCEDURE — 97802 MEDICAL NUTRITION INDIV IN: CPT | Performed by: DIETITIAN, REGISTERED

## 2023-01-19 ASSESSMENT — PAIN SCALES - GENERAL: PAINLEVEL: MODERATE PAIN (5)

## 2023-01-19 NOTE — PROGRESS NOTES
Diabetes Self-Management Education & Support    Presents for: Individual review    Type of Service: In Person Visit    ASSESSMENT:  Pt is here today for MNT r/t dx of diabetes and CKD.  He has hx of kidney transplant.  Pt recently resumed insulin therapy and is awaiting Dexcom supplies to begin using Dexcom.  Meter download today notes glucose levels in target with two week average of 149.  Pt listened and participated well during nutrition review and education.  He had many questions.      Patient's most recent   Lab Results   Component Value Date    A1C 13.1 11/13/2022    A1C 7.2 05/21/2021     is not meeting goal of <7.5% for age.     Diabetes knowledge and skills assessment:   Patient is knowledgeable in diabetes management concepts related to: Being Active and Monitoring    Education today focused on:   Healthy Eating - Reviewed carbohydrate counting meal plan (60 grams/meal, 15-30 grams/snack), sodium restriction, portion control, label reading, hints for dining out, benefits of exercise in promoting weight loss.     Based on learning assessment above, most appropriate setting for further diabetes education would be: Individual setting.      PLAN  Follow healthy, low carbohydrate meal plan provided.   Call with questions or concerns.    Bring Dexcom to appointment with Lyly Pedroza NP for help with set up if needed.      Follow-up: As needed.    See Care Plan for co-developed, patient-state behavior change goals.  AVS provided for patient today.    Education Materials Provided:  My Food Plan    SUBJECTIVE/OBJECTIVE:  Presents for: Individual review  Accompanied by: Self  Diabetes education in the past 24mo: Yes  Focus of Visit: Healthy Eating  Diabetes type: Type 2  Date of diagnosis: Unknown  Disease course: Improving  How confident are you filling out medical forms by yourself:: Quite a bit  Diabetes management related comments/concerns: Pt would like to lose weight.  Transportation concerns:  "No  Difficulty affording diabetes medication?: No  Difficulty affording diabetes testing supplies?: No  Other concerns:: None  Cultural Influences/Ethnic Background:  Not  or     Diabetes Symptoms & Complications:  Weight trend: Fluctuating (Pt has issues with fluid retention.  Weighs self daily.)  Complications assessed today?: Yes  Heart disease: Yes  Nephropathy: Yes (Pt has hx of kidney transplant.)  Retinopathy: No    Patient Problem List and Family Medical History reviewed for relevant medical history, current medical status, and diabetes risk factors.    Vitals:  /67   Pulse 67   Resp 18   Ht 1.715 m (5' 7.5\")   Wt 141.2 kg (311 lb 4.8 oz)   SpO2 95%   BMI 48.04 kg/m    Estimated body mass index is 48.04 kg/m  as calculated from the following:    Height as of this encounter: 1.715 m (5' 7.5\").    Weight as of this encounter: 141.2 kg (311 lb 4.8 oz).   Last 3 BP:   BP Readings from Last 3 Encounters:   01/19/23 115/67   12/16/22 106/63   11/18/22 (!) 86/45       History   Smoking Status     Former     Types: Cigarettes   Smokeless Tobacco     Never       Labs:  Lab Results   Component Value Date    A1C 13.1 11/13/2022    A1C 7.2 05/21/2021     Lab Results   Component Value Date     11/15/2022     05/27/2021     No results found for: LDL  No results found for: HDL]  GFR Estimate   Date Value Ref Range Status   11/15/2022 49 (L) >60 mL/min/1.73m2 Final     Comment:     Effective December 21, 2021 eGFRcr in adults is calculated using the 2021 CKD-EPI creatinine equation which includes age and gender (Amy durbin al., NEJM, DOI: 10.1056/RZCStw8797840)   05/27/2021 51 (L) >60 mL/min/[1.73_m2] Final     Comment:     Non  GFR Calc  Starting 12/18/2018, serum creatinine based estimated GFR (eGFR) will be   calculated using the Chronic Kidney Disease Epidemiology Collaboration   (CKD-EPI) equation.       GFR Estimate If Black   Date Value Ref Range Status "   05/27/2021 59 (L) >60 mL/min/[1.73_m2] Final     Comment:      GFR Calc  Starting 12/18/2018, serum creatinine based estimated GFR (eGFR) will be   calculated using the Chronic Kidney Disease Epidemiology Collaboration   (CKD-EPI) equation.       Lab Results   Component Value Date    CR 1.55 11/15/2022    CR 1.45 05/27/2021     No results found for: MICROALBUMIN    Healthy Eating:  Healthy Eating Assessed Today: Yes  Cultural/Alevism diet restrictions?: No  Meal planning/habits: Low salt  How many times a week on average do you eat food made away from home (restaurant/take-out)?: 2  Meals include: Breakfast  Breakfast: 2 eggs, sausage or adams - sometimes hashbrowns if dining out  Lunch: May just snack - protein bar, fruit  Dinner: spaghetti or meat and rice - sometimes veggies  Snacks: sugar free popsicles, nuts  Beverages: Water, Tea    Being Active:  Being Active Assessed Today: Yes  Exercise:: Currently not exercising (Pt has an elliptical)  Barrier to exercise: Physical limitation (Does get SOB)    Monitoring:  Monitoring Assessed Today: Yes  Did patient bring glucose meter to appointment? : Yes  Blood Glucose Meter: Accu-chek (Shweta Plus - Pt is planning to transition to Dexcom CGM but has not received supplies yet.)  Times checking blood sugar at home (number): Other (2-3x/day)  Blood glucose trend: Decreasing  Fasting-110, 135, 145, 140, 138, 183, 133, 167, 128, 113, 142, 131, 119, 114  Before supper-139, 178, 174, 93, 115, 139, 185, 117, 136, 146  Bedtime-162, 178, 220, 203, 162, 126, 128, 178  Two week average is 149.     Taking Medications:  Diabetes Medication(s)     Diabetic Other       glucose (BD GLUCOSE) 4 g chewable tablet    Take 1 tablet by mouth every hour as needed for low blood sugar    Insulin       insulin aspart (NOVOLOG PEN) 100 UNIT/ML pen    Inject 15 Units Subcutaneous 3 times daily (with meals) Hold if pre-meal glucose <100     insulin glargine (LANTUS PEN) 100  UNIT/ML pen    Inject 30 Units Subcutaneous 2 times daily          Taking Medication Assessed Today: No     Reducing Risks:  Has dilated eye exam at least once a year?: Yes  Feet checked by healthcare provider in the last year?: Yes    Healthy Coping:  Healthy Coping Assessed Today: Yes  Emotional response to diabetes: Ready to learn  Informal Support system:: Family  Stage of change: ACTION (Actively working towards change)  Patient Activation Measure Survey Score:  No flowsheet data found.    Care Plan and Education Provided:  Care Plan: Diabetes   Updates made by Rylee Guerrero RD since 1/19/2023 12:00 AM      Problem: HbA1C Not In Goal       Goal: Establish Regular Follow-Ups with PCP       Task: Discuss with PCP the recommended timing for patient's next follow up visit(s)    Responsible User: Rylee Guerrero RD      Task: Discuss schedule for PCP visits with patient    Responsible User: Rylee Guerrero RD      Goal: Get HbA1C Level in Goal       Task: Educate patient on diabetes education self-management topics    Responsible User: Rylee Guerrero RD      Task: Educate patient on benefits of regular glucose monitoring    Responsible User: Rylee Guerrero RD      Task: Refer patient to appropriate extended care team member, as needed (Medication Therapy Management, Behavioral Health, Physical Therapy, etc.)    Responsible User: Rylee Guerrero RD      Task: Discuss diabetes treatment plan with patient    Responsible User: Rylee Guerrero RD      Problem: Diabetes Self-Management Education Needed to Optimize Self-Care Behaviors       Goal: Understand diabetes pathophysiology and disease progression       Task: Provide education on diabetes pathophysiology and disease progression specfic to patient's diabetes type    Responsible User: Rylee Guerrero RD      Goal: Healthy Eating - follow a healthy eating pattern for diabetes    Start Date: 1/19/2023   This Visit's Progress: 0%   Note:    Pt will follow meal planning  guidelines in effort to better control glucose levels, fluid status and to lose weight - 1-2# per month.      Task: Provide education on portion control and consistency in amount, composition and timing of food intake Completed 1/19/2023   Responsible User: Rylee Guerrero RD      Task: Provide education on managing carbohydrate intake (carbohydrate counting, plate planning method, etc.) Completed 1/19/2023   Responsible User: Rylee Guerrero RD      Task: Provide education on weight management Completed 1/19/2023   Responsible User: Rylee Guerrero RD      Task: Provide education on heart healthy eating Completed 1/19/2023   Responsible User: Rylee Guerrero RD      Task: Provide education on eating out Completed 1/19/2023   Responsible User: Rylee Guerrero RD      Task: Develop individualized healthy eating plan with patient Completed 1/19/2023   Responsible User: Rylee Guerrero RD      Goal: Being Active - get regular physical activity, working up to at least 150 minutes per week       Task: Provide education on relationship of activity to glucose and precautions to take if at risk for low glucose    Responsible User: Rylee Guerrero RD      Task: Discuss barriers to physical activity with patient    Responsible User: Rylee Guerrero RD      Task: Develop physical activity plan with patient    Responsible User: Rylee Guerrero RD      Task: Explore community resources including walking groups, assistance programs, and home videos    Responsible User: Rylee Guerrero RD      Goal: Monitoring - monitor glucose and ketones as directed       Task: Provide education on blood glucose monitoring (purpose, proper technique, frequency, glucose targets, interpreting results, when to use glucose control solution, sharps disposal)    Responsible User: Rylee Guerrero RD      Task: Provide education on continuous glucose monitoring (sensor placement, use of maribell or /reader, understanding glucose trends, alerts and alarms,  differences between sensor glucose and blood glucose)    Responsible User: Rylee Guerrero RD      Task: Provide education on ketone monitoring (when to monitor, frequency, etc.)    Responsible User: Rylee Guerrero RD      Goal: Taking Medication - patient is consistently taking medications as directed       Task: Provide education on action of prescribed medication, including when to take and possible side effects    Responsible User: Rylee Guerrero RD      Task: Provide education on insulin and injectable diabetes medications, including administration, storage, site selection and rotation for injection sites    Responsible User: Rylee Guerrero RD      Task: Discuss barriers to medication adherence with patient and provide management technique ideas as appropriate    Responsible User: Rylee Guerrero RD      Task: Provide education on frequency and refill details of medications    Responsible User: Rylee Guerrero RD      Goal: Problem Solving - know how to prevent and manage short-term diabetes complications       Task: Provide education on high blood glucose - causes, signs/symptoms, prevention and treatment    Responsible User: Rylee Guerrero RD      Task: Provide education on low blood glucose - causes, signs/symptoms, prevention, treatment, carrying a carbohydrate source at all times, and medical identification    Responsible User: Rylee Guerrero RD      Task: Provide education on safe travel with diabetes    Responsible User: Rylee Guerrero RD      Task: Provide education on how to care for diabetes on sick days    Responsible User: Rylee Guerrero RD      Task: Provide education on when to call a health care provider    Responsible User: Rylee Guerrero RD      Goal: Reducing Risks - know how to prevent and treat long-term diabetes complications       Task: Provide education on major complications of diabetes, prevention, early diagnostic measures and treatment of complications    Responsible User: Rylee Guerrero  CYRIL      Task: Provide education on recommended care for dental, eye and foot health    Responsible User: Rylee Guerrero RD      Task: Provide education on Hemoglobin A1c - goals and relationship to blood glucose levels    Responsible User: Rylee Guerrero RD      Task: Provide education on recommendations for heart health - lipid levels and goals, blood pressure and goals, and aspirin therapy, if indicated    Responsible User: Rylee Guerrero RD      Task: Provide education on tobacco cessation    Responsible User: Rylee Guerrero RD      Goal: Healthy Coping - use available resources to cope with the challenges of managing diabetes       Task: Discuss recognizing feelings about having diabetes    Responsible User: Rylee Guerrero RD      Task: Provide education on the benefits of making appropriate lifestyle changes    Responsible User: Rylee Guerrero RD      Task: Provide education on benefits of utilizing support systems    Responsible User: Rylee Guerrero RD      Task: Discuss methods for coping with stress    Responsible User: Rylee Guerrero RD      Task: Provide education on when to seek professional counseling    Responsible User: Rylee Guerrero RD        Time Spent: 85 minutes  Encounter Type: Individual    Any diabetes medication dose changes were made via the CDE Protocol per the patient's referring provider. A copy of this encounter was shared with the provider.

## 2023-01-19 NOTE — LETTER
1/19/2023        RE: Eduar Isaacs  [address]  [facility] [address]        Diabetes Self-Management Education & Support    Presents for: Individual review    Type of Service: In Person Visit    ASSESSMENT:  Pt is here today for MNT r/t dx of diabetes and CKD.  He has hx of kidney transplant.  Pt recently resumed insulin therapy and is awaiting Dexcom supplies to begin using Dexcom.  Meter download today notes glucose levels in target with two week average of 149.  Pt listened and participated well during nutrition review and education.  He had many questions.      Patient's most recent   Lab Results   Component Value Date    A1C 13.1 11/13/2022    A1C 7.2 05/21/2021     is not meeting goal of <7.5% for age.     Diabetes knowledge and skills assessment:   Patient is knowledgeable in diabetes management concepts related to: Being Active and Monitoring    Education today focused on:   Healthy Eating - Reviewed carbohydrate counting meal plan (60 grams/meal, 15-30 grams/snack), sodium restriction, portion control, label reading, hints for dining out, benefits of exercise in promoting weight loss.     Based on learning assessment above, most appropriate setting for further diabetes education would be: Individual setting.      PLAN  Follow healthy, low carbohydrate meal plan provided.   Call with questions or concerns.    Bring Dexcom to appointment with Lyly Pedroza NP for help with set up if needed.      Follow-up: As needed.    See Care Plan for co-developed, patient-state behavior change goals.  AVS provided for patient today.    Education Materials Provided:  My Food Plan    SUBJECTIVE/OBJECTIVE:  Presents for: Individual review  Accompanied by: Self  Diabetes education in the past 24mo: Yes  Focus of Visit: Healthy Eating  Diabetes type: Type 2  Date of diagnosis: Unknown  Disease course: Improving  How confident are you filling out medical forms by yourself:: Quite a bit  Diabetes management related  "comments/concerns: Pt would like to lose weight.  Transportation concerns: No  Difficulty affording diabetes medication?: No  Difficulty affording diabetes testing supplies?: No  Other concerns:: None  Cultural Influences/Ethnic Background:  Not  or     Diabetes Symptoms & Complications:  Weight trend: Fluctuating (Pt has issues with fluid retention.  Weighs self daily.)  Complications assessed today?: Yes  Heart disease: Yes  Nephropathy: Yes (Pt has hx of kidney transplant.)  Retinopathy: No    Patient Problem List and Family Medical History reviewed for relevant medical history, current medical status, and diabetes risk factors.    Vitals:  /67   Pulse 67   Resp 18   Ht 1.715 m (5' 7.5\")   Wt 141.2 kg (311 lb 4.8 oz)   SpO2 95%   BMI 48.04 kg/m    Estimated body mass index is 48.04 kg/m  as calculated from the following:    Height as of this encounter: 1.715 m (5' 7.5\").    Weight as of this encounter: 141.2 kg (311 lb 4.8 oz).   Last 3 BP:   BP Readings from Last 3 Encounters:   01/19/23 115/67   12/16/22 106/63   11/18/22 (!) 86/45       History   Smoking Status     Former     Types: Cigarettes   Smokeless Tobacco     Never       Labs:  Lab Results   Component Value Date    A1C 13.1 11/13/2022    A1C 7.2 05/21/2021     Lab Results   Component Value Date     11/15/2022     05/27/2021     No results found for: LDL  No results found for: HDL]  GFR Estimate   Date Value Ref Range Status   11/15/2022 49 (L) >60 mL/min/1.73m2 Final     Comment:     Effective December 21, 2021 eGFRcr in adults is calculated using the 2021 CKD-EPI creatinine equation which includes age and gender (Amy durbin al., NEJM, DOI: 10.1056/CEZTwr2145056)   05/27/2021 51 (L) >60 mL/min/[1.73_m2] Final     Comment:     Non  GFR Calc  Starting 12/18/2018, serum creatinine based estimated GFR (eGFR) will be   calculated using the Chronic Kidney Disease Epidemiology Collaboration   (CKD-EPI) " equation.       GFR Estimate If Black   Date Value Ref Range Status   05/27/2021 59 (L) >60 mL/min/[1.73_m2] Final     Comment:      GFR Calc  Starting 12/18/2018, serum creatinine based estimated GFR (eGFR) will be   calculated using the Chronic Kidney Disease Epidemiology Collaboration   (CKD-EPI) equation.       Lab Results   Component Value Date    CR 1.55 11/15/2022    CR 1.45 05/27/2021     No results found for: MICROALBUMIN    Healthy Eating:  Healthy Eating Assessed Today: Yes  Cultural/Oriental orthodox diet restrictions?: No  Meal planning/habits: Low salt  How many times a week on average do you eat food made away from home (restaurant/take-out)?: 2  Meals include: Breakfast  Breakfast: 2 eggs, sausage or adams - sometimes hashbrowns if dining out  Lunch: May just snack - protein bar, fruit  Dinner: spaghetti or meat and rice - sometimes veggies  Snacks: sugar free popsicles, nuts  Beverages: Water, Tea    Being Active:  Being Active Assessed Today: Yes  Exercise:: Currently not exercising (Pt has an elliptical)  Barrier to exercise: Physical limitation (Does get SOB)    Monitoring:  Monitoring Assessed Today: Yes  Did patient bring glucose meter to appointment? : Yes  Blood Glucose Meter: Accu-chek (Shweta Plus - Pt is planning to transition to Dexcom CGM but has not received supplies yet.)  Times checking blood sugar at home (number): Other (2-3x/day)  Blood glucose trend: Decreasing  Fasting-110, 135, 145, 140, 138, 183, 133, 167, 128, 113, 142, 131, 119, 114  Before supper-139, 178, 174, 93, 115, 139, 185, 117, 136, 146  Bedtime-162, 178, 220, 203, 162, 126, 128, 178  Two week average is 149.     Taking Medications:  Diabetes Medication(s)     Diabetic Other       glucose (BD GLUCOSE) 4 g chewable tablet    Take 1 tablet by mouth every hour as needed for low blood sugar    Insulin       insulin aspart (NOVOLOG PEN) 100 UNIT/ML pen    Inject 15 Units Subcutaneous 3 times daily (with meals) Hold  if pre-meal glucose <100     insulin glargine (LANTUS PEN) 100 UNIT/ML pen    Inject 30 Units Subcutaneous 2 times daily          Taking Medication Assessed Today: No     Reducing Risks:  Has dilated eye exam at least once a year?: Yes  Feet checked by healthcare provider in the last year?: Yes    Healthy Coping:  Healthy Coping Assessed Today: Yes  Emotional response to diabetes: Ready to learn  Informal Support system:: Family  Stage of change: ACTION (Actively working towards change)  Patient Activation Measure Survey Score:  No flowsheet data found.    Care Plan and Education Provided:  Care Plan: Diabetes   Updates made by Rylee Guerrero RD since 1/19/2023 12:00 AM      Problem: HbA1C Not In Goal       Goal: Establish Regular Follow-Ups with PCP       Task: Discuss with PCP the recommended timing for patient's next follow up visit(s)    Responsible User: Rylee Guerrero RD      Task: Discuss schedule for PCP visits with patient    Responsible User: Rylee Guerrero RD      Goal: Get HbA1C Level in Goal       Task: Educate patient on diabetes education self-management topics    Responsible User: Rylee Guerrero RD      Task: Educate patient on benefits of regular glucose monitoring    Responsible User: Rylee Guerrero RD      Task: Refer patient to appropriate extended care team member, as needed (Medication Therapy Management, Behavioral Health, Physical Therapy, etc.)    Responsible User: Rylee Guerrero RD      Task: Discuss diabetes treatment plan with patient    Responsible User: Rylee Guerrero RD      Problem: Diabetes Self-Management Education Needed to Optimize Self-Care Behaviors       Goal: Understand diabetes pathophysiology and disease progression       Task: Provide education on diabetes pathophysiology and disease progression specfic to patient's diabetes type    Responsible User: Rylee Guerrero RD      Goal: Healthy Eating - follow a healthy eating pattern for diabetes    Start Date: 1/19/2023   This  Visit's Progress: 0%   Note:    Pt will follow meal planning guidelines in effort to better control glucose levels, fluid status and to lose weight - 1-2# per month.      Task: Provide education on portion control and consistency in amount, composition and timing of food intake Completed 1/19/2023   Responsible User: Rylee Guerrero RD      Task: Provide education on managing carbohydrate intake (carbohydrate counting, plate planning method, etc.) Completed 1/19/2023   Responsible User: Rylee Guerrero RD      Task: Provide education on weight management Completed 1/19/2023   Responsible User: Rylee Guerrero RD      Task: Provide education on heart healthy eating Completed 1/19/2023   Responsible User: Rylee Guerrero RD      Task: Provide education on eating out Completed 1/19/2023   Responsible User: Rylee Guerrero RD      Task: Develop individualized healthy eating plan with patient Completed 1/19/2023   Responsible User: Rylee Guerrero RD      Goal: Being Active - get regular physical activity, working up to at least 150 minutes per week       Task: Provide education on relationship of activity to glucose and precautions to take if at risk for low glucose    Responsible User: Rylee Guerrero RD      Task: Discuss barriers to physical activity with patient    Responsible User: Rylee Guerrero RD      Task: Develop physical activity plan with patient    Responsible User: Rylee Guerrero RD      Task: Explore community resources including walking groups, assistance programs, and home videos    Responsible User: Rylee Guerrero RD      Goal: Monitoring - monitor glucose and ketones as directed       Task: Provide education on blood glucose monitoring (purpose, proper technique, frequency, glucose targets, interpreting results, when to use glucose control solution, sharps disposal)    Responsible User: Rylee Guerrero RD      Task: Provide education on continuous glucose monitoring (sensor placement, use of maribell or  /reader, understanding glucose trends, alerts and alarms, differences between sensor glucose and blood glucose)    Responsible User: Rylee Guerrero RD      Task: Provide education on ketone monitoring (when to monitor, frequency, etc.)    Responsible User: Rylee Guerrero RD      Goal: Taking Medication - patient is consistently taking medications as directed       Task: Provide education on action of prescribed medication, including when to take and possible side effects    Responsible User: Rylee Guerrero RD      Task: Provide education on insulin and injectable diabetes medications, including administration, storage, site selection and rotation for injection sites    Responsible User: Rylee Guerrero RD      Task: Discuss barriers to medication adherence with patient and provide management technique ideas as appropriate    Responsible User: Rylee Guerrero RD      Task: Provide education on frequency and refill details of medications    Responsible User: Rylee Guerrero RD      Goal: Problem Solving - know how to prevent and manage short-term diabetes complications       Task: Provide education on high blood glucose - causes, signs/symptoms, prevention and treatment    Responsible User: Rylee Guerrero RD      Task: Provide education on low blood glucose - causes, signs/symptoms, prevention, treatment, carrying a carbohydrate source at all times, and medical identification    Responsible User: Rylee Guerrero RD      Task: Provide education on safe travel with diabetes    Responsible User: Rylee Guerrero RD      Task: Provide education on how to care for diabetes on sick days    Responsible User: Rylee Guerrero RD      Task: Provide education on when to call a health care provider    Responsible User: Rylee Guerrero RD      Goal: Reducing Risks - know how to prevent and treat long-term diabetes complications       Task: Provide education on major complications of diabetes, prevention, early diagnostic measures  and treatment of complications    Responsible User: Rylee Guerrero RD      Task: Provide education on recommended care for dental, eye and foot health    Responsible User: Rylee Guerrero RD      Task: Provide education on Hemoglobin A1c - goals and relationship to blood glucose levels    Responsible User: Rylee Guerrero RD      Task: Provide education on recommendations for heart health - lipid levels and goals, blood pressure and goals, and aspirin therapy, if indicated    Responsible User: Rylee Guerrero RD      Task: Provide education on tobacco cessation    Responsible User: Rylee Guerrero RD      Goal: Healthy Coping - use available resources to cope with the challenges of managing diabetes       Task: Discuss recognizing feelings about having diabetes    Responsible User: Rylee Guerrero RD      Task: Provide education on the benefits of making appropriate lifestyle changes    Responsible User: Rylee Guerrero RD      Task: Provide education on benefits of utilizing support systems    Responsible User: Rylee Guererro RD      Task: Discuss methods for coping with stress    Responsible User: Rylee Guerrero RD      Task: Provide education on when to seek professional counseling    Responsible User: Rylee Guerrero RD        Time Spent: 85 minutes  Encounter Type: Individual    Any diabetes medication dose changes were made via the CDE Protocol per the patient's referring provider. A copy of this encounter was shared with the provider.        Sincerely,        Rylee Guerrero RD

## 2023-01-27 ENCOUNTER — ALLIED HEALTH/NURSE VISIT (OUTPATIENT)
Dept: EDUCATION SERVICES | Facility: OTHER | Age: 68
End: 2023-01-27
Attending: NURSE PRACTITIONER
Payer: COMMERCIAL

## 2023-01-27 VITALS
BODY MASS INDEX: 45.77 KG/M2 | WEIGHT: 302 LBS | DIASTOLIC BLOOD PRESSURE: 62 MMHG | SYSTOLIC BLOOD PRESSURE: 122 MMHG | RESPIRATION RATE: 18 BRPM | OXYGEN SATURATION: 96 % | HEIGHT: 68 IN | HEART RATE: 67 BPM

## 2023-01-27 DIAGNOSIS — Z79.4 TYPE 2 DIABETES MELLITUS WITH HYPERGLYCEMIA, WITH LONG-TERM CURRENT USE OF INSULIN (H): ICD-10-CM

## 2023-01-27 DIAGNOSIS — E11.65 TYPE 2 DIABETES MELLITUS WITH HYPERGLYCEMIA, WITH LONG-TERM CURRENT USE OF INSULIN (H): ICD-10-CM

## 2023-01-27 PROCEDURE — 99215 OFFICE O/P EST HI 40 MIN: CPT | Performed by: NURSE PRACTITIONER

## 2023-01-27 PROCEDURE — G0463 HOSPITAL OUTPT CLINIC VISIT: HCPCS

## 2023-01-27 ASSESSMENT — PAIN SCALES - GENERAL: PAINLEVEL: MODERATE PAIN (5)

## 2023-01-27 NOTE — PROGRESS NOTES
Assessment & Plan     Type 2 diabetes mellitus with hyperglycemia, with long-term current use of insulin (H)  Reviewed A1c from [facility] on 1/19/23  6.8  He is doing a great job!!!  Assisted Duane in placing and starting his Dexcom today.  Plan to follow up in a month to make sure he is doing well with his Dexcom.  He does have the number for Dexcom if he has any question.        Review of external notes as documented elsewhere in note  I spent a total of 40 minutes on the day of the visit.   Time spent doing chart review, history and exam, documentation and further activities per the note       Patient Instructions   Continue working on healthy eating and moving (start low and slow, work up to 30 min, 5x/week)    BG goals:  Fasting and before meals <180, >80  2 hour after eating <180    We only need 1/2 of these numbers to be within target then your A1c will be within target    Practicing health promotion can help improve diabetes (suggested by the ADA, March 2019)   Meditation    Apps: for IPhone and Android    -Calm    -Headspace    -Insight Timer   Yoga   Mindful eating - portion sizing     Medication changes   Continue Lantus 30 unties 2 times a day   Novolog 15 units with meals, hold  if BG <100    Do not give Novolog within 4 hours of last dose   Novolog and lantus can be taken at the same time       Recommendation  -try bike or walking 5-10 minutes after eating  -make sure to eat a protein with any all carb meal or snack     Set up Dexcom today     Follow up   1 month     Call me sooner if any problems/concerns and/or questions develop including consistent low BGs <70 or consistent high BGs >200  [phone] (Unit Coordinator)    [phone] (Nurse)- [facility] diabetic nurse - call with any question         No follow-ups on file.    SAKINA Morrissey [facility] [place]    Subjective   Duane is a 67 year old, presenting for the following health issues:  Diabetes      HPI      Diabetes Follow-up       How often are you checking your blood sugar? 2-3 times a day     Blood sugar testing frequency justification:  Adjustment of medication(s), Risk of hypoglycemia with medication(s) and Patient modifying lifestyle changes (diet, exercise) with blood sugars    What time of day are you checking your blood sugars (select all that apply)?  Before and after meals    Have you had any blood sugars above 200?  Yes after eating     Have you had any blood sugars below 70?  No    What symptoms do you notice when your blood sugar is low?  None    What concerns do you have today about your diabetes? None     Do you have any of these symptoms? (Select all that apply)  Always numb - has had some improvement since starting insulin     He is following with orthopedics for his feet   Brought in Dexcom to have assistance placing for the first time   Assisted to set up dexcom today      Period Average (mg/dl): 143  Highest Value (mg/dl): 245  Lowest Value (mg/dl):111  Values Above Goal (180 mg/dl): 4  Values Within Goal ( mg/dl): 28  Values Below Goal (70 mg/dl): 0  Time in range 87%         Diabetes Medications:  1. Lantus 30 units 2 times a day  2. Novolog 15 unit with meals   Severe diarrhea from ozempic - cannot tolerate   ASA use: 81 mg daily   Statin use: Ezetimibe 10 mg daily and crestor 40 mg daily     Review of Systems   CONSTITUTIONAL: NEGATIVE for fever, chills, change in weight  INTEGUMENTARY/SKIN: dry skin   EYES: NEGATIVE for vision changes or irritation  ENT/MOUTH: NEGATIVE for ear, mouth and throat problems  RESP: NEGATIVE for significant cough or SOB  CV: NEGATIVE for chest pain, palpitations or peripheral edema  GI: NEGATIVE for nausea, abdominal pain, heartburn, or change in bowel habits  : negative for dysuria, hematuria, decreased urinary stream  MUSCULOSKELETAL: chronic pain   NEURO: intermittent dizziness   ENDOCRINE: Hx diabetes  PSYCHIATRIC: NEGATIVE for changes in mood or  "affect      Objective    /62   Pulse 67   Resp 18   Ht 1.715 m (5' 7.5\")   Wt 137 kg (302 lb)   SpO2 96%   BMI 46.60 kg/m    Body mass index is 46.6 kg/m .  Physical Exam   GENERAL: alert, no distress and obese  NECK: no adenopathy, no asymmetry, masses, or scars and thyroid normal to palpation  RESP: lungs clear to auscultation - no rales, rhonchi or wheezes  CV: regular rate and rhythm, normal S1 S2, no S3 or S4, no murmur, click or rub, no peripheral edema and peripheral pulses strong  ABDOMEN: soft, nontender, no hepatosplenomegaly, no masses and bowel sounds normal  PSYCH: mentation appears normal, affect normal/bright    Reviewed labs from [facility]                 "

## 2023-01-27 NOTE — PATIENT INSTRUCTIONS
Continue working on healthy eating and moving (start low and slow, work up to 30 min, 5x/week)    BG goals:  Fasting and before meals <180, >80  2 hour after eating <180    We only need 1/2 of these numbers to be within target then your A1c will be within target    Practicing health promotion can help improve diabetes (suggested by the ADA, March 2019)   Meditation    Apps: for IPhone and Android    -Calm    -Headspace    -Insight Timer   Yoga   Mindful eating - portion sizing     Medication changes   Continue Lantus 30 unties 2 times a day   Novolog 15 units with meals, hold  if BG <100    Do not give Novolog within 4 hours of last dose   Novolog and lantus can be taken at the same time       Recommendation  -try bike or walking 5-10 minutes after eating  -make sure to eat a protein with any all carb meal or snack     Set up Dexcom today     Follow up   1 month     Call me sooner if any problems/concerns and/or questions develop including consistent low BGs <70 or consistent high BGs >200  [phone] (Unit Coordinator)    [phone] (Nurse)- [facility] diabetic nurse - call with any question

## 2023-02-14 ENCOUNTER — TRANSFERRED RECORDS (OUTPATIENT)
Dept: OTHER | Facility: HOSPITAL | Age: 68
End: 2023-02-14

## 2023-02-24 ENCOUNTER — ALLIED HEALTH/NURSE VISIT (OUTPATIENT)
Dept: EDUCATION SERVICES | Facility: OTHER | Age: 68
End: 2023-02-24
Attending: NURSE PRACTITIONER
Payer: COMMERCIAL

## 2023-02-24 VITALS
BODY MASS INDEX: 47.74 KG/M2 | RESPIRATION RATE: 20 BRPM | WEIGHT: 315 LBS | HEIGHT: 68 IN | OXYGEN SATURATION: 96 % | HEART RATE: 70 BPM | DIASTOLIC BLOOD PRESSURE: 80 MMHG | SYSTOLIC BLOOD PRESSURE: 117 MMHG

## 2023-02-24 DIAGNOSIS — Z79.4 TYPE 2 DIABETES MELLITUS WITH HYPERGLYCEMIA, WITH LONG-TERM CURRENT USE OF INSULIN (H): Primary | ICD-10-CM

## 2023-02-24 DIAGNOSIS — E11.65 TYPE 2 DIABETES MELLITUS WITH HYPERGLYCEMIA, WITH LONG-TERM CURRENT USE OF INSULIN (H): Primary | ICD-10-CM

## 2023-02-24 PROCEDURE — 99214 OFFICE O/P EST MOD 30 MIN: CPT | Performed by: NURSE PRACTITIONER

## 2023-02-24 PROCEDURE — G0463 HOSPITAL OUTPT CLINIC VISIT: HCPCS

## 2023-02-24 ASSESSMENT — PAIN SCALES - GENERAL: PAINLEVEL: MODERATE PAIN (5)

## 2023-02-24 NOTE — PATIENT INSTRUCTIONS
Continue working on healthy eating and moving (start low and slow, work up to 30 min, 5x/week)    BG goals:  Fasting and before meals <180, >80  2 hour after eating <180    We only need 1/2 of these numbers to be within target then your A1c will be within target    Practicing health promotion can help improve diabetes (suggested by the ADA, March 2019)   Meditation    Apps: for IPhone and Android    -Calm    -Headspace    -Insight Timer   Yoga   Mindful eating - portion sizing     Medication changes   Continue Lantus 30 unties 2 times a day   Novolog 15 units with meals, hold  if BG <100    Do not give Novolog within 4 hours of last dose   Novolog and lantus can be taken at the same time       Recommendation  -try bike or walking 5-10 minutes after eating  -make sure to eat a protein with any all carb meal or snack     Reviewed Dexcom Average 178 with over 50% of the time in range (this is our goal at this time)    Follow up   1 month     Call me sooner if any problems/concerns and/or questions develop including consistent low BGs <70 or consistent high BGs >200  [phone] (Unit Coordinator)    [phone] (Nurse)- [facility] diabetic nurse - call with any question

## 2023-02-24 NOTE — PROGRESS NOTES
Assessment & Plan     Type 2 diabetes mellitus with hyperglycemia, with long-term current use of insulin (H)  Reviewed Dexcom today.  No change in insulin today.  Follow up in 3 months or as needed  Duane is concerned waiting 3 months he may not be as active or eat as well.  He can follow up sooner if any concerns.        I spent a total of 30 minutes on the day of the visit.   Time spent doing chart review, history and exam, documentation and further activities per the note       Patient Instructions   Continue working on healthy eating and moving (start low and slow, work up to 30 min, 5x/week)    BG goals:  Fasting and before meals <180, >80  2 hour after eating <180    We only need 1/2 of these numbers to be within target then your A1c will be within target    Practicing health promotion can help improve diabetes (suggested by the ADA, March 2019)   Meditation    Apps: for IPhone and [employer]    -Calm    -Headspace    -Insight Timer   Yoga   Mindful eating - portion sizing     Medication changes   Continue Lantus 30 unties 2 times a day   Novolog 15 units with meals, hold  if BG <100    Do not give Novolog within 4 hours of last dose   Novolog and lantus can be taken at the same time       Recommendation  -try bike or walking 5-10 minutes after eating  -make sure to eat a protein with any all carb meal or snack     Reviewed Dexcom Average 178 with over 50% of the time in range (this is our goal at this time)    Follow up   1 month     Call me sooner if any problems/concerns and/or questions develop including consistent low BGs <70 or consistent high BGs >200  [phone] (Unit Coordinator)    [phone] (Nurse)- MYKEL diabetic nurse - call with any question         No follow-ups on file.    SAKINA [facility] - KAITLIN Larkin   Duane is a 67 year old, presenting for the following health issues:  Diabetes      HPI     Diabetes Follow-up    How often are you checking your  "blood sugar? Continuous glucose monitor -Dexcom  Glucose Management indicator: N/A  Ave: 178+/-49  Time in Range:  >180 41%  : 57%  <80:  2%    What time of day are you checking your blood sugars (select all that apply)?  Before and after meals  Have you had any blood sugars above 200?  Yes   Have you had any blood sugars below 70?  Yes     What symptoms do you notice when your blood sugar is low?  None and but the meter does alarms     What concerns do you have today about your diabetes? None     Do you have any of these symptoms? (Select all that apply)  Numbness in feet, Burning in feet and Weight gain     Concerns for recent chest/shoulder pain - he did get a new CPAP and snow blowing this week. He does have an appointment with cardiology today     Diabetes Medications:  1. Lantus 30 units 2 times a day  2. Novolog 15 unit with meals   Severe diarrhea from ozempic - cannot tolerate   ASA use: 81 mg daily   Statin use: Ezetimibe 10 mg daily and crestor 40 mg daily    BP Readings from Last 2 Encounters:   02/24/23 117/80   01/27/23 122/62     Hemoglobin A1C (%)   Date Value   11/13/2022 13.1 (H)   05/21/2021 7.2 (H)           Review of Systems   CONSTITUTIONAL:low grade temp a couple nights ago - then return to normal   INTEGUMENTARY/SKIN: NEGATIVE for worrisome rashes, moles or lesions  EYES: NEGATIVE for vision changes or irritation  ENT/MOUTH: NEGATIVE for ear, mouth and throat problems  RESP:chronic SOB - was worse a couple days ago   CV: chest tenderness with palpation   GI: did have some mild abdominal tenderness   : negative for dysuria, hematuria, decreased urinary stream  MUSCULOSKELETAL: chest wall   NEURO: NEGATIVE for weakness, dizziness or paresthesias  ENDOCRINE: Hx diabetes  PSYCHIATRIC: NEGATIVE for changes in mood or affect      Objective    /80   Pulse 70   Resp 20   Ht 1.715 m (5' 7.5\")   Wt 143.2 kg (315 lb 9.6 oz)   SpO2 96%   BMI 48.70 kg/m    Body mass index is 48.7 " kg/m .  Physical Exam   GENERAL: alert and obese  NECK: no adenopathy, no asymmetry, masses, or scars and thyroid normal to palpation  RESP: lungs clear to auscultation - no rales, rhonchi or wheezes  CV: regular rate and rhythm, normal S1 S2, no S3 or S4, no murmur, click or rub, no peripheral edema and peripheral pulses strong  ABDOMEN: soft, nontender, no hepatosplenomegaly, no masses and bowel sounds normal  PSYCH: mentation appears normal, affect normal/bright    A1c 6.8 on 1/19/23 from [facility]'s   Lab Results   Component Value Date    A1C 13.1 11/13/2022    A1C 7.2 05/21/2021

## 2023-03-15 LAB
ALBUMIN (URINE) MG/SPEC: 406 MG/L
ALBUMIN/CREATININE RATIO: 1515 MG/GM (ref 0–30)
ANION GAP SERPL CALC-SCNC: 8 MMOL/L (ref 5–15)
BUN SERPL-MCNC: 35 MG/DL (ref 8–23)
CALCIUM (EXTERNAL): 9.7 MG/DL (ref 8.5–10.5)
CHLORIDE (EXTERNAL): 96 MMOL/L (ref 98–107)
CO2 (EXTERNAL): 34 MMOL/L (ref 23–32)
CREATININE (EXTERNAL): 1.71 MG/DL (ref 0.8–1.5)
CREATININE (URINE): 26.8 MG/DL
GFR ESTIMATED (EXTERNAL): 44 ML/MIN/1.73M2
GFR ESTIMATED (IF AFRICAN AMERICAN) (EXTERNAL): ABNORMAL ML/MIN/1.73M2
GLUCOSE (EXTERNAL): 133 MG/DL (ref 60–99)
POTASSIUM (EXTERNAL): 3.7 MMOL/L (ref 3.5–5.1)
SODIUM (EXTERNAL): 138 MMOL/L (ref 136–145)

## 2023-03-16 ENCOUNTER — TRANSFERRED RECORDS (OUTPATIENT)
Dept: HEALTH INFORMATION MANAGEMENT | Facility: CLINIC | Age: 68
End: 2023-03-16

## 2023-04-12 ENCOUNTER — TRANSFERRED RECORDS (OUTPATIENT)
Dept: HEALTH INFORMATION MANAGEMENT | Facility: CLINIC | Age: 68
End: 2023-04-12

## 2023-04-12 LAB
ANION GAP SERPL CALC-SCNC: 9 MMOL/L (ref 5–15)
BUN SERPL-MCNC: 64 MG/DL (ref 8–23)
CALCIUM (EXTERNAL): 9 MG/DL (ref 8.5–10.5)
CHLORIDE (EXTERNAL): 98 MMOL/L (ref 98–107)
CO2 (EXTERNAL): 28 MMOL/L (ref 23–32)
CREATININE (EXTERNAL): 2.21 MG/DL (ref 0.8–1.5)
GFR ESTIMATED (EXTERNAL): 32 ML/MIN/1.73M2
GFR ESTIMATED (IF AFRICAN AMERICAN) (EXTERNAL): ABNORMAL ML/MIN/1.73M2
GLUCOSE (EXTERNAL): 218 MG/DL (ref 60–99)
POTASSIUM (EXTERNAL): 3.4 MMOL/L (ref 3.5–5.1)
SODIUM (EXTERNAL): 135 MMOL/L (ref 136–145)
TSH SERPL-ACNC: 5.08 UIU/ML (ref 0.35–4.8)

## 2023-05-10 ENCOUNTER — TRANSFERRED RECORDS (OUTPATIENT)
Dept: HEALTH INFORMATION MANAGEMENT | Facility: CLINIC | Age: 68
End: 2023-05-10

## 2023-05-31 ENCOUNTER — HOSPITAL ENCOUNTER (INPATIENT)
Facility: HOSPITAL | Age: 68
LOS: 1 days | Discharge: ANOTHER HOSPITAL | DRG: 871 | End: 2023-06-01
Attending: EMERGENCY MEDICINE | Admitting: INTERNAL MEDICINE
Payer: COMMERCIAL

## 2023-05-31 ENCOUNTER — APPOINTMENT (OUTPATIENT)
Dept: GENERAL RADIOLOGY | Facility: HOSPITAL | Age: 68
DRG: 871 | End: 2023-05-31
Attending: INTERNAL MEDICINE
Payer: COMMERCIAL

## 2023-05-31 DIAGNOSIS — Z79.52 IMMUNOCOMPROMISED DUE TO CORTICOSTEROIDS (H): ICD-10-CM

## 2023-05-31 DIAGNOSIS — N17.9 ACUTE RENAL FAILURE, UNSPECIFIED ACUTE RENAL FAILURE TYPE (H): ICD-10-CM

## 2023-05-31 DIAGNOSIS — Z88.9 H/O MULTIPLE ALLERGIES: ICD-10-CM

## 2023-05-31 DIAGNOSIS — D84.821 IMMUNOCOMPROMISED DUE TO CORTICOSTEROIDS (H): ICD-10-CM

## 2023-05-31 DIAGNOSIS — T38.0X5A IMMUNOCOMPROMISED DUE TO CORTICOSTEROIDS (H): ICD-10-CM

## 2023-05-31 DIAGNOSIS — A41.9 SEPSIS, DUE TO UNSPECIFIED ORGANISM, UNSPECIFIED WHETHER ACUTE ORGAN DYSFUNCTION PRESENT (H): ICD-10-CM

## 2023-05-31 LAB
ALBUMIN SERPL BCG-MCNC: 3.3 G/DL (ref 3.5–5.2)
ALBUMIN UR-MCNC: 300 MG/DL
ALP SERPL-CCNC: 58 U/L (ref 40–129)
ALT SERPL W P-5'-P-CCNC: 16 U/L (ref 10–50)
ANION GAP SERPL CALCULATED.3IONS-SCNC: 18 MMOL/L (ref 7–15)
ANION GAP SERPL CALCULATED.3IONS-SCNC: 18 MMOL/L (ref 7–15)
ANION GAP SERPL CALCULATED.3IONS-SCNC: 21 MMOL/L (ref 7–15)
APPEARANCE UR: ABNORMAL
AST SERPL W P-5'-P-CCNC: 24 U/L (ref 10–50)
BASOPHILS # BLD AUTO: 0.1 10E3/UL (ref 0–0.2)
BASOPHILS NFR BLD AUTO: 0 %
BILIRUB SERPL-MCNC: 0.7 MG/DL
BILIRUB UR QL STRIP: NEGATIVE
BUN SERPL-MCNC: 84.3 MG/DL (ref 8–23)
BUN SERPL-MCNC: 86.9 MG/DL (ref 8–23)
BUN SERPL-MCNC: 87.8 MG/DL (ref 8–23)
CALCIUM SERPL-MCNC: 7.5 MG/DL (ref 8.8–10.2)
CALCIUM SERPL-MCNC: 7.8 MG/DL (ref 8.8–10.2)
CALCIUM SERPL-MCNC: 8.2 MG/DL (ref 8.8–10.2)
CHLORIDE SERPL-SCNC: 88 MMOL/L (ref 98–107)
CHLORIDE SERPL-SCNC: 89 MMOL/L (ref 98–107)
CHLORIDE SERPL-SCNC: 90 MMOL/L (ref 98–107)
COLOR UR AUTO: YELLOW
CREAT SERPL-MCNC: 2.49 MG/DL (ref 0.67–1.17)
CREAT SERPL-MCNC: 2.59 MG/DL (ref 0.67–1.17)
CREAT SERPL-MCNC: 2.87 MG/DL (ref 0.67–1.17)
DEPRECATED HCO3 PLAS-SCNC: 17 MMOL/L (ref 22–29)
DEPRECATED HCO3 PLAS-SCNC: 19 MMOL/L (ref 22–29)
DEPRECATED HCO3 PLAS-SCNC: 23 MMOL/L (ref 22–29)
EOSINOPHIL # BLD AUTO: 0 10E3/UL (ref 0–0.7)
EOSINOPHIL NFR BLD AUTO: 0 %
ERYTHROCYTE [DISTWIDTH] IN BLOOD BY AUTOMATED COUNT: 16 % (ref 10–15)
GFR SERPL CREATININE-BSD FRML MDRD: 23 ML/MIN/1.73M2
GFR SERPL CREATININE-BSD FRML MDRD: 26 ML/MIN/1.73M2
GFR SERPL CREATININE-BSD FRML MDRD: 28 ML/MIN/1.73M2
GLUCOSE BLDC GLUCOMTR-MCNC: 120 MG/DL (ref 70–99)
GLUCOSE BLDC GLUCOMTR-MCNC: 157 MG/DL (ref 70–99)
GLUCOSE BLDC GLUCOMTR-MCNC: 169 MG/DL (ref 70–99)
GLUCOSE BLDC GLUCOMTR-MCNC: 176 MG/DL (ref 70–99)
GLUCOSE BLDC GLUCOMTR-MCNC: 200 MG/DL (ref 70–99)
GLUCOSE BLDC GLUCOMTR-MCNC: 214 MG/DL (ref 70–99)
GLUCOSE SERPL-MCNC: 133 MG/DL (ref 70–99)
GLUCOSE SERPL-MCNC: 178 MG/DL (ref 70–99)
GLUCOSE SERPL-MCNC: 194 MG/DL (ref 70–99)
GLUCOSE UR STRIP-MCNC: NEGATIVE MG/DL
HCT VFR BLD AUTO: 37.6 % (ref 40–53)
HGB BLD-MCNC: 12.7 G/DL (ref 13.3–17.7)
HGB UR QL STRIP: ABNORMAL
HOLD SPECIMEN: NORMAL
IMM GRANULOCYTES # BLD: 0.4 10E3/UL
IMM GRANULOCYTES NFR BLD: 2 %
KETONES UR STRIP-MCNC: NEGATIVE MG/DL
LACTATE SERPL-SCNC: 1.7 MMOL/L (ref 0.7–2)
LACTATE SERPL-SCNC: 2.7 MMOL/L (ref 0.7–2)
LEUKOCYTE ESTERASE UR QL STRIP: ABNORMAL
LYMPHOCYTES # BLD AUTO: 0.8 10E3/UL (ref 0.8–5.3)
LYMPHOCYTES NFR BLD AUTO: 4 %
MAGNESIUM SERPL-MCNC: 1.2 MG/DL (ref 1.7–2.3)
MCH RBC QN AUTO: 28.8 PG (ref 26.5–33)
MCHC RBC AUTO-ENTMCNC: 33.8 G/DL (ref 31.5–36.5)
MCV RBC AUTO: 85 FL (ref 78–100)
MONOCYTES # BLD AUTO: 1.4 10E3/UL (ref 0–1.3)
MONOCYTES NFR BLD AUTO: 7 %
NEUTROPHILS # BLD AUTO: 19.6 10E3/UL (ref 1.6–8.3)
NEUTROPHILS NFR BLD AUTO: 87 %
NITRATE UR QL: NEGATIVE
NRBC # BLD AUTO: 0 10E3/UL
NRBC BLD AUTO-RTO: 0 /100
NT-PROBNP SERPL-MCNC: 3665 PG/ML (ref 0–900)
OSMOLALITY SERPL: 300 MMOL/KG (ref 280–301)
PH UR STRIP: 6 [PH] (ref 4.7–8)
PHOSPHATE SERPL-MCNC: 1.4 MG/DL (ref 2.5–4.5)
PLATELET # BLD AUTO: 138 10E3/UL (ref 150–450)
POTASSIUM SERPL-SCNC: 2.8 MMOL/L (ref 3.4–5.3)
POTASSIUM SERPL-SCNC: 2.8 MMOL/L (ref 3.4–5.3)
POTASSIUM SERPL-SCNC: 3.1 MMOL/L (ref 3.4–5.3)
PROCALCITONIN SERPL IA-MCNC: 76.34 NG/ML
PROT SERPL-MCNC: 6 G/DL (ref 6.4–8.3)
RBC # BLD AUTO: 4.41 10E6/UL (ref 4.4–5.9)
RBC URINE: 48 /HPF
RENAL TUB EPI: 1 /HPF
SODIUM SERPL-SCNC: 127 MMOL/L (ref 136–145)
SODIUM SERPL-SCNC: 127 MMOL/L (ref 136–145)
SODIUM SERPL-SCNC: 129 MMOL/L (ref 136–145)
SP GR UR STRIP: 1.01 (ref 1–1.03)
SQUAMOUS EPITHELIAL: 3 /HPF
UROBILINOGEN UR STRIP-MCNC: NORMAL MG/DL
WBC # BLD AUTO: 22.3 10E3/UL (ref 4–11)
WBC CLUMPS #/AREA URNS HPF: PRESENT /HPF
WBC URINE: >182 /HPF

## 2023-05-31 PROCEDURE — 96361 HYDRATE IV INFUSION ADD-ON: CPT

## 2023-05-31 PROCEDURE — 36415 COLL VENOUS BLD VENIPUNCTURE: CPT | Performed by: EMERGENCY MEDICINE

## 2023-05-31 PROCEDURE — 250N000013 HC RX MED GY IP 250 OP 250 PS 637: Performed by: INTERNAL MEDICINE

## 2023-05-31 PROCEDURE — 84145 PROCALCITONIN (PCT): CPT | Performed by: INTERNAL MEDICINE

## 2023-05-31 PROCEDURE — 83930 ASSAY OF BLOOD OSMOLALITY: CPT | Performed by: INTERNAL MEDICINE

## 2023-05-31 PROCEDURE — 250N000011 HC RX IP 250 OP 636: Performed by: INTERNAL MEDICINE

## 2023-05-31 PROCEDURE — 36415 COLL VENOUS BLD VENIPUNCTURE: CPT | Performed by: INTERNAL MEDICINE

## 2023-05-31 PROCEDURE — 84100 ASSAY OF PHOSPHORUS: CPT | Performed by: INTERNAL MEDICINE

## 2023-05-31 PROCEDURE — 250N000013 HC RX MED GY IP 250 OP 250 PS 637: Performed by: HOSPITALIST

## 2023-05-31 PROCEDURE — 82310 ASSAY OF CALCIUM: CPT | Performed by: INTERNAL MEDICINE

## 2023-05-31 PROCEDURE — 999N000157 HC STATISTIC RCP TIME EA 10 MIN

## 2023-05-31 PROCEDURE — 83735 ASSAY OF MAGNESIUM: CPT | Performed by: INTERNAL MEDICINE

## 2023-05-31 PROCEDURE — 71045 X-RAY EXAM CHEST 1 VIEW: CPT

## 2023-05-31 PROCEDURE — 99291 CRITICAL CARE FIRST HOUR: CPT | Performed by: EMERGENCY MEDICINE

## 2023-05-31 PROCEDURE — 250N000012 HC RX MED GY IP 250 OP 636 PS 637: Performed by: INTERNAL MEDICINE

## 2023-05-31 PROCEDURE — 80053 COMPREHEN METABOLIC PANEL: CPT | Performed by: EMERGENCY MEDICINE

## 2023-05-31 PROCEDURE — 96374 THER/PROPH/DIAG INJ IV PUSH: CPT

## 2023-05-31 PROCEDURE — 250N000009 HC RX 250: Performed by: EMERGENCY MEDICINE

## 2023-05-31 PROCEDURE — 87086 URINE CULTURE/COLONY COUNT: CPT | Performed by: EMERGENCY MEDICINE

## 2023-05-31 PROCEDURE — 51702 INSERT TEMP BLADDER CATH: CPT

## 2023-05-31 PROCEDURE — 96375 TX/PRO/DX INJ NEW DRUG ADDON: CPT

## 2023-05-31 PROCEDURE — 99285 EMERGENCY DEPT VISIT HI MDM: CPT | Mod: 25

## 2023-05-31 PROCEDURE — 81001 URINALYSIS AUTO W/SCOPE: CPT | Performed by: EMERGENCY MEDICINE

## 2023-05-31 PROCEDURE — 87077 CULTURE AEROBIC IDENTIFY: CPT | Performed by: EMERGENCY MEDICINE

## 2023-05-31 PROCEDURE — 82962 GLUCOSE BLOOD TEST: CPT

## 2023-05-31 PROCEDURE — 250N000013 HC RX MED GY IP 250 OP 250 PS 637: Performed by: EMERGENCY MEDICINE

## 2023-05-31 PROCEDURE — 83880 ASSAY OF NATRIURETIC PEPTIDE: CPT | Performed by: INTERNAL MEDICINE

## 2023-05-31 PROCEDURE — 99291 CRITICAL CARE FIRST HOUR: CPT | Mod: AI | Performed by: INTERNAL MEDICINE

## 2023-05-31 PROCEDURE — 250N000011 HC RX IP 250 OP 636: Performed by: EMERGENCY MEDICINE

## 2023-05-31 PROCEDURE — 258N000003 HC RX IP 258 OP 636: Performed by: EMERGENCY MEDICINE

## 2023-05-31 PROCEDURE — 85004 AUTOMATED DIFF WBC COUNT: CPT | Performed by: EMERGENCY MEDICINE

## 2023-05-31 PROCEDURE — 83605 ASSAY OF LACTIC ACID: CPT | Performed by: EMERGENCY MEDICINE

## 2023-05-31 PROCEDURE — 93005 ELECTROCARDIOGRAM TRACING: CPT

## 2023-05-31 PROCEDURE — 200N000001 HC R&B ICU

## 2023-05-31 PROCEDURE — 87641 MR-STAPH DNA AMP PROBE: CPT | Performed by: EMERGENCY MEDICINE

## 2023-05-31 PROCEDURE — 93010 ELECTROCARDIOGRAM REPORT: CPT | Performed by: INTERNAL MEDICINE

## 2023-05-31 RX ORDER — MYCOPHENOLATE MOFETIL 500 MG/1
1000 TABLET ORAL 2 TIMES DAILY
Status: DISCONTINUED | OUTPATIENT
Start: 2023-05-31 | End: 2023-06-01 | Stop reason: HOSPADM

## 2023-05-31 RX ORDER — ASPIRIN 81 MG/1
81 TABLET ORAL AT BEDTIME
Status: DISCONTINUED | OUTPATIENT
Start: 2023-05-31 | End: 2023-06-01 | Stop reason: HOSPADM

## 2023-05-31 RX ORDER — NITROGLYCERIN 0.4 MG/1
0.4 TABLET SUBLINGUAL EVERY 5 MIN PRN
Status: DISCONTINUED | OUTPATIENT
Start: 2023-05-31 | End: 2023-06-01 | Stop reason: HOSPADM

## 2023-05-31 RX ORDER — CLOPIDOGREL BISULFATE 75 MG/1
75 TABLET ORAL DAILY
Status: DISCONTINUED | OUTPATIENT
Start: 2023-05-31 | End: 2023-06-01 | Stop reason: HOSPADM

## 2023-05-31 RX ORDER — LEVOTHYROXINE SODIUM 25 UG/1
50 TABLET ORAL DAILY
Status: DISCONTINUED | OUTPATIENT
Start: 2023-05-31 | End: 2023-06-01 | Stop reason: HOSPADM

## 2023-05-31 RX ORDER — DEXTROSE MONOHYDRATE 25 G/50ML
25-50 INJECTION, SOLUTION INTRAVENOUS
Status: DISCONTINUED | OUTPATIENT
Start: 2023-05-31 | End: 2023-06-01 | Stop reason: HOSPADM

## 2023-05-31 RX ORDER — FLUTICASONE FUROATE AND VILANTEROL 100; 25 UG/1; UG/1
1 POWDER RESPIRATORY (INHALATION) DAILY
Status: DISCONTINUED | OUTPATIENT
Start: 2023-05-31 | End: 2023-06-01 | Stop reason: HOSPADM

## 2023-05-31 RX ORDER — ONDANSETRON 4 MG/1
4 TABLET, ORALLY DISINTEGRATING ORAL EVERY 6 HOURS PRN
Status: DISCONTINUED | OUTPATIENT
Start: 2023-05-31 | End: 2023-06-01 | Stop reason: HOSPADM

## 2023-05-31 RX ORDER — ROPIVACAINE IN 0.9% SOD CHL/PF 0.1 %
.03-.125 PLASTIC BAG, INJECTION (ML) EPIDURAL CONTINUOUS
Status: DISCONTINUED | OUTPATIENT
Start: 2023-05-31 | End: 2023-06-01 | Stop reason: HOSPADM

## 2023-05-31 RX ORDER — MAGNESIUM SULFATE HEPTAHYDRATE 40 MG/ML
INJECTION, SOLUTION INTRAVENOUS
Status: COMPLETED
Start: 2023-05-31 | End: 2023-06-01

## 2023-05-31 RX ORDER — MAGNESIUM SULFATE HEPTAHYDRATE 40 MG/ML
4 INJECTION, SOLUTION INTRAVENOUS ONCE
Status: COMPLETED | OUTPATIENT
Start: 2023-05-31 | End: 2023-06-01

## 2023-05-31 RX ORDER — POTASSIUM CHLORIDE 1.5 G/1.58G
20 POWDER, FOR SOLUTION ORAL ONCE
Status: COMPLETED | OUTPATIENT
Start: 2023-05-31 | End: 2023-05-31

## 2023-05-31 RX ORDER — MYCOPHENOLATE MOFETIL 500 MG/1
1000 TABLET ORAL 2 TIMES DAILY
Status: DISCONTINUED | OUTPATIENT
Start: 2023-05-31 | End: 2023-05-31

## 2023-05-31 RX ORDER — ACETAMINOPHEN 325 MG/1
650 TABLET ORAL EVERY 4 HOURS PRN
Status: DISCONTINUED | OUTPATIENT
Start: 2023-05-31 | End: 2023-06-01 | Stop reason: HOSPADM

## 2023-05-31 RX ORDER — NICOTINE POLACRILEX 4 MG
15-30 LOZENGE BUCCAL
Status: DISCONTINUED | OUTPATIENT
Start: 2023-05-31 | End: 2023-06-01 | Stop reason: HOSPADM

## 2023-05-31 RX ORDER — EZETIMIBE 10 MG/1
10 TABLET ORAL DAILY
Status: DISCONTINUED | OUTPATIENT
Start: 2023-05-31 | End: 2023-06-01 | Stop reason: HOSPADM

## 2023-05-31 RX ORDER — LEVOFLOXACIN 5 MG/ML
750 INJECTION, SOLUTION INTRAVENOUS ONCE
Status: COMPLETED | OUTPATIENT
Start: 2023-05-31 | End: 2023-05-31

## 2023-05-31 RX ORDER — ALBUTEROL SULFATE 90 UG/1
2 AEROSOL, METERED RESPIRATORY (INHALATION) EVERY 6 HOURS PRN
Status: DISCONTINUED | OUTPATIENT
Start: 2023-05-31 | End: 2023-06-01 | Stop reason: HOSPADM

## 2023-05-31 RX ORDER — LORAZEPAM 0.5 MG/1
0.5 TABLET ORAL
Status: DISCONTINUED | OUTPATIENT
Start: 2023-05-31 | End: 2023-06-01 | Stop reason: HOSPADM

## 2023-05-31 RX ORDER — MEROPENEM AND SODIUM CHLORIDE 1 G/50ML
1 INJECTION, SOLUTION INTRAVENOUS EVERY 8 HOURS
Status: DISCONTINUED | OUTPATIENT
Start: 2023-05-31 | End: 2023-05-31

## 2023-05-31 RX ORDER — ISOSORBIDE MONONITRATE 30 MG/1
60 TABLET, EXTENDED RELEASE ORAL DAILY
Status: DISCONTINUED | OUTPATIENT
Start: 2023-05-31 | End: 2023-06-01 | Stop reason: HOSPADM

## 2023-05-31 RX ORDER — ONDANSETRON 2 MG/ML
4 INJECTION INTRAMUSCULAR; INTRAVENOUS EVERY 6 HOURS PRN
Status: DISCONTINUED | OUTPATIENT
Start: 2023-05-31 | End: 2023-06-01 | Stop reason: HOSPADM

## 2023-05-31 RX ORDER — HEPARIN SODIUM 5000 [USP'U]/.5ML
5000 INJECTION, SOLUTION INTRAVENOUS; SUBCUTANEOUS EVERY 8 HOURS
Status: DISCONTINUED | OUTPATIENT
Start: 2023-05-31 | End: 2023-06-01 | Stop reason: HOSPADM

## 2023-05-31 RX ORDER — MEROPENEM AND SODIUM CHLORIDE 1 G/50ML
1 INJECTION, SOLUTION INTRAVENOUS EVERY 12 HOURS
Status: DISCONTINUED | OUTPATIENT
Start: 2023-06-01 | End: 2023-06-01 | Stop reason: HOSPADM

## 2023-05-31 RX ORDER — LINEZOLID 2 MG/ML
600 INJECTION, SOLUTION INTRAVENOUS ONCE
Status: COMPLETED | OUTPATIENT
Start: 2023-05-31 | End: 2023-05-31

## 2023-05-31 RX ORDER — ACETAMINOPHEN 325 MG/1
975 TABLET ORAL ONCE
Status: COMPLETED | OUTPATIENT
Start: 2023-05-31 | End: 2023-05-31

## 2023-05-31 RX ORDER — PREDNISONE 5 MG/1
5 TABLET ORAL DAILY
Status: DISCONTINUED | OUTPATIENT
Start: 2023-05-31 | End: 2023-06-01 | Stop reason: HOSPADM

## 2023-05-31 RX ORDER — POTASSIUM CHLORIDE 1500 MG/1
40 TABLET, EXTENDED RELEASE ORAL ONCE
Status: COMPLETED | OUTPATIENT
Start: 2023-05-31 | End: 2023-05-31

## 2023-05-31 RX ORDER — BISACODYL 10 MG
10 SUPPOSITORY, RECTAL RECTAL DAILY PRN
Status: DISCONTINUED | OUTPATIENT
Start: 2023-05-31 | End: 2023-06-01 | Stop reason: HOSPADM

## 2023-05-31 RX ORDER — POTASSIUM CHLORIDE 1500 MG/1
20 TABLET, EXTENDED RELEASE ORAL DAILY
Status: DISCONTINUED | OUTPATIENT
Start: 2023-05-31 | End: 2023-06-01 | Stop reason: HOSPADM

## 2023-05-31 RX ADMIN — LEVOTHYROXINE SODIUM 50 MCG: 0.03 TABLET ORAL at 20:29

## 2023-05-31 RX ADMIN — HYDROCORTISONE SODIUM SUCCINATE 50 MG: 100 INJECTION, POWDER, FOR SOLUTION INTRAMUSCULAR; INTRAVENOUS at 21:10

## 2023-05-31 RX ADMIN — HEPARIN SODIUM 5000 UNITS: 5000 INJECTION, SOLUTION INTRAVENOUS; SUBCUTANEOUS at 18:46

## 2023-05-31 RX ADMIN — SODIUM CHLORIDE 1000 ML: 9 INJECTION, SOLUTION INTRAVENOUS at 14:42

## 2023-05-31 RX ADMIN — MYCOPHENOLATE MOFETIL 1000 MG: 500 TABLET ORAL at 20:31

## 2023-05-31 RX ADMIN — POTASSIUM CHLORIDE 40 MEQ: 1500 TABLET, EXTENDED RELEASE ORAL at 23:27

## 2023-05-31 RX ADMIN — CLOPIDOGREL BISULFATE 75 MG: 75 TABLET ORAL at 20:29

## 2023-05-31 RX ADMIN — ACETAMINOPHEN 650 MG: 325 TABLET ORAL at 20:18

## 2023-05-31 RX ADMIN — INSULIN GLARGINE 30 UNITS: 100 INJECTION, SOLUTION SUBCUTANEOUS at 20:36

## 2023-05-31 RX ADMIN — LINEZOLID 600 MG: 600 INJECTION, SOLUTION INTRAVENOUS at 17:22

## 2023-05-31 RX ADMIN — ACETAMINOPHEN 975 MG: 325 TABLET, FILM COATED ORAL at 14:32

## 2023-05-31 RX ADMIN — ONDANSETRON 4 MG: 2 INJECTION INTRAMUSCULAR; INTRAVENOUS at 19:04

## 2023-05-31 RX ADMIN — POTASSIUM CHLORIDE 20 MEQ: 1500 TABLET, EXTENDED RELEASE ORAL at 21:09

## 2023-05-31 RX ADMIN — ASPIRIN 81 MG: 81 TABLET, COATED ORAL at 21:10

## 2023-05-31 RX ADMIN — ISOSORBIDE MONONITRATE 60 MG: 30 TABLET, EXTENDED RELEASE ORAL at 20:28

## 2023-05-31 RX ADMIN — MEROPENEM AND SODIUM CHLORIDE 1 G: 1 INJECTION, SOLUTION INTRAVENOUS at 20:30

## 2023-05-31 RX ADMIN — POTASSIUM CHLORIDE 20 MEQ: 1.5 POWDER, FOR SOLUTION ORAL at 17:18

## 2023-05-31 RX ADMIN — Medication 0.03 MCG/KG/MIN: at 17:13

## 2023-05-31 RX ADMIN — LEVOFLOXACIN 750 MG: 750 INJECTION, SOLUTION INTRAVENOUS at 15:25

## 2023-05-31 RX ADMIN — POTASSIUM & SODIUM PHOSPHATES POWDER PACK 280-160-250 MG 2 PACKET: 280-160-250 PACK at 23:26

## 2023-05-31 RX ADMIN — EZETIMIBE 10 MG: 10 TABLET ORAL at 20:28

## 2023-05-31 RX ADMIN — SODIUM CHLORIDE 1000 ML: 9 INJECTION, SOLUTION INTRAVENOUS at 16:11

## 2023-05-31 RX ADMIN — PREDNISONE 5 MG: 5 TABLET ORAL at 20:29

## 2023-05-31 RX ADMIN — HYDROCORTISONE SODIUM SUCCINATE 100 MG: 100 INJECTION, POWDER, FOR SOLUTION INTRAMUSCULAR; INTRAVENOUS at 15:12

## 2023-05-31 RX ADMIN — POTASSIUM CHLORIDE 20 MEQ: 1.5 POWDER, FOR SOLUTION ORAL at 15:55

## 2023-05-31 ASSESSMENT — ACTIVITIES OF DAILY LIVING (ADL)
ADLS_ACUITY_SCORE: 26
ADLS_ACUITY_SCORE: 23
ADLS_ACUITY_SCORE: 39
ADLS_ACUITY_SCORE: 23
ADLS_ACUITY_SCORE: 39

## 2023-05-31 NOTE — PLAN OF CARE
[facility] Inpatient Admission Note:    Patient admitted to 3128/3128-1 at approximately 1800 via cart accompanied by nurse from emergency room . Report received from Nichol TOUSSAINT RN in SBAR format at 1731 via telephone. Patient transferred to bed via slide board.. Patient is alert and oriented X 3, denies pain; rates at 0 on 0-10 scale.  Patient oriented to room, unit, hourly rounding, and plan of care. Explained admission packet and patient handbook with patient bill of rights brochure. Will continue to monitor and document as needed.     Inpatient Nursing criteria listed below was met:    Health care directives status obtained and documented: Yes    Patient identifies a surrogate decision maker: Yes If yes, who:Sister Michelle Contact Information:[phone]     If initial lactic acid greater than 2.0, repeat lactic acid drawn within one hour of arrival to unit: NA. If no, state reason: Lactic 2.7 recheck 1.7    Clergy visit ordered if patient requests: No    Skin issues/needs documented: Yes    Isolation Patient: no Education given, correct sign in place and documentation row added to PCS:  No    Fall Prevention Yes: Care plan updated, education given and documented, sticker and magnet in place: No    Care Plan initiated: Yes    Education Documented (including assessment): Yes    Patient has discharge needs : Yes If yes, please explain: Possible SNIF pending physical mobility

## 2023-05-31 NOTE — H&P
[facility]    History and Physical - Hospitalist Service       Date of Admission:  5/31/2023    Assessment & Plan      Eduar Isaacs is a 68 year old male admitted on 5/31/2023. He presents with sepsis from acute cystitis.    #Sepsis with hypotension and organ dysfunction secondary to UTI  While patient is becoming hypotensive his BNP is also elevated suggesting he might be in congestive heart failure.  Since patient has received 3 L of IV fluid already, we will start Levophed if patient becomes hypotensive again.  We will continue with antibiotics including Levaquin and linezolid but also add meropenem.  We will follow patient's blood culture and urine culture to refine antibiotic therapy.    #Status post a renal transplant, on immunosuppressive therapy including steroid  Since patient's adrenal axis might be suppressed, we will continue with a stress dose steroid.  We will continue with the immunosuppressive therapy and follow patient's renal function closely.    #CHF exacerbation  Patient might be in CHF exacerbation state in light of elevated BNP.  Will check echocardiogram.  Avoid fluid overload by using IV pressors.    #Hypokalemia  We will check patient's potassium closely as he received 40 mill equivalent of potassium in the emergency room.    #Hyponatremia  This may be pseudohyponatremia; we will check serum osmolality    #Type 2 diabetes mellitus  We will continue with the patient's insulin regimen    #COPD  Patient's symptom is controlled    #JYOTSNA  We will continue with the CPAP         Diet:  Diabetic diet  DVT Prophylaxis: Heparin SQ  Manley Catheter: PRESENT, indication:    Lines: None     Cardiac Monitoring: None  Code Status:  Full code    Clinically Significant Risk Factors Present on Admission        # Hypokalemia: Lowest K = 2.8 mmol/L in last 2 days, will replace as needed  # Hyponatremia: Lowest Na = 129 mmol/L in last 2 days, will monitor as appropriate  # Hypocalcemia: Lowest Ca  = 8.2 mg/dL in last 2 days, will monitor and replace as appropriate     # Hypoalbuminemia: Lowest albumin = 3.3 g/dL at 5/31/2023  2:46 PM, will monitor as appropriate   # Drug Induced Platelet Defect: home medication list includes an antiplatelet medication   # Hypertension: Noted on problem list               Disposition Plan      Expected Discharge Date: 06/02/2023                  Sánchez Pina MD  Hospitalist Service  [facility]  Securely message with [employer] (more info)  Text page via [facility] Paging/Directory     ______________________________________________________________________    Chief Complaint   Fever and dysuria    History is obtained from the patient     History of Present Illness   Eduar Isaacs is a 68 year old male status post kidney transplant, type 2 diabetes mellitus with polyneuropathy, JYOTSNA, CHF, COPD, who presents with a fever and dysuria since yesterday.  Patient was in his usual state of health until yesterday morning.  In the afternoon he developed dysuria and subsequently fever, chills, lightheadedness, anorexia.  As patient felt extremely weak he decided to seek medical attention by coming to the emergency room.  In the emergency room, he was noted to have fever of 102.7, heart rate 112, initial blood pressure 110/57.  Patient however develop tachypnea with respiratory rate 30.  UA was positive for UTI, leukocytosis with a WBC 22,000, hypokalemia.  Patient was pancultured and started on levofloxacin and linezolid.  He is now being admitted to the hospital for further care.  Of note, patient received 2 L of normal saline but subsequently became hypotensive with a blood pressure 87/56.  Patient's BNP is elevated at 3665.  In light of these hypotension with a CHF, patient is being admitted to ICU for further care.      Past Medical History    Past Medical History:   Diagnosis Date     Arthritis      Congestive heart failure (H)      COPD (chronic obstructive pulmonary disease)  (H)      Diabetes (H)      Hypertension      Myocardial infarction (H)      Neuromuscular disorder (H)        Past Surgical History   Past Surgical History:   Procedure Laterality Date     CARDIAC SURGERY       COLONOSCOPY - HIM SCAN  2012    [facility], polyps, adenomatous     ORTHOPEDIC SURGERY       TRANSPLANT         Prior to Admission Medications   Prior to Admission Medications   Prescriptions Last Dose Informant Patient Reported? Taking?   Calcium Carbonate Antacid 400 MG CHEW  Self Yes No   Sig: Take 2 chew tab by mouth daily   Continuous Blood Gluc  (DEXCOM G6 ) SANAZ  Self No No   Sig: Use to read blood sugars as per 's instructions.   Continuous Blood Gluc Sensor (DEXCOM G6 SENSOR) MISC  Self No No   Sig: Change every 10 days.   Continuous Blood Gluc Transmit (DEXCOM G6 TRANSMITTER) MISC  Self No No   Sig: Change every 3 months.   Diclofenac Sodium 3 % GEL  Self Yes No   Sig: Apply topically 2 times daily as needed   LORazepam (ATIVAN) 0.5 MG tablet  Self Yes No   Sig: Take 0.5 mg by mouth nightly as needed   TRELEGY ELLIPTA 100-62.5-25 MCG/INH oral inhaler  Self Yes No   Sig: Inhale 1 puff into the lungs daily   albuterol (PROAIR HFA/PROVENTIL HFA/VENTOLIN HFA) 108 (90 Base) MCG/ACT inhaler  Self Yes No   Sig: Inhale 2 puffs into the lungs every 6 hours as needed   allopurinol (ZYLOPRIM) 100 MG tablet  Self Yes No   Sig: Take 100 mg by mouth daily    aspirin (ASA) 81 MG EC tablet  Self Yes No   Sig: Take 81 mg by mouth At Bedtime    atenolol (TENORMIN) 25 MG tablet  Self Yes No   Sig: Take 25 mg by mouth every evening   blood glucose (NO BRAND SPECIFIED) test strip  Self No No   Si strip by In Vitro route 4 times daily (before meals and nightly) Use to test blood sugar 4 times daily or as directed.   blood glucose monitoring (SOFTCLIX) lancets  Self No No   Si each by In Vitro route 4 times daily (before meals and nightly) Use to monitor blood glucose once daily as  directed.   cholecalciferol (VITAMIN D3) 125 mcg (5000 units) capsule  Self Yes No   Sig: Take 125 mcg by mouth daily   clopidogrel (PLAVIX) 75 MG tablet  Self Yes No   Sig: Take 75 mg by mouth daily   ezetimibe (ZETIA) 10 MG tablet  Self Yes No   Sig: Take 10 mg by mouth daily   fish oil-omega-3 fatty acids 1000 MG capsule  Self Yes No   Sig: Take 1 g by mouth 2 times daily   furosemide (LASIX) 40 MG tablet  Self Yes No   Sig: Take 120 mg by mouth daily   gabapentin (NEURONTIN) 100 MG capsule  Self Yes No   Sig: Take 300 mg by mouth 2 times daily   glucose (BD GLUCOSE) 4 g chewable tablet  Self No No   Sig: Take 1 tablet by mouth every hour as needed for low blood sugar   guaiFENesin (MUCINEX) 600 MG 12 hr tablet  Self Yes No   Sig: Take 600 mg by mouth 2 times daily   insulin aspart (NOVOLOG PEN) 100 UNIT/ML pen 5/30/2023 at 0800 Self No Yes   Sig: Inject 15 Units Subcutaneous 3 times daily (with meals) Hold if pre-meal glucose <100   insulin glargine (LANTUS PEN) 100 UNIT/ML pen 5/30/2023 at 0800 Self No Yes   Sig: Inject 30 Units Subcutaneous 2 times daily   insulin pen needle (31G X 8 MM) 31G X 8 MM miscellaneous  Self No No   Sig: Use 5 pen needles daily or as directed.   isosorbide mononitrate (IMDUR) 30 MG 24 hr tablet  Self Yes No   Sig: Take 2 tablets by mouth daily   levothyroxine (SYNTHROID/LEVOTHROID) 50 MCG tablet  Self Yes No   Sig: Take 50 mcg by mouth daily    lisinopril (ZESTRIL) 5 MG tablet  Self No No   Sig: Take 0.5 tablets (2.5 mg) by mouth daily . ----Start taking this after discussing with your kidney doctor--   metolazone (ZAROXOLYN) 2.5 MG tablet  Self Yes No   Sig: Take 1 tablet by mouth twice a week on Tuesday and Friday. Take with furosemide.   mupirocin (BACTROBAN) 2 % external ointment  Self Yes No   Sig: Apply topically 2 times daily as needed   mycophenolate (GENERIC EQUIVALENT) 500 MG tablet  Self Yes No   Sig: Take 1,000 mg by mouth 2 times daily   nitroGLYcerin (NITROSTAT) 0.4 MG  sublingual tablet  Self Yes No   Sig: Place 0.4 mg under the tongue every 5 minutes as needed    oxyCODONE (ROXICODONE) 5 MG tablet  Self Yes No   Sig: Take 5 mg by mouth every 4 hours as needed for breakthrough pain   potassium chloride ER (K-TAB) 20 MEQ CR tablet  Self Yes No   Sig: Take 1 tablet by mouth daily   predniSONE (DELTASONE) 5 MG tablet  Self Yes No   Sig: Take 5 mg by mouth daily   rosuvastatin (CRESTOR) 40 MG tablet  Self Yes No   Sig: Take 40 mg by mouth At Bedtime       Facility-Administered Medications: None        Review of Systems    The 10 point Review of Systems is negative other than noted in the HPI or here.      Social History   I have reviewed this patient's social history and updated it with pertinent information if needed.  Social History     Tobacco Use     Smoking status: Former     Types: Cigarettes     Smokeless tobacco: Never   Substance Use Topics     Alcohol use: Yes     Comment: rare     Drug use: Never       Family History     No significant family history, including no history of: Kidney disease    Allergies   Allergies   Allergen Reactions     Cefepime      pain, kidney function off.     Codeine Shortness Of Breath     Niacin Itching and Rash     Amoxicillin Itching and Rash     Prilosec [Omeprazole] Itching and Rash     Vancomycin Hives and Rash     Atorvastatin Other (See Comments)     Semaglutide Diarrhea     Spironolactone         Physical Exam   Vital Signs: Temp: (!) 102  F (38.9  C) Temp src: Tympanic BP: 108/59 Pulse: 94   Resp: 26 SpO2: 93 % O2 Device: None (Room air)    Weight: 301 lbs 0 oz    General Appearance: Lying in bed in no acute distress but appears fatigued  Eyes: PERRLA EOMI  HEENT: Clear oropharynx, neck is supple, no JVD but obese  Respiratory: Clear to auscultation bilaterally  Cardiovascular: S1-S2, regular rhythm and rate, no murmurs rubs or gallops  GI: Soft, obese, nontender, nondistended  Lymph/Hematologic: No palpable lymph nodes  Genitourinary:  Manley in place  Skin: Intact  Musculoskeletal: No joint deformities or muscle atrophy, trace edema at bilateral ankles  Neurologic: Alert and oriented x3, cranial nerve exam showing no asymmetry, motor examination showed intact muscle strength, sensory examination showed intact light touch sensation throughout, deep tendon reflexes were 2+ throughout  Psychiatric: Appropriate affect    Medical Decision Making       70 critical care MINUTES SPENT BY ME on the date of service doing chart review, history, exam, documentation & further activities per the note.      Data     I have personally reviewed the following data over the past 24 hrs:    22.3 (H)  \   12.7 (L)   / 138 (L)     129 (L) 88 (L) 86.9 (H) /  194 (H)   2.8 (L) 23 2.49 (H) \       ALT: 16 AST: 24 AP: 58 TBILI: 0.7   ALB: 3.3 (L) TOT PROTEIN: 6.0 (L) LIPASE: N/A       Trop: N/A BNP: 3,665 (H)       Procal: N/A CRP: N/A Lactic Acid: 2.7 (H)         Imaging results reviewed over the past 24 hrs:   Recent Results (from the past 24 hour(s))   XR Chest Port 1 View    Narrative    PROCEDURE:  XR CHEST PORT 1 VIEW    HISTORY:  Sepsis.     COMPARISON:  None.    FINDINGS:   The cardiac silhouette is normal in size. The pulmonary vasculature is  normal.  The lungs are clear. No pleural effusion or pneumothorax.      Impression    IMPRESSION:  No acute cardiopulmonary disease.      AMBIKA HERRERA MD         SYSTEM ID:  [id number]

## 2023-05-31 NOTE — ED NOTES
Attempted to get a urine sample on patient.  Son stated he stood at edge of bed and missed the urinal and he cleaned up the urine.  Explained we will still need a urine sample.

## 2023-05-31 NOTE — ED NOTES
BP cuff placed back on lower forearm, not much change in BP's see flowsheet.  3rd NS bolus almost complete.  IV antibiotics running as ordered.  Second IV being placed now.

## 2023-05-31 NOTE — ED TRIAGE NOTES
"67 y/o male presents via [facility] with reports of high glucose, fever, tachycardia and painful urination. Symptoms started yesterday. He erports he was \"delirious.\"   Patient has a hx of DM - he reports his normal blood glucose is approximately 140ish. His dexcomb 6 is currently dead.  He states he had a left kidney transplant 20 years ago.     Per EMS, SBP was 70s on arrival. EMS established a 20 gauge in the right hand; NS 500ml bolus given.  "

## 2023-06-01 ENCOUNTER — TRANSFERRED RECORDS (OUTPATIENT)
Dept: OTHER | Facility: HOSPITAL | Age: 68
End: 2023-06-01

## 2023-06-01 ENCOUNTER — APPOINTMENT (OUTPATIENT)
Dept: CARDIOLOGY | Facility: HOSPITAL | Age: 68
DRG: 871 | End: 2023-06-01
Attending: INTERNAL MEDICINE
Payer: COMMERCIAL

## 2023-06-01 ENCOUNTER — APPOINTMENT (OUTPATIENT)
Dept: GENERAL RADIOLOGY | Facility: HOSPITAL | Age: 68
DRG: 871 | End: 2023-06-01
Attending: INTERNAL MEDICINE
Payer: COMMERCIAL

## 2023-06-01 VITALS
WEIGHT: 308.64 LBS | SYSTOLIC BLOOD PRESSURE: 126 MMHG | RESPIRATION RATE: 27 BRPM | HEIGHT: 68 IN | BODY MASS INDEX: 46.78 KG/M2 | DIASTOLIC BLOOD PRESSURE: 75 MMHG | TEMPERATURE: 98.6 F | OXYGEN SATURATION: 98 % | HEART RATE: 94 BPM

## 2023-06-01 PROBLEM — A41.9 SEPTIC SHOCK (H): Status: ACTIVE | Noted: 2023-06-01

## 2023-06-01 PROBLEM — I42.9 CARDIOMYOPATHY, SECONDARY (H): Status: ACTIVE | Noted: 2023-06-01

## 2023-06-01 PROBLEM — R65.21 SEPTIC SHOCK (H): Status: ACTIVE | Noted: 2023-06-01

## 2023-06-01 PROBLEM — R65.20: Status: ACTIVE | Noted: 2023-06-01

## 2023-06-01 PROBLEM — I50.23 ACUTE ON CHRONIC SYSTOLIC CONGESTIVE HEART FAILURE (H): Status: ACTIVE | Noted: 2023-06-01

## 2023-06-01 PROBLEM — A41.50: Status: ACTIVE | Noted: 2023-06-01

## 2023-06-01 LAB
ALBUMIN SERPL BCG-MCNC: 2.7 G/DL (ref 3.5–5.2)
ALP SERPL-CCNC: 99 U/L (ref 40–129)
ALT SERPL W P-5'-P-CCNC: 16 U/L (ref 10–50)
ALT SERPL-CCNC: 19 U/L (ref 18–65)
ANION GAP SERPL CALCULATED.3IONS-SCNC: 18 MMOL/L (ref 7–15)
ANION GAP SERPL CALCULATED.3IONS-SCNC: 20 MMOL/L (ref 7–15)
AST SERPL W P-5'-P-CCNC: 48 U/L (ref 10–50)
AST SERPL-CCNC: 56 U/L (ref 10–40)
BILIRUB DIRECT SERPL-MCNC: 0.31 MG/DL (ref 0–0.3)
BILIRUB SERPL-MCNC: 0.6 MG/DL
BUN SERPL-MCNC: 83.1 MG/DL (ref 8–23)
BUN SERPL-MCNC: 88 MG/DL (ref 8–23)
CALCIUM SERPL-MCNC: 7.5 MG/DL (ref 8.8–10.2)
CALCIUM SERPL-MCNC: 7.5 MG/DL (ref 8.8–10.2)
CHLORIDE SERPL-SCNC: 90 MMOL/L (ref 98–107)
CHLORIDE SERPL-SCNC: 91 MMOL/L (ref 98–107)
CREAT SERPL-MCNC: 3.1 MG/DL (ref 0.67–1.17)
CREAT SERPL-MCNC: 3.21 MG/DL (ref 0.67–1.17)
DEPRECATED HCO3 PLAS-SCNC: 16 MMOL/L (ref 22–29)
DEPRECATED HCO3 PLAS-SCNC: 17 MMOL/L (ref 22–29)
ERYTHROCYTE [DISTWIDTH] IN BLOOD BY AUTOMATED COUNT: 16.3 % (ref 10–15)
GFR SERPL CREATININE-BSD FRML MDRD: 20 ML/MIN/1.73M2
GFR SERPL CREATININE-BSD FRML MDRD: 21 ML/MIN/1.73M2
GLUCOSE BLDC GLUCOMTR-MCNC: 135 MG/DL (ref 70–99)
GLUCOSE SERPL-MCNC: 152 MG/DL (ref 70–99)
GLUCOSE SERPL-MCNC: 153 MG/DL (ref 70–99)
HCT VFR BLD AUTO: 37.5 % (ref 40–53)
HGB BLD-MCNC: 12.5 G/DL (ref 13.3–17.7)
HOLD SPECIMEN: NORMAL
INR (EXTERNAL): 1.4 (ref 0.9–1.1)
LVEF ECHO: NORMAL
MAGNESIUM SERPL-MCNC: 2.3 MG/DL (ref 1.7–2.3)
MCH RBC QN AUTO: 28.9 PG (ref 26.5–33)
MCHC RBC AUTO-ENTMCNC: 33.3 G/DL (ref 31.5–36.5)
MCV RBC AUTO: 87 FL (ref 78–100)
MRSA DNA SPEC QL NAA+PROBE: NEGATIVE
NT-PROBNP SERPL-MCNC: ABNORMAL PG/ML (ref 0–900)
PHOSPHATE SERPL-MCNC: 3 MG/DL (ref 2.5–4.5)
PLATELET # BLD AUTO: 107 10E3/UL (ref 150–450)
POTASSIUM SERPL-SCNC: 3 MMOL/L (ref 3.4–5.3)
POTASSIUM SERPL-SCNC: 3.2 MMOL/L (ref 3.4–5.3)
POTASSIUM SERPL-SCNC: 3.2 MMOL/L (ref 3.4–5.3)
PROCALCITONIN SERPL IA-MCNC: 138.76 NG/ML
PROT SERPL-MCNC: 5 G/DL (ref 6.4–8.3)
RBC # BLD AUTO: 4.32 10E6/UL (ref 4.4–5.9)
SA TARGET DNA: NEGATIVE
SODIUM SERPL-SCNC: 126 MMOL/L (ref 136–145)
SODIUM SERPL-SCNC: 126 MMOL/L (ref 136–145)
TROPONIN T SERPL HS-MCNC: 237 NG/L
WBC # BLD AUTO: 32 10E3/UL (ref 4–11)

## 2023-06-01 PROCEDURE — 80053 COMPREHEN METABOLIC PANEL: CPT | Performed by: INTERNAL MEDICINE

## 2023-06-01 PROCEDURE — 82248 BILIRUBIN DIRECT: CPT | Performed by: INTERNAL MEDICINE

## 2023-06-01 PROCEDURE — 250N000011 HC RX IP 250 OP 636

## 2023-06-01 PROCEDURE — 999N000147 HC STATISTIC PT IP EVAL DEFER: Performed by: PHYSICAL THERAPIST

## 2023-06-01 PROCEDURE — 84145 PROCALCITONIN (PCT): CPT | Performed by: INTERNAL MEDICINE

## 2023-06-01 PROCEDURE — 84100 ASSAY OF PHOSPHORUS: CPT | Performed by: INTERNAL MEDICINE

## 2023-06-01 PROCEDURE — 250N000013 HC RX MED GY IP 250 OP 250 PS 637: Performed by: INTERNAL MEDICINE

## 2023-06-01 PROCEDURE — 82247 BILIRUBIN TOTAL: CPT | Performed by: INTERNAL MEDICINE

## 2023-06-01 PROCEDURE — 250N000013 HC RX MED GY IP 250 OP 250 PS 637: Performed by: HOSPITALIST

## 2023-06-01 PROCEDURE — 84460 ALANINE AMINO (ALT) (SGPT): CPT | Performed by: INTERNAL MEDICINE

## 2023-06-01 PROCEDURE — 99292 CRITICAL CARE ADDL 30 MIN: CPT | Performed by: INTERNAL MEDICINE

## 2023-06-01 PROCEDURE — 71045 X-RAY EXAM CHEST 1 VIEW: CPT

## 2023-06-01 PROCEDURE — 83735 ASSAY OF MAGNESIUM: CPT | Performed by: INTERNAL MEDICINE

## 2023-06-01 PROCEDURE — 93005 ELECTROCARDIOGRAM TRACING: CPT

## 2023-06-01 PROCEDURE — 250N000011 HC RX IP 250 OP 636: Performed by: HOSPITALIST

## 2023-06-01 PROCEDURE — 84132 ASSAY OF SERUM POTASSIUM: CPT | Performed by: INTERNAL MEDICINE

## 2023-06-01 PROCEDURE — 84155 ASSAY OF PROTEIN SERUM: CPT | Performed by: INTERNAL MEDICINE

## 2023-06-01 PROCEDURE — 85027 COMPLETE CBC AUTOMATED: CPT | Performed by: INTERNAL MEDICINE

## 2023-06-01 PROCEDURE — 250N000012 HC RX MED GY IP 250 OP 636 PS 637: Performed by: INTERNAL MEDICINE

## 2023-06-01 PROCEDURE — 255N000002 HC RX 255 OP 636: Performed by: INTERNAL MEDICINE

## 2023-06-01 PROCEDURE — 99291 CRITICAL CARE FIRST HOUR: CPT | Performed by: INTERNAL MEDICINE

## 2023-06-01 PROCEDURE — 83880 ASSAY OF NATRIURETIC PEPTIDE: CPT | Performed by: INTERNAL MEDICINE

## 2023-06-01 PROCEDURE — 84075 ASSAY ALKALINE PHOSPHATASE: CPT | Performed by: INTERNAL MEDICINE

## 2023-06-01 PROCEDURE — 250N000011 HC RX IP 250 OP 636: Performed by: INTERNAL MEDICINE

## 2023-06-01 PROCEDURE — 36415 COLL VENOUS BLD VENIPUNCTURE: CPT | Performed by: INTERNAL MEDICINE

## 2023-06-01 PROCEDURE — 84484 ASSAY OF TROPONIN QUANT: CPT | Performed by: INTERNAL MEDICINE

## 2023-06-01 PROCEDURE — 250N000009 HC RX 250: Performed by: INTERNAL MEDICINE

## 2023-06-01 PROCEDURE — 93010 ELECTROCARDIOGRAM REPORT: CPT | Performed by: INTERNAL MEDICINE

## 2023-06-01 RX ORDER — INSULIN LISPRO 100 [IU]/ML
15-20 INJECTION, SOLUTION INTRAVENOUS; SUBCUTANEOUS
COMMUNITY
Start: 2023-03-30

## 2023-06-01 RX ORDER — ROSUVASTATIN CALCIUM 10 MG/1
40 TABLET, COATED ORAL AT BEDTIME
Status: DISCONTINUED | OUTPATIENT
Start: 2023-06-01 | End: 2023-06-01 | Stop reason: HOSPADM

## 2023-06-01 RX ORDER — POTASSIUM CHLORIDE 7.45 MG/ML
10 INJECTION INTRAVENOUS
Status: COMPLETED | OUTPATIENT
Start: 2023-06-01 | End: 2023-06-01

## 2023-06-01 RX ORDER — POTASSIUM CHLORIDE 1500 MG/1
20 TABLET, EXTENDED RELEASE ORAL EVERY EVENING
Status: ON HOLD | COMMUNITY
End: 2023-06-01

## 2023-06-01 RX ORDER — ACETAZOLAMIDE 500 MG/1
500 CAPSULE, EXTENDED RELEASE ORAL EVERY MORNING
Status: ON HOLD | COMMUNITY
Start: 2023-03-16 | End: 2023-06-26

## 2023-06-01 RX ADMIN — ACETAMINOPHEN 650 MG: 325 TABLET ORAL at 00:50

## 2023-06-01 RX ADMIN — PREDNISONE 5 MG: 5 TABLET ORAL at 08:53

## 2023-06-01 RX ADMIN — POTASSIUM CHLORIDE 10 MEQ: 7.46 INJECTION, SOLUTION INTRAVENOUS at 08:28

## 2023-06-01 RX ADMIN — ACETAMINOPHEN 650 MG: 325 TABLET ORAL at 04:05

## 2023-06-01 RX ADMIN — HEPARIN SODIUM 5000 UNITS: 5000 INJECTION, SOLUTION INTRAVENOUS; SUBCUTANEOUS at 04:05

## 2023-06-01 RX ADMIN — INSULIN ASPART 7 UNITS: 100 INJECTION, SOLUTION INTRAVENOUS; SUBCUTANEOUS at 08:00

## 2023-06-01 RX ADMIN — MYCOPHENOLATE MOFETIL 1000 MG: 500 TABLET ORAL at 08:53

## 2023-06-01 RX ADMIN — MAGNESIUM SULFATE HEPTAHYDRATE 4 G: 40 INJECTION, SOLUTION INTRAVENOUS at 00:31

## 2023-06-01 RX ADMIN — Medication 0.07 MCG/KG/MIN: at 04:49

## 2023-06-01 RX ADMIN — POTASSIUM CHLORIDE 20 MEQ: 1500 TABLET, EXTENDED RELEASE ORAL at 08:53

## 2023-06-01 RX ADMIN — EZETIMIBE 10 MG: 10 TABLET ORAL at 08:53

## 2023-06-01 RX ADMIN — HYDROCORTISONE SODIUM SUCCINATE 50 MG: 100 INJECTION, POWDER, FOR SOLUTION INTRAMUSCULAR; INTRAVENOUS at 06:08

## 2023-06-01 RX ADMIN — LEVOTHYROXINE SODIUM 50 MCG: 0.03 TABLET ORAL at 08:53

## 2023-06-01 RX ADMIN — CLOPIDOGREL BISULFATE 75 MG: 75 TABLET ORAL at 08:53

## 2023-06-01 RX ADMIN — POTASSIUM CHLORIDE 10 MEQ: 7.46 INJECTION, SOLUTION INTRAVENOUS at 07:19

## 2023-06-01 RX ADMIN — POTASSIUM CHLORIDE 10 MEQ: 7.46 INJECTION, SOLUTION INTRAVENOUS at 09:18

## 2023-06-01 RX ADMIN — POTASSIUM CHLORIDE 10 MEQ: 7.46 INJECTION, SOLUTION INTRAVENOUS at 06:07

## 2023-06-01 RX ADMIN — POTASSIUM & SODIUM PHOSPHATES POWDER PACK 280-160-250 MG 2 PACKET: 280-160-250 PACK at 04:05

## 2023-06-01 RX ADMIN — POTASSIUM & SODIUM PHOSPHATES POWDER PACK 280-160-250 MG 2 PACKET: 280-160-250 PACK at 06:31

## 2023-06-01 RX ADMIN — PERFLUTREN 10 ML: 6.52 INJECTION, SUSPENSION INTRAVENOUS at 07:47

## 2023-06-01 RX ADMIN — ISOSORBIDE MONONITRATE 60 MG: 30 TABLET, EXTENDED RELEASE ORAL at 08:53

## 2023-06-01 ASSESSMENT — ACTIVITIES OF DAILY LIVING (ADL)
ADLS_ACUITY_SCORE: 27
ADLS_ACUITY_SCORE: 23
ADLS_ACUITY_SCORE: 27

## 2023-06-01 NOTE — PROGRESS NOTES
[facility]    Medicine Progress Note - Hospitalist Service    Date of Admission:  5/31/2023    Assessment & Plan   HPI from H&P:   Eduar Isaacs is a 68 year old male status post kidney transplant, type 2 diabetes mellitus with polyneuropathy, JYOTSNA, CHF, COPD, who presents with a fever and dysuria since yesterday.    Patient was in his usual state of health until yesterday morning.  In the afternoon he developed dysuria and subsequently fever, chills, lightheadedness, anorexia.  As patient felt extremely weak he decided to seek medical attention by coming to the emergency room.  In the emergency room, he was noted to have fever of 102.7, heart rate 112, initial blood pressure 110/57.  Patient however develop tachypnea with respiratory rate 30.  UA was positive for UTI, leukocytosis with a WBC 22,000, hypokalemia.  Patient was pancultured and started on levofloxacin and linezolid.  He is now being admitted to the hospital for further care.  Of note, patient received 2 L of normal saline but subsequently became hypotensive with a blood pressure 87/56.  Patient's BNP is elevated at 3665.  In light of these hypotension with a CHF, patient is being admitted to ICU for further care.       #Sepsis with hypotension and organ dysfunction secondary to UTI  #GNR bacteremia  Patient was admitted to the ICU and was continued on norepinephrine overnight.  Patient's had hypotensive episode with blood pressure going down to67/35 at one time, and norepinephrine dose was increased.  Patient was continued on meropenem and linezolid.  Patient did receive a dose of levofloxacin in the emergency room.  Since patient's renal function worsened and he developed CHF symptoms overnight with orthopnea, patient's case was presented to Dr. Brady at [facility] who accepted the patient for transfer.    #CHF exacerbation, septic cardiomyopathy,   Overnight, patient developed CHF symptoms with orthopnea and patient had to  sleep in chair.  BNP on admission was 3665, which increased to 22,834 by the next morning.  Echocardiogram was checked and it is pending at the time of this note.  In light of CHF, fluid administration was limited and patient was continued on norepinephrine IV drip to maintain his blood pressure.    #Elevated troponin, demand ischemia  Patient developed orthopnea but no chest pain.  EKG did not show any ischemic changes this morning.  I presume troponin elevation is due to demand ischemia.  We will continue with the patient's cardiac medications including dual antiplatelet therapy, statin and nitroglycerin.  We are holding patient's atenolol and lisinopril due to hypotension and renal failure.    #KAYLAN on CKD  #Status post a renal transplant, on immunosuppressive therapy including steroid  Since patient's adrenal axis might be suppressed, patient was given stress dose steroid.  Immunosuppressive therapy was continued.  Patient's renal function worsened overnight since admission and his creatinine increased from 2.49 to 3.21 by the next morning.  In light of this worsening renal function, the case was discussed with Dr. Brady at [facility] who graciously accepted the patient for transfer    #Hypokalemia  Patient's hypokalemia on admission with level at 2.8 persisted despite 20 mill equivalent KCl x2.  Potassium replacement protocol was started and it improved to 3.2.    #Hyponatremia  Patient's sodium level was 129 at the time of presentation, but serum osmolality was normal at 300, suggesting pseudohyponatremia.    #Type 2 diabetes mellitus  Patient's a preprandial NovoLog was reduced from 15 to 7 units 3 times daily.  Patient's blood glucose was controlled and in light of worsening renal function Lantus 30 units twice daily was held in the morning.    #COPD  Patient's symptom is controlled    #JYOTSNA  Patient was continued with the CPAP       Diet: Combination Diet Renal Diet (dialysis); Moderate  "Consistent Carb (60 g CHO per Meal) Diet    DVT Prophylaxis: Heparin SQ  Manley Catheter: PRESENT, indication: Strict 1-2 Hour I&O  Lines: None     Cardiac Monitoring: ACTIVE order. Indication: Electrolyte Imbalance (24 hours)- Magnesium <1.3 mg/ml; Potassium < =2.8 or > 5.5 mg/ml  Code Status: Full Code      Clinically Significant Risk Factors Present on Admission        # Hypokalemia: Lowest K = 2.8 mmol/L in last 2 days, will replace as needed  # Hyponatremia: Lowest Na = 126 mmol/L in last 2 days, will monitor as appropriate    # Hypomagnesemia: Lowest Mg = 1.2 mg/dL in last 2 days, will replace as needed  # Anion Gap Metabolic Acidosis: Highest Anion Gap = 21 mmol/L in last 2 days, will monitor and treat as appropriate  # Hypoalbuminemia: Lowest albumin = 2.7 g/dL at 6/1/2023  4:42 AM, will monitor as appropriate   # Drug Induced Platelet Defect: home medication list includes an antiplatelet medication  # Acute Kidney Injury, unspecified: based on a >150% or 0.3 mg/dL increase in last creatinine compared to past 90 day average, will monitor renal function  # Hypertension: Noted on problem list   # Circulatory Shock: currently requiring pressors for blood pressure support  # Acute Respiratory Failure: Documented O2 saturation < 91%.  Continue supplemental oxygen as needed     # Severe Obesity: Estimated body mass index is 46.93 kg/m  as calculated from the following:    Height as of this encounter: 1.727 m (5' 8\").    Weight as of this encounter: 140 kg (308 lb 10.3 oz).            Disposition Plan    Transferred to [facility] when a bed is available    Sánchez Pina MD  Hospitalist Service  [facility]  Securely message with [employer] (more info)  Text page via [facility] Paging/Directory   ______________________________________________________________________    Interval History   Patient was on norepinephrine overnight, patient was able to be tapered off at 3 AM but patient developed hypotension with " blood pressure 67/35, and it was resumed.  Overnight patient developed orthopnea and had to sleep in a chair; his BNP also increased from 3000 to 22,000.  Patient is a blood culture is growing gram-negative rods and 2 out of 2 bottles.      Physical Exam   Vital Signs: Temp: 98.8  F (37.1  C) Temp src: Tympanic BP: 111/63 Pulse: 92   Resp: (!) 28 SpO2: 99 % O2 Device: Nasal cannula Oxygen Delivery: 2 LPM  Weight: 308 lbs 10.3 oz    General Appearance: Sitting in chair in mild respiratory distress  Respiratory: Mild crackles bilaterally at bases  Cardiovascular: S1-S2, regular rhythm and rate, no murmurs rubs gallops, 1+ lower extremity edema  GI: Obese, soft, nontender, nondistended  Skin: Intact  Other: Neuro: Nonfocal    Medical Decision Making       90 critical care MINUTES SPENT BY ME on the date of service doing chart review, history, exam, documentation & further activities per the note.      Data     I have personally reviewed the following data over the past 24 hrs:    32.0 (H)  \   12.5 (L)   / 107 (L)     126 (L) 90 (L) 88.0 (H) /  152 (H)   3.2 (L); 3.2 (L) 16 (L) 3.21 (H) \       ALT: 16 AST: 48 AP: 99 TBILI: 0.6   ALB: 2.7 (L) TOT PROTEIN: 5.0 (L) LIPASE: N/A       Trop: 237 (HH) BNP: 22,834 (H)       Procal: 138.76 (HH) CRP: N/A Lactic Acid: 1.7         Imaging results reviewed over the past 24 hrs:   Recent Results (from the past 24 hour(s))   XR Chest Port 1 View    Narrative    PROCEDURE:  XR CHEST PORT 1 VIEW    HISTORY:  Sepsis.     COMPARISON:  None.    FINDINGS:   The cardiac silhouette is normal in size. The pulmonary vasculature is  normal.  The lungs are clear. No pleural effusion or pneumothorax.      Impression    IMPRESSION:  No acute cardiopulmonary disease.      AMBIKA HERRERA MD         SYSTEM ID:  [id number]   XR Chest Port 1 View    Narrative    PROCEDURE:  XR CHEST PORT 1 VIEW    HISTORY:  right base crackles with orthopnea; check for CHF vs  pneumonia.     COMPARISON:   None.    FINDINGS:   The cardiac silhouette is normal in size. The pulmonary vasculature is  normal.  The lungs are clear. No pleural effusion or pneumothorax.      Impression    IMPRESSION:  No acute cardiopulmonary disease.      AMBIKA HERRERA MD         SYSTEM ID:  [id number]

## 2023-06-01 NOTE — PLAN OF CARE
Pt admitted at 1750. Pt had denied nausea and pain. States breathing the best its ever been. Family to bring med list and cpap. Pt then c/o at shift change some nausea. Started to dry heave and threw up some phlegm. Next nurse gave zofran. Pt had become tachy and red in face. Pt pressures have been WNL on levophed.     Face to face report given with opportunity to observe patient.    Report given to Cici Soto RN   5/31/2023  7:43 PM

## 2023-06-01 NOTE — PLAN OF CARE
Occupational Therapy: Orders received. Chart reviewed and discussed with care team.? Occupational Therapy not indicated due to patient being transferred to a higher level of care.? Will complete orders.

## 2023-06-01 NOTE — PLAN OF CARE
Writer called to give Nurse to Nurse report at this time to the ICU at [facility] at [phone]. Receiving RN Zenaida is calling writer back. Patient left for transfer via [facility] at 0927.

## 2023-06-01 NOTE — PLAN OF CARE
Goal Outcome Evaluation:  At beginning of shift, patient was hot to tough, flushed, had increased WOB, temperatures in the low 100s, and tachycardia in the 130s-140s. Tylenol given per MAR, ice packs applied, with noted relief. Temps trending down per flowsheets. Electrolytes replaced per protocol.    Neuro  A&Ox4, PERRL, no numbness or tingling    Cardio  NSR 90s - sinus tach 100s, on levo gtt to maintain MAP >65. Pulses palpable     Resp  2L NC or CPAP with sleep, LS diminished    GI/  Manley in place with low UO that's saad and sediment, no BM, tolerating Regular diet    Skin  No new skin issues, turned patient    Lines  2 PIVs    Face to face report given with opportunity to observe patient.    Report given to Heather Oleary RN   6/1/2023  6:41 AM

## 2023-06-01 NOTE — PROGRESS NOTES
Hypokalemia despite KCL 20 mEq multiple times; will order potassium replacement protocol.    Sánchez Pina MD

## 2023-06-01 NOTE — MEDICATION SCRIBE - ADMISSION MEDICATION HISTORY
Medication Scribe Admission Medication History    Admission medication history is complete. The information provided in this note is only as accurate as the sources available at the time of the update.    Medication reconciliation/reorder completed by provider prior to medication history? Yes    Information Source(s): Patient, [facility] and [facility] via in-person    Pertinent Information:     Patient reports taking all AM meds on 5/30/23 but is uncertain if he took anything after that.     Potassium- listed at [facility] as 40 meq AM and 20 meq PM- pt reports supposed to be taking 40 meq BID.    dexcom- reports currently wearing and needs to be changed    Changes made to PTA medication list:    Added: acetazolamide, humalog    Deleted: novolog-not covered (on humalog), lisinopril- therapy completed, oxycodone-therapy completed    Changed: updated time of day pt takes all home meds, plavix from 2 tabs to 1 tab, metolazone from twice weekly to 3x weekly.    Medication Affordability:  Not including over the counter (OTC) medications, was there a time in the past 3 months when you did not take your medications as prescribed because of cost?: No    Allergies reviewed with patient and updates made in EHR: yes-nurse completed, allergies match [facility], no concerns    Medication History Completed By: Krista Mattson 6/1/2023 8:51 AM    Prior to Admission medications    Medication Sig Last Dose Taking? Auth Provider Long Term End Date   albuterol (PROAIR HFA/PROVENTIL HFA/VENTOLIN HFA) 108 (90 Base) MCG/ACT inhaler Inhale 2 puffs into the lungs every 6 hours as needed for shortness of breath Past Week Yes Reported, Patient     aspirin (ASA) 81 MG EC tablet Take 81 mg by mouth At Bedtime  Past Week at HS Yes Reported, Patient     atenolol (TENORMIN) 25 MG tablet Take 25 mg by mouth At Bedtime Past Week at HS Yes Reported, Patient Yes    Calcium Carbonate Antacid 400 MG CHEW Take 2 chew tab by mouth At Bedtime  Past Week at HS Yes Reported, Patient     Continuous Blood Gluc  (DEXCOM G6 ) SANAZ Use to read blood sugars as per 's instructions. 6/1/2023 Yes Lyly Pedroza APRN CNP     Continuous Blood Gluc Sensor (DEXCOM G6 SENSOR) MISC Change every 10 days. 6/1/2023 Yes Lyly Pedroza APRN CNP     Continuous Blood Gluc Transmit (DEXCOM G6 TRANSMITTER) MISC Change every 3 months. 6/1/2023 Yes Lyly Pedroza APRN CNP     Diclofenac Sodium 3 % GEL Apply topically 2 times daily as needed More than a month Yes Reported, Patient     furosemide (LASIX) 40 MG tablet Take 120 mg by mouth At Bedtime Past Week at HS Yes Reported, Patient Yes    glucose (BD GLUCOSE) 4 g chewable tablet Take 1 tablet by mouth every hour as needed for low blood sugar More than a month Yes Lyly Pedroza APRN CNP     guaiFENesin (MUCINEX) 600 MG 12 hr tablet Take 600 mg by mouth 2 times daily as needed Past Week Yes Reported, Patient     insulin glargine (LANTUS PEN) 100 UNIT/ML pen Inject 30 Units Subcutaneous 2 times daily 5/30/2023 at 0800 Yes Lyly Pedroza APRN CNP Yes    LORazepam (ATIVAN) 0.5 MG tablet Take 0.5 mg by mouth nightly as needed for anxiety More than a month Yes Reported, Patient     mupirocin (BACTROBAN) 2 % external ointment Apply topically 2 times daily as needed More than a month Yes Reported, Patient     nitroGLYcerin (NITROSTAT) 0.4 MG sublingual tablet Place 0.4 mg under the tongue every 5 minutes as needed for chest pain More than a month Yes Reported, Patient Yes    acetaZOLAMIDE (DIAMOX SEQUEL) 500 MG 12 hr capsule Take 500 mg by mouth every morning 5/30/2023 at AM  Reported, Patient Yes    allopurinol (ZYLOPRIM) 100 MG tablet Take 200 mg by mouth every morning 5/30/2023 at AM  Reported, Patient     blood glucose (NO BRAND SPECIFIED) test strip 1 strip by In Vitro route 4 times daily (before meals and nightly) Use to test blood sugar 4 times daily or  as directed.   Nicolette Cooper, NP     blood glucose monitoring (SOFTCLIX) lancets 1 each by In Vitro route 4 times daily (before meals and nightly) Use to monitor blood glucose once daily as directed.   Nicoeltte Cooper NP     cholecalciferol (VITAMIN D3) 125 mcg (5000 units) capsule Take 125 mcg by mouth every morning 5/30/2023  Reported, Patient     clopidogrel (PLAVIX) 75 MG tablet Take 75 mg by mouth every morning 5/30/2023 at AM  Reported, Patient Yes    ezetimibe (ZETIA) 10 MG tablet Take 10 mg by mouth every morning 5/30/2023 at AM  Reported, Patient Yes    fish oil-omega-3 fatty acids 1000 MG capsule Take 1 g by mouth 2 times daily 5/30/2023 at AM  Reported, Patient     gabapentin (NEURONTIN) 100 MG capsule Take 300 mg by mouth 2 times daily 5/30/2023 at AM  Reported, Patient Yes    HUMALOG KWIKPEN 100 UNIT/ML soln Inject 15 Units Subcutaneous 3 times daily 5/30/2023 at noon  Reported, Patient Yes    insulin pen needle (31G X 8 MM) 31G X 8 MM miscellaneous Use 5 pen needles daily or as directed.   Lyly Pedroza APRN CNP     isosorbide mononitrate (IMDUR) 30 MG 24 hr tablet Take 2 tablets by mouth every morning 5/30/2023 at AM  Reported, Patient Yes    levothyroxine (SYNTHROID/LEVOTHROID) 50 MCG tablet Take 50 mcg by mouth every morning 5/30/2023 at AM  Reported, Patient Yes    metolazone (ZAROXOLYN) 2.5 MG tablet Take 1 tablet by mouth three times a week -Monday, Wednesday and Friday at bedtime. Take with lasix. 5/29/2023 at HS  Reported, Patient Yes    mycophenolate (GENERIC EQUIVALENT) 500 MG tablet Take 1,000 mg by mouth 2 times daily 5/30/2023 at AM  Reported, Patient Yes    potassium chloride ER (K-TAB) 20 MEQ CR tablet Take 40 mEq by mouth 2 times daily 5/30/2023 at AM  Reported, Patient     predniSONE (DELTASONE) 5 MG tablet Take 5 mg by mouth every morning 5/30/2023 at AM  Reported, Patient     rosuvastatin (CRESTOR) 40 MG tablet Take 40 mg by mouth At Bedtime  5/30/2023 at HS   Reported, Patient Yes    TRELEGY ELLIPTA 100-62.5-25 MCG/INH oral inhaler Inhale 1 puff into the lungs every morning 5/30/2023 at AM  Reported, Patient

## 2023-06-01 NOTE — DISCHARGE SUMMARY
"[facility]  Hospitalist Discharge Summary      Date of Admission:  5/31/2023  Date of Discharge:  6/1/2023  Discharging Provider: Sánchez Pina MD  Discharge Service: Hospitalist Service    Discharge Diagnoses   Principal Problem:    Acute renal failure, unspecified acute renal failure type (H)  Active Problems:    Immunocompromised due to corticosteroids (H)    H/O multiple allergies    Septic shock (H)    Gram-negative sepsis with organ dysfunction (H)    Acute on chronic systolic congestive heart failure (H)    Cardiomyopathy, secondary (H)      Clinically Significant Risk Factors     # Severe Obesity: Estimated body mass index is 46.93 kg/m  as calculated from the following:    Height as of this encounter: 1.727 m (5' 8\").    Weight as of this encounter: 140 kg (308 lb 10.3 oz).       Follow-ups Needed After Discharge   Unresulted Labs Ordered in the Past 30 Days of this Admission     Date and Time Order Name Status Description    5/31/2023  2:56 PM MRSA MSSA PCR, Nasal Swab In process     5/31/2023  2:29 PM Urine Culture Preliminary     5/31/2023  2:28 PM Blood Culture Hand, Left Preliminary     5/31/2023  2:28 PM Blood Culture Arm, Left Preliminary       These results will be followed up by pcp    Discharge Disposition   Transferred to [facility]  Condition at discharge: Critical    Hospital Course   HPI from H&P:   Eduar Isaacs is a 68 year old male status post kidney transplant, type 2 diabetes mellitus with polyneuropathy, JYOTSNA, CHF, COPD, who presents with a fever and dysuria since yesterday.    Patient was in his usual state of health until yesterday morning.  In the afternoon he developed dysuria and subsequently fever, chills, lightheadedness, anorexia.  As patient felt extremely weak he decided to seek medical attention by coming to the emergency room.  In the emergency room, he was noted to have fever of 102.7, heart rate 112, initial blood pressure 110/57.  Patient however " develop tachypnea with respiratory rate 30.  UA was positive for UTI, leukocytosis with a WBC 22,000, hypokalemia.  Patient was pancultured and started on levofloxacin and linezolid.  He is now being admitted to the hospital for further care.  Of note, patient received 2 L of normal saline but subsequently became hypotensive with a blood pressure 87/56.  Patient's BNP is elevated at 3665.  In light of these hypotension with a CHF, patient is being admitted to ICU for further care.     Brief Hospital Course:  Following issues were addressed during hospitalization:    #Sepsis with hypotension and organ dysfunction secondary to UTI  #GNR bacteremia  Patient was admitted to the ICU and was continued on norepinephrine overnight.  Patient's had hypotensive episode with blood pressure going down to67/35 at one time, and norepinephrine dose was increased.  Patient was continued on meropenem and linezolid.  Patient did receive a dose of levofloxacin in the emergency room.  Since patient's renal function worsened and he developed CHF symptoms overnight with orthopnea, patient's case was presented to Dr. Brady at [facility] who accepted the patient for transfer.    #CHF exacerbation, septic cardiomyopathy,   Overnight, patient developed CHF symptoms with orthopnea and patient had to sleep in chair.  BNP on admission was 3665, which increased to 22,834 by the next morning.  Echocardiogram was checked and it is pending at the time of this note.  In light of CHF, fluid administration was limited and patient was continued on norepinephrine IV drip to maintain his blood pressure.    #Elevated troponin, demand ischemia  Patient developed orthopnea but no chest pain.  EKG did not show any ischemic changes this morning.  I presume troponin elevation is due to demand ischemia.  We will continue with the patient's cardiac medications including dual antiplatelet therapy, statin and nitroglycerin.  We are holding patient's  atenolol and lisinopril due to hypotension and renal failure.    #KAYLAN on CKD  #Status post a renal transplant, on immunosuppressive therapy including steroid  Since patient's adrenal axis might be suppressed, patient was given stress dose steroid.  Immunosuppressive therapy was continued.  Patient's renal function worsened overnight since admission and his creatinine increased from 2.49 to 3.21 by the next morning.  In light of this worsening renal function, the case was discussed with Dr. Brady at [facility] who graciously accepted the patient for transfer    #Hypokalemia  Patient's hypokalemia on admission with level at 2.8 persisted despite 20 mill equivalent KCl x2.  Potassium replacement protocol was started and it improved to 3.2.    #Hyponatremia  Patient's sodium level was 129 at the time of presentation, but serum osmolality was normal at 300, suggesting pseudohyponatremia.    #Type 2 diabetes mellitus  Patient's a preprandial NovoLog was reduced from 15 to 7 units 3 times daily.  Patient's blood glucose was controlled and in light of worsening renal function Lantus 30 units twice daily was held in the morning.    #COPD  Patient's symptom is controlled    #JYOTSNA  Patient was continued with the CPAP      Consultations This Hospital Stay   PHYSICAL THERAPY ADULT IP CONSULT  OCCUPATIONAL THERAPY ADULT IP CONSULT    Code Status   Full Code    Time Spent on this Encounter   I, Sánchez Pina MD, personally saw the patient today and spent greater than 30 minutes discharging this patient.       Sánchez Pina MD  [address] [facility]  [address] [place] [id number]  Phone: [phone]  Fax: [phone]  ______________________________________________________________________    Physical Exam   Vital Signs: Temp: 98.6  F (37  C) Temp src: Tympanic BP: 126/75 Pulse: 94   Resp: (!) 27 SpO2: 98 % O2 Device: Nasal cannula Oxygen Delivery: 2 LPM  Weight: 308 lbs 10.3 oz  General Appearance:  Sitting in  chair in mild respiratory distress  Respiratory: Mild crackles bilaterally at bases  Cardiovascular: S1-S2, regular rhythm and rate, no murmurs rubs gallops, 1+ lower extremity edema  GI: Obese, soft, nontender, nondistended  Skin: Intact  Other:  Neuro: Nonfocal       Primary Care Physician   Kody Abarca    Discharge Orders   No discharge procedures on file.    Significant Results and Procedures     Results for orders placed or performed during the hospital encounter of 23   XR Chest Port 1 View    Narrative    PROCEDURE:  XR CHEST PORT 1 VIEW    HISTORY:  Sepsis.     COMPARISON:  None.    FINDINGS:   The cardiac silhouette is normal in size. The pulmonary vasculature is  normal.  The lungs are clear. No pleural effusion or pneumothorax.      Impression    IMPRESSION:  No acute cardiopulmonary disease.      AMBIKA HERRERA MD         SYSTEM ID:  [id number]   XR Chest Port 1 View    Narrative    PROCEDURE:  XR CHEST PORT 1 VIEW    HISTORY:  right base crackles with orthopnea; check for CHF vs  pneumonia.     COMPARISON:  None.    FINDINGS:   The cardiac silhouette is normal in size. The pulmonary vasculature is  normal.  The lungs are clear. No pleural effusion or pneumothorax.      Impression    IMPRESSION:  No acute cardiopulmonary disease.      AMBIKA HERRERA MD         SYSTEM ID:  [id number]   Echocardiogram Complete     Value    LVEF  55-60%    Narrative    [id number]  [id number]  [id number]  [id number]^ORLANDO^JD^BARBARA     [facility]  Echocardiography Laboratory  [address] [place], [place] [id number]     Name: CAMILO GREENE  MRN: [id number]  : 1955  Study Date: 2023 07:10 AM  Age: 68 yrs  Gender: Male  Patient Location: [facility]  Reason For Study: CHF  History: CHF  Ordering Physician: JD PERRY  Performed By: Maria L Parikh RDCS     BSA: 2.5 m2  Height: 68 in  Weight: 308  lb  ______________________________________________________________________________  Procedure  Complete Portable Echo Adult. Contrast Definity. Echocardiogram with two-  dimensional, color and spectral Doppler performed. Technically difficult  study.Extremely difficult acoustic windows despite the use of contrast for  endcardial border definition.  ______________________________________________________________________________  Interpretation Summary  Technically difficult study.Extremely difficult acoustic windows despite the  use of contrast for endcardial border definition.  Global and regional left ventricular function is normal with an EF of 55-60%.  Global right ventricular function is normal.  IVC diameter and respiratory changes fall into an intermediate range  suggesting an RA pressure of 8 mmHg.  No pericardial effusion is present.  ______________________________________________________________________________  Left Ventricle  Global and regional left ventricular function is normal with an EF of 55-60%.  Left ventricular size is normal. Left ventricular wall thickness cannot  evaluate.     Right Ventricle  The right ventricle is normal size. Global right ventricular function is  normal.     Atria  Both atria appear normal.     Mitral Valve  The mitral valve is normal.     Aortic Valve  The valve leaflets are not well visualized. On Doppler interrogation, there is  no significant stenosis or regurgitation.     Tricuspid Valve  The tricuspid valve is normal. The peak velocity of the tricuspid regurgitant  jet is not obtainable. Pulmonary artery systolic pressure cannot be assessed.     Pulmonic Valve  The pulmonic valve is normal.     Vessels  The aorta root is normal. IVC diameter and respiratory changes fall into an  intermediate range suggesting an RA pressure of 8 mmHg.     Pericardium  No pericardial effusion is present.     Miscellaneous  No significant valvular abnormalities present.      ______________________________________________________________________________  MMode/2D Measurements & Calculations  IVSd: 1.2 cm  LVIDd: 5.1 cm  LVIDs: 3.6 cm  LVPWd: 1.1 cm     FS: 28.7 %  LV mass(C)d: 233.0 grams  LV mass(C)dI: 94.9 grams/m2  Ao root diam: 3.6 cm  asc Aorta Diam: 3.7 cm  LVOT diam: 2.3 cm  LVOT area: 4.1 cm2  LA Volume Indexed (AL/bp): 32.3 ml/m2  RWT: 0.45  TAPSE: 2.4 cm     Doppler Measurements & Calculations  MV E max zay: 53.0 cm/sec  MV A max zay: 63.0 cm/sec  MV E/A: 0.84  MV dec slope: 253.0 cm/sec2  MV dec time: 0.21 sec  Ao V2 max: 136.0 cm/sec  Ao max P.4 mmHg  Ao V2 mean: 101.0 cm/sec  Ao mean P.5 mmHg  Ao V2 VTI: 22.9 cm  KWABENA(I,D): 3.8 cm2  KWABENA(V,D): 3.9 cm2  LV V1 max P.8 mmHg  LV V1 max: 130.0 cm/sec  LV V1 VTI: 21.4 cm  SV(LVOT): 87.4 ml  SI(LVOT): 35.6 ml/m2  AV Zay Ratio (DI): 0.96  KWABENA Index (cm2/m2): 1.6  E/E' av.0  Lateral E/e': 5.8  Medial E/e': 6.2     ______________________________________________________________________________  Report approved by: MD Slade Morrow 2023 08:54 AM           Discharge Medications   Current Discharge Medication List      CONTINUE these medications which have NOT CHANGED    Details   albuterol (PROAIR HFA/PROVENTIL HFA/VENTOLIN HFA) 108 (90 Base) MCG/ACT inhaler Inhale 2 puffs into the lungs every 6 hours as needed for shortness of breath    Comments: Pharmacy may dispense brand covered by insurance (Proair, or proventil or ventolin or generic albuterol inhaler)      aspirin (ASA) 81 MG EC tablet Take 81 mg by mouth At Bedtime       atenolol (TENORMIN) 25 MG tablet Take 25 mg by mouth At Bedtime      Calcium Carbonate Antacid 400 MG CHEW Take 2 chew tab by mouth At Bedtime      Continuous Blood Gluc  (DEXCOM G6 ) SANAZ Use to read blood sugars as per 's instructions.  Qty: 1 each, Refills: 0    Associated Diagnoses: Type 2 diabetes mellitus with hyperglycemia, with  long-term current use of insulin (H)      Continuous Blood Gluc Sensor (DEXCOM G6 SENSOR) MISC Change every 10 days.  Qty: 3 each, Refills: 11    Associated Diagnoses: Type 2 diabetes mellitus with hyperglycemia, with long-term current use of insulin (H)      Continuous Blood Gluc Transmit (DEXCOM G6 TRANSMITTER) MISC Change every 3 months.  Qty: 1 each, Refills: 3    Associated Diagnoses: Type 2 diabetes mellitus with hyperglycemia, with long-term current use of insulin (H)      Diclofenac Sodium 3 % GEL Apply topically 2 times daily as needed      furosemide (LASIX) 40 MG tablet Take 120 mg by mouth At Bedtime      glucose (BD GLUCOSE) 4 g chewable tablet Take 1 tablet by mouth every hour as needed for low blood sugar  Qty: 30 tablet, Refills: 1    Associated Diagnoses: Type 2 diabetes mellitus with hyperglycemia, with long-term current use of insulin (H)      guaiFENesin (MUCINEX) 600 MG 12 hr tablet Take 600 mg by mouth 2 times daily as needed      insulin glargine (LANTUS PEN) 100 UNIT/ML pen Inject 30 Units Subcutaneous 2 times daily  Qty: 15 mL, Refills: 0    Comments: If Lantus is not covered by insurance, may substitute Basaglar or Semglee or other insulin glargine product per insurance preference at same dose and frequency.    Associated Diagnoses: Controlled type 2 diabetes mellitus with hyperglycemia, with long-term current use of insulin (H)      LORazepam (ATIVAN) 0.5 MG tablet Take 0.5 mg by mouth nightly as needed for anxiety      mupirocin (BACTROBAN) 2 % external ointment Apply topically 2 times daily as needed      nitroGLYcerin (NITROSTAT) 0.4 MG sublingual tablet Place 0.4 mg under the tongue every 5 minutes as needed for chest pain      acetaZOLAMIDE (DIAMOX SEQUEL) 500 MG 12 hr capsule Take 500 mg by mouth every morning      allopurinol (ZYLOPRIM) 100 MG tablet Take 200 mg by mouth every morning      blood glucose (NO BRAND SPECIFIED) test strip 1 strip by In Vitro route 4 times daily (before  meals and nightly) Use to test blood sugar 4 times daily or as directed.  Qty: 100 strip, Refills: 0    Associated Diagnoses: Diabetes mellitus without complication (H)      blood glucose monitoring (SOFTCLIX) lancets 1 each by In Vitro route 4 times daily (before meals and nightly) Use to monitor blood glucose once daily as directed.  Qty: 200 each, Refills: 0    Associated Diagnoses: Diabetes mellitus without complication (H)      cholecalciferol (VITAMIN D3) 125 mcg (5000 units) capsule Take 125 mcg by mouth every morning      clopidogrel (PLAVIX) 75 MG tablet Take 75 mg by mouth every morning      ezetimibe (ZETIA) 10 MG tablet Take 10 mg by mouth every morning      fish oil-omega-3 fatty acids 1000 MG capsule Take 1 g by mouth 2 times daily      gabapentin (NEURONTIN) 100 MG capsule Take 300 mg by mouth 2 times daily      HUMALOG KWIKPEN 100 UNIT/ML soln Inject 15 Units Subcutaneous 3 times daily      insulin pen needle (31G X 8 MM) 31G X 8 MM miscellaneous Use 5 pen needles daily or as directed.  Qty: 100 each, Refills: 11    Associated Diagnoses: Diabetes mellitus without complication (H)      isosorbide mononitrate (IMDUR) 30 MG 24 hr tablet Take 2 tablets by mouth every morning      levothyroxine (SYNTHROID/LEVOTHROID) 50 MCG tablet Take 50 mcg by mouth every morning      metolazone (ZAROXOLYN) 2.5 MG tablet Take 1 tablet by mouth three times a week -Monday, Wednesday and Friday at bedtime. Take with lasix.      mycophenolate (GENERIC EQUIVALENT) 500 MG tablet Take 1,000 mg by mouth 2 times daily      potassium chloride ER (K-TAB) 20 MEQ CR tablet Take 40 mEq by mouth 2 times daily      predniSONE (DELTASONE) 5 MG tablet Take 5 mg by mouth every morning      rosuvastatin (CRESTOR) 40 MG tablet Take 40 mg by mouth At Bedtime       TRELEGY ELLIPTA 100-62.5-25 MCG/INH oral inhaler Inhale 1 puff into the lungs every morning         STOP taking these medications       insulin aspart (NOVOLOG PEN) 100 UNIT/ML pen  Comments:   Reason for Stopping:             Allergies   Allergies   Allergen Reactions     Cefepime Other (See Comments)     pain, kidney function off.     Codeine Shortness Of Breath     Niacin Itching and Rash     Amoxicillin Itching and Rash     Prilosec [Omeprazole] Itching and Rash     Vancomycin Hives and Rash     Atorvastatin Muscle Pain (Myalgia)     Semaglutide Diarrhea     Spironolactone

## 2023-06-01 NOTE — PROGRESS NOTES
Physical Therapy: Orders received. Chart reviewed and discussed with care team.? Physical Therapy not indicated due to pt being transferred to higher level of care per Dr. Pina.? Will complete orders.

## 2023-06-02 LAB — BACTERIA UR CULT: ABNORMAL

## 2023-06-03 LAB
BACTERIA BLD CULT: ABNORMAL
BACTERIA BLD CULT: ABNORMAL

## 2023-06-13 ENCOUNTER — TRANSFERRED RECORDS (OUTPATIENT)
Dept: HEALTH INFORMATION MANAGEMENT | Facility: CLINIC | Age: 68
End: 2023-06-13

## 2023-06-13 LAB
CREATININE (EXTERNAL): 3.04 MG/DL (ref 0.8–1.5)
GFR ESTIMATED (EXTERNAL): 22 ML/MIN/1.73M2
GLUCOSE (EXTERNAL): 93 MG/DL (ref 60–99)
POTASSIUM (EXTERNAL): 3.4 MMOL/L (ref 3.5–5.1)

## 2023-06-15 LAB
CREATININE (EXTERNAL): 2.65 MG/DL (ref 0.8–1.5)
GFR ESTIMATED (EXTERNAL): 26 ML/MIN/1.73M2
GLUCOSE (EXTERNAL): 131 MG/DL (ref 60–99)
POTASSIUM (EXTERNAL): 3.8 MMOL/L (ref 3.5–5.1)

## 2023-06-25 ENCOUNTER — HOSPITAL ENCOUNTER (INPATIENT)
Facility: HOSPITAL | Age: 68
LOS: 3 days | Discharge: HOME | DRG: 871 | End: 2023-06-28
Attending: FAMILY MEDICINE | Admitting: HOSPITALIST
Payer: COMMERCIAL

## 2023-06-25 DIAGNOSIS — N39.0 URINARY TRACT INFECTION WITHOUT HEMATURIA, SITE UNSPECIFIED: ICD-10-CM

## 2023-06-25 LAB
ALBUMIN UR-MCNC: 100 MG/DL
ANION GAP SERPL CALCULATED.3IONS-SCNC: 12 MMOL/L (ref 7–15)
APPEARANCE UR: ABNORMAL
BACTERIA #/AREA URNS HPF: ABNORMAL /HPF
BASOPHILS # BLD AUTO: 0.1 10E3/UL (ref 0–0.2)
BASOPHILS NFR BLD AUTO: 1 %
BILIRUB UR QL STRIP: NEGATIVE
BUN SERPL-MCNC: 58.8 MG/DL (ref 8–23)
CALCIUM SERPL-MCNC: 9 MG/DL (ref 8.8–10.2)
CHLORIDE SERPL-SCNC: 92 MMOL/L (ref 98–107)
COLOR UR AUTO: YELLOW
CREAT SERPL-MCNC: 2.22 MG/DL (ref 0.67–1.17)
DEPRECATED HCO3 PLAS-SCNC: 27 MMOL/L (ref 22–29)
EOSINOPHIL # BLD AUTO: 0.1 10E3/UL (ref 0–0.7)
EOSINOPHIL NFR BLD AUTO: 1 %
ERYTHROCYTE [DISTWIDTH] IN BLOOD BY AUTOMATED COUNT: 16.1 % (ref 10–15)
GFR SERPL CREATININE-BSD FRML MDRD: 32 ML/MIN/1.73M2
GLUCOSE SERPL-MCNC: 162 MG/DL (ref 70–99)
GLUCOSE UR STRIP-MCNC: NEGATIVE MG/DL
HCT VFR BLD AUTO: 31.9 % (ref 40–53)
HGB BLD-MCNC: 9.9 G/DL (ref 13.3–17.7)
HGB UR QL STRIP: ABNORMAL
IMM GRANULOCYTES # BLD: 0.1 10E3/UL
IMM GRANULOCYTES NFR BLD: 1 %
KETONES UR STRIP-MCNC: NEGATIVE MG/DL
LACTATE SERPL-SCNC: 0.9 MMOL/L (ref 0.7–2)
LEUKOCYTE ESTERASE UR QL STRIP: ABNORMAL
LYMPHOCYTES # BLD AUTO: 1.9 10E3/UL (ref 0.8–5.3)
LYMPHOCYTES NFR BLD AUTO: 20 %
MCH RBC QN AUTO: 28 PG (ref 26.5–33)
MCHC RBC AUTO-ENTMCNC: 31 G/DL (ref 31.5–36.5)
MCV RBC AUTO: 90 FL (ref 78–100)
MONOCYTES # BLD AUTO: 1.3 10E3/UL (ref 0–1.3)
MONOCYTES NFR BLD AUTO: 14 %
NEUTROPHILS # BLD AUTO: 6 10E3/UL (ref 1.6–8.3)
NEUTROPHILS NFR BLD AUTO: 63 %
NITRATE UR QL: POSITIVE
NRBC # BLD AUTO: 0 10E3/UL
NRBC BLD AUTO-RTO: 0 /100
PH UR STRIP: 6 [PH] (ref 4.7–8)
PLATELET # BLD AUTO: 265 10E3/UL (ref 150–450)
POTASSIUM SERPL-SCNC: 3.5 MMOL/L (ref 3.4–5.3)
RBC # BLD AUTO: 3.53 10E6/UL (ref 4.4–5.9)
RBC URINE: 12 /HPF
SODIUM SERPL-SCNC: 131 MMOL/L (ref 136–145)
SP GR UR STRIP: 1.01 (ref 1–1.03)
SQUAMOUS EPITHELIAL: 0 /HPF
UROBILINOGEN UR STRIP-MCNC: NORMAL MG/DL
WBC # BLD AUTO: 9.4 10E3/UL (ref 4–11)
WBC CLUMPS #/AREA URNS HPF: PRESENT /HPF
WBC URINE: >182 /HPF

## 2023-06-25 PROCEDURE — 81001 URINALYSIS AUTO W/SCOPE: CPT | Performed by: FAMILY MEDICINE

## 2023-06-25 PROCEDURE — 87086 URINE CULTURE/COLONY COUNT: CPT | Performed by: FAMILY MEDICINE

## 2023-06-25 PROCEDURE — 82310 ASSAY OF CALCIUM: CPT | Performed by: FAMILY MEDICINE

## 2023-06-25 PROCEDURE — 36415 COLL VENOUS BLD VENIPUNCTURE: CPT | Performed by: FAMILY MEDICINE

## 2023-06-25 PROCEDURE — 120N000001 HC R&B MED SURG/OB

## 2023-06-25 PROCEDURE — 85025 COMPLETE CBC W/AUTO DIFF WBC: CPT | Performed by: FAMILY MEDICINE

## 2023-06-25 PROCEDURE — 99285 EMERGENCY DEPT VISIT HI MDM: CPT | Mod: 25

## 2023-06-25 PROCEDURE — 87040 BLOOD CULTURE FOR BACTERIA: CPT | Performed by: FAMILY MEDICINE

## 2023-06-25 PROCEDURE — 99285 EMERGENCY DEPT VISIT HI MDM: CPT | Performed by: FAMILY MEDICINE

## 2023-06-25 PROCEDURE — 83605 ASSAY OF LACTIC ACID: CPT | Performed by: FAMILY MEDICINE

## 2023-06-25 RX ORDER — LEVOFLOXACIN 5 MG/ML
500 INJECTION, SOLUTION INTRAVENOUS ONCE
Status: COMPLETED | OUTPATIENT
Start: 2023-06-25 | End: 2023-06-26

## 2023-06-25 ASSESSMENT — ACTIVITIES OF DAILY LIVING (ADL): ADLS_ACUITY_SCORE: 35

## 2023-06-26 ENCOUNTER — APPOINTMENT (OUTPATIENT)
Dept: PHYSICAL THERAPY | Facility: HOSPITAL | Age: 68
DRG: 871 | End: 2023-06-26
Attending: HOSPITALIST
Payer: COMMERCIAL

## 2023-06-26 LAB
ANION GAP SERPL CALCULATED.3IONS-SCNC: 15 MMOL/L (ref 7–15)
BUN SERPL-MCNC: 54.6 MG/DL (ref 8–23)
CALCIUM SERPL-MCNC: 8.9 MG/DL (ref 8.8–10.2)
CHLORIDE SERPL-SCNC: 94 MMOL/L (ref 98–107)
CREAT SERPL-MCNC: 2.22 MG/DL (ref 0.67–1.17)
DEPRECATED HCO3 PLAS-SCNC: 25 MMOL/L (ref 22–29)
ERYTHROCYTE [DISTWIDTH] IN BLOOD BY AUTOMATED COUNT: 16 % (ref 10–15)
GFR SERPL CREATININE-BSD FRML MDRD: 32 ML/MIN/1.73M2
GLUCOSE BLDC GLUCOMTR-MCNC: 115 MG/DL (ref 70–99)
GLUCOSE BLDC GLUCOMTR-MCNC: 146 MG/DL (ref 70–99)
GLUCOSE SERPL-MCNC: 105 MG/DL (ref 70–99)
HCT VFR BLD AUTO: 30.9 % (ref 40–53)
HGB BLD-MCNC: 9.9 G/DL (ref 13.3–17.7)
LACTATE SERPL-SCNC: 0.8 MMOL/L (ref 0.7–2)
MCH RBC QN AUTO: 28.4 PG (ref 26.5–33)
MCHC RBC AUTO-ENTMCNC: 32 G/DL (ref 31.5–36.5)
MCV RBC AUTO: 89 FL (ref 78–100)
PHOSPHATE SERPL-MCNC: 3.3 MG/DL (ref 2.5–4.5)
PLATELET # BLD AUTO: 249 10E3/UL (ref 150–450)
POTASSIUM SERPL-SCNC: 3.3 MMOL/L (ref 3.4–5.3)
PROCALCITONIN SERPL IA-MCNC: 0.68 NG/ML
RBC # BLD AUTO: 3.49 10E6/UL (ref 4.4–5.9)
SODIUM SERPL-SCNC: 134 MMOL/L (ref 136–145)
WBC # BLD AUTO: 9.9 10E3/UL (ref 4–11)

## 2023-06-26 PROCEDURE — 80048 BASIC METABOLIC PNL TOTAL CA: CPT | Performed by: HOSPITALIST

## 2023-06-26 PROCEDURE — 250N000013 HC RX MED GY IP 250 OP 250 PS 637: Performed by: HOSPITALIST

## 2023-06-26 PROCEDURE — 96372 THER/PROPH/DIAG INJ SC/IM: CPT | Performed by: HOSPITALIST

## 2023-06-26 PROCEDURE — 84145 PROCALCITONIN (PCT): CPT | Performed by: INTERNAL MEDICINE

## 2023-06-26 PROCEDURE — 250N000013 HC RX MED GY IP 250 OP 250 PS 637: Performed by: INTERNAL MEDICINE

## 2023-06-26 PROCEDURE — 250N000011 HC RX IP 250 OP 636: Mod: JZ | Performed by: HOSPITALIST

## 2023-06-26 PROCEDURE — 36415 COLL VENOUS BLD VENIPUNCTURE: CPT | Performed by: INTERNAL MEDICINE

## 2023-06-26 PROCEDURE — 120N000001 HC R&B MED SURG/OB

## 2023-06-26 PROCEDURE — 36415 COLL VENOUS BLD VENIPUNCTURE: CPT | Performed by: HOSPITALIST

## 2023-06-26 PROCEDURE — 250N000011 HC RX IP 250 OP 636: Mod: JZ | Performed by: FAMILY MEDICINE

## 2023-06-26 PROCEDURE — 250N000012 HC RX MED GY IP 250 OP 636 PS 637: Performed by: HOSPITALIST

## 2023-06-26 PROCEDURE — 97161 PT EVAL LOW COMPLEX 20 MIN: CPT | Mod: GP | Performed by: PHYSICAL THERAPIST

## 2023-06-26 PROCEDURE — 83605 ASSAY OF LACTIC ACID: CPT | Performed by: INTERNAL MEDICINE

## 2023-06-26 PROCEDURE — 85027 COMPLETE CBC AUTOMATED: CPT | Performed by: HOSPITALIST

## 2023-06-26 PROCEDURE — 99223 1ST HOSP IP/OBS HIGH 75: CPT | Performed by: HOSPITALIST

## 2023-06-26 PROCEDURE — 250N000011 HC RX IP 250 OP 636: Mod: JZ | Performed by: INTERNAL MEDICINE

## 2023-06-26 PROCEDURE — 84100 ASSAY OF PHOSPHORUS: CPT | Performed by: INTERNAL MEDICINE

## 2023-06-26 PROCEDURE — 82962 GLUCOSE BLOOD TEST: CPT

## 2023-06-26 RX ORDER — ALLOPURINOL 100 MG/1
200 TABLET ORAL EVERY MORNING
Status: DISCONTINUED | OUTPATIENT
Start: 2023-06-26 | End: 2023-06-28 | Stop reason: HOSPADM

## 2023-06-26 RX ORDER — LEVOTHYROXINE SODIUM 50 UG/1
50 TABLET ORAL
Status: DISCONTINUED | OUTPATIENT
Start: 2023-06-27 | End: 2023-06-28 | Stop reason: HOSPADM

## 2023-06-26 RX ORDER — SACCHAROMYCES BOULARDII 250 MG
250 CAPSULE ORAL 3 TIMES DAILY
COMMUNITY
Start: 2023-06-16 | End: 2024-01-07

## 2023-06-26 RX ORDER — NICOTINE POLACRILEX 4 MG
15-30 LOZENGE BUCCAL
Status: DISCONTINUED | OUTPATIENT
Start: 2023-06-26 | End: 2023-06-28 | Stop reason: HOSPADM

## 2023-06-26 RX ORDER — ASPIRIN 81 MG/1
81 TABLET ORAL AT BEDTIME
Status: DISCONTINUED | OUTPATIENT
Start: 2023-06-26 | End: 2023-06-28 | Stop reason: HOSPADM

## 2023-06-26 RX ORDER — ONDANSETRON 4 MG/1
4 TABLET, ORALLY DISINTEGRATING ORAL EVERY 6 HOURS PRN
Status: DISCONTINUED | OUTPATIENT
Start: 2023-06-26 | End: 2023-06-28 | Stop reason: HOSPADM

## 2023-06-26 RX ORDER — METRONIDAZOLE 500 MG/1
1 TABLET ORAL
Status: ON HOLD | COMMUNITY
Start: 2023-06-21 | End: 2023-06-26

## 2023-06-26 RX ORDER — CALCIUM ACETATE 667 MG/1
1 CAPSULE ORAL
COMMUNITY
Start: 2023-06-15 | End: 2024-04-19

## 2023-06-26 RX ORDER — ATENOLOL 25 MG/1
25 TABLET ORAL AT BEDTIME
Status: DISCONTINUED | OUTPATIENT
Start: 2023-06-26 | End: 2023-06-28 | Stop reason: HOSPADM

## 2023-06-26 RX ORDER — LEVOTHYROXINE SODIUM 25 UG/1
12.5 TABLET ORAL
COMMUNITY
Start: 2023-06-20 | End: 2023-12-01 | Stop reason: DRUGHIGH

## 2023-06-26 RX ORDER — CLOPIDOGREL BISULFATE 75 MG/1
75 TABLET ORAL EVERY MORNING
Status: DISCONTINUED | OUTPATIENT
Start: 2023-06-26 | End: 2023-06-28 | Stop reason: HOSPADM

## 2023-06-26 RX ORDER — FUROSEMIDE 40 MG
120 TABLET ORAL DAILY
Status: DISCONTINUED | OUTPATIENT
Start: 2023-06-26 | End: 2023-06-28 | Stop reason: HOSPADM

## 2023-06-26 RX ORDER — INSULIN LISPRO 100 [IU]/ML
15 INJECTION, SOLUTION INTRAVENOUS; SUBCUTANEOUS
Status: DISCONTINUED | OUTPATIENT
Start: 2023-06-26 | End: 2023-06-27

## 2023-06-26 RX ORDER — LEVOFLOXACIN 5 MG/ML
750 INJECTION, SOLUTION INTRAVENOUS
Status: DISCONTINUED | OUTPATIENT
Start: 2023-06-27 | End: 2023-06-28 | Stop reason: HOSPADM

## 2023-06-26 RX ORDER — POTASSIUM CHLORIDE 1500 MG/1
40 TABLET, EXTENDED RELEASE ORAL 2 TIMES DAILY
Status: DISCONTINUED | OUTPATIENT
Start: 2023-06-26 | End: 2023-06-28 | Stop reason: HOSPADM

## 2023-06-26 RX ORDER — PREDNISONE 5 MG/1
5 TABLET ORAL EVERY MORNING
Status: DISCONTINUED | OUTPATIENT
Start: 2023-06-26 | End: 2023-06-28 | Stop reason: HOSPADM

## 2023-06-26 RX ORDER — ROSUVASTATIN CALCIUM 10 MG/1
40 TABLET, COATED ORAL AT BEDTIME
Status: DISCONTINUED | OUTPATIENT
Start: 2023-06-26 | End: 2023-06-28 | Stop reason: HOSPADM

## 2023-06-26 RX ORDER — AMOXICILLIN 250 MG
1 CAPSULE ORAL 2 TIMES DAILY PRN
Status: DISCONTINUED | OUTPATIENT
Start: 2023-06-26 | End: 2023-06-28 | Stop reason: HOSPADM

## 2023-06-26 RX ORDER — ALBUTEROL SULFATE 90 UG/1
2 AEROSOL, METERED RESPIRATORY (INHALATION) EVERY 6 HOURS PRN
Status: DISCONTINUED | OUTPATIENT
Start: 2023-06-26 | End: 2023-06-28 | Stop reason: HOSPADM

## 2023-06-26 RX ORDER — ISOSORBIDE MONONITRATE 60 MG/1
60 TABLET, EXTENDED RELEASE ORAL EVERY MORNING
Status: DISCONTINUED | OUTPATIENT
Start: 2023-06-26 | End: 2023-06-28 | Stop reason: HOSPADM

## 2023-06-26 RX ORDER — EZETIMIBE 10 MG/1
10 TABLET ORAL EVERY MORNING
Status: DISCONTINUED | OUTPATIENT
Start: 2023-06-26 | End: 2023-06-28 | Stop reason: HOSPADM

## 2023-06-26 RX ORDER — CHLORAL HYDRATE 500 MG
1 CAPSULE ORAL 2 TIMES DAILY
COMMUNITY
End: 2024-01-07

## 2023-06-26 RX ORDER — HEPARIN SODIUM 5000 [USP'U]/.5ML
5000 INJECTION, SOLUTION INTRAVENOUS; SUBCUTANEOUS 2 TIMES DAILY
Status: DISCONTINUED | OUTPATIENT
Start: 2023-06-26 | End: 2023-06-26

## 2023-06-26 RX ORDER — HEPARIN SODIUM 5000 [USP'U]/.5ML
5000 INJECTION, SOLUTION INTRAVENOUS; SUBCUTANEOUS EVERY 8 HOURS
Status: DISCONTINUED | OUTPATIENT
Start: 2023-06-26 | End: 2023-06-28 | Stop reason: HOSPADM

## 2023-06-26 RX ORDER — MYCOPHENOLATE MOFETIL 500 MG/1
1000 TABLET ORAL 2 TIMES DAILY
Status: DISCONTINUED | OUTPATIENT
Start: 2023-06-26 | End: 2023-06-28 | Stop reason: HOSPADM

## 2023-06-26 RX ORDER — CALCIUM CARBONATE 500 MG/1
1000 TABLET, CHEWABLE ORAL AT BEDTIME
Status: DISCONTINUED | OUTPATIENT
Start: 2023-06-26 | End: 2023-06-28 | Stop reason: HOSPADM

## 2023-06-26 RX ORDER — ACETAMINOPHEN 325 MG/1
650 TABLET ORAL EVERY 4 HOURS PRN
Status: DISCONTINUED | OUTPATIENT
Start: 2023-06-26 | End: 2023-06-28 | Stop reason: HOSPADM

## 2023-06-26 RX ORDER — LEVOTHYROXINE SODIUM 50 UG/1
50 TABLET ORAL
Status: DISCONTINUED | OUTPATIENT
Start: 2023-06-26 | End: 2023-06-26

## 2023-06-26 RX ORDER — FUROSEMIDE 40 MG
120 TABLET ORAL AT BEDTIME
Status: DISCONTINUED | OUTPATIENT
Start: 2023-06-26 | End: 2023-06-26

## 2023-06-26 RX ORDER — AMOXICILLIN 250 MG
2 CAPSULE ORAL 2 TIMES DAILY PRN
Status: DISCONTINUED | OUTPATIENT
Start: 2023-06-26 | End: 2023-06-28 | Stop reason: HOSPADM

## 2023-06-26 RX ORDER — GABAPENTIN 300 MG/1
300 CAPSULE ORAL 2 TIMES DAILY
Status: DISCONTINUED | OUTPATIENT
Start: 2023-06-26 | End: 2023-06-28 | Stop reason: HOSPADM

## 2023-06-26 RX ORDER — ONDANSETRON 2 MG/ML
4 INJECTION INTRAMUSCULAR; INTRAVENOUS EVERY 6 HOURS PRN
Status: DISCONTINUED | OUTPATIENT
Start: 2023-06-26 | End: 2023-06-28 | Stop reason: HOSPADM

## 2023-06-26 RX ORDER — DEXTROSE MONOHYDRATE 25 G/50ML
25-50 INJECTION, SOLUTION INTRAVENOUS
Status: DISCONTINUED | OUTPATIENT
Start: 2023-06-26 | End: 2023-06-28 | Stop reason: HOSPADM

## 2023-06-26 RX ORDER — COLCHICINE 0.6 MG/1
1 TABLET ORAL DAILY PRN
Status: ON HOLD | COMMUNITY
Start: 2023-06-15 | End: 2024-04-29

## 2023-06-26 RX ADMIN — HEPARIN SODIUM 5000 UNITS: 5000 INJECTION, SOLUTION INTRAVENOUS; SUBCUTANEOUS at 09:37

## 2023-06-26 RX ADMIN — EZETIMIBE 10 MG: 10 TABLET ORAL at 09:37

## 2023-06-26 RX ADMIN — GABAPENTIN 300 MG: 300 CAPSULE ORAL at 21:11

## 2023-06-26 RX ADMIN — HEPARIN SODIUM 5000 UNITS: 5000 INJECTION, SOLUTION INTRAVENOUS; SUBCUTANEOUS at 18:12

## 2023-06-26 RX ADMIN — GABAPENTIN 300 MG: 300 CAPSULE ORAL at 09:37

## 2023-06-26 RX ADMIN — ACETAMINOPHEN 650 MG: 325 TABLET, FILM COATED ORAL at 02:52

## 2023-06-26 RX ADMIN — LEVOTHYROXINE SODIUM 50 MCG: 0.05 TABLET ORAL at 06:40

## 2023-06-26 RX ADMIN — PREDNISONE 5 MG: 5 TABLET ORAL at 09:38

## 2023-06-26 RX ADMIN — MYCOPHENOLATE MOFETIL 1000 MG: 500 TABLET ORAL at 09:38

## 2023-06-26 RX ADMIN — ALLOPURINOL 200 MG: 100 TABLET ORAL at 11:26

## 2023-06-26 RX ADMIN — LEVOFLOXACIN 500 MG: 500 INJECTION, SOLUTION INTRAVENOUS at 00:34

## 2023-06-26 RX ADMIN — CLOPIDOGREL BISULFATE 75 MG: 75 TABLET ORAL at 09:37

## 2023-06-26 RX ADMIN — ATENOLOL 25 MG: 25 TABLET ORAL at 21:10

## 2023-06-26 RX ADMIN — LEVOTHYROXINE SODIUM 12.5 MCG: 0.03 TABLET ORAL at 11:26

## 2023-06-26 RX ADMIN — FUROSEMIDE 120 MG: 40 TABLET ORAL at 17:51

## 2023-06-26 RX ADMIN — ROSUVASTATIN 40 MG: 10 TABLET, FILM COATED ORAL at 21:10

## 2023-06-26 RX ADMIN — POTASSIUM CHLORIDE 40 MEQ: 1500 TABLET, EXTENDED RELEASE ORAL at 21:11

## 2023-06-26 RX ADMIN — ACETAMINOPHEN 650 MG: 325 TABLET, FILM COATED ORAL at 21:11

## 2023-06-26 RX ADMIN — CALCIUM CARBONATE 1000 MG: 500 TABLET, CHEWABLE ORAL at 21:11

## 2023-06-26 RX ADMIN — INSULIN LISPRO 15 UNITS: 100 INJECTION, SOLUTION INTRAVENOUS; SUBCUTANEOUS at 16:59

## 2023-06-26 RX ADMIN — MYCOPHENOLATE MOFETIL 1000 MG: 500 TABLET ORAL at 21:10

## 2023-06-26 RX ADMIN — ASPIRIN 81 MG: 81 TABLET, COATED ORAL at 21:10

## 2023-06-26 RX ADMIN — ISOSORBIDE MONONITRATE 60 MG: 60 TABLET, EXTENDED RELEASE ORAL at 09:37

## 2023-06-26 RX ADMIN — POTASSIUM CHLORIDE 40 MEQ: 1500 TABLET, EXTENDED RELEASE ORAL at 11:26

## 2023-06-26 ASSESSMENT — ACTIVITIES OF DAILY LIVING (ADL)
ADLS_ACUITY_SCORE: 22
ADLS_ACUITY_SCORE: 35
ADLS_ACUITY_SCORE: 22
ADLS_ACUITY_SCORE: 20
DEPENDENT_IADLS:: INDEPENDENT
ADLS_ACUITY_SCORE: 22

## 2023-06-26 NOTE — PLAN OF CARE
Occupational Therapy: Orders received. Chart reviewed and discussed with care team.? Occupational Therapy not indicated due to patient being observation status and he did well with PT evaluation, no concerns warranting OT evaluation.? Defer discharge recommendations to PT.? Will complete orders.

## 2023-06-26 NOTE — ED TRIAGE NOTES
UC provider reviewed chart. Concerned pt will need more of a workup due to history and recent hospitalization. Pt to be seen in the ED.

## 2023-06-26 NOTE — PLAN OF CARE
[facility] Inpatient Admission Note:    Patient admitted to 3226/3226-1 at approximately 0055 via wheel chair accompanied by transport tech and daughter from emergency room . Report received from Lita FORD in SBAR format at 0040 via telephone. Patient ambulated to bed via self.. Patient is alert and oriented X 3, denies pain; rates at 0 on 0-10 scale.  Patient oriented to room, unit, hourly rounding, and plan of care. Explained admission packet and patient handbook with patient bill of rights brochure. Will continue to monitor and document as needed.     Inpatient Nursing criteria listed below was met:    Health care directives status obtained and documented: Yes    Patient identifies a surrogate decision maker: No      If initial lactic acid greater than 2.0, repeat lactic acid drawn within one hour of arrival to unit: NA. Lactic acid 0.9 @2245 6/25/23    Clergy visit ordered if patient requests: No    Skin issues/needs documented: No; patient refused full skin assessment: see flowsheets. Denied any skin issues besides scattered bruising on BUE    Isolation Patient: no   Education given, correct sign in place and documentation row added to PCS:  Yes    Fall Prevention Yes: Care plan updated, education given and documented, sticker and magnet in place: Yes    Care Plan initiated: Yes    Education Documented (including assessment): Yes    Patient has discharge needs : No Comes from home independently

## 2023-06-26 NOTE — MEDICATION SCRIBE - ADMISSION MEDICATION HISTORY
Medication Scribe Admission Medication History    Admission medication history is complete. The information provided in this note is only as accurate as the sources available at the time of the update.    Medication reconciliation/reorder completed by provider prior to medication history? Yes    Information Source(s): Patient, CareEverywhere/SureScripts and discharge med list from [facility] via in-person    Pertinent Information:     Patient discharged from Rehab at [facility] on 6/24/23. Resumed his own medications at discharge.     Patient had some issues with getting the right prescriptions when leaving and was unable to continue on the increased dose of synthroid or phoslo. Other changes were made, but did not change the total daily doses of those medications. PTA medications reflect what patient has at home at this time.     Patient was given meds at different times than he takes them at home when in rehab but resumed his usual regimen when he returned home.     Flagyl- pt was started on a course of flagyl for 8 days while at [facility] and continued at [facility]- therapy completed on 6/24.    Allopurinol- dose appears to have been decreased when hospitalized at [facility] prior to [facility] and continued at the decreased dose while in rehab. Patient reports he isn't sure what dose he is supposed to be on and resumed his prior dose of 200 mg when home.     Changes made to PTA medication list:    Added: phoslo, colchicine, synthroid 12.5 mcg (increased at [facility]), florastor    Deleted: diamox, mupirocin, lorazepam, guaifenesin, BG tablets    Changed: allopurinol, diclofenac-dose decreases    Medication Affordability:  Not including over the counter (OTC) medications, was there a time in the past 3 months when you did not take your medications as prescribed because of cost?: No    Allergies reviewed with patient and updates made in EHR: yes- added cipro    Medication History Completed By: Krista Mattson 6/26/2023  9:57 AM    Prior to Admission medications    Medication Sig Last Dose Taking? Auth Provider Long Term End Date   albuterol (PROAIR HFA/PROVENTIL HFA/VENTOLIN HFA) 108 (90 Base) MCG/ACT inhaler Inhale 2 puffs into the lungs every 6 hours as needed for shortness of breath Unknown Yes Reported, Patient     allopurinol (ZYLOPRIM) 100 MG tablet Take 100 mg by mouth every morning 6/25/2023 Yes Reported, Patient     aspirin (ASA) 81 MG EC tablet Take 81 mg by mouth At Bedtime 6/25/2023 at HS Yes Reported, Patient     atenolol (TENORMIN) 25 MG tablet Take 25 mg by mouth At Bedtime 6/25/2023 Yes Reported, Patient Yes    calcium acetate (PHOSLO) 667 MG CAPS capsule Take 1 capsule by mouth 3 times daily (with meals) 6/25/2023 Yes Reported, Patient     calcium carbonate (TUMS) 500 MG chewable tablet Take 2 chew tab by mouth daily 6/25/2023 Yes Reported, Patient     cholecalciferol (VITAMIN D3) 125 mcg (5000 units) capsule Take 125 mcg by mouth every morning 6/25/2023 Yes Reported, Patient     clopidogrel (PLAVIX) 75 MG tablet Take 75 mg by mouth every morning 6/25/2023 Yes Reported, Patient Yes    ezetimibe (ZETIA) 10 MG tablet Take 10 mg by mouth every morning 6/25/2023 Yes Reported, Patient Yes    fish oil-omega-3 fatty acids 1000 MG capsule Take 1 g by mouth 2 times daily 6/25/2023 at AM/HS Yes Reported, Patient     furosemide (LASIX) 40 MG tablet Take 120 mg by mouth At Bedtime 6/25/2023 Yes Reported, Patient Yes    gabapentin (NEURONTIN) 300 MG capsule Take 300 mg by mouth 2 times daily 6/25/2023 Yes Reported, Patient Yes    HUMALOG KWIKPEN 100 UNIT/ML soln Inject 15 Units Subcutaneous 3 times daily (with meals) -hold for BG < 120 6/25/2023 Yes Reported, Patient Yes    insulin glargine (LANTUS PEN) 100 UNIT/ML pen Inject 30 Units Subcutaneous 2 times daily 6/25/2023 Yes Lyly Pedroza, APRN CNP Yes    isosorbide mononitrate (IMDUR) 30 MG 24 hr tablet Take 2 tablets by mouth every morning 6/25/2023 Yes Reported,  Patient Yes    levothyroxine (SYNTHROID/LEVOTHROID) 50 MCG tablet Take 50 mcg by mouth every morning (before breakfast) -take along with a 12.5 mcg tablet for a total daily dose of 62.5 mcg. 6/25/2023 at AM Yes Reported, Patient Yes    metolazone (ZAROXOLYN) 2.5 MG tablet Take 1 tablet by mouth three times a week -Monday, Wednesday and Friday at bedtime. Past Week Yes Reported, Patient Yes    mycophenolate (GENERIC EQUIVALENT) 500 MG tablet Take 1,000 mg by mouth 2 times daily 6/25/2023 Yes Reported, Patient Yes    nitroGLYcerin (NITROSTAT) 0.4 MG sublingual tablet Place 0.4 mg under the tongue every 5 minutes as needed for chest pain Unknown Yes Reported, Patient Yes    potassium chloride ER (K-TAB) 20 MEQ CR tablet Take 40 mEq by mouth 2 times daily (with meals) 6/25/2023 Yes Reported, Patient     predniSONE (DELTASONE) 5 MG tablet Take 5 mg by mouth every morning 6/25/2023 Yes Reported, Patient     saccharomyces boulardii (FLORASTOR) 250 MG capsule Take 250 mg by mouth 3 times daily -capsule may be opened and sprinkled on food. 6/25/2023 Yes Reported, Patient     TRELEGY ELLIPTA 100-62.5-25 MCG/INH oral inhaler Inhale 1 puff into the lungs every morning 6/25/2023 Yes Reported, Patient     blood glucose (NO BRAND SPECIFIED) test strip 1 strip by In Vitro route 4 times daily (before meals and nightly) Use to test blood sugar 4 times daily or as directed.   Nicolette Cooper, NP     blood glucose monitoring (SOFTCLIX) lancets 1 each by In Vitro route 4 times daily (before meals and nightly) Use to monitor blood glucose once daily as directed.   Nicolette Cooper NP     colchicine (COLCRYS) 0.6 MG tablet Take 1 tablet by mouth daily as needed for gout pain   Reported, Patient     Continuous Blood Gluc  (DEXCOM G6 ) SANAZ Use to read blood sugars as per 's instructions.   Lyly Pedroza APRN CNP     Continuous Blood Gluc Sensor (DEXCOM G6 SENSOR) MISC Change every 10 days.    Lyly Pedroza APRN CNP     Continuous Blood Gluc Transmit (DEXCOM G6 TRANSMITTER) MISC Change every 3 months.   Lyly Pedroza APRN CNP     diclofenac (VOLTAREN) 1 % topical gel Apply 4 g topically 2 times daily as needed for pain -apply to the affected area.   Reported, Patient     insulin pen needle (31G X 8 MM) 31G X 8 MM miscellaneous Use 5 pen needles daily or as directed.   Lyly Pedroza APRN CNP     levothyroxine (SYNTHROID/LEVOTHROID) 25 MCG tablet Take 12.5 mcg by mouth daily before breakfast -take along with a 50 mcg tablet for a total daily dose of 62.5 mcg.   Reported, Patient Yes    metroNIDAZOLE (FLAGYL) 500 MG tablet Take 1 tablet by mouth 3 times daily (with meals)   Reported, Patient  6/28/23   rosuvastatin (CRESTOR) 40 MG tablet Take 40 mg by mouth At Bedtime    Reported, Patient Yes

## 2023-06-26 NOTE — UTILIZATION REVIEW
[facility] Utilization Review  Admission Status; Secondary Review Determination     Admission Date: 6/25/2023  9:53 PM      Under the authority of the Utilization Management Committee, the utilization review process indicated a secondary review on the above patient.  The review outcome is based on review of the medical records, discussions with staff, and applying clinical experience noted on the date of the review.        (X)      Inpatient Status Appropriate - This patient's medical care is consistent with medical management for inpatient care and reasonable inpatient medical practice.          RATIONALE FOR DETERMINATION   Eduar Isaacs is a 68 year old male with complex past medical history including renal transplant, currently on immunosuppressive therapy, who presented with fever and is admitted with acute UTI, fever 1 degrees and suspected sepsis.  Requiring IV antibiotics, supportive cares, infectious work-up including urine and blood cultures.  Cares are rendered complex due to immunosuppressed status with high risk of septic shock.The severity of illness, intensity of service provided, expected length of stay more than two midnights and risk for adverse outcome make the care complex, high risk and appropriate for hospital admission.  Recommendation is communicated to the primary team.      The information on this document is developed by the utilization review team in order for the business office to ensure compliance.  This only denotes the appropriateness of proper admission status and does not reflect the quality of care rendered.              Sincerely,       Poala Londono MD, MS  Physician Advisor  Utilization Review-[place]    Phone: [phone]

## 2023-06-26 NOTE — PLAN OF CARE
Most recent vitals: BP (!) 145/42 (BP Location: Left arm, Patient Position: Supine)   Pulse 90   Temp (!) 102.2  F (39  C) (Tympanic)   Resp 20   SpO2 95%    Febrile   Pain Management:  Denied pain.   LOC:  A&O  Cardiac:  WDL  Respiratory:  WDL  GI:  WDL  :  Dysuria   Skin Issues: As charted. Refused full assessment     IVF: IV TKO  ABX:  IV as ordered     Nutrition: As ordered   ADL's: up to bathroom  Ambulation: SBA   Safety:  Alarms active and audible. Call light within reach and able to make needs known.     Face to face report given with opportunity to observe patient.    Report given to Evonne Gutierres RN   6/26/2023  7:17 AM

## 2023-06-26 NOTE — ED TRIAGE NOTES
Pt reports he just got out of the hospital yesterday in [place] rehab. Pt states he started a low grade fever last night. Pt has a hx of UTIs and has a hx of kidney transplant. Pt reports burning with urination.

## 2023-06-26 NOTE — PHARMACY-MEDICATION REGIMEN REVIEW
Pharmacy Antimicrobial Stewardship Assessment     Current Antimicrobial Therapy:  Anti-infectives (From now, onward)    Start     Dose/Rate Route Frequency Ordered Stop    23 0800  levofloxacin (LEVAQUIN) infusion 750 mg         750 mg  100 mL/hr over 90 Minutes Intravenous EVERY 48 HOURS 23 0053            Indication: UTI    Days of Therapy: 2     Pertinent Labs:  Recent Labs   Lab Test 23  0509 23  2245   WBC 9.9 9.4     Recent Labs   Lab Test 23  2245 LACT 0.9 SED  --  PCAL  --   < > = values in this interval not displayed.        Temperature:  Temp (24hrs), Av.4  F (38  C), Min:98.3  F (36.8  C), Max:102.2  F (39  C)    Culture Results:   (23) Blood  (23) Urine = >100k GNR    (23) Blood = negative  (23 Urine) = negative    (23) Urine + Blood = E. coli      Recent Abx (unsure what patient received at [facility]):      Recommendations/Interventions:  1. Procalcitonin added; patient recently hospitalized for septic shock  2. Patient recently had C. Diff per [facility] lab review; add probiotics      Daniel Car, [facility]  2023

## 2023-06-26 NOTE — H&P
"[facility]    History and Physical - Hospitalist Service       Date of Admission:  6/25/2023    Assessment & Plan      Eduar Isaacs is a 68 year old male admitted on 6/25/2023 with UTI    #UTI in immunocompromised  -68 year old male with ongoing medical history of  kidney transplant on immunosuppressive therapy with mycophenolate and prednisone, who presented with  fever up to 101. dysuria   -On admission temperature 99.8  - UA positive for UTI  -  Given his immunocompromise state admitted to the hospital for IV antibiotic.  -OF NOTE HE WAS ADMITTED ON 6/1 WITH SEPTIC SHOCK DUE TO UTI  -Last urine culture was positive for E. coli susceptible to Levaquin.  -Start patient on Levaquin  -Follow urine culture      #Transplant kidney  -Resume home prednisone and mycophenolate    #Chronic kidney disease stage III  -Creatinine 2.22 which is about his baseline  -avoid nephrotoxic medication  -renally dose meds    #Diabetes mellitus type 2  -resume home lantus   -low dose sliding scale novolog    #COPD  -resume home inhalers    #Obesity  -patient advised on lifestyle modifications       Diet:   DIABETIC  DVT Prophylaxis: Heparin SQ  Manley Catheter: Not present  Lines: None     Cardiac Monitoring: None  Code Status:   FULL    Clinically Significant Risk Factors Present on Admission                # Drug Induced Platelet Defect: home medication list includes an antiplatelet medication   # Hypertension: Noted on problem list      # Severe Obesity: Estimated body mass index is 46.93 kg/m  as calculated from the following:    Height as of 5/31/23: 1.727 m (5' 8\").    Weight as of 6/1/23: 140 kg (308 lb 10.3 oz).            Disposition Plan      Expected Discharge Date: 06/27/2023                  Axel Petty MD  Hospitalist Service  [facility]  Securely message with [employer] (more info)  Text page via [facility] Paging/Directory "     ______________________________________________________________________    Chief Complaint   FEVER DISURIA    History is obtained from the patient    History of Present Illness   Eduar Isaacs is a 68 year old male with ongoing medical history of diabetes, kidney transplant, COPD, obstructive sleep apnea, diabetes who presented to the hospital complaining of fever up to 101.  Patient has a history of recurrent UTI.  He was hospitalized on 6/1/2023 and discharged to rehab.  He finished his stay in rehab yesterday and was discharged from there.  He started seeing nursing dysuria and fever.  He has been taking Tylenol at home.  He denies headache, nausea, vomiting, diarrhea    On admission temperature 99.8, sodium 131, creatinine 2.22 which is his baseline, UA positive for UTI    Patient assessed as having UTI.  Given his immunocompromise state admitted to the hospital for IV antibiotic.        Past Medical History    Past Medical History:   Diagnosis Date     Arthritis      Congestive heart failure (H)      COPD (chronic obstructive pulmonary disease) (H)      Diabetes (H)      Hypertension      Myocardial infarction (H)      Neuromuscular disorder (H)        Past Surgical History   Past Surgical History:   Procedure Laterality Date     CARDIAC SURGERY       COLONOSCOPY - HIM SCAN  04/08/2012    [place], polyps, adenomatous     ORTHOPEDIC SURGERY       TRANSPLANT         Prior to Admission Medications   Prior to Admission Medications   Prescriptions Last Dose Informant Patient Reported? Taking?   Calcium Carbonate Antacid 400 MG CHEW  Self Yes No   Sig: Take 2 chew tab by mouth At Bedtime   Continuous Blood Gluc  (DEXCOM G6 ) SANAZ  Self No No   Sig: Use to read blood sugars as per 's instructions.   Continuous Blood Gluc Sensor (DEXCOM G6 SENSOR) MISC  Self No No   Sig: Change every 10 days.   Continuous Blood Gluc Transmit (DEXCOM G6 TRANSMITTER) MISC  Self No No   Sig: Change  every 3 months.   Diclofenac Sodium 3 % GEL  Self Yes No   Sig: Apply topically 2 times daily as needed   HUMALOG KWIKPEN 100 UNIT/ML soln   Yes No   Sig: Inject 15 Units Subcutaneous 3 times daily   LORazepam (ATIVAN) 0.5 MG tablet  Self Yes No   Sig: Take 0.5 mg by mouth nightly as needed for anxiety   TRELEGY ELLIPTA 100-62.5-25 MCG/INH oral inhaler  Self Yes No   Sig: Inhale 1 puff into the lungs every morning   acetaZOLAMIDE (DIAMOX SEQUEL) 500 MG 12 hr capsule   Yes No   Sig: Take 500 mg by mouth every morning   albuterol (PROAIR HFA/PROVENTIL HFA/VENTOLIN HFA) 108 (90 Base) MCG/ACT inhaler  Self Yes No   Sig: Inhale 2 puffs into the lungs every 6 hours as needed for shortness of breath   allopurinol (ZYLOPRIM) 100 MG tablet  Self Yes No   Sig: Take 200 mg by mouth every morning   aspirin (ASA) 81 MG EC tablet  Self Yes No   Sig: Take 81 mg by mouth At Bedtime    atenolol (TENORMIN) 25 MG tablet  Self Yes No   Sig: Take 25 mg by mouth At Bedtime   blood glucose (NO BRAND SPECIFIED) test strip  Self No No   Si strip by In Vitro route 4 times daily (before meals and nightly) Use to test blood sugar 4 times daily or as directed.   blood glucose monitoring (SOFTCLIX) lancets  Self No No   Si each by In Vitro route 4 times daily (before meals and nightly) Use to monitor blood glucose once daily as directed.   cholecalciferol (VITAMIN D3) 125 mcg (5000 units) capsule  Self Yes No   Sig: Take 125 mcg by mouth every morning   clopidogrel (PLAVIX) 75 MG tablet  Self Yes No   Sig: Take 75 mg by mouth every morning   ezetimibe (ZETIA) 10 MG tablet  Self Yes No   Sig: Take 10 mg by mouth every morning   fish oil-omega-3 fatty acids 1000 MG capsule  Self Yes No   Sig: Take 1 g by mouth 2 times daily   furosemide (LASIX) 40 MG tablet  Self Yes No   Sig: Take 120 mg by mouth At Bedtime   gabapentin (NEURONTIN) 100 MG capsule  Self Yes No   Sig: Take 300 mg by mouth 2 times daily   glucose (BD GLUCOSE) 4 g chewable  tablet  Self No No   Sig: Take 1 tablet by mouth every hour as needed for low blood sugar   guaiFENesin (MUCINEX) 600 MG 12 hr tablet  Self Yes No   Sig: Take 600 mg by mouth 2 times daily as needed   insulin glargine (LANTUS PEN) 100 UNIT/ML pen  Self No No   Sig: Inject 30 Units Subcutaneous 2 times daily   insulin pen needle (31G X 8 MM) 31G X 8 MM miscellaneous  Self No No   Sig: Use 5 pen needles daily or as directed.   isosorbide mononitrate (IMDUR) 30 MG 24 hr tablet  Self Yes No   Sig: Take 2 tablets by mouth every morning   levothyroxine (SYNTHROID/LEVOTHROID) 50 MCG tablet  Self Yes No   Sig: Take 50 mcg by mouth every morning   metolazone (ZAROXOLYN) 2.5 MG tablet  Self Yes No   Sig: Take 1 tablet by mouth three times a week -Monday, Wednesday and Friday at bedtime. Take with lasix.   mupirocin (BACTROBAN) 2 % external ointment  Self Yes No   Sig: Apply topically 2 times daily as needed   mycophenolate (GENERIC EQUIVALENT) 500 MG tablet  Self Yes No   Sig: Take 1,000 mg by mouth 2 times daily   nitroGLYcerin (NITROSTAT) 0.4 MG sublingual tablet  Self Yes No   Sig: Place 0.4 mg under the tongue every 5 minutes as needed for chest pain   potassium chloride ER (K-TAB) 20 MEQ CR tablet  Self Yes No   Sig: Take 40 mEq by mouth 2 times daily   predniSONE (DELTASONE) 5 MG tablet  Self Yes No   Sig: Take 5 mg by mouth every morning   rosuvastatin (CRESTOR) 40 MG tablet  Self Yes No   Sig: Take 40 mg by mouth At Bedtime       Facility-Administered Medications: None        Review of Systems    The 10 point Review of Systems is negative other than noted in the HPI   Physical Exam   Vital Signs: Temp: 99.8  F (37.7  C) Temp src: Tympanic BP: 129/77 Pulse: 70   Resp: 16 SpO2: 96 % O2 Device: None (Room air)    Weight: 0 lbs 0 oz    Constitutional:       General: He is not in acute distress.     Appearance: Normal appearance. He is not ill-appearing, toxic-appearing or diaphoretic.   HENT:      Head: Atraumatic.       Nose: Nose normal. No congestion or rhinorrhea.   Eyes:      General: No scleral icterus.        Left eye: No discharge.      Extraocular Movements: Extraocular movements intact.      Conjunctiva/sclera: Conjunctivae normal.      Pupils: Pupils are equal, round, and reactive to light.   Neck:      Vascular: No carotid bruit.   Cardiovascular:      Rate and Rhythm: Normal rate and regular rhythm.      Heart sounds: Normal heart sounds. No murmur heard.  Pulmonary:      Effort: Pulmonary effort is normal. No respiratory distress.      Breath sounds: Normal breath sounds. No stridor. No wheezing, rhonchi or rales.   Chest:      Chest wall: No tenderness.   Abdominal:      General: Bowel sounds are normal. There is no distension.      Palpations: Abdomen is soft. There is no mass.      Tenderness: There is no abdominal tenderness. There is no right CVA tenderness, left CVA tenderness, guarding or rebound.      Hernia: No hernia is present.   Musculoskeletal:         General: No tenderness.      Cervical back: Normal range of motion and neck supple. No rigidity or tenderness.   Lymphadenopathy:      Cervical: No cervical adenopathy.   Skin:     General: Skin is warm.      Findings: No rash.   Neurological:      General: No focal deficit present.      Mental Status: He is alert and oriented to person, place, and time. Mental status is at baseline.      Cranial Nerves: No cranial nerve deficit.      Sensory: No sensory deficit.      Motor: No weakness.      Coordination: Coordination normal.      Gait: Gait normal.      Deep Tendon Reflexes: Reflexes normal.   Psychiatric:         Mood and Affect: Mood normal.         Medical Decision Making       78 MINUTES SPENT BY ME on the date of service doing chart review, history, exam, documentation & further activities per the note.      Data   ------------------------- PAST 24 HR DATA REVIEWED -----------------------------------------------    I have personally reviewed the  following data over the past 24 hrs:    9.4  \   9.9 (L)   / 265     131 (L) 92 (L) 58.8 (H) /  162 (H)   3.5 27 2.22 (H) \       Procal: N/A CRP: N/A Lactic Acid: 0.9         Imaging results reviewed over the past 24 hrs:   No results found for this or any previous visit (from the past 24 hour(s)).

## 2023-06-26 NOTE — ED PROVIDER NOTES
History     Chief Complaint   Patient presents with     Dysuria     HPI  Eduar Isaacs is a 68 year old male patient with history of left-sided kidney transplant presents to the ER with a chief complaint of burning with urination since last night.  Also has a fever of 101.  Has been taking Tylenol since last night.  Otherwise denies any cough sore throat or nasal congestion.  Denies any diarrhea or constipation.  Denies any abdominal pain or flank pain.  Denies any chest pain or shortness of breath.  Denies any other concerns or complaints.  Patient was discharged home from [facility] yesterday.    Allergies:  Allergies   Allergen Reactions     Cefepime Other (See Comments)     pain, kidney function off.     Codeine Shortness Of Breath     Niacin Itching and Rash     Amoxicillin Itching and Rash     Prilosec [Omeprazole] Itching and Rash     Vancomycin Hives and Rash     Atorvastatin Muscle Pain (Myalgia)     Semaglutide Diarrhea     Spironolactone        Problem List:    Patient Active Problem List    Diagnosis Date Noted     Urinary tract infection without hematuria, site unspecified 06/25/2023     Priority: Medium     Septic shock (H) 06/01/2023     Priority: Medium     Gram-negative sepsis with organ dysfunction (H) 06/01/2023     Priority: Medium     Acute on chronic systolic congestive heart failure (H) 06/01/2023     Priority: Medium     Cardiomyopathy, secondary (H) 06/01/2023     Priority: Medium     Immunocompromised due to corticosteroids (H) 05/31/2023     Priority: Medium     H/O multiple allergies 05/31/2023     Priority: Medium     Acute renal failure, unspecified acute renal failure type (H) 05/31/2023     Priority: Medium     Sepsis, due to unspecified organism, unspecified whether acute organ dysfunction present (H) 05/31/2023     Priority: Medium     Hyperglycemia 11/14/2022     Priority: Medium     Hypokalemia 11/14/2022     Priority: Medium     Dehydration 11/12/2022     Priority:  Medium     Diabetes mellitus without complication (H) 11/12/2022     Priority: Medium     Elevated serum creatinine 11/12/2022     Priority: Medium     JYOTSNA (obstructive sleep apnea) 11/12/2022     Priority: Medium     Morbid obesity (H) 11/12/2022     Priority: Medium     Coronary artery disease involving native coronary artery 11/12/2022     Priority: Medium     Hyponatremia 11/12/2022     Priority: Medium     Fever, unknown origin 05/21/2021     Priority: Medium     Sepsis (H) 05/21/2021     Priority: Medium     Morbid obesity with BMI of 45.0-49.9, adult (H) 10/21/2016     Priority: Medium     Coronary atherosclerosis 10/21/2016     Priority: Medium     Asymptomatic hyperuricemia 10/19/2015     Priority: Medium     Hyperlipidemia 10/19/2015     Priority: Medium     Long term current use of systemic steroids 10/19/2015     Priority: Medium     Stented coronary artery 05/20/2015     Priority: Medium     Hypophosphatemia 12/04/2014     Priority: Medium     Secondary renal hyperparathyroidism (H) 05/06/2014     Priority: Medium     Formatting of this note might be different from the original.  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     Priority: Medium     Chronic kidney disease, stage III (moderate) (H) 12/04/2006     Priority: Medium        Past Medical History:    Past Medical History:   Diagnosis Date     Arthritis      Congestive heart failure (H)      COPD (chronic obstructive  pulmonary disease) (H)      Diabetes (H)      Hypertension      Myocardial infarction (H)      Neuromuscular disorder (H)        Past Surgical History:    Past Surgical History:   Procedure Laterality Date     CARDIAC SURGERY       COLONOSCOPY - HIM SCAN  04/08/2012    [facility], polyps, adenomatous     ORTHOPEDIC SURGERY       TRANSPLANT         Family History:    No family history on file.    Social History:  Marital Status:   [5]  Social History     Tobacco Use     Smoking status: Former     Types: Cigarettes     Smokeless tobacco: Never   Substance Use Topics     Alcohol use: Yes     Comment: rare     Drug use: Never        Medications:    acetaZOLAMIDE (DIAMOX SEQUEL) 500 MG 12 hr capsule  albuterol (PROAIR HFA/PROVENTIL HFA/VENTOLIN HFA) 108 (90 Base) MCG/ACT inhaler  allopurinol (ZYLOPRIM) 100 MG tablet  aspirin (ASA) 81 MG EC tablet  atenolol (TENORMIN) 25 MG tablet  blood glucose (NO BRAND SPECIFIED) test strip  blood glucose monitoring (SOFTCLIX) lancets  Calcium Carbonate Antacid 400 MG CHEW  cholecalciferol (VITAMIN D3) 125 mcg (5000 units) capsule  clopidogrel (PLAVIX) 75 MG tablet  Continuous Blood Gluc  (DEXCOM G6 ) SANAZ  Continuous Blood Gluc Sensor (DEXCOM G6 SENSOR) MISC  Continuous Blood Gluc Transmit (DEXCOM G6 TRANSMITTER) MISC  Diclofenac Sodium 3 % GEL  ezetimibe (ZETIA) 10 MG tablet  fish oil-omega-3 fatty acids 1000 MG capsule  furosemide (LASIX) 40 MG tablet  gabapentin (NEURONTIN) 100 MG capsule  glucose (BD GLUCOSE) 4 g chewable tablet  guaiFENesin (MUCINEX) 600 MG 12 hr tablet  HUMALOG KWIKPEN 100 UNIT/ML soln  insulin glargine (LANTUS PEN) 100 UNIT/ML pen  insulin pen needle (31G X 8 MM) 31G X 8 MM miscellaneous  isosorbide mononitrate (IMDUR) 30 MG 24 hr tablet  levothyroxine (SYNTHROID/LEVOTHROID) 50 MCG tablet  LORazepam (ATIVAN) 0.5 MG tablet  metolazone (ZAROXOLYN) 2.5 MG tablet  mupirocin (BACTROBAN) 2 % external ointment  mycophenolate (GENERIC EQUIVALENT)  500 MG tablet  nitroGLYcerin (NITROSTAT) 0.4 MG sublingual tablet  potassium chloride ER (K-TAB) 20 MEQ CR tablet  predniSONE (DELTASONE) 5 MG tablet  rosuvastatin (CRESTOR) 40 MG tablet  TRELEGY ELLIPTA 100-62.5-25 MCG/INH oral inhaler          Review of Systems   All other systems reviewed and are negative.      Physical Exam   BP: 129/77  Pulse: 70  Temp: 99.8  F (37.7  C)  Resp: 16  SpO2: 96 %      Physical Exam  Constitutional:       General: He is not in acute distress.     Appearance: Normal appearance. He is not ill-appearing, toxic-appearing or diaphoretic.   HENT:      Head: Atraumatic.      Nose: Nose normal. No congestion or rhinorrhea.   Eyes:      General: No scleral icterus.        Left eye: No discharge.      Extraocular Movements: Extraocular movements intact.      Conjunctiva/sclera: Conjunctivae normal.      Pupils: Pupils are equal, round, and reactive to light.   Neck:      Vascular: No carotid bruit.   Cardiovascular:      Rate and Rhythm: Normal rate and regular rhythm.      Heart sounds: Normal heart sounds. No murmur heard.  Pulmonary:      Effort: Pulmonary effort is normal. No respiratory distress.      Breath sounds: Normal breath sounds. No stridor. No wheezing, rhonchi or rales.   Chest:      Chest wall: No tenderness.   Abdominal:      General: Bowel sounds are normal. There is no distension.      Palpations: Abdomen is soft. There is no mass.      Tenderness: There is no abdominal tenderness. There is no right CVA tenderness, left CVA tenderness, guarding or rebound.      Hernia: No hernia is present.   Musculoskeletal:         General: No tenderness.      Cervical back: Normal range of motion and neck supple. No rigidity or tenderness.   Lymphadenopathy:      Cervical: No cervical adenopathy.   Skin:     General: Skin is warm.      Findings: No rash.   Neurological:      General: No focal deficit present.      Mental Status: He is alert and oriented to person, place, and time. Mental  status is at baseline.      Cranial Nerves: No cranial nerve deficit.      Sensory: No sensory deficit.      Motor: No weakness.      Coordination: Coordination normal.      Gait: Gait normal.      Deep Tendon Reflexes: Reflexes normal.   Psychiatric:         Mood and Affect: Mood normal.         ED Course            Patient was seen and examined shortly after arrival.  Stable.  Lab reviewed.  Shows sign of infection.  Slightly elevated white count.  Normal lactic acid.  I did consult as well as hospitalist on-call Dr. Petty and he accepted admission for further management.  Recommended to start Levaquin.  I did order 500 mg IV because of his kidney function.  Patient agrees with the plan.  Stable for admission.     Procedures              Critical Care time:  none               Results for orders placed or performed during the hospital encounter of 06/25/23 (from the past 24 hour(s))   UA with Microscopic reflex to Culture    Specimen: Urine, NOS   Result Value Ref Range    Color Urine Yellow Colorless, Straw, Light Yellow, Yellow    Appearance Urine Cloudy (A) Clear    Glucose Urine Negative Negative mg/dL    Bilirubin Urine Negative Negative    Ketones Urine Negative Negative mg/dL    Specific Gravity Urine 1.008 1.003 - 1.035    Blood Urine Moderate (A) Negative    pH Urine 6.0 4.7 - 8.0    Protein Albumin Urine 100 (A) Negative mg/dL    Urobilinogen Urine Normal Normal, 2.0 mg/dL    Nitrite Urine Positive (A) Negative    Leukocyte Esterase Urine Large (A) Negative    Bacteria Urine Moderate (A) None Seen /HPF    WBC Clumps Urine Present (A) None Seen /HPF    RBC Urine 12 (H) <=2 /HPF    WBC Urine >182 (H) <=5 /HPF    Squamous Epithelials Urine 0 <=1 /HPF    Narrative    Urine Culture ordered based on laboratory criteria   Basic metabolic panel   Result Value Ref Range    Sodium 131 (L) 136 - 145 mmol/L    Potassium 3.5 3.4 - 5.3 mmol/L    Chloride 92 (L) 98 - 107 mmol/L    Carbon Dioxide (CO2) 27 22 - 29  mmol/L    Anion Gap 12 7 - 15 mmol/L    Urea Nitrogen 58.8 (H) 8.0 - 23.0 mg/dL    Creatinine 2.22 (H) 0.67 - 1.17 mg/dL    Calcium 9.0 8.8 - 10.2 mg/dL    Glucose 162 (H) 70 - 99 mg/dL    GFR Estimate 32 (L) >60 mL/min/1.73m2   Lactic acid whole blood   Result Value Ref Range    Lactic Acid 0.9 0.7 - 2.0 mmol/L   CBC with platelets differential    Narrative    The following orders were created for panel order CBC with platelets differential.  Procedure                               Abnormality         Status                     ---------                               -----------         ------                     CBC with platelets and d...[[id number]]  Abnormal            Final result                 Please view results for these tests on the individual orders.   CBC with platelets and differential   Result Value Ref Range    WBC Count 9.4 4.0 - 11.0 10e3/uL    RBC Count 3.53 (L) 4.40 - 5.90 10e6/uL    Hemoglobin 9.9 (L) 13.3 - 17.7 g/dL    Hematocrit 31.9 (L) 40.0 - 53.0 %    MCV 90 78 - 100 fL    MCH 28.0 26.5 - 33.0 pg    MCHC 31.0 (L) 31.5 - 36.5 g/dL    RDW 16.1 (H) 10.0 - 15.0 %    Platelet Count 265 150 - 450 10e3/uL    % Neutrophils 63 %    % Lymphocytes 20 %    % Monocytes 14 %    % Eosinophils 1 %    % Basophils 1 %    % Immature Granulocytes 1 %    NRBCs per 100 WBC 0 <1 /100    Absolute Neutrophils 6.0 1.6 - 8.3 10e3/uL    Absolute Lymphocytes 1.9 0.8 - 5.3 10e3/uL    Absolute Monocytes 1.3 0.0 - 1.3 10e3/uL    Absolute Eosinophils 0.1 0.0 - 0.7 10e3/uL    Absolute Basophils 0.1 0.0 - 0.2 10e3/uL    Absolute Immature Granulocytes 0.1 <=0.4 10e3/uL    Absolute NRBCs 0.0 10e3/uL       Medications   levofloxacin (LEVAQUIN) infusion 500 mg (has no administration in time range)       Assessments & Plan (with Medical Decision Making)     I have reviewed the nursing notes.    I have reviewed the findings, diagnosis, plan and need for follow up with the patient.               New Prescriptions    No medications  on file       Final diagnoses:   Urinary tract infection without hematuria, site unspecified       6/25/2023   [facility]     Gunjan Alvarez MD  06/25/23 4056

## 2023-06-26 NOTE — PROGRESS NOTES
06/26/23 1108   Appointment Info   Signing Clinician's Name / Credentials (PT) Tammi Nichols DPAYDE   Quick Adds   Quick Adds Certification   Living Environment   People in Home alone   Current Living Arrangements house   Home Accessibility no concerns   Transportation Anticipated car, drives self   Living Environment Comments Pt has no steps to enter or within home   Self-Care   Usual Activity Tolerance moderate   Current Activity Tolerance moderate   Equipment Currently Used at Home cane, straight;glucometer   Fall history within last six months no   Activity/Exercise/Self-Care Comment Pt is independent with ADLs and mobility without AD at baseline.  Pt was discharged from rehab in [place] 6 hours before returning to hospital.  Pt states things were going fine until the fever hit.   Pt was at short term rehab for about 1 week   General Information   Onset of Illness/Injury or Date of Surgery 06/25/23   Referring Physician Dr. Petty   Patient/Family Therapy Goals Statement (PT) to go home   Pertinent History of Current Problem (include personal factors and/or comorbidities that impact the POC) Pt admitted with UTI.  Was discharged home from rehab in [place] 6 hours prior to onset of symptoms   General Observations Not acute distress, family present for evaluation   Cognition   Affect/Mental Status (Cognition) WFL   Orientation Status (Cognition) oriented x 3   Follows Commands (Cognition) WFL   Pain Assessment   Patient Currently in Pain No   Integumentary/Edema   Integumentary/Edema no deficits were identifed   Posture    Posture Forward head position   Range of Motion (ROM)   Range of Motion ROM is WFL   Strength (Manual Muscle Testing)   Strength (Manual Muscle Testing) strength is WFL   Bed Mobility   Bed Mobility no deficits identified   Transfers   Transfers sit-stand transfer   Sit-Stand Transfer   Sit-Stand [place] (Transfers) supervision   Gait/Stairs (Locomotion)   [place] Level (Gait)  supervision   Distance in Feet 175'   Pattern (Gait) step-through   Deviations/Abnormal Patterns (Gait) antalgic   Balance   Balance Comments fair+ without support   Clinical Impression   Criteria for Skilled Therapeutic Intervention Evaluation only;No problems identified which require skilled intervention   Clinical Presentation (PT Evaluation Complexity) Stable/Uncomplicated   Clinical Presentation Rationale clinical judgement   Clinical Decision Making (Complexity) low complexity   Risk & Benefits of therapy have been explained evaluation/treatment results reviewed;care plan/treatment goals reviewed;risks/benefits reviewed;participants included;patient   Clinical Impression Comments PT evaluation completed.  Pt lives at home alone, was discharged from rehab 6 hours prior to admission.  Pt states things were going well in that short time at home until the fever hit.  Pt tolerated all mobility with SBA, is steady on feet.  Pt is appropriate to return home at discharge.  No further skilled PT during acute care stay, pt can continue to mobilize with nursing staff.   PT Total Evaluation Time   PT Eval, Low Complexity Minutes (07184) 13   PT Discharge Planning   PT Plan DC skilled PT, no further needs identified   PT Discharge Recommendation (DC Rec) home   PT Rationale for DC Rec PT evaluation completed.  Pt lives at home alone, was discharged from rehab 6 hours prior to admission.  Pt states things were going well in that short time at home until the fever hit.  Pt tolerated all mobility with SBA, is steady on feet.  Pt is appropriate to return home at discharge.  No further skilled PT during acute care stay, pt can continue to mobilize with nursing staff.   PT Brief overview of current status sup>sit mod I, sit>stand SBA, ambulated 175' without AD SBA   Total Session Time   Total Session Time (sum of timed and untimed services) 13

## 2023-06-26 NOTE — PLAN OF CARE
"Most recent vitals: /70 (BP Location: Right arm, Patient Position: Chair, Cuff Size: Adult Regular)   Pulse 89   Temp (!) 100.8  F (38.2  C) (Tympanic)   Resp 20   Ht 1.727 m (5' 8\")   Wt 131.5 kg (289 lb 14.5 oz)   SpO2 98%   BMI 44.08 kg/m      Pain Management:  Denies pain  LOC:  A&O x4  Cardiac:  WDL, HR regular  Respiratory:  LS dim bilaterally  GI:  BS normal/active   :  Voiding adequately, using bedside urinal  Skin Issues:  Scattered bruising, more prominent on upper forearms    IVF:  IV SL  ABX:  Levaquin    Nutrition: Reg Diet  Ambulation: SBA  Safety:  Alarms active    Pt did bring home medications; Humalog and trelegy inhaler. Brought to pharmacy for labeling.      Face to face report given with opportunity to observe patient.    Report given to Claribel Marks RN   6/26/2023  3:17 PM      "

## 2023-06-26 NOTE — PROGRESS NOTES
Care Transitions note:         06/26/23 1108   General Information   Assessment completed with: Patient   Person spoke with [facility]   Type of CM/SW Visit Initial Assessment   Primary Care Provider verified and updated as needed? Yes   Readmission Within the Last 30 Days unable to assess   Reason for Return Failed outpatient treatment   Reason for Consult discharge planning   Advance Directives   Scanned docmt in ACP Activity? No scanned doc   Pt confirms current doc scanned Pt states no documents   HC Your Rights handout  Informed and given   Pt offered more information Pt declined   Living Environment   People in Home alone   Current Living Arrangements house   Care Provided by self   Provides Care For no one   Able to Return to Prior Arrangements yes   Assessment of Family/Social Support   Marital Status    Who is your support system? Children;Sibling(s)   Description of Support System Involved;Supportive   Support Assessment Adequate family and caregiver support   Employment/   Employment Status retired   Financial/Legal   Source of Income pension/prison   Referral to Financial Worker No   Who Manages Finances if Patient Unable independent   Functional Status   Usual Activity Tolerance moderate   Current Activity Tolerance moderate   Assessment of Functional Status   Dependent ADLs: Independent   Dependent IADLs: Independent   Assesssment of Functional Status At functional baseline   Mental Status   Mental Health Status No Current Concerns   Chemical Dependency Status No Current Concerns   Discharge Needs Assessment   Patient/Family Anticipates Transition to home   Equipment Currently Used at Home cane, straight;glucometer   Patient/Family Anticipated Services at Transition none   Transportation Anticipated car, drives self   Transportation Concerns none   Concerns to be Addressed no discharge needs identified   Plan   Plan return home via private vehicle

## 2023-06-27 LAB
ANION GAP SERPL CALCULATED.3IONS-SCNC: 15 MMOL/L (ref 7–15)
BUN SERPL-MCNC: 51.9 MG/DL (ref 8–23)
CALCIUM SERPL-MCNC: 9.1 MG/DL (ref 8.8–10.2)
CHLORIDE SERPL-SCNC: 89 MMOL/L (ref 98–107)
CREAT SERPL-MCNC: 2.29 MG/DL (ref 0.67–1.17)
DEPRECATED HCO3 PLAS-SCNC: 26 MMOL/L (ref 22–29)
GFR SERPL CREATININE-BSD FRML MDRD: 31 ML/MIN/1.73M2
GLUCOSE BLDC GLUCOMTR-MCNC: 105 MG/DL (ref 70–99)
GLUCOSE BLDC GLUCOMTR-MCNC: 128 MG/DL (ref 70–99)
GLUCOSE BLDC GLUCOMTR-MCNC: 155 MG/DL (ref 70–99)
GLUCOSE BLDC GLUCOMTR-MCNC: 80 MG/DL (ref 70–99)
GLUCOSE BLDC GLUCOMTR-MCNC: 86 MG/DL (ref 70–99)
GLUCOSE BLDC GLUCOMTR-MCNC: 93 MG/DL (ref 70–99)
GLUCOSE SERPL-MCNC: 91 MG/DL (ref 70–99)
HOLD SPECIMEN: NORMAL
HOLD SPECIMEN: NORMAL
LACTATE SERPL-SCNC: 0.7 MMOL/L (ref 0.7–2)
POTASSIUM SERPL-SCNC: 3.5 MMOL/L (ref 3.4–5.3)
PROCALCITONIN SERPL IA-MCNC: 0.72 NG/ML
PROCALCITONIN SERPL IA-MCNC: 0.83 NG/ML
SODIUM SERPL-SCNC: 130 MMOL/L (ref 136–145)

## 2023-06-27 PROCEDURE — 36415 COLL VENOUS BLD VENIPUNCTURE: CPT | Performed by: INTERNAL MEDICINE

## 2023-06-27 PROCEDURE — 99232 SBSQ HOSP IP/OBS MODERATE 35: CPT | Performed by: INTERNAL MEDICINE

## 2023-06-27 PROCEDURE — 83605 ASSAY OF LACTIC ACID: CPT | Performed by: INTERNAL MEDICINE

## 2023-06-27 PROCEDURE — 250N000011 HC RX IP 250 OP 636: Mod: JZ | Performed by: INTERNAL MEDICINE

## 2023-06-27 PROCEDURE — 99222 1ST HOSP IP/OBS MODERATE 55: CPT | Performed by: NURSE PRACTITIONER

## 2023-06-27 PROCEDURE — 120N000001 HC R&B MED SURG/OB

## 2023-06-27 PROCEDURE — 250N000013 HC RX MED GY IP 250 OP 250 PS 637: Performed by: INTERNAL MEDICINE

## 2023-06-27 PROCEDURE — 84145 PROCALCITONIN (PCT): CPT | Performed by: INTERNAL MEDICINE

## 2023-06-27 PROCEDURE — 250N000013 HC RX MED GY IP 250 OP 250 PS 637: Performed by: HOSPITALIST

## 2023-06-27 PROCEDURE — 250N000012 HC RX MED GY IP 250 OP 636 PS 637: Performed by: HOSPITALIST

## 2023-06-27 PROCEDURE — 80048 BASIC METABOLIC PNL TOTAL CA: CPT | Performed by: INTERNAL MEDICINE

## 2023-06-27 PROCEDURE — 250N000011 HC RX IP 250 OP 636: Mod: JZ | Performed by: HOSPITALIST

## 2023-06-27 RX ORDER — INSULIN LISPRO 100 [IU]/ML
10 INJECTION, SOLUTION INTRAVENOUS; SUBCUTANEOUS
Status: DISCONTINUED | OUTPATIENT
Start: 2023-06-27 | End: 2023-06-28 | Stop reason: HOSPADM

## 2023-06-27 RX ADMIN — POTASSIUM CHLORIDE 40 MEQ: 1500 TABLET, EXTENDED RELEASE ORAL at 20:39

## 2023-06-27 RX ADMIN — ROSUVASTATIN 40 MG: 10 TABLET, FILM COATED ORAL at 20:38

## 2023-06-27 RX ADMIN — LEVOFLOXACIN 750 MG: 750 INJECTION, SOLUTION INTRAVENOUS at 07:55

## 2023-06-27 RX ADMIN — ATENOLOL 25 MG: 25 TABLET ORAL at 20:39

## 2023-06-27 RX ADMIN — LEVOTHYROXINE SODIUM 50 MCG: 0.05 TABLET ORAL at 09:38

## 2023-06-27 RX ADMIN — PREDNISONE 5 MG: 5 TABLET ORAL at 08:05

## 2023-06-27 RX ADMIN — ALLOPURINOL 200 MG: 100 TABLET ORAL at 08:05

## 2023-06-27 RX ADMIN — ACETAMINOPHEN 650 MG: 325 TABLET, FILM COATED ORAL at 20:39

## 2023-06-27 RX ADMIN — CLOPIDOGREL BISULFATE 75 MG: 75 TABLET ORAL at 08:05

## 2023-06-27 RX ADMIN — EZETIMIBE 10 MG: 10 TABLET ORAL at 08:05

## 2023-06-27 RX ADMIN — CALCIUM CARBONATE 1000 MG: 500 TABLET, CHEWABLE ORAL at 20:38

## 2023-06-27 RX ADMIN — ASPIRIN 81 MG: 81 TABLET, COATED ORAL at 20:39

## 2023-06-27 RX ADMIN — DEXTROSE 15 G: 15 GEL ORAL at 01:32

## 2023-06-27 RX ADMIN — INSULIN LISPRO 15 UNITS: 100 INJECTION, SOLUTION INTRAVENOUS; SUBCUTANEOUS at 09:39

## 2023-06-27 RX ADMIN — INSULIN LISPRO 10 UNITS: 100 INJECTION, SOLUTION INTRAVENOUS; SUBCUTANEOUS at 17:17

## 2023-06-27 RX ADMIN — POTASSIUM CHLORIDE 40 MEQ: 1500 TABLET, EXTENDED RELEASE ORAL at 08:05

## 2023-06-27 RX ADMIN — ACETAMINOPHEN 650 MG: 325 TABLET, FILM COATED ORAL at 08:05

## 2023-06-27 RX ADMIN — HEPARIN SODIUM 5000 UNITS: 5000 INJECTION, SOLUTION INTRAVENOUS; SUBCUTANEOUS at 16:54

## 2023-06-27 RX ADMIN — FUROSEMIDE 120 MG: 40 TABLET ORAL at 16:53

## 2023-06-27 RX ADMIN — GABAPENTIN 300 MG: 300 CAPSULE ORAL at 20:39

## 2023-06-27 RX ADMIN — MYCOPHENOLATE MOFETIL 1000 MG: 500 TABLET ORAL at 08:05

## 2023-06-27 RX ADMIN — ISOSORBIDE MONONITRATE 60 MG: 60 TABLET, EXTENDED RELEASE ORAL at 08:05

## 2023-06-27 RX ADMIN — HEPARIN SODIUM 5000 UNITS: 5000 INJECTION, SOLUTION INTRAVENOUS; SUBCUTANEOUS at 08:05

## 2023-06-27 RX ADMIN — LEVOTHYROXINE SODIUM 12.5 MCG: 0.03 TABLET ORAL at 09:38

## 2023-06-27 RX ADMIN — GABAPENTIN 300 MG: 300 CAPSULE ORAL at 08:05

## 2023-06-27 RX ADMIN — Medication 125 MCG: at 08:05

## 2023-06-27 RX ADMIN — MYCOPHENOLATE MOFETIL 1000 MG: 500 TABLET ORAL at 20:38

## 2023-06-27 ASSESSMENT — ACTIVITIES OF DAILY LIVING (ADL)
ADLS_ACUITY_SCORE: 21
ADLS_ACUITY_SCORE: 22
ADLS_ACUITY_SCORE: 21
ADLS_ACUITY_SCORE: 21
ADLS_ACUITY_SCORE: 22
ADLS_ACUITY_SCORE: 22
ADLS_ACUITY_SCORE: 21
ADLS_ACUITY_SCORE: 22
ADLS_ACUITY_SCORE: 21
ADLS_ACUITY_SCORE: 22

## 2023-06-27 NOTE — PHARMACY-MEDICATION REGIMEN REVIEW
Pharmacy Antimicrobial Stewardship Assessment     Current Antimicrobial Therapy:  Anti-infectives (From now, onward)    Start     Dose/Rate Route Frequency Ordered Stop    23 0800  levofloxacin (LEVAQUIN) infusion 750 mg         750 mg  100 mL/hr over 90 Minutes Intravenous EVERY 48 HOURS 23 0053          Indication: UTI     Days of Therapy: 3     Pertinent Labs:  Recent Labs   Lab Test 23  0509 23  2245 23  0511   WBC 9.9 9.4 32.0*     Recent Labs   Lab Test 23  0514 23  1550 23  0509 23  2245 LACT  --  0.8  --  0.9 SED  --   --   --   --  PCAL 0.83*  --  0.68*  --   < > = values in this interval not displayed.        Temperature:  Temp (24hrs), Av.7  F (38.2  C), Min:99.5  F (37.5  C), Max:101.6  F (38.7  C)    Culture Results:   (23) Blood  (23) Urine = >100k GNR     (23) Blood = negative  (23 Urine) = negative    (23) Urine + Blood = E. coli       Recent Abx (unsure what patient received at [facility]):       Recommendations/Interventions:  1. Procalcitonin increased since admit; repeat with AM labs  2. Patient recently had C. Diff per [facility] lab review; add probiotics as levofloxacin high risk for C. diff    Daniel Car, [facility]  2023

## 2023-06-27 NOTE — PLAN OF CARE
Pt sat in chair all of shift. Encouraged pt to elevate feet for edema. Pt showered today. Pt spoke with diabetes ed about dexcom. Pt did spike temp this .5 gave tylenol which helped. Pt continues on IV levaquin. Lunch insulin held as pt did not eat. Pt did have breakfast which was missed charting. Pt makes needs known approp.     Face to face report given with opportunity to observe patient.    Report given to Claribel Soto RN   6/27/2023  3:43 PM

## 2023-06-27 NOTE — PLAN OF CARE
Free from falls/injuries this shift. Assess as charted. Sepsis lag- Lactic acid : 0.8. Up in chair. Visitors here. Does get up to BR indep to void. Continues to void in urinal for measurement.     Face to face report given with opportunity to observe patient.    Report given to Marie Brown RN   6/26/2023  8:30 PM

## 2023-06-27 NOTE — CONSULTS
S:  Per the ED's HPI on 6/25/23:    Dysuria      HPI  Eduar Isaacs is a 68 year old male patient with history of left-sided kidney transplant presents to the ER with a chief complaint of burning with urination since last night.  Also has a fever of 101.  Has been taking Tylenol since last night.  Otherwise denies any cough sore throat or nasal congestion.  Denies any diarrhea or constipation.  Denies any abdominal pain or flank pain.  Denies any chest pain or shortness of breath.  Denies any other concerns or complaints.  Patient was discharged home from [facility] yesterday.    Hx of Type 2 diabetes  Last A1c  Lab Results   Component Value Date    A1C 13.1 11/13/2022    A1C 7.2 05/21/2021 6/20/23: A1c 7.4%    Questions about the Dexcom G6  Had a BG of 40 this morning  When the nurse checked it, it was 86.      O  Constitutional: alert and no distress  Musculoskeletal: extremities normal- no gross deformities noted  Psychiatric: mentation appears normal and affect normal/bright    A/P  Type 2 diabetes  Education on when to calibrate his Dexcom G6--trend arrow in the straight across position)  Per Dexcom rep, a person doesn't have to calibrate it. If there's issues, the patient can call Dexcom Customer Support (I gave him the number)  According to Dexcom rep, it becomes more accurate the further you get from the start date.    I also reminded him that if he doesn't believe it, check your BG.    These two numbers will be different based on the trend arrows.   He understood this.   No questions  States it's working good now.   Noted Dexcom reading of 82, with trend arrow lowering (45 degree).   He just ate 1 hour ago   Gave him apple juice.  Eating his apples.    Recheck, 86 with a straight across arrow.      I spoke to LILIANA Sidhu and recommended lowering his meal time insulin to 10 units when he's in the hospital.  She was going to talk to Dr. Norton  Patient jokingly said he wish he could have a burger from  [employer].         Lashae PRESLEY FNP-BC  Diabetes and Wound Care

## 2023-06-27 NOTE — PLAN OF CARE
"Most recent vitals: /70 (BP Location: Right arm, Patient Position: Semi-Darden's)   Pulse 83   Temp 100.1  F (37.8  C) (Oral)   Resp 20   Ht 1.727 m (5' 8\")   Wt 131.5 kg (289 lb 14.5 oz)   SpO2 96%   BMI 44.08 kg/m      Pain Management:  Denies pain  Was given PRN tylenol for temperature prior to bed. Asymptotic with fevers.   LOC:  A&O x4  Cardiac:  WDL, HR regular  Respiratory:  Lung sounds diminished bilaterally  GI:  BS normal/active    :  Voiding adequately, using bedside urinal  Skin Issues:  Scattered bruising, more prominent on upper forearms. Dexcom on abdomin.  ABX:  Remains on IV Levaquin. No IV fluids  Nutrition: Reg Diet  Ambulation: Up ad abbi in room.   Safety:  Call light within reach, nonskid socks on, bed near nurses station. Has been calling appropriately this shift. Able to make needs known.     Comment: Patient did change out dexcom this evening independenantly. Woke up in the middle of the night stating dexcom was reading patients blood sugar was low, writer rechecked glucose with hand held monitor and fingerstick. Results came back WNL. An hour or so later, dexcom was reading low blood sugars. Fingerstick was performed once again and was once again WNL. Writer attempted to calibrate patients dexcom with no success. Patient stated \"maybe is something with the one I put on I dont know\".     Face to face report given with opportunity to observe patient.    Report given to Thea Loredo RN   6/27/2023  7:25 AM          "

## 2023-06-27 NOTE — PROGRESS NOTES
"[facility]    Hospitalist Progress Note    Date of Service (when I saw the patient): 06/27/2023    Assessment & Plan   Eduar Isaacs is a 68 year old male who was admitted on 6/25/2023.    Acute cystitis without hematuria in immunocompromised patient: Patient has history of cystitis in the past, as recently as 5/31 with pansensitive E. coli.  Tmax overnight 101.6.  Procalcitonin slightly up from 0.68 to 0.83.  Urine culture growing gram-negative bacilli.  Patient has severe reaction to cephalosporin in the past with renal failure, also is allergic to penicillins with a rash.  -Continuing levofloxacin monotherapy for the time being  -Patient is still spiking fevers, Tmax 101.5  -Awaiting urine culture speciation and sensitivities    Status post renal transplant: With chronic kidney disease stage III, baseline creatinine is approximately 2 - 2.5.  -Continuing low-dose prednisone and mycophenolate  -Avoid nephrotoxic medications    Insulin-dependent diabetes mellitus type 2: Resuming home insulin regimen  -Sliding scale coverage for hyperglycemia    COPD: Stable.  No home oxygen.  -Resuming home inhalers    Morbid obesity: BMI 44.08 kg/m     FEN: Oral diabetic diet as tolerated.  Replace electrolytes as indicated.      Clinically Significant Risk Factors        # Hypokalemia: Lowest K = 3.3 mmol/L in last 2 days, will replace as needed           # Hypertension: Noted on problem list        # Severe Obesity: Estimated body mass index is 44.08 kg/m  as calculated from the following:    Height as of this encounter: 1.727 m (5' 8\").    Weight as of this encounter: 131.5 kg (289 lb 14.5 oz)., PRESENT ON ADMISSION            DVT Prophylaxis: Heparin SQ    Code Status: Full Code    Disposition: Expected discharge once urine cultures finalize.  Patient did well with PT/OT, expect discharge home    Fletcher Norton MD, MD        Interval History   Patient seen in room today.  Up independently.  Still spiking " fevers, Tmax 101.5.    -Data reviewed today: I reviewed all new labs and imaging results over the last 24 hours. I personally reviewed imaging reports.    Physical Exam   Temp: 100.1  F (37.8  C) Temp src: Oral BP: 129/70 Pulse: 83   Resp: 20 SpO2: 96 % O2 Device: None (Room air)    Vitals:    06/26/23 0807   Weight: 131.5 kg (289 lb 14.5 oz)     Vital Signs with Ranges  Temp:  [99.8  F (37.7  C)-101.6  F (38.7  C)] 100.1  F (37.8  C)  Pulse:  [82-89] 83  Resp:  [18-20] 20  BP: (116-152)/(60-79) 129/70  SpO2:  [96 %-98 %] 96 %      Intake/Output Summary (Last 24 hours) at 6/27/2023 1439  Last data filed at 6/27/2023 0800  Gross per 24 hour   Intake 240 ml   Output 2475 ml   Net -2235 ml         Peripheral IV 06/26/23 Anterior;Right Lower forearm (Active)   Site Assessment WDL 06/27/23 0500   Line Status Saline locked 06/27/23 0500   Dressing Transparent 06/27/23 0500   Dressing Status clean;dry;intact 06/27/23 0500   Line Intervention Flushed 06/27/23 0500   Phlebitis Scale 0-->no symptoms 06/27/23 0500   Infiltration? no 06/26/23 0807   Number of days: 1     No line/device    Constitutional - AA, NAD, obese  HEENT - atraumatic, normocephalic  Neck - supple, no masses, no JVD  CVS - S1 S2 RRR, no murmurs, rubs, gallops  Respiratory - CTA b/l  GI - soft, NT, ND, + bowel sounds, no organomegaly  Musculoskeletal - no LE edema, no lesions  Neuro - oriented x 3, no gross focal deficits      Medications       allopurinol  200 mg Oral QAM     aspirin  81 mg Oral At Bedtime     atenolol  25 mg Oral At Bedtime     calcium carbonate  1,000 mg Oral At Bedtime     cholecalciferol  125 mcg Oral QAM     clopidogrel  75 mg Oral QAM     ezetimibe  10 mg Oral QAM     Fluticasone-Umeclidin-Vilant  1 puff Inhalation QAM     furosemide  120 mg Oral Daily     gabapentin  300 mg Oral BID     heparin ANTICOAGULANT  5,000 Units Subcutaneous Q8H     insulin aspart  1-3 Units Subcutaneous At Bedtime     insulin glargine  30 Units  Subcutaneous BID     insulin lispro  15 Units Subcutaneous TID AC     isosorbide mononitrate  60 mg Oral QAM     levofloxacin  750 mg Intravenous Q48H     levothyroxine  12.5 mcg Oral QAM AC     levothyroxine  50 mcg Oral QAM AC     mycophenolate  1,000 mg Oral BID     potassium chloride ER  40 mEq Oral BID     predniSONE  5 mg Oral QAM     rosuvastatin  40 mg Oral At Bedtime       Data   Recent Labs   Lab 06/27/23  0456 06/27/23  0152 06/26/23  2115 06/26/23  0509 06/26/23  0114 06/25/23  2245   WBC  --   --   --  9.9  --  9.4   HGB  --   --   --  9.9*  --  9.9*   MCV  --   --   --  89  --  90   PLT  --   --   --  249  --  265   NA  --   --   --  134*  --  131*   POTASSIUM  --   --   --  3.3*  --  3.5   CHLORIDE  --   --   --  94*  --  92*   CO2  --   --   --  25  --  27   BUN  --   --   --  54.6*  --  58.8*   CR  --   --   --  2.22*  --  2.22*   ANIONGAP  --   --   --  15  --  12   HANNAH  --   --   --  8.9  --  9.0   GLC 93 80 146* 105*   < > 162*    < > = values in this interval not displayed.     Lactic Acid   Date Value Ref Range Status   06/26/2023 0.8 0.7 - 2.0 mmol/L Final   06/25/2023 0.9 0.7 - 2.0 mmol/L Final   05/31/2023 1.7 0.7 - 2.0 mmol/L Final   05/23/2021 1.4 0.7 - 2.0 mmol/L Final   05/22/2021 1.4 0.7 - 2.0 mmol/L Final   05/21/2021 1.8 0.7 - 2.0 mmol/L Final       No results found for this or any previous visit (from the past 24 hour(s)).    Fletcher Norton MD

## 2023-06-28 VITALS
BODY MASS INDEX: 43.94 KG/M2 | OXYGEN SATURATION: 96 % | WEIGHT: 289.9 LBS | SYSTOLIC BLOOD PRESSURE: 115 MMHG | RESPIRATION RATE: 14 BRPM | HEART RATE: 76 BPM | DIASTOLIC BLOOD PRESSURE: 64 MMHG | TEMPERATURE: 100.6 F | HEIGHT: 68 IN

## 2023-06-28 LAB
ANION GAP SERPL CALCULATED.3IONS-SCNC: 15 MMOL/L (ref 7–15)
BACTERIA UR CULT: ABNORMAL
BUN SERPL-MCNC: 54 MG/DL (ref 8–23)
CALCIUM SERPL-MCNC: 9 MG/DL (ref 8.8–10.2)
CHLORIDE SERPL-SCNC: 93 MMOL/L (ref 98–107)
CREAT SERPL-MCNC: 2.37 MG/DL (ref 0.67–1.17)
CRP SERPL-MCNC: 121.53 MG/L
DEPRECATED HCO3 PLAS-SCNC: 27 MMOL/L (ref 22–29)
ERYTHROCYTE [DISTWIDTH] IN BLOOD BY AUTOMATED COUNT: 15.9 % (ref 10–15)
GFR SERPL CREATININE-BSD FRML MDRD: 29 ML/MIN/1.73M2
GLUCOSE BLDC GLUCOMTR-MCNC: 83 MG/DL (ref 70–99)
GLUCOSE SERPL-MCNC: 87 MG/DL (ref 70–99)
HCT VFR BLD AUTO: 30.6 % (ref 40–53)
HGB BLD-MCNC: 9.8 G/DL (ref 13.3–17.7)
MCH RBC QN AUTO: 28.5 PG (ref 26.5–33)
MCHC RBC AUTO-ENTMCNC: 32 G/DL (ref 31.5–36.5)
MCV RBC AUTO: 89 FL (ref 78–100)
PLATELET # BLD AUTO: 247 10E3/UL (ref 150–450)
POTASSIUM SERPL-SCNC: 3.3 MMOL/L (ref 3.4–5.3)
PROCALCITONIN SERPL IA-MCNC: 0.65 NG/ML
RBC # BLD AUTO: 3.44 10E6/UL (ref 4.4–5.9)
SODIUM SERPL-SCNC: 135 MMOL/L (ref 136–145)
WBC # BLD AUTO: 8.6 10E3/UL (ref 4–11)

## 2023-06-28 PROCEDURE — 250N000012 HC RX MED GY IP 250 OP 636 PS 637: Performed by: HOSPITALIST

## 2023-06-28 PROCEDURE — 86140 C-REACTIVE PROTEIN: CPT | Performed by: INTERNAL MEDICINE

## 2023-06-28 PROCEDURE — 85027 COMPLETE CBC AUTOMATED: CPT | Performed by: INTERNAL MEDICINE

## 2023-06-28 PROCEDURE — 99239 HOSP IP/OBS DSCHRG MGMT >30: CPT | Performed by: INTERNAL MEDICINE

## 2023-06-28 PROCEDURE — 36415 COLL VENOUS BLD VENIPUNCTURE: CPT | Performed by: INTERNAL MEDICINE

## 2023-06-28 PROCEDURE — 250N000013 HC RX MED GY IP 250 OP 250 PS 637: Performed by: HOSPITALIST

## 2023-06-28 PROCEDURE — 82310 ASSAY OF CALCIUM: CPT | Performed by: INTERNAL MEDICINE

## 2023-06-28 PROCEDURE — 84145 PROCALCITONIN (PCT): CPT | Performed by: INTERNAL MEDICINE

## 2023-06-28 RX ORDER — LEVOFLOXACIN 250 MG/1
250 TABLET, FILM COATED ORAL DAILY
Qty: 12 TABLET | Refills: 0 | Status: SHIPPED | OUTPATIENT
Start: 2023-06-28 | End: 2023-07-10

## 2023-06-28 RX ADMIN — POTASSIUM CHLORIDE 40 MEQ: 1500 TABLET, EXTENDED RELEASE ORAL at 09:34

## 2023-06-28 RX ADMIN — ALLOPURINOL 200 MG: 100 TABLET ORAL at 09:34

## 2023-06-28 RX ADMIN — CLOPIDOGREL BISULFATE 75 MG: 75 TABLET ORAL at 09:34

## 2023-06-28 RX ADMIN — Medication 125 MCG: at 09:33

## 2023-06-28 RX ADMIN — ISOSORBIDE MONONITRATE 60 MG: 60 TABLET, EXTENDED RELEASE ORAL at 09:34

## 2023-06-28 RX ADMIN — MYCOPHENOLATE MOFETIL 1000 MG: 500 TABLET ORAL at 09:34

## 2023-06-28 RX ADMIN — GABAPENTIN 300 MG: 300 CAPSULE ORAL at 09:33

## 2023-06-28 RX ADMIN — PREDNISONE 5 MG: 5 TABLET ORAL at 09:33

## 2023-06-28 RX ADMIN — EZETIMIBE 10 MG: 10 TABLET ORAL at 09:34

## 2023-06-28 ASSESSMENT — ACTIVITIES OF DAILY LIVING (ADL)
ADLS_ACUITY_SCORE: 22

## 2023-06-28 NOTE — DISCHARGE INSTRUCTIONS
Appointments: The [facility] will be calling you at home to schedule a Hospital Follow-up appointment if they do not contact you by tomorrow please call [phone].

## 2023-06-28 NOTE — PLAN OF CARE
"Most recent vitals: /68 (BP Location: Right arm, Patient Position: Dangled)   Pulse 70   Temp 98.4  F (36.9  C) (Oral)   Resp 18   Ht 1.727 m (5' 8\")   Wt 131.5 kg (289 lb 14.5 oz)   SpO2 97%   BMI 44.08 kg/m       Pain Management:  Complaining of generalized discomfort. Was given PRN tylenol prior to bed with good relief.   LOC:  A&O x4  Cardiac:  WDL, HR regular  Respiratory:  Lung sounds diminished bilaterally  GI:  BS normal/active    :  Voiding adequately, using bedside urinal  Skin Issues:  Scattered bruising, more prominent on upper forearms. Dexcom on abdomen.  ABX:  Remains on IV Levaquin. No IV fluids  Nutrition: Reg Diet  Ambulation: Up ad abbi in room.   Safety:  Call light within reach, nonskid socks on, ID band in place, bed in the lowest position. Has been calling appropriately this shift. Able to make needs known.     Face to face report given with opportunity to observe patient.    Report given to Delfino Loredo RN   6/28/2023  7:26 AM    "

## 2023-06-28 NOTE — CARE PLAN
Patient discharged at 11:11 AM via wheel chair accompanied by daughter and staff. Prescriptions sent to patients preferred pharmacy. All belongings sent with patient.     Discharge instructions reviewed with patient.     Patient discharged to home.     [facility] [place]  Follow up appointment made:  Allyssa Conti to call and schedule appt  Home medications returned to patient: Yes  Patient reports pain was well managed at discharge: Yes

## 2023-06-28 NOTE — DISCHARGE SUMMARY
[facility]    Discharge Summary  Hospitalist    Date of Admission:  6/25/2023  Date of Discharge:  6/28/2023  Discharging Provider: Fletcher Norton MD, MD  Date of Service (when I saw the patient): 06/28/23    Discharge Diagnoses   Principal Problem:    Urinary tract infection without hematuria, site unspecified  Acute cystitis without hematuria in immunocompromised patient  Status post renal transplant on low-dose prednisone and mycophenolate  Chronic kidney disease stage III baseline creatinine approximately 2 - 2.5  Insulin-dependent diabetes mellitus type 2  COPD, stable not on home oxygen  Morbid obesity BMI 44.08 kg/m       History of Present Illness   Eduar Isaacs is an 68 year old male who presented with fevers.  Please see admission H+P for additional details.    Hospital Course   Eduar Isaacs was admitted on 6/25/2023.  68-year-old male with a history of renal transplant on chronic immunosuppression, chronic kidney disease stage III, obesity with JYOTSNA, COPD, diabetes who was just discharged from [facility] presented to the emergency department with fevers.  Work-up revealed that he had significant pyuria.  This will be his second urinary tract infection within the last 30 days.  Due to his immunosuppression, patient was admitted for further management and waiting for urine cultures to speciate.  Patient improved clinically with empiric levofloxacin.  Levofloxacin was chosen due to multiple antibiotic allergies and adverse reactions in the past.  Urine culture did reveal pansensitive E. coli.  Since this is his second urinary tract infection within 3 days, we will give him an outpatient referral to urology to rule out any anatomical cause for his infections.  Physical therapy did clear him to return home, he will be discharged with antibiotic coverage for a total of 14 days for complicated UTI.  He will need follow-up with his primary care physician within 7 to 10 days to  assess for continued improvement.    Fletcher Norton MD      Significant Results and Procedures   See below    Pending Results   These results will be followed up by Kody Abarca    Unresulted Labs Ordered in the Past 30 Days of this Admission     Date and Time Order Name Status Description    6/25/2023 10:14 PM Blood Culture Peripheral Blood Preliminary     6/25/2023 10:14 PM Blood Culture Peripheral Blood Preliminary           Code Status   Full Code       Primary Care Physician   Kody Abarca    Physical Exam   Temp: (!) 100.6  F (38.1  C) Temp src: Tympanic BP: 115/64 Pulse: 76   Resp: 14 SpO2: 96 % O2 Device: None (Room air)    Vitals:    06/26/23 0807   Weight: 131.5 kg (289 lb 14.5 oz)     Vital Signs with Ranges  Temp:  [97.5  F (36.4  C)-100.6  F (38.1  C)] 100.6  F (38.1  C)  Pulse:  [67-76] 76  Resp:  [14-20] 14  BP: (112-118)/(64-77) 115/64  SpO2:  [96 %-99 %] 96 %  I/O last 3 completed shifts:  In: 480 [P.O.:480]  Out: 2775 [Urine:2775]    Constitutional: AA, NAD, obese  Eyes: PERRLA, no injection, no icterus  HEENT: atraumatic, normocephalic  Respiratory: CTA b/l  Cardiovascular: S1 S2 RRR  GI: soft, NT, ND, + bowel sounds  Lymph/Hematologic: no palpable lymphadenopathy  Skin: no rashes, no lesions  Musculoskeletal: No edema, good tone, no deformities  Neurologic: oriented x 3, no focal deficits  Psychiatric: appropriate affect      Discharge Disposition   Discharged to home  Condition at discharge: Stable    Consultations This Hospital Stay   PHYSICAL THERAPY ADULT IP CONSULT  OCCUPATIONAL THERAPY ADULT IP CONSULT    Time Spent on this Encounter   IFletcher MD, personally saw the patient today and spent greater than 30 minutes discharging this patient.    Discharge Orders      Adult Urology  Referral      Reason for your hospital stay    UTI     Follow-up and recommended labs and tests     Follow up with primary care provider, Kody Abarca, within 7 days for  hospital follow- up.  The following labs/tests are recommended: bmp.     Activity    Your activity upon discharge: activity as tolerated     Diet    Follow this diet upon discharge: Orders Placed This Encounter      Consistent Carbohydrate Diet Moderate Consistent Carb (60 g CHO per Meal) Diet     Discharge Medications   Current Discharge Medication List      START taking these medications    Details   levofloxacin (LEVAQUIN) 250 MG tablet Take 1 tablet (250 mg) by mouth daily for 12 days  Qty: 12 tablet, Refills: 0    Associated Diagnoses: Urinary tract infection without hematuria, site unspecified         CONTINUE these medications which have NOT CHANGED    Details   albuterol (PROAIR HFA/PROVENTIL HFA/VENTOLIN HFA) 108 (90 Base) MCG/ACT inhaler Inhale 2 puffs into the lungs every 6 hours as needed for shortness of breath    Comments: Pharmacy may dispense brand covered by insurance (Proair, or proventil or ventolin or generic albuterol inhaler)      allopurinol (ZYLOPRIM) 100 MG tablet Take 100 mg by mouth every morning      aspirin (ASA) 81 MG EC tablet Take 81 mg by mouth At Bedtime      atenolol (TENORMIN) 25 MG tablet Take 25 mg by mouth At Bedtime      calcium acetate (PHOSLO) 667 MG CAPS capsule Take 1 capsule by mouth 3 times daily (with meals)      calcium carbonate (TUMS) 500 MG chewable tablet Take 2 chew tab by mouth daily      cholecalciferol (VITAMIN D3) 125 mcg (5000 units) capsule Take 125 mcg by mouth every morning      clopidogrel (PLAVIX) 75 MG tablet Take 75 mg by mouth every morning      colchicine (COLCRYS) 0.6 MG tablet Take 1 tablet by mouth daily as needed for gout pain      diclofenac (VOLTAREN) 1 % topical gel Apply 4 g topically 2 times daily as needed for pain -apply to the affected area.      ezetimibe (ZETIA) 10 MG tablet Take 10 mg by mouth every morning      fish oil-omega-3 fatty acids 1000 MG capsule Take 1 g by mouth 2 times daily      furosemide (LASIX) 40 MG tablet Take 120 mg  by mouth At Bedtime      gabapentin (NEURONTIN) 100 MG capsule Take 300 mg by mouth 2 times daily      HUMALOG KWIKPEN 100 UNIT/ML soln Inject 15 Units Subcutaneous 3 times daily (with meals) -hold for BG < 120      insulin glargine (LANTUS PEN) 100 UNIT/ML pen Inject 30 Units Subcutaneous 2 times daily  Qty: 15 mL, Refills: 0    Comments: If Lantus is not covered by insurance, may substitute Basaglar or Semglee or other insulin glargine product per insurance preference at same dose and frequency.    Associated Diagnoses: Controlled type 2 diabetes mellitus with hyperglycemia, with long-term current use of insulin (H)      isosorbide mononitrate (IMDUR) 30 MG 24 hr tablet Take 2 tablets by mouth every morning      !! levothyroxine (SYNTHROID/LEVOTHROID) 25 MCG tablet Take 12.5 mcg by mouth daily before breakfast -take along with a 50 mcg tablet for a total daily dose of 62.5 mcg.      !! levothyroxine (SYNTHROID/LEVOTHROID) 50 MCG tablet Take 50 mcg by mouth every morning (before breakfast) -take along with a 12.5 mcg tablet for a total daily dose of 62.5 mcg.      metolazone (ZAROXOLYN) 2.5 MG tablet Take 1 tablet by mouth three times a week -Monday, Wednesday and Friday at bedtime.      mycophenolate (GENERIC EQUIVALENT) 500 MG tablet Take 1,000 mg by mouth 2 times daily      nitroGLYcerin (NITROSTAT) 0.4 MG sublingual tablet Place 0.4 mg under the tongue every 5 minutes as needed for chest pain      potassium chloride ER (K-TAB) 20 MEQ CR tablet Take 40 mEq by mouth 2 times daily (with meals)      predniSONE (DELTASONE) 5 MG tablet Take 5 mg by mouth every morning      rosuvastatin (CRESTOR) 40 MG tablet Take 40 mg by mouth At Bedtime       saccharomyces boulardii (FLORASTOR) 250 MG capsule Take 250 mg by mouth 3 times daily -capsule may be opened and sprinkled on food.      TRELEGY ELLIPTA 100-62.5-25 MCG/INH oral inhaler Inhale 1 puff into the lungs every morning      blood glucose (NO BRAND SPECIFIED) test  strip 1 strip by In Vitro route 4 times daily (before meals and nightly) Use to test blood sugar 4 times daily or as directed.  Qty: 100 strip, Refills: 0    Associated Diagnoses: Diabetes mellitus without complication (H)      blood glucose monitoring (SOFTCLIX) lancets 1 each by In Vitro route 4 times daily (before meals and nightly) Use to monitor blood glucose once daily as directed.  Qty: 200 each, Refills: 0    Associated Diagnoses: Diabetes mellitus without complication (H)      Continuous Blood Gluc  (DEXCOM G6 ) SANAZ Use to read blood sugars as per 's instructions.  Qty: 1 each, Refills: 0    Associated Diagnoses: Type 2 diabetes mellitus with hyperglycemia, with long-term current use of insulin (H)      Continuous Blood Gluc Sensor (DEXCOM G6 SENSOR) MISC Change every 10 days.  Qty: 3 each, Refills: 11    Associated Diagnoses: Type 2 diabetes mellitus with hyperglycemia, with long-term current use of insulin (H)      Continuous Blood Gluc Transmit (DEXCOM G6 TRANSMITTER) MISC Change every 3 months.  Qty: 1 each, Refills: 3    Associated Diagnoses: Type 2 diabetes mellitus with hyperglycemia, with long-term current use of insulin (H)      insulin pen needle (31G X 8 MM) 31G X 8 MM miscellaneous Use 5 pen needles daily or as directed.  Qty: 100 each, Refills: 11    Associated Diagnoses: Diabetes mellitus without complication (H)       !! - Potential duplicate medications found. Please discuss with provider.        Allergies   Allergies   Allergen Reactions     Cefepime Other (See Comments)     pain, kidney function off.     Codeine Shortness Of Breath     Niacin Itching and Rash     Amoxicillin Itching and Rash     Prilosec [Omeprazole] Itching and Rash     Vancomycin Hives and Rash     Aldactone [Spironolactone]      Atorvastatin Muscle Pain (Myalgia)     Ciprofloxacin Rash     Semaglutide Diarrhea     Data   Most Recent 3 CBC's:  Recent Labs   Lab Test 06/28/23  0555  06/26/23  0509 06/25/23  2245   WBC 8.6 9.9 9.4   HGB 9.8* 9.9* 9.9*   MCV 89 89 90    249 265      Most Recent 3 BMP's:  Recent Labs   Lab Test 06/28/23  0608 06/28/23  0515 06/27/23  2047 06/27/23  0745 06/27/23  0514 06/26/23  2115 06/26/23  0509   NA  --  135*  --   --  130*  --  134*   POTASSIUM  --  3.3*  --   --  3.5  --  3.3*   CHLORIDE  --  93*  --   --  89*  --  94*   CO2  --  27  --   --  26  --  25   BUN  --  54.0*  --   --  51.9*  --  54.6*   CR  --  2.37*  --   --  2.29*  --  2.22*   ANIONGAP  --  15  --   --  15  --  15   HANNAH  --  9.0  --   --  9.1  --  8.9   GLC 83 87 128*   < > 91   < > 105*    < > = values in this interval not displayed.     Most Recent 2 LFT's:  Recent Labs   Lab Test 06/01/23  0442 05/31/23  1446   AST 48 24   ALT 16 16   ALKPHOS 99 58   BILITOTAL 0.6 0.7     Most Recent INR's and Anticoagulation Dosing History:  Anticoagulation Dose History         No data to display              Most Recent 3 Troponin's:  Recent Labs   Lab Test 05/21/21  1452   TROPI <0.015     Most Recent Cholesterol Panel:  Recent Labs   Lab Test 10/18/22  1319   TRIG 528*     Most Recent 6 Bacteria Isolates From Any Culture (See EPIC Reports for Culture Details):  Recent Labs   Lab Test 05/22/21  0652 05/22/21  0644 05/21/21  1516 05/21/21  1452   CULT No growth after 6 days No growth after 6 days No growth after 6 days No growth after 6 days  50,000 to 100,000 colonies/mL  Enterococcus faecalis  *     Most Recent TSH, T4 and A1c Labs:  Recent Labs   Lab Test 11/13/22  0612 11/12/22  1516   TSH  --  4.82*   T4  --  1.32   A1C 13.1*  --

## 2023-06-28 NOTE — PLAN OF CARE
Free from falls/injuries this shift. Up in chair since 1500. Tried to elevate feet while in chair but states was too uncomfortable. Sepsis flag at 1500- Lactic 0.7.     Face to face report given with opportunity to observe patient.    Report given to Marie Brown RN   6/27/2023  7:42 PM

## 2023-07-01 LAB
BACTERIA BLD CULT: NO GROWTH
BACTERIA BLD CULT: NO GROWTH

## 2023-07-26 ENCOUNTER — MEDICAL CORRESPONDENCE (OUTPATIENT)
Dept: HEALTH INFORMATION MANAGEMENT | Facility: CLINIC | Age: 68
End: 2023-07-26

## 2023-08-03 LAB
ALBUMIN (URINE) MG/SPEC: 216 MG/L
ALBUMIN/CREATININE RATIO: 854 MG/GM (ref 0–30)
CREATININE (EXTERNAL): 1.68 MG/DL (ref 0.8–1.5)
CREATININE (URINE): 25.3 MG/DL
GFR ESTIMATED (EXTERNAL): 45 ML/MIN/1.73M2
GLUCOSE (EXTERNAL): 105 MG/DL (ref 60–99)
POTASSIUM (EXTERNAL): 3.1 MMOL/L (ref 3.5–5.1)
TSH SERPL-ACNC: 6.7 UIU/ML (ref 0.35–4.8)

## 2023-08-14 LAB
CHOLESTEROL (EXTERNAL): 130 MG/DL
HDLC SERPL-MCNC: 41 MG/DL
LDL CHOLESTEROL CALCULATED (EXTERNAL): 42 MG/DL
TRIGLYCERIDES (EXTERNAL): 233 MG/DL

## 2023-08-29 ENCOUNTER — TRANSFERRED RECORDS (OUTPATIENT)
Dept: HEALTH INFORMATION MANAGEMENT | Facility: CLINIC | Age: 68
End: 2023-08-29

## 2023-08-29 LAB — RETINOPATHY: NEGATIVE

## 2023-09-11 LAB
ALBUMIN (URINE) MG/SPEC: 284 MG/L
ALBUMIN/CREATININE RATIO: 1291 MG/GM (ref 0–30)
CREATININE (EXTERNAL): 1.86 MG/DL (ref 0.8–1.5)
CREATININE (URINE): 22 MG/DL
GFR ESTIMATED (EXTERNAL): 39 ML/MIN/1.73M2
GLUCOSE (EXTERNAL): 125 MG/DL (ref 60–99)
POTASSIUM (EXTERNAL): 3.3 MMOL/L (ref 3.5–5.1)

## 2023-09-25 LAB
ALT SERPL-CCNC: 9 U/L (ref 18–65)
AST SERPL-CCNC: 14 U/L (ref 10–40)
CREATININE (EXTERNAL): 2.12 MG/DL (ref 0.8–1.5)
GFR ESTIMATED (EXTERNAL): 34 ML/MIN/1.73M2
GLUCOSE (EXTERNAL): 168 MG/DL (ref 60–99)
POTASSIUM (EXTERNAL): 3.1 MMOL/L (ref 3.5–5.1)
TSH SERPL-ACNC: 6.38 UIU/ML (ref 0.35–4.8)

## 2023-10-11 LAB
CREATININE (EXTERNAL): 2.66 MG/DL (ref 0.8–1.5)
GFR ESTIMATED (EXTERNAL): 26 ML/MIN/1.73M2
GLUCOSE (EXTERNAL): 137 MG/DL (ref 60–99)
POTASSIUM (EXTERNAL): 3.3 MMOL/L (ref 3.5–5.1)

## 2023-10-25 LAB
CREATININE (EXTERNAL): 2.6 MG/DL (ref 0.8–1.5)
GFR ESTIMATED (EXTERNAL): 26 ML/MIN/1.73M2
GLUCOSE (EXTERNAL): 146 MG/DL (ref 60–99)
POTASSIUM (EXTERNAL): 3.8 MMOL/L (ref 3.5–5.1)

## 2023-11-15 ENCOUNTER — TRANSFERRED RECORDS (OUTPATIENT)
Dept: HEALTH INFORMATION MANAGEMENT | Facility: CLINIC | Age: 68
End: 2023-11-15
Payer: COMMERCIAL

## 2023-11-29 ENCOUNTER — TRANSFERRED RECORDS (OUTPATIENT)
Dept: HEALTH INFORMATION MANAGEMENT | Facility: CLINIC | Age: 68
End: 2023-11-29
Payer: COMMERCIAL

## 2023-11-29 LAB
CREATININE (EXTERNAL): 2.79 MG/DL (ref 0.8–1.5)
GFR ESTIMATED (EXTERNAL): 24 ML/MIN/1.73M2
GLUCOSE (EXTERNAL): 143 MG/DL (ref 60–99)
HBA1C MFR BLD: 6.8 %
POTASSIUM (EXTERNAL): 3.8 MMOL/L (ref 3.5–5.1)
TSH SERPL-ACNC: 2.27 UIU/ML (ref 0.35–4.8)

## 2023-12-01 ENCOUNTER — ALLIED HEALTH/NURSE VISIT (OUTPATIENT)
Dept: EDUCATION SERVICES | Facility: OTHER | Age: 68
End: 2023-12-01
Attending: NURSE PRACTITIONER
Payer: COMMERCIAL

## 2023-12-01 VITALS
OXYGEN SATURATION: 98 % | SYSTOLIC BLOOD PRESSURE: 130 MMHG | DIASTOLIC BLOOD PRESSURE: 79 MMHG | BODY MASS INDEX: 43.62 KG/M2 | HEART RATE: 63 BPM | RESPIRATION RATE: 16 BRPM | WEIGHT: 287.8 LBS | HEIGHT: 68 IN

## 2023-12-01 DIAGNOSIS — E11.65 CONTROLLED TYPE 2 DIABETES MELLITUS WITH HYPERGLYCEMIA, WITH LONG-TERM CURRENT USE OF INSULIN (H): Primary | ICD-10-CM

## 2023-12-01 DIAGNOSIS — Z79.4 CONTROLLED TYPE 2 DIABETES MELLITUS WITH HYPERGLYCEMIA, WITH LONG-TERM CURRENT USE OF INSULIN (H): Primary | ICD-10-CM

## 2023-12-01 PROCEDURE — G0463 HOSPITAL OUTPT CLINIC VISIT: HCPCS

## 2023-12-01 PROCEDURE — 99214 OFFICE O/P EST MOD 30 MIN: CPT | Performed by: NURSE PRACTITIONER

## 2023-12-01 RX ORDER — FLUTICASONE PROPIONATE 50 MCG
1 SPRAY, SUSPENSION (ML) NASAL DAILY
COMMUNITY
End: 2024-01-07

## 2023-12-01 RX ORDER — LEVOTHYROXINE SODIUM 75 UG/1
75 TABLET ORAL EVERY MORNING
COMMUNITY
Start: 2023-11-07

## 2023-12-01 RX ORDER — SPIRONOLACTONE 25 MG/1
25 TABLET ORAL EVERY MORNING
COMMUNITY

## 2023-12-01 RX ORDER — ACYCLOVIR 400 MG/1
TABLET ORAL
Qty: 3 EACH | Refills: 3 | Status: ON HOLD | OUTPATIENT
Start: 2023-12-01 | End: 2024-04-29

## 2023-12-01 RX ORDER — ACETAZOLAMIDE 500 MG/1
500 CAPSULE, EXTENDED RELEASE ORAL EVERY MORNING
COMMUNITY

## 2023-12-01 ASSESSMENT — PAIN SCALES - GENERAL: PAINLEVEL: MODERATE PAIN (5)

## 2023-12-01 NOTE — PROGRESS NOTES
"  Assessment & Plan     Controlled type 2 diabetes mellitus with hyperglycemia, with long-term current use of insulin (H)  Doing well with his Diabetes.  A1c 6.8 per [facility] notes reviewed from 11/29/23  Continue with current diabetes medications   Follow up in 6 months   - Continuous Blood Gluc  (DEXCOM G7 ) SANAZ; Use to read blood sugars as per 's instructions.    Review of external notes as documented elsewhere in note  I spent a total of 33 minutes on the day of the visit.   Time spent by me doing chart review, history and exam, documentation and further activities per the note       BMI:   Estimated body mass index is 44.41 kg/m  as calculated from the following:    Height as of this encounter: 1.715 m (5' 7.5\").    Weight as of this encounter: 130.5 kg (287 lb 12.8 oz).   Weight management plan: Discussed healthy diet and exercise guidelines    Patient Instructions   Continue working on healthy eating and moving (start low and slow, work up to 30 min, 5x/week)    BG goals:  Fasting and before meals <180, >80  2 hour after eating <180    We only need 1/2 of these numbers to be within target then your A1c will be within target    Practicing health promotion can help improve diabetes (suggested by the ADA, March 2019)   Meditation    Apps: for IPhone and Android    -Calm    -Headspace    -Insight Timer   Yoga   Mindful eating - portion sizing     Medication changes   Continue Lantus 30 unties 2 times a day   Novolog 15-25 units with meals, hold  if BG <100    Do not give Novolog within 4 hours of last dose   Novolog and lantus can be taken at the same time       Recommendation  -try bike or walking 5-10 minutes after eating  -make sure to eat a protein with any all carb meal or snack       Follow up   6 month     Call me sooner if any problems/concerns and/or questions develop including consistent low BGs <70 or consistent high BGs >200  [phone] (Unit Coordinator)  "   [phone] (Nurse)- MYKEL diabetic nurse - call with any question     No follow-ups on file.    SAKINA Morrissey [facility] - KAITLIN Zepeda is a 68 year old, presenting for the following health issues:  Diabetes    HPI     Diabetes Follow-up    How often are you checking your blood sugar? Continuous glucose monitor  Dexcom Personal CGM:  Glucose Management indicator: 6.9%  Ave: 148+/-29  Time in Range:  >180  14%  : 86%  <80:  0%   What time of day are you checking your blood sugars (select all that apply)?  Before and after meals  Have you had any blood sugars above 200?  rare  Have you had any blood sugars below 70?  No  What symptoms do you notice when your blood sugar is low?  None  What concerns do you have today about your diabetes? Needs new Dexcom    Do you have any of these symptoms? (Select all that apply)  Numbness in feet  Have you had a diabetic eye exam in the last 12 months?     Diabetes Medications:  1. Lantus 30 units 2 times a day  2. Novolog 15 -25 unit with meals   Severe diarrhea from ozempic - cannot tolerate   ASA use: 81 mg daily   Statin use: Ezetimibe 10 mg daily and crestor 40 mg daily    BP Readings from Last 2 Encounters:   12/01/23 130/79   06/28/23 115/64     Hemoglobin A1C (%)   Date Value   11/13/2022 13.1 (H)   05/21/2021 7.2 (H)               Review of Systems   CONSTITUTIONAL: NEGATIVE for fever, chills, change in weight  INTEGUMENTARY/SKIN: NEGATIVE for worrisome rashes, moles or lesions  EYES: NEGATIVE for vision changes or irritation  ENT/MOUTH: occasional bloody nose   RESP:occasional cough   CV: NEGATIVE for chest pain, palpitations or peripheral edema  GI: NEGATIVE for nausea, abdominal pain, heartburn, or change in bowel habits  : negative for dysuria, hematuria, decreased urinary stream, erectile dysfunction  MUSCULOSKELETAL: feet pain  NEURO: neuropathy   ENDOCRINE: Hx diabetes  PSYCHIATRIC: NEGATIVE for changes in mood  "or affect      Objective    /79   Pulse 63   Resp 16   Ht 1.715 m (5' 7.5\")   Wt 130.5 kg (287 lb 12.8 oz)   SpO2 98%   BMI 44.41 kg/m    Body mass index is 44.41 kg/m .  Physical Exam   GENERAL: alert, no distress, and obese  NECK: no adenopathy, no asymmetry, masses, or scars and thyroid normal to palpation  RESP: lungs clear to auscultation - no rales, rhonchi or wheezes  CV: regular rate and rhythm, normal S1 S2, no S3 or S4, no murmur, click or rub, no peripheral edema and peripheral pulses strong  ABDOMEN: soft, nontender, no hepatosplenomegaly, no masses and bowel sounds normal  PSYCH: mentation appears normal, affect normal/bright    A1c 6.8 on 11/29/23 at [facility]                     "

## 2023-12-01 NOTE — PATIENT INSTRUCTIONS
Continue working on healthy eating and moving (start low and slow, work up to 30 min, 5x/week)    BG goals:  Fasting and before meals <180, >80  2 hour after eating <180    We only need 1/2 of these numbers to be within target then your A1c will be within target    Practicing health promotion can help improve diabetes (suggested by the ADA, March 2019)   Meditation    Apps: for IPhone and Android    -Calm    -Headspace    -Insight Timer   Yoga   Mindful eating - portion sizing     Medication changes   Continue Lantus 30 unties 2 times a day   Novolog 15-25 units with meals, hold  if BG <100    Do not give Novolog within 4 hours of last dose   Novolog and lantus can be taken at the same time       Recommendation  -try bike or walking 5-10 minutes after eating  -make sure to eat a protein with any all carb meal or snack       Follow up   6 month     Call me sooner if any problems/concerns and/or questions develop including consistent low BGs <70 or consistent high BGs >200  [phone] (Unit Coordinator)    [phone] (Nurse)- MYKEL diabetic nurse - call with any question

## 2023-12-04 ENCOUNTER — TELEPHONE (OUTPATIENT)
Dept: FAMILY MEDICINE | Facility: OTHER | Age: 68
End: 2023-12-04

## 2023-12-04 DIAGNOSIS — Z79.4 TYPE 2 DIABETES MELLITUS WITH HYPERGLYCEMIA, WITH LONG-TERM CURRENT USE OF INSULIN (H): Primary | ICD-10-CM

## 2023-12-04 DIAGNOSIS — E11.65 TYPE 2 DIABETES MELLITUS WITH HYPERGLYCEMIA, WITH LONG-TERM CURRENT USE OF INSULIN (H): Primary | ICD-10-CM

## 2023-12-04 RX ORDER — ACYCLOVIR 400 MG/1
TABLET ORAL
Qty: 3 EACH | Refills: 5 | Status: ON HOLD | OUTPATIENT
Start: 2023-12-04 | End: 2024-04-29

## 2023-12-04 RX ORDER — ACYCLOVIR 400 MG/1
TABLET ORAL
Qty: 1 EACH | Refills: 0 | Status: ON HOLD | OUTPATIENT
Start: 2023-12-04 | End: 2024-04-29

## 2023-12-04 NOTE — TELEPHONE ENCOUNTER
3:05 PM    Reason for Call: Phone Call    Description: Lyly has to call [employer] regarding Dexcom [phone]. They will not approve until they speak with Lyly.    Was an appointment offered for this call? No    Preferred method for responding to this message: Telephone Call  What is your phone number [phone]    If we cannot reach you directly, may we leave a detailed response at the number you provided? Yes    Can this message wait until your PCP/provider returns, if available today? Not applicable    Emilee Hendrickson

## 2023-12-04 NOTE — PROGRESS NOTES
Recent the order   [employer] has not had an order for over a year  Sent in for Dexcom G7    Lyly PRESLEY FNP-BC  Family Nurse Practitioner

## 2024-01-07 ENCOUNTER — HOSPITAL ENCOUNTER (EMERGENCY)
Facility: HOSPITAL | Age: 69
Discharge: HOME | End: 2024-01-07
Attending: NURSE PRACTITIONER | Admitting: NURSE PRACTITIONER
Payer: COMMERCIAL

## 2024-01-07 ENCOUNTER — APPOINTMENT (OUTPATIENT)
Dept: GENERAL RADIOLOGY | Facility: HOSPITAL | Age: 69
End: 2024-01-07
Attending: NURSE PRACTITIONER
Payer: COMMERCIAL

## 2024-01-07 VITALS
SYSTOLIC BLOOD PRESSURE: 142 MMHG | DIASTOLIC BLOOD PRESSURE: 78 MMHG | TEMPERATURE: 100.1 F | RESPIRATION RATE: 18 BRPM | OXYGEN SATURATION: 98 % | HEART RATE: 99 BPM

## 2024-01-07 DIAGNOSIS — R50.9 FEVER: ICD-10-CM

## 2024-01-07 DIAGNOSIS — B33.8 RSV INFECTION: ICD-10-CM

## 2024-01-07 DIAGNOSIS — J22 LOWER RESPIRATORY INFECTION: Primary | ICD-10-CM

## 2024-01-07 LAB
FLUAV RNA SPEC QL NAA+PROBE: NEGATIVE
FLUBV RNA RESP QL NAA+PROBE: NEGATIVE
RSV RNA SPEC NAA+PROBE: POSITIVE
SARS-COV-2 RNA RESP QL NAA+PROBE: NEGATIVE

## 2024-01-07 PROCEDURE — 250N000013 HC RX MED GY IP 250 OP 250 PS 637: Performed by: NURSE PRACTITIONER

## 2024-01-07 PROCEDURE — 71046 X-RAY EXAM CHEST 2 VIEWS: CPT

## 2024-01-07 PROCEDURE — 87637 SARSCOV2&INF A&B&RSV AMP PRB: CPT | Performed by: NURSE PRACTITIONER

## 2024-01-07 PROCEDURE — G0463 HOSPITAL OUTPT CLINIC VISIT: HCPCS

## 2024-01-07 PROCEDURE — G0463 HOSPITAL OUTPT CLINIC VISIT: HCPCS | Mod: 25

## 2024-01-07 PROCEDURE — 99213 OFFICE O/P EST LOW 20 MIN: CPT | Performed by: NURSE PRACTITIONER

## 2024-01-07 RX ORDER — BENZONATATE 200 MG/1
200 CAPSULE ORAL 3 TIMES DAILY PRN
Qty: 24 CAPSULE | Refills: 0 | Status: ON HOLD | OUTPATIENT
Start: 2024-01-07 | End: 2024-04-29

## 2024-01-07 RX ORDER — LEVOFLOXACIN 250 MG/1
250 TABLET, FILM COATED ORAL DAILY
Qty: 4 TABLET | Refills: 0 | Status: SHIPPED | OUTPATIENT
Start: 2024-01-07 | End: 2024-01-11

## 2024-01-07 RX ORDER — BENZONATATE 100 MG/1
200 CAPSULE ORAL ONCE
Status: COMPLETED | OUTPATIENT
Start: 2024-01-07 | End: 2024-01-07

## 2024-01-07 RX ORDER — LEVOFLOXACIN 500 MG/1
500 TABLET, FILM COATED ORAL ONCE
Status: COMPLETED | OUTPATIENT
Start: 2024-01-07 | End: 2024-01-07

## 2024-01-07 RX ADMIN — LEVOFLOXACIN 500 MG: 500 TABLET, FILM COATED ORAL at 19:24

## 2024-01-07 RX ADMIN — BENZONATATE 200 MG: 100 CAPSULE ORAL at 19:24

## 2024-01-07 ASSESSMENT — ACTIVITIES OF DAILY LIVING (ADL): ADLS_ACUITY_SCORE: 35

## 2024-01-07 ASSESSMENT — ENCOUNTER SYMPTOMS
COUGH: 1
SHORTNESS OF BREATH: 1
SORE THROAT: 0
TROUBLE SWALLOWING: 0
APPETITE CHANGE: 1
VOICE CHANGE: 0
FEVER: 1

## 2024-01-08 NOTE — ED TRIAGE NOTES
Pt presents with c/o having an ongoing lingering dry cough   Pt reports started a week ago   Pt also reports fluctuations in temp from    Pt denies any other symptoms   No otc meds taken today

## 2024-01-08 NOTE — ED PROVIDER NOTES
History     Chief Complaint   Patient presents with    Cough     HPI  Eduar Isaacs is a 68 year old male who presents to urgent care for evaluation of URI symptoms.  Patient tells me that he has had some nasal congestion symptoms since the fall 2023.  He tells me that he does have some environmental allergies and has had to take allergy medication for it.  In the last week he has since developed a dry cough accompanied by a fever of up to 101  F.  He has also had decreased appetite, chest pain with coughing and the cough is interfering with his sleep.  No known recent ill contacts.  Patient presented today because he is concerned that he may have an infection requiring treatment due to his history of kidney transplant.  Patient tells me that he got his COVID-19, influenza and RSV vaccines in October 2023.  He has had negative home COVID-19 test.    Past medical history most significant for cardiomyopathy, coronary artery disease, morbid obesity, COPD, CHF, diabetes mellitus, kidney transplant and chronic kidney disease stage III.    Allergies:  Allergies   Allergen Reactions    Cefepime Other (See Comments)     pain, kidney function off.    Codeine Shortness Of Breath    Niacin Itching and Rash    Amoxicillin Itching and Rash    Prilosec [Omeprazole] Itching and Rash    Vancomycin Hives and Rash    Aldactone [Spironolactone]     Atorvastatin Muscle Pain (Myalgia)    Ciprofloxacin Rash    Semaglutide Diarrhea       Problem List:    Patient Active Problem List    Diagnosis Date Noted    Urinary tract infection without hematuria, site unspecified 06/25/2023     Priority: Medium    Septic shock (H) 06/01/2023     Priority: Medium    Gram-negative sepsis with organ dysfunction (H) 06/01/2023     Priority: Medium    Acute on chronic systolic congestive heart failure (H) 06/01/2023     Priority: Medium    Cardiomyopathy, secondary (H) 06/01/2023     Priority: Medium    Immunocompromised due to corticosteroids   05/31/2023     Priority: Medium    H/O multiple allergies 05/31/2023     Priority: Medium    Acute renal failure, unspecified acute renal failure type (H24) 05/31/2023     Priority: Medium    Sepsis, due to unspecified organism, unspecified whether acute organ dysfunction present (H) 05/31/2023     Priority: Medium    Hyperglycemia 11/14/2022     Priority: Medium    Hypokalemia 11/14/2022     Priority: Medium    Dehydration 11/12/2022     Priority: Medium    Diabetes mellitus without complication (H) 11/12/2022     Priority: Medium    Elevated serum creatinine 11/12/2022     Priority: Medium    JYOTSNA (obstructive sleep apnea) 11/12/2022     Priority: Medium    Morbid obesity (H) 11/12/2022     Priority: Medium    Coronary artery disease involving native coronary artery 11/12/2022     Priority: Medium    Hyponatremia 11/12/2022     Priority: Medium    Fever, unknown origin 05/21/2021     Priority: Medium    Sepsis (H) 05/21/2021     Priority: Medium    Morbid obesity with BMI of 45.0-49.9, adult (H) 10/21/2016     Priority: Medium    Coronary atherosclerosis 10/21/2016     Priority: Medium    Asymptomatic hyperuricemia 10/19/2015     Priority: Medium    Hyperlipidemia 10/19/2015     Priority: Medium    Long term current use of systemic steroids 10/19/2015     Priority: Medium    Stented coronary artery 05/20/2015     Priority: Medium    Hypophosphatemia 12/04/2014     Priority: Medium    Secondary renal hyperparathyroidism (H24) 05/06/2014     Priority: Medium     Formatting of this note might be different from the original.  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     Priority: Medium    Chronic kidney disease, stage III (moderate) (H) 12/04/2006     Priority: Medium        Past Medical History:    Past Medical History:   Diagnosis Date    Arthritis     Congestive heart failure (H)     COPD (chronic obstructive pulmonary disease) (H)     Diabetes (H)     Hypertension     Myocardial infarction (H)     Neuromuscular disorder (H)        Past Surgical History:    Past Surgical History:   Procedure Laterality Date    CARDIAC SURGERY      COLONOSCOPY - HIM SCAN  04/08/2012    [facility], polyps, adenomatous    ORTHOPEDIC SURGERY      TRANSPLANT         Family History:    No family history on file.    Social History:  Marital Status:   [5]  Social History     Tobacco Use    Smoking status: Former     Types: Cigarettes    Smokeless tobacco: Never   Substance Use Topics    Alcohol use: Yes     Comment: rare    Drug use: Never        Medications:    acetaZOLAMIDE (DIAMOX SEQUEL) 500 MG 12 hr capsule  albuterol (PROAIR HFA/PROVENTIL HFA/VENTOLIN HFA) 108 (90 Base) MCG/ACT inhaler  allopurinol (ZYLOPRIM) 100 MG tablet  aspirin (ASA) 81 MG EC tablet  atenolol (TENORMIN) 25 MG tablet  benzonatate (TESSALON) 200 MG capsule  blood glucose (NO BRAND SPECIFIED) test strip  blood glucose monitoring (SOFTCLIX) lancets  calcium acetate (PHOSLO) 667 MG CAPS capsule  cholecalciferol (VITAMIN D3) 125 mcg (5000 units) capsule  clopidogrel (PLAVIX) 75 MG tablet  colchicine (COLCRYS) 0.6 MG tablet  Continuous Blood Gluc  (DEXCOM G6 ) SANAZ  Continuous Blood Gluc  (DEXCOM G7 ) SANAZ  Continuous Blood Gluc  (DEXCOM G7 ) SANAZ  Continuous Blood Gluc Sensor (DEXCOM G6 SENSOR) MISC  Continuous Blood Gluc Sensor (DEXCOM G7 SENSOR) MISC  Continuous Blood Gluc Transmit (DEXCOM G6  TRANSMITTER) MISC  ezetimibe (ZETIA) 10 MG tablet  furosemide (LASIX) 40 MG tablet  gabapentin (NEURONTIN) 100 MG capsule  HUMALOG KWIKPEN 100 UNIT/ML soln  Icosapent Ethyl (VASCEPA PO)  insulin glargine (LANTUS PEN) 100 UNIT/ML pen  insulin pen needle (31G X 8 MM) 31G X 8 MM miscellaneous  isosorbide mononitrate (IMDUR) 30 MG 24 hr tablet  LACTOBACILLUS PROBIOTIC PO  levofloxacin (LEVAQUIN) 250 MG tablet  levothyroxine (SYNTHROID/LEVOTHROID) 75 MCG tablet  metolazone (ZAROXOLYN) 2.5 MG tablet  mycophenolate (GENERIC EQUIVALENT) 500 MG tablet  potassium chloride ER (K-TAB) 20 MEQ CR tablet  predniSONE (DELTASONE) 5 MG tablet  rosuvastatin (CRESTOR) 40 MG tablet  spironolactone (ALDACTONE) 25 MG tablet  TRELEGY ELLIPTA 100-62.5-25 MCG/INH oral inhaler  nitroGLYcerin (NITROSTAT) 0.4 MG sublingual tablet          Review of Systems   Constitutional:  Positive for appetite change and fever.   HENT:  Positive for congestion. Negative for sore throat, trouble swallowing and voice change.    Respiratory:  Positive for cough and shortness of breath.    Cardiovascular:  Positive for chest pain (with coughing).   All other systems reviewed and are negative.      Physical Exam   BP: 142/78  Pulse: 99  Temp: 100.1  F (37.8  C)  Resp: 18  SpO2: 98 %      Physical Exam  Vitals and nursing note reviewed.   Constitutional:       General: He is not in acute distress.     Appearance: He is well-developed. He is obese. He is not diaphoretic.   HENT:      Head: Normocephalic and atraumatic.      Right Ear: Tympanic membrane and ear canal normal.      Left Ear: Tympanic membrane and ear canal normal.      Nose: Congestion present.      Mouth/Throat:      Mouth: Mucous membranes are moist.   Eyes:      Pupils: Pupils are equal, round, and reactive to light.   Cardiovascular:      Rate and Rhythm: Normal rate and regular rhythm.      Heart sounds: Normal heart sounds.   Pulmonary:      Effort: Pulmonary effort is normal. No respiratory  distress.      Breath sounds: Rhonchi present. No wheezing or rales.      Comments: Respirations are even and nonlabored.  Heart rate and rhythm are regular.  Speech is clear.  Mild rhonchi auscultated to lower lung fields.  Musculoskeletal:      Cervical back: Normal range of motion and neck supple.   Skin:     General: Skin is warm and dry.      Coloration: Skin is not pale.   Neurological:      Mental Status: He is alert and oriented to person, place, and time.         ED Course                 Procedures              Results for orders placed or performed during the hospital encounter of 01/07/24 (from the past 24 hour(s))   Symptomatic Influenza A/B, RSV, & SARS-CoV2 PCR (COVID-19) Nose    Specimen: Nose; Swab   Result Value Ref Range    Influenza A PCR Negative Negative    Influenza B PCR Negative Negative    RSV PCR Positive (A) Negative    SARS CoV2 PCR Negative Negative    Narrative    Testing was performed using the Xpert Xpress CoV2/Flu/RSV Assay on the [employer] GeneXpert Instrument. This test should be ordered for the detection of SARS-CoV-2, influenza, and RSV viruses in individuals who meet clinical and/or epidemiological criteria. Test performance is unknown in asymptomatic patients. This test is for in vitro diagnostic use under the FDA EUA for laboratories certified under CLIA to perform high or moderate complexity testing. This test has not been FDA cleared or approved. A negative result does not rule out the presence of PCR inhibitors in the specimen or target RNA in concentration below the limit of detection for the assay. If only one viral target is positive but coinfection with multiple targets is suspected, the sample should be re-tested with another FDA cleared, approved, or authorized test, if coinfection would change clinical management. This test was validated by the [facility] [employer]. These laboratories are certified under the Clinical Laboratory Improvement Amendments of 1988  (CLIA-88) as qualified to perform high complexity laboratory testing.   XR Chest 2 Views    Narrative    PROCEDURE:  XR CHEST 2 VIEWS    HISTORY: cough and fever progressively worsening x 1 week, concern for  pneumonia, .    COMPARISON:  6/1/2023    FINDINGS: Artifact on the lateral.  The cardiomediastinal contours are stable. The trachea is midline.  There is calcific aortic atherosclerosis.  Question subtle streaky infrahilar opacities. No effusion or  pneumothorax.    No suspicious osseous lesion or subdiaphragmatic free air.      Impression    IMPRESSION:      Streaky infrahilar opacities in the frontal view may be accentuated by  habitus. Atelectasis and subtle infiltrate are in the differential.    MARY PIEDRA MD         SYSTEM ID:  [id number]       Medications   levofloxacin (LEVAQUIN) tablet 500 mg (has no administration in time range)   benzonatate (TESSALON) capsule 200 mg (has no administration in time range)       Assessments & Plan (with Medical Decision Making)   This is a 68-year-old male who presented for evaluation of a 1 week history of a cough and fever.  Patient noted to have mild rhonchi to bilateral lower lung fields.  Speech is clear.  Respirations are even and nonlabored.  Oxygen saturation 98% on room air.  Chest x-ray was completed today which showed streaky infrahilar opacities with radiologist noting atelectasis and subtle infiltrate would be in the differentials.   I discussed these findings with patient.  Given his chronic medical problems, will treat for possible bacterial respiratory infection with Levaquin which she has tolerated well in the past.  Recommended continue with Tylenol as needed for pain or fever.  Push fluids.  Advised him to follow-up with his primary doctor in 5 to 7 days for reevaluation.  He will return to urgent care or emergency room for any worsening or concerning symptoms.     As patient was getting ready to be discharged, he tested positive for RSV.   Patient was formed about the result.  Continue symptomatic treatment.     I have reviewed the nursing notes.    I have reviewed the findings, diagnosis, plan and need for follow up with the patient.  This document was prepared using a combination of typing and voice generated software.  While every attempt was made for accuracy, spelling and grammatical errors may exist.         New Prescriptions    BENZONATATE (TESSALON) 200 MG CAPSULE    Take 1 capsule (200 mg) by mouth 3 times daily as needed for cough    LEVOFLOXACIN (LEVAQUIN) 250 MG TABLET    Take 1 tablet (250 mg) by mouth daily for 4 days For lower respiratory infection       Final diagnoses:   Lower respiratory infection   Fever   RSV infection       1/7/2024   [facility]       Mpofu, Prudence, CNP  01/07/24 1942

## 2024-01-08 NOTE — DISCHARGE INSTRUCTIONS
I prescribed an antibiotic for you to take for your respiratory infection.  Take Tylenol as needed for pain or fevers.  Drink plenty of fluids.    Follow-up with your doctor if no improvement in symptoms.    Return to urgent care or emergency room for any worsening or concerning symptoms.

## 2024-02-08 LAB
ALBUMIN (URINE) MG/SPEC: 419 MG/L
ALBUMIN/CREATININE RATIO: 779 MG/GM (ref 0–30)
CREATININE (EXTERNAL): 2.25 MG/DL (ref 0.8–1.5)
CREATININE (URINE): 53.8 MG/DL
GFR ESTIMATED (EXTERNAL): 31 ML/MIN/1.73M2
GLUCOSE (EXTERNAL): 125 MG/DL (ref 60–99)
POTASSIUM (EXTERNAL): 4 MMOL/L (ref 3.5–5.1)

## 2024-02-15 ENCOUNTER — TRANSFERRED RECORDS (OUTPATIENT)
Dept: HEALTH INFORMATION MANAGEMENT | Facility: CLINIC | Age: 69
End: 2024-02-15
Payer: COMMERCIAL

## 2024-04-04 ENCOUNTER — TRANSFERRED RECORDS (OUTPATIENT)
Dept: HEALTH INFORMATION MANAGEMENT | Facility: CLINIC | Age: 69
End: 2024-04-04
Payer: COMMERCIAL

## 2024-04-04 LAB
ALT SERPL-CCNC: 9 U/L (ref 18–65)
CREATININE (EXTERNAL): 2.85 MG/DL (ref 0.8–1.5)
CREATININE (EXTERNAL): 2.85 MG/DL (ref 0.8–1.5)
GFR ESTIMATED (EXTERNAL): 24 ML/MIN/1.73M2
GFR ESTIMATED (EXTERNAL): 24 ML/MIN/1.73M2
GLUCOSE (EXTERNAL): 116 MG/DL (ref 60–99)
GLUCOSE (EXTERNAL): 116 MG/DL (ref 60–99)
HBA1C MFR BLD: 6.3 %
PHQ9 SCORE: 13
POTASSIUM (EXTERNAL): 3.8 MMOL/L (ref 3.5–5.1)
POTASSIUM (EXTERNAL): 3.8 MMOL/L (ref 3.5–5.1)

## 2024-04-11 ENCOUNTER — TRANSFERRED RECORDS (OUTPATIENT)
Dept: HEALTH INFORMATION MANAGEMENT | Facility: CLINIC | Age: 69
End: 2024-04-11
Payer: COMMERCIAL

## 2024-04-15 DIAGNOSIS — E11.65 CONTROLLED TYPE 2 DIABETES MELLITUS WITH HYPERGLYCEMIA, WITH LONG-TERM CURRENT USE OF INSULIN (H): Primary | ICD-10-CM

## 2024-04-15 DIAGNOSIS — Z79.4 CONTROLLED TYPE 2 DIABETES MELLITUS WITH HYPERGLYCEMIA, WITH LONG-TERM CURRENT USE OF INSULIN (H): Primary | ICD-10-CM

## 2024-04-19 ENCOUNTER — ALLIED HEALTH/NURSE VISIT (OUTPATIENT)
Dept: EDUCATION SERVICES | Facility: OTHER | Age: 69
End: 2024-04-19
Attending: NURSE PRACTITIONER
Payer: COMMERCIAL

## 2024-04-19 VITALS
OXYGEN SATURATION: 98 % | BODY MASS INDEX: 46.27 KG/M2 | SYSTOLIC BLOOD PRESSURE: 126 MMHG | HEIGHT: 66 IN | RESPIRATION RATE: 18 BRPM | HEART RATE: 69 BPM | WEIGHT: 287.9 LBS | DIASTOLIC BLOOD PRESSURE: 75 MMHG

## 2024-04-19 DIAGNOSIS — E11.65 CONTROLLED TYPE 2 DIABETES MELLITUS WITH HYPERGLYCEMIA, WITH LONG-TERM CURRENT USE OF INSULIN (H): Primary | ICD-10-CM

## 2024-04-19 DIAGNOSIS — Z79.4 CONTROLLED TYPE 2 DIABETES MELLITUS WITH HYPERGLYCEMIA, WITH LONG-TERM CURRENT USE OF INSULIN (H): Primary | ICD-10-CM

## 2024-04-19 PROCEDURE — 99213 OFFICE O/P EST LOW 20 MIN: CPT | Performed by: NURSE PRACTITIONER

## 2024-04-19 PROCEDURE — G0463 HOSPITAL OUTPT CLINIC VISIT: HCPCS

## 2024-04-19 RX ORDER — FLUTICASONE PROPIONATE 50 MCG
1 SPRAY, SUSPENSION (ML) NASAL DAILY PRN
COMMUNITY

## 2024-04-19 RX ORDER — CALCIUM CARBONATE/VITAMIN D3 600 MG-10
1 TABLET ORAL 2 TIMES DAILY
COMMUNITY

## 2024-04-19 NOTE — PROGRESS NOTES
Assessment & Plan     Controlled type 2 diabetes mellitus with hyperglycemia, with long-term current use of insulin (H)  Doing great!  A1c is 6.3% per Dr Mejias note.  No change in insulin.  He is doing well with adjusting meal time insulin as needed for what he is eating.  Follow up in 6 months     Review of external notes as documented elsewhere in note  I spent a total of 24 minutes on the day of the visit.   Time spent by me doing chart review, history and exam, documentation and further activities per the note        Patient Instructions   Continue working on healthy eating and moving (start low and slow, work up to 30 min, 5x/week)    BG goals:  Fasting and before meals <180, >80  2 hour after eating <180    We only need 1/2 of these numbers to be within target then your A1c will be within target    Practicing health promotion can help improve diabetes (suggested by the ADA, March 2019)   Meditation    Apps: for IPhone and Android    -Calm    -Headspace    -Insight Timer   Yoga   Mindful eating - portion sizing     Medication changes   Continue Lantus 30 unties 2 times a day   Novolog 15-25 units with meals, hold  if BG <100    Do not give Novolog within 4 hours of last dose   Novolog and lantus can be taken at the same time       Recommendation  -try bike or walking 5-10 minutes after eating  -make sure to eat a protein with any all carb meal or snack       Follow up   6 month     Call me sooner if any problems/concerns and/or questions develop including consistent low BGs <70 or consistent high BGs >200  [phone] (Unit Coordinator)    [phone] (Nurse)- MYKEL diabetic nurse - call with any question     No follow-ups on file.    Subjective   Duane is a 68 year old, presenting for the following health issues:  Diabetes    HPI     Diabetes Follow-up    How often are you checking your blood sugar? Continuous glucose monitor  Dexcom Personal CGM:  Glucose Management indicator: 7.0 %  Ave: 153+/-36  Time  "in Range:  Very High 1%  High  20%  TIR  78%  low:  1%  Very low  0  What time of day are you checking your blood sugars (select all that apply)?   Continuous   Have you had any blood sugars above 200?  Yes   Have you had any blood sugars below 70?  Yes rare  What symptoms do you notice when your blood sugar is low?  None  What concerns do you have today about your diabetes? None   Do you have any of these symptoms? (Select all that apply)  Numbness in feet  Have you had a diabetic eye exam in the last 12 months? 8/23    Diabetes Medications:  1. Lantus 30 units 2 times a day  2. Novolog 15 -25 unit with meals   Severe diarrhea from ozempic - cannot tolerate   ASA use: 81 mg daily   Statin use: Ezetimibe 10 mg daily and crestor 40 mg daily    A1c 4/4/24 - 6.3% - completed at [facility] at his last appointment     BP Readings from Last 2 Encounters:   04/19/24 126/75   01/07/24 142/78     Hemoglobin A1C (%)   Date Value   11/13/2022 13.1 (H)   05/21/2021 7.2 (H)               Review of Systems  CONSTITUTIONAL:NEGATIVE for fever, chills, change in weight  INTEGUMENTARY/SKIN: NEGATIVE for worrisome rashes, moles or lesions  EYES: NEGATIVE for vision changes or irritation  ENT/MOUTH: NEGATIVE for ear, mouth and throat problems  RESP:chronic SOB and Hx COPD  CV: NEGATIVE for chest pain, palpitations or peripheral edema  GI: diarrhea  : denies dysuria   MUSCULOSKELETAL: neck and shoulder pain recently   NEURO: neuropathy   ENDOCRINE: Hx diabetes  PSYCHIATRIC: NEGATIVE for changes in mood or affect      Objective    /75   Pulse 69   Resp 18   Ht 1.679 m (5' 6.12\")   Wt 130.6 kg (287 lb 14.4 oz)   SpO2 98%   BMI 46.30 kg/m    Body mass index is 46.3 kg/m .  Physical Exam   GENERAL: alert, no distress, and obese  NECK: no adenopathy, no asymmetry, masses, or scars  RESP: lungs clear to auscultation - no rales, rhonchi or wheezes  CV: regular rate and rhythm, normal S1 S2, no S3 or S4, no murmur, click or rub, no " peripheral edema  ABDOMEN: soft, nontender, no hepatosplenomegaly, no masses and bowel sounds normal  PSYCH: mentation appears normal, affect normal/bright    A1c 6.3% with Dr Abarca at [facility] - 4/4/24        Signed Electronically by: SAKINA Morrissey CNP

## 2024-04-19 NOTE — PATIENT INSTRUCTIONS
Continue working on healthy eating and moving (start low and slow, work up to 30 min, 5x/week)    BG goals:  Fasting and before meals <180, >80  2 hour after eating <180    We only need 1/2 of these numbers to be within target then your A1c will be within target    Practicing health promotion can help improve diabetes (suggested by the ADA, March 2019)   Meditation    Apps: for IPhone and Android    -Calm    -Headspace    -Insight Timer   Yoga   Mindful eating - portion sizing     Medication changes   Continue Lantus 30 unties 2 times a day   Novolog 15-25 units with meals, hold  if BG <100    Do not give Novolog within 4 hours of last dose   Novolog and lantus can be taken at the same time       Recommendation  -try bike or walking 5-10 minutes after eating  -make sure to eat a protein with any all carb meal or snack       Follow up   6 month     Call me sooner if any problems/concerns and/or questions develop including consistent low BGs <70 or consistent high BGs >200  [phone] (Unit Coordinator)    [phone] (Nurse)- [facility] diabetic nurse - call with any question

## 2024-04-21 NOTE — PROGRESS NOTES
Blood pressures were little soft responded to IV fluid bolus.  Remains relatively afebrile throughout today.  Repeat CHEM profile shows that renal function is a bit better with creatinine of 2.49.  We will continue with IV fluids.  Continue with her current antibiotic regimen.  Blood cultures remain negative still only with the Enterococcus in his urine.   Pt. Is awake, alert and oriented x 4. He was noticed to be watching TV and talking with other Pts. He denied any c/o auditory or visual hallucinations.  Pt. Also denied any c/o suicidal or homicidal ideations. He c/o feeling agitated and anxious and asked for medication, he was admin. Chlorpromazine 100 mg IM to right buttocks. Pt. Was cooperative and encouraged Pt. To verbalized his feelings when needed. He ate his snack and then went to bed. Will cont. To monitor Pt.  Problem: Adult Behavioral Health Plan of Care  Goal: Plan of Care Review  Outcome: Ongoing, Progressing  Goal: Patient-Specific Goal (Individualization)  Outcome: Ongoing, Progressing  Goal: Adheres to Safety Considerations for Self and Others  Outcome: Ongoing, Progressing  Goal: Absence of New-Onset Illness or Injury  Outcome: Ongoing, Progressing  Goal: Optimized Coping Skills in Response to Life Stressors  Outcome: Ongoing, Progressing  Goal: Develops/Participates in Therapeutic [place] to Support Successful Transition  Outcome: Ongoing, Progressing  Goal: Rounds/Family Conference  Outcome: Ongoing, Progressing     Problem: Violence Risk or Actual  Goal: Anger and Impulse Control  Outcome: Ongoing, Progressing     Problem: Fall Injury Risk  Goal: Absence of Fall and Fall-Related Injury  Outcome: Ongoing, Progressing     Problem: Behavior Regulation Impairment (Psychotic Signs/Symptoms)  Goal: Improved Behavioral Control (Psychotic Signs/Symptoms)  Outcome: Ongoing, Progressing     Problem: Cognitive Impairment (Psychotic Signs/Symptoms)  Goal: Optimal Cognitive Function (Psychotic Signs/Symptoms)  Outcome: Ongoing, Progressing     Problem: Decreased Participation and Engagement (Psychotic Signs/Symptoms)  Goal: Increased Participation and Engagement (Psychotic Signs/Symptoms)  Outcome: Ongoing, Progressing     Problem: Mood Impairment (Psychotic Signs/Symptoms)  Goal: Improved Mood Symptoms (Psychotic Signs/Symptoms)  Outcome: Ongoing,  Progressing     Problem: Psychomotor Impairment (Psychotic Signs/Symptoms)  Goal: Improved Psychomotor Symptoms (Psychotic Signs/Symptoms)  Outcome: Ongoing, Progressing     Problem: Sensory Perception Impairment (Psychotic Signs/Symptoms)  Goal: Decreased Sensory Symptoms (Psychotic Signs/Symptoms)  Outcome: Ongoing, Progressing     Problem: Sleep Disturbance (Psychotic Signs/Symptoms)  Goal: Improved Sleep (Psychotic Signs/Symptoms)  Outcome: Ongoing, Progressing     Problem: Social, Occupational or Functional Impairment (Psychotic Signs/Symptoms)  Goal: Enhanced Social, Occupational or Functional Skills (Psychotic Signs/Symptoms)  Outcome: Ongoing, Progressing     Problem: Activity and Energy Impairment (Anxiety Signs/Symptoms)  Goal: Optimized Energy Level (Anxiety Signs/Symptoms)  Outcome: Ongoing, Progressing     Problem: Cognitive Impairment (Anxiety Signs/Symptoms)  Goal: Optimized Cognitive Function (Anxiety Signs/Symptoms)  Outcome: Ongoing, Progressing     Problem: Mood Impairment (Anxiety Signs/Symptoms)  Goal: Improved Mood Symptoms (Anxiety Signs/Symptoms)  Outcome: Ongoing, Progressing     Problem: Sleep Impairment (Anxiety Signs/Symptoms)  Goal: Improved Sleep (Anxiety Signs/Symptoms)  Outcome: Ongoing, Progressing     Problem: Social, Occupational or Functional Impairment (Anxiety Signs/Symptoms)  Goal: Enhanced Social, Occupational or Functional Skills (Anxiety Signs/Symptoms)  Outcome: Ongoing, Progressing     Problem: Somatic Disturbance (Anxiety Signs/Symptoms)  Goal: Improved Somatic Symptoms (Anxiety Signs/Symptoms)  Outcome: Ongoing, Progressing     Problem: Activity and Energy Impairment (Excessive Substance Use)  Goal: Optimized Energy Level (Excessive Substance Use)  Outcome: Ongoing, Progressing     Problem: Behavior Regulation Impairment (Excessive Substance Use)  Goal: Improved Behavioral Control (Excessive Substance Use)  Outcome: Ongoing, Progressing     Problem: Decreased  Participation and Engagement (Excessive Substance Use)  Goal: Increased Participation and Engagement (Excessive Substance Use)  Outcome: Ongoing, Progressing     Problem: Physiologic Impairment (Excessive Substance Use)  Goal: Improved Physiologic Symptoms (Excessive Substance Use)  Outcome: Ongoing, Progressing     Problem: Social, Occupational or Functional Impairment (Excessive Substance Use)  Goal: Enhanced Social, Occupational or Functional Skills (Excessive Substance Use)  Outcome: Ongoing, Progressing

## 2024-04-28 ENCOUNTER — APPOINTMENT (OUTPATIENT)
Dept: ULTRASOUND IMAGING | Facility: HOSPITAL | Age: 69
DRG: 690 | End: 2024-04-28
Attending: EMERGENCY MEDICINE
Payer: COMMERCIAL

## 2024-04-28 ENCOUNTER — HOSPITAL ENCOUNTER (INPATIENT)
Facility: HOSPITAL | Age: 69
LOS: 4 days | Discharge: HOME | DRG: 690 | End: 2024-05-02
Attending: EMERGENCY MEDICINE | Admitting: HOSPITALIST
Payer: COMMERCIAL

## 2024-04-28 ENCOUNTER — APPOINTMENT (OUTPATIENT)
Dept: GENERAL RADIOLOGY | Facility: HOSPITAL | Age: 69
DRG: 690 | End: 2024-04-28
Attending: EMERGENCY MEDICINE
Payer: COMMERCIAL

## 2024-04-28 DIAGNOSIS — N30.00 ACUTE CYSTITIS WITHOUT HEMATURIA: ICD-10-CM

## 2024-04-28 DIAGNOSIS — R78.81 BACTEREMIA: Primary | ICD-10-CM

## 2024-04-28 DIAGNOSIS — I50.22 CHRONIC SYSTOLIC HEART FAILURE (H): ICD-10-CM

## 2024-04-28 DIAGNOSIS — E11.65 CONTROLLED TYPE 2 DIABETES MELLITUS WITH HYPERGLYCEMIA, WITH LONG-TERM CURRENT USE OF INSULIN (H): ICD-10-CM

## 2024-04-28 DIAGNOSIS — Z79.4 CONTROLLED TYPE 2 DIABETES MELLITUS WITH HYPERGLYCEMIA, WITH LONG-TERM CURRENT USE OF INSULIN (H): ICD-10-CM

## 2024-04-28 PROBLEM — A41.9 SEPSIS (H): Status: ACTIVE | Noted: 2021-05-21

## 2024-04-28 LAB
ALBUMIN SERPL BCG-MCNC: 3.8 G/DL (ref 3.5–5.2)
ALBUMIN UR-MCNC: 300 MG/DL
ALP SERPL-CCNC: 38 U/L (ref 40–150)
ALT SERPL W P-5'-P-CCNC: 11 U/L (ref 0–70)
ANION GAP SERPL CALCULATED.3IONS-SCNC: 14 MMOL/L (ref 7–15)
APPEARANCE UR: ABNORMAL
AST SERPL W P-5'-P-CCNC: 21 U/L (ref 0–45)
BACTERIA #/AREA URNS HPF: ABNORMAL /HPF
BASE EXCESS BLDV CALC-SCNC: -6.7 MMOL/L (ref -3–3)
BASOPHILS # BLD AUTO: 0 10E3/UL (ref 0–0.2)
BASOPHILS NFR BLD AUTO: 0 %
BILIRUB SERPL-MCNC: 0.3 MG/DL
BILIRUB UR QL STRIP: NEGATIVE
BUN SERPL-MCNC: 59.7 MG/DL (ref 8–23)
CALCIUM SERPL-MCNC: 8.3 MG/DL (ref 8.8–10.2)
CHLORIDE SERPL-SCNC: 100 MMOL/L (ref 98–107)
COLOR UR AUTO: ABNORMAL
CREAT SERPL-MCNC: 2.44 MG/DL (ref 0.67–1.17)
DEPRECATED HCO3 PLAS-SCNC: 18 MMOL/L (ref 22–29)
EGFRCR SERPLBLD CKD-EPI 2021: 28 ML/MIN/1.73M2
EOSINOPHIL # BLD AUTO: 0 10E3/UL (ref 0–0.7)
EOSINOPHIL NFR BLD AUTO: 0 %
ERYTHROCYTE [DISTWIDTH] IN BLOOD BY AUTOMATED COUNT: 15.9 % (ref 10–15)
FLUAV RNA SPEC QL NAA+PROBE: NEGATIVE
FLUBV RNA RESP QL NAA+PROBE: NEGATIVE
GLUCOSE SERPL-MCNC: 131 MG/DL (ref 70–99)
GLUCOSE UR STRIP-MCNC: NEGATIVE MG/DL
HCO3 BLDV-SCNC: 18 MMOL/L (ref 21–28)
HCT VFR BLD AUTO: 33.1 % (ref 40–53)
HGB BLD-MCNC: 10.8 G/DL (ref 13.3–17.7)
HGB UR QL STRIP: ABNORMAL
HOLD SPECIMEN: NORMAL
HOLD SPECIMEN: NORMAL
IMM GRANULOCYTES # BLD: 0.1 10E3/UL
IMM GRANULOCYTES NFR BLD: 1 %
KETONES UR STRIP-MCNC: NEGATIVE MG/DL
LACTATE SERPL-SCNC: 0.9 MMOL/L (ref 0.7–2)
LEUKOCYTE ESTERASE UR QL STRIP: ABNORMAL
LYMPHOCYTES # BLD AUTO: 0.3 10E3/UL (ref 0.8–5.3)
LYMPHOCYTES NFR BLD AUTO: 2 %
MCH RBC QN AUTO: 28.8 PG (ref 26.5–33)
MCHC RBC AUTO-ENTMCNC: 32.6 G/DL (ref 31.5–36.5)
MCV RBC AUTO: 88 FL (ref 78–100)
MONOCYTES # BLD AUTO: 0.8 10E3/UL (ref 0–1.3)
MONOCYTES NFR BLD AUTO: 4 %
MUCOUS THREADS #/AREA URNS LPF: PRESENT /LPF
NEUTROPHILS # BLD AUTO: 16.7 10E3/UL (ref 1.6–8.3)
NEUTROPHILS NFR BLD AUTO: 93 %
NITRATE UR QL: NEGATIVE
NRBC # BLD AUTO: 0 10E3/UL
NRBC BLD AUTO-RTO: 0 /100
NT-PROBNP SERPL-MCNC: 3102 PG/ML (ref 0–900)
O2/TOTAL GAS SETTING VFR VENT: 21 %
OXYHGB MFR BLDV: 72 % (ref 70–75)
PCO2 BLDV: 35 MM HG (ref 40–50)
PH BLDV: 7.34 [PH] (ref 7.32–7.43)
PH UR STRIP: 5.5 [PH] (ref 4.7–8)
PLATELET # BLD AUTO: 147 10E3/UL (ref 150–450)
PO2 BLDV: 37 MM HG (ref 25–47)
POTASSIUM SERPL-SCNC: 4 MMOL/L (ref 3.4–5.3)
PROCALCITONIN SERPL IA-MCNC: 2.24 NG/ML
PROT SERPL-MCNC: 6.8 G/DL (ref 6.4–8.3)
RBC # BLD AUTO: 3.75 10E6/UL (ref 4.4–5.9)
RBC URINE: 3 /HPF
RSV RNA SPEC NAA+PROBE: NEGATIVE
SAO2 % BLDV: 73.3 % (ref 70–75)
SARS-COV-2 RNA RESP QL NAA+PROBE: NEGATIVE
SODIUM SERPL-SCNC: 132 MMOL/L (ref 135–145)
SP GR UR STRIP: 1.01 (ref 1–1.03)
SQUAMOUS EPITHELIAL: <1 /HPF
TROPONIN T SERPL HS-MCNC: 73 NG/L
UROBILINOGEN UR STRIP-MCNC: NORMAL MG/DL
WBC # BLD AUTO: 17.9 10E3/UL (ref 4–11)
WBC CLUMPS #/AREA URNS HPF: PRESENT /HPF
WBC URINE: 84 /HPF

## 2024-04-28 PROCEDURE — 80053 COMPREHEN METABOLIC PANEL: CPT | Performed by: EMERGENCY MEDICINE

## 2024-04-28 PROCEDURE — 36415 COLL VENOUS BLD VENIPUNCTURE: CPT | Performed by: EMERGENCY MEDICINE

## 2024-04-28 PROCEDURE — 84484 ASSAY OF TROPONIN QUANT: CPT | Performed by: EMERGENCY MEDICINE

## 2024-04-28 PROCEDURE — 250N000011 HC RX IP 250 OP 636: Performed by: EMERGENCY MEDICINE

## 2024-04-28 PROCEDURE — 99223 1ST HOSP IP/OBS HIGH 75: CPT | Mod: AI | Performed by: HOSPITALIST

## 2024-04-28 PROCEDURE — 87186 SC STD MICRODIL/AGAR DIL: CPT | Performed by: EMERGENCY MEDICINE

## 2024-04-28 PROCEDURE — 93010 ELECTROCARDIOGRAM REPORT: CPT | Performed by: INTERNAL MEDICINE

## 2024-04-28 PROCEDURE — 85025 COMPLETE CBC W/AUTO DIFF WBC: CPT | Performed by: EMERGENCY MEDICINE

## 2024-04-28 PROCEDURE — 82805 BLOOD GASES W/O2 SATURATION: CPT | Performed by: EMERGENCY MEDICINE

## 2024-04-28 PROCEDURE — 84145 PROCALCITONIN (PCT): CPT | Performed by: EMERGENCY MEDICINE

## 2024-04-28 PROCEDURE — 83880 ASSAY OF NATRIURETIC PEPTIDE: CPT | Performed by: EMERGENCY MEDICINE

## 2024-04-28 PROCEDURE — 96365 THER/PROPH/DIAG IV INF INIT: CPT

## 2024-04-28 PROCEDURE — 87086 URINE CULTURE/COLONY COUNT: CPT | Performed by: EMERGENCY MEDICINE

## 2024-04-28 PROCEDURE — 99291 CRITICAL CARE FIRST HOUR: CPT | Performed by: EMERGENCY MEDICINE

## 2024-04-28 PROCEDURE — 83605 ASSAY OF LACTIC ACID: CPT | Performed by: EMERGENCY MEDICINE

## 2024-04-28 PROCEDURE — 120N000001 HC R&B MED SURG/OB

## 2024-04-28 PROCEDURE — 99291 CRITICAL CARE FIRST HOUR: CPT | Mod: 25

## 2024-04-28 PROCEDURE — 250N000013 HC RX MED GY IP 250 OP 250 PS 637: Performed by: EMERGENCY MEDICINE

## 2024-04-28 PROCEDURE — 81001 URINALYSIS AUTO W/SCOPE: CPT | Performed by: EMERGENCY MEDICINE

## 2024-04-28 PROCEDURE — 93005 ELECTROCARDIOGRAM TRACING: CPT

## 2024-04-28 PROCEDURE — 71045 X-RAY EXAM CHEST 1 VIEW: CPT

## 2024-04-28 PROCEDURE — 87637 SARSCOV2&INF A&B&RSV AMP PRB: CPT | Performed by: EMERGENCY MEDICINE

## 2024-04-28 RX ORDER — ACETAMINOPHEN 325 MG/1
650 TABLET ORAL EVERY 4 HOURS PRN
Status: DISCONTINUED | OUTPATIENT
Start: 2024-04-28 | End: 2024-04-29

## 2024-04-28 RX ORDER — NITROGLYCERIN 0.4 MG/1
0.4 TABLET SUBLINGUAL EVERY 5 MIN PRN
Status: DISCONTINUED | OUTPATIENT
Start: 2024-04-28 | End: 2024-05-02 | Stop reason: HOSPADM

## 2024-04-28 RX ORDER — CLOPIDOGREL BISULFATE 75 MG/1
75 TABLET ORAL EVERY MORNING
Status: DISCONTINUED | OUTPATIENT
Start: 2024-04-29 | End: 2024-05-02 | Stop reason: HOSPADM

## 2024-04-28 RX ORDER — LIDOCAINE 40 MG/G
CREAM TOPICAL
Status: DISCONTINUED | OUTPATIENT
Start: 2024-04-28 | End: 2024-04-30

## 2024-04-28 RX ORDER — LEVOTHYROXINE SODIUM 75 UG/1
75 TABLET ORAL
Status: DISCONTINUED | OUTPATIENT
Start: 2024-04-29 | End: 2024-05-02 | Stop reason: HOSPADM

## 2024-04-28 RX ORDER — ALLOPURINOL 100 MG/1
100 TABLET ORAL EVERY MORNING
Status: DISCONTINUED | OUTPATIENT
Start: 2024-04-29 | End: 2024-04-29

## 2024-04-28 RX ORDER — ROSUVASTATIN CALCIUM 10 MG/1
40 TABLET, COATED ORAL AT BEDTIME
Status: DISCONTINUED | OUTPATIENT
Start: 2024-04-29 | End: 2024-05-02 | Stop reason: HOSPADM

## 2024-04-28 RX ORDER — ISOSORBIDE MONONITRATE 30 MG/1
60 TABLET, EXTENDED RELEASE ORAL EVERY MORNING
Status: DISCONTINUED | OUTPATIENT
Start: 2024-04-29 | End: 2024-05-02 | Stop reason: HOSPADM

## 2024-04-28 RX ORDER — EZETIMIBE 10 MG/1
10 TABLET ORAL EVERY MORNING
Status: DISCONTINUED | OUTPATIENT
Start: 2024-04-29 | End: 2024-05-02 | Stop reason: HOSPADM

## 2024-04-28 RX ORDER — FLUTICASONE FUROATE AND VILANTEROL 100; 25 UG/1; UG/1
1 POWDER RESPIRATORY (INHALATION) DAILY
Status: DISCONTINUED | OUTPATIENT
Start: 2024-04-29 | End: 2024-05-02 | Stop reason: HOSPADM

## 2024-04-28 RX ORDER — AMOXICILLIN 250 MG
2 CAPSULE ORAL 2 TIMES DAILY PRN
Status: DISCONTINUED | OUTPATIENT
Start: 2024-04-28 | End: 2024-05-02 | Stop reason: HOSPADM

## 2024-04-28 RX ORDER — FLUTICASONE PROPIONATE 50 MCG
1 SPRAY, SUSPENSION (ML) NASAL DAILY
Status: DISCONTINUED | OUTPATIENT
Start: 2024-04-29 | End: 2024-04-29

## 2024-04-28 RX ORDER — ONDANSETRON 4 MG/1
4 TABLET, ORALLY DISINTEGRATING ORAL EVERY 6 HOURS PRN
Status: DISCONTINUED | OUTPATIENT
Start: 2024-04-28 | End: 2024-05-02 | Stop reason: HOSPADM

## 2024-04-28 RX ORDER — LEVOFLOXACIN 5 MG/ML
750 INJECTION, SOLUTION INTRAVENOUS ONCE
Status: COMPLETED | OUTPATIENT
Start: 2024-04-28 | End: 2024-04-28

## 2024-04-28 RX ORDER — ASPIRIN 81 MG/1
81 TABLET ORAL AT BEDTIME
Status: DISCONTINUED | OUTPATIENT
Start: 2024-04-29 | End: 2024-05-02 | Stop reason: HOSPADM

## 2024-04-28 RX ORDER — FUROSEMIDE 10 MG/ML
100 INJECTION INTRAMUSCULAR; INTRAVENOUS
Status: DISCONTINUED | OUTPATIENT
Start: 2024-04-28 | End: 2024-04-29

## 2024-04-28 RX ORDER — NICOTINE POLACRILEX 4 MG
15-30 LOZENGE BUCCAL
Status: DISCONTINUED | OUTPATIENT
Start: 2024-04-28 | End: 2024-05-02 | Stop reason: HOSPADM

## 2024-04-28 RX ORDER — HEPARIN SODIUM 5000 [USP'U]/.5ML
5000 INJECTION, SOLUTION INTRAVENOUS; SUBCUTANEOUS EVERY 8 HOURS
Status: DISCONTINUED | OUTPATIENT
Start: 2024-04-29 | End: 2024-05-01

## 2024-04-28 RX ORDER — DEXTROSE MONOHYDRATE 25 G/50ML
25-50 INJECTION, SOLUTION INTRAVENOUS
Status: DISCONTINUED | OUTPATIENT
Start: 2024-04-28 | End: 2024-05-02 | Stop reason: HOSPADM

## 2024-04-28 RX ORDER — GABAPENTIN 300 MG/1
300 CAPSULE ORAL 2 TIMES DAILY
Status: DISCONTINUED | OUTPATIENT
Start: 2024-04-29 | End: 2024-05-02 | Stop reason: HOSPADM

## 2024-04-28 RX ORDER — CALCIUM CARBONATE 500 MG/1
1000 TABLET, CHEWABLE ORAL 4 TIMES DAILY PRN
Status: DISCONTINUED | OUTPATIENT
Start: 2024-04-28 | End: 2024-05-02 | Stop reason: HOSPADM

## 2024-04-28 RX ORDER — MYCOPHENOLATE MOFETIL 500 MG/1
1000 TABLET ORAL 2 TIMES DAILY
Status: DISCONTINUED | OUTPATIENT
Start: 2024-04-29 | End: 2024-04-29

## 2024-04-28 RX ORDER — AMOXICILLIN 250 MG
1 CAPSULE ORAL 2 TIMES DAILY PRN
Status: DISCONTINUED | OUTPATIENT
Start: 2024-04-28 | End: 2024-05-02 | Stop reason: HOSPADM

## 2024-04-28 RX ORDER — ONDANSETRON 2 MG/ML
4 INJECTION INTRAMUSCULAR; INTRAVENOUS EVERY 6 HOURS PRN
Status: DISCONTINUED | OUTPATIENT
Start: 2024-04-28 | End: 2024-05-02 | Stop reason: HOSPADM

## 2024-04-28 RX ORDER — ALBUTEROL SULFATE 90 UG/1
2 AEROSOL, METERED RESPIRATORY (INHALATION) EVERY 6 HOURS PRN
Status: DISCONTINUED | OUTPATIENT
Start: 2024-04-28 | End: 2024-05-02 | Stop reason: HOSPADM

## 2024-04-28 RX ORDER — PREDNISONE 5 MG/1
5 TABLET ORAL EVERY MORNING
Status: DISCONTINUED | OUTPATIENT
Start: 2024-04-29 | End: 2024-04-29

## 2024-04-28 RX ORDER — ACETAMINOPHEN 325 MG/1
975 TABLET ORAL ONCE
Status: COMPLETED | OUTPATIENT
Start: 2024-04-28 | End: 2024-04-28

## 2024-04-28 RX ORDER — SPIRONOLACTONE 25 MG/1
25 TABLET ORAL DAILY
Status: DISCONTINUED | OUTPATIENT
Start: 2024-04-29 | End: 2024-04-29

## 2024-04-28 RX ORDER — ATENOLOL 25 MG/1
25 TABLET ORAL AT BEDTIME
Status: DISCONTINUED | OUTPATIENT
Start: 2024-04-29 | End: 2024-04-29

## 2024-04-28 RX ORDER — LEVOFLOXACIN 5 MG/ML
750 INJECTION, SOLUTION INTRAVENOUS
Status: DISCONTINUED | OUTPATIENT
Start: 2024-04-30 | End: 2024-04-29

## 2024-04-28 RX ADMIN — ACETAMINOPHEN 975 MG: 325 TABLET, FILM COATED ORAL at 21:29

## 2024-04-28 RX ADMIN — LEVOFLOXACIN 750 MG: 5 INJECTION, SOLUTION INTRAVENOUS at 21:29

## 2024-04-28 ASSESSMENT — COLUMBIA-SUICIDE SEVERITY RATING SCALE - C-SSRS
6. HAVE YOU EVER DONE ANYTHING, STARTED TO DO ANYTHING, OR PREPARED TO DO ANYTHING TO END YOUR LIFE?: NO
1. IN THE PAST MONTH, HAVE YOU WISHED YOU WERE DEAD OR WISHED YOU COULD GO TO SLEEP AND NOT WAKE UP?: NO
2. HAVE YOU ACTUALLY HAD ANY THOUGHTS OF KILLING YOURSELF IN THE PAST MONTH?: NO

## 2024-04-28 ASSESSMENT — ENCOUNTER SYMPTOMS
FEVER: 1
CHILLS: 0
SHORTNESS OF BREATH: 1

## 2024-04-28 ASSESSMENT — ACTIVITIES OF DAILY LIVING (ADL)
ADLS_ACUITY_SCORE: 37

## 2024-04-29 ENCOUNTER — APPOINTMENT (OUTPATIENT)
Dept: OCCUPATIONAL THERAPY | Facility: HOSPITAL | Age: 69
DRG: 690 | End: 2024-04-29
Attending: HOSPITALIST
Payer: COMMERCIAL

## 2024-04-29 ENCOUNTER — APPOINTMENT (OUTPATIENT)
Dept: PHYSICAL THERAPY | Facility: HOSPITAL | Age: 69
DRG: 690 | End: 2024-04-29
Attending: HOSPITALIST
Payer: COMMERCIAL

## 2024-04-29 PROBLEM — E66.01 MORBID OBESITY (H): Status: RESOLVED | Noted: 2022-11-12 | Resolved: 2024-04-29

## 2024-04-29 PROBLEM — I27.20 PULMONARY HYPERTENSION (H): Status: ACTIVE | Noted: 2023-06-19

## 2024-04-29 PROBLEM — R78.81 BACTEREMIA: Status: ACTIVE | Noted: 2024-04-29

## 2024-04-29 PROBLEM — J44.9 CHRONIC OBSTRUCTIVE PULMONARY DISEASE, UNSPECIFIED COPD TYPE (H): Status: ACTIVE | Noted: 2023-06-19

## 2024-04-29 PROBLEM — I50.9 CHRONIC CONGESTIVE HEART FAILURE, UNSPECIFIED HEART FAILURE TYPE (H): Status: ACTIVE | Noted: 2023-06-19

## 2024-04-29 PROBLEM — E86.0 DEHYDRATION: Status: RESOLVED | Noted: 2022-11-12 | Resolved: 2024-04-29

## 2024-04-29 PROBLEM — D63.1 ANEMIA OF RENAL DISEASE: Status: ACTIVE | Noted: 2023-06-19

## 2024-04-29 PROBLEM — I25.10 CORONARY ARTERY DISEASE INVOLVING NATIVE CORONARY ARTERY: Status: RESOLVED | Noted: 2022-11-12 | Resolved: 2024-04-29

## 2024-04-29 PROBLEM — E87.6 HYPOKALEMIA: Status: RESOLVED | Noted: 2022-11-14 | Resolved: 2024-04-29

## 2024-04-29 PROBLEM — R73.9 HYPERGLYCEMIA: Status: RESOLVED | Noted: 2022-11-14 | Resolved: 2024-04-29

## 2024-04-29 PROBLEM — Z79.52 IMMUNOCOMPROMISED DUE TO CORTICOSTEROIDS (H): Status: ACTIVE | Noted: 2023-05-31

## 2024-04-29 PROBLEM — A41.9 SEPTIC SHOCK (H): Status: RESOLVED | Noted: 2023-06-01 | Resolved: 2024-04-29

## 2024-04-29 PROBLEM — Z87.39 HX OF GOUT: Status: ACTIVE | Noted: 2023-06-16

## 2024-04-29 PROBLEM — D84.821 IMMUNOCOMPROMISED DUE TO CORTICOSTEROIDS (H): Status: ACTIVE | Noted: 2023-05-31

## 2024-04-29 PROBLEM — E11.9 DIABETES MELLITUS WITHOUT COMPLICATION (H): Status: ACTIVE | Noted: 2022-11-12

## 2024-04-29 PROBLEM — E11.9 DIABETES MELLITUS WITHOUT COMPLICATION (H): Status: RESOLVED | Noted: 2022-11-12 | Resolved: 2024-04-29

## 2024-04-29 PROBLEM — E87.1 HYPONATREMIA: Status: RESOLVED | Noted: 2022-11-12 | Resolved: 2024-04-29

## 2024-04-29 PROBLEM — A41.50: Status: RESOLVED | Noted: 2023-06-01 | Resolved: 2024-04-29

## 2024-04-29 PROBLEM — N18.9 ANEMIA OF RENAL DISEASE: Status: ACTIVE | Noted: 2023-06-19

## 2024-04-29 PROBLEM — R50.9 FEVER, UNKNOWN ORIGIN: Status: RESOLVED | Noted: 2021-05-21 | Resolved: 2024-04-29

## 2024-04-29 PROBLEM — E11.40 TYPE 2 DIABETES MELLITUS WITH DIABETIC NEUROPATHY, WITH LONG-TERM CURRENT USE OF INSULIN (H): Status: ACTIVE | Noted: 2023-06-16

## 2024-04-29 PROBLEM — N39.0 URINARY TRACT INFECTION WITHOUT HEMATURIA, SITE UNSPECIFIED: Status: RESOLVED | Noted: 2023-06-25 | Resolved: 2024-04-29

## 2024-04-29 PROBLEM — Z79.4 TYPE 2 DIABETES MELLITUS WITH DIABETIC NEUROPATHY, WITH LONG-TERM CURRENT USE OF INSULIN (H): Status: ACTIVE | Noted: 2023-06-16

## 2024-04-29 PROBLEM — R65.21 SEPTIC SHOCK (H): Status: RESOLVED | Noted: 2023-06-01 | Resolved: 2024-04-29

## 2024-04-29 PROBLEM — A41.9 SEPSIS, DUE TO UNSPECIFIED ORGANISM, UNSPECIFIED WHETHER ACUTE ORGAN DYSFUNCTION PRESENT (H): Status: RESOLVED | Noted: 2023-05-31 | Resolved: 2024-04-29

## 2024-04-29 PROBLEM — T38.0X5A IMMUNOCOMPROMISED DUE TO CORTICOSTEROIDS (H): Status: ACTIVE | Noted: 2023-05-31

## 2024-04-29 PROBLEM — E03.9 HYPOTHYROIDISM, UNSPECIFIED TYPE: Status: ACTIVE | Noted: 2023-06-19

## 2024-04-29 PROBLEM — N17.9 ACUTE RENAL FAILURE, UNSPECIFIED ACUTE RENAL FAILURE TYPE (H): Status: RESOLVED | Noted: 2023-05-31 | Resolved: 2024-04-29

## 2024-04-29 PROBLEM — I50.22 CHRONIC SYSTOLIC HEART FAILURE (H): Status: ACTIVE | Noted: 2024-04-29

## 2024-04-29 PROBLEM — F33.2 SEVERE EPISODE OF RECURRENT MAJOR DEPRESSIVE DISORDER, WITHOUT PSYCHOTIC FEATURES (H): Status: ACTIVE | Noted: 2023-06-19

## 2024-04-29 PROBLEM — R65.20: Status: RESOLVED | Noted: 2023-06-01 | Resolved: 2024-04-29

## 2024-04-29 PROBLEM — R79.89 ELEVATED SERUM CREATININE: Status: RESOLVED | Noted: 2022-11-12 | Resolved: 2024-04-29

## 2024-04-29 LAB
ANION GAP SERPL CALCULATED.3IONS-SCNC: 16 MMOL/L (ref 7–15)
ATRIAL RATE - MUSE: 95 BPM
BASOPHILS # BLD AUTO: 0 10E3/UL (ref 0–0.2)
BASOPHILS NFR BLD AUTO: 0 %
BUN SERPL-MCNC: 60.7 MG/DL (ref 8–23)
CALCIUM SERPL-MCNC: 8.2 MG/DL (ref 8.8–10.2)
CHLORIDE SERPL-SCNC: 99 MMOL/L (ref 98–107)
CREAT SERPL-MCNC: 2.78 MG/DL (ref 0.67–1.17)
DEPRECATED HCO3 PLAS-SCNC: 18 MMOL/L (ref 22–29)
DIASTOLIC BLOOD PRESSURE - MUSE: NORMAL MMHG
EGFRCR SERPLBLD CKD-EPI 2021: 24 ML/MIN/1.73M2
EOSINOPHIL # BLD AUTO: 0 10E3/UL (ref 0–0.7)
EOSINOPHIL NFR BLD AUTO: 0 %
ERYTHROCYTE [DISTWIDTH] IN BLOOD BY AUTOMATED COUNT: 15.9 % (ref 10–15)
EST. AVERAGE GLUCOSE BLD GHB EST-MCNC: 128 MG/DL
GLUCOSE BLDC GLUCOMTR-MCNC: 162 MG/DL (ref 70–99)
GLUCOSE BLDC GLUCOMTR-MCNC: 169 MG/DL (ref 70–99)
GLUCOSE BLDC GLUCOMTR-MCNC: 185 MG/DL (ref 70–99)
GLUCOSE BLDC GLUCOMTR-MCNC: 91 MG/DL (ref 70–99)
GLUCOSE BLDC GLUCOMTR-MCNC: 92 MG/DL (ref 70–99)
GLUCOSE BLDC GLUCOMTR-MCNC: 96 MG/DL (ref 70–99)
GLUCOSE SERPL-MCNC: 98 MG/DL (ref 70–99)
HBA1C MFR BLD: 6.1 %
HCT VFR BLD AUTO: 34 % (ref 40–53)
HGB BLD-MCNC: 10.7 G/DL (ref 13.3–17.7)
IMM GRANULOCYTES # BLD: 0.2 10E3/UL
IMM GRANULOCYTES NFR BLD: 1 %
INTERPRETATION ECG - MUSE: NORMAL
LACTATE SERPL-SCNC: 0.8 MMOL/L (ref 0.7–2)
LYMPHOCYTES # BLD AUTO: 0.7 10E3/UL (ref 0.8–5.3)
LYMPHOCYTES NFR BLD AUTO: 4 %
MCH RBC QN AUTO: 28.5 PG (ref 26.5–33)
MCHC RBC AUTO-ENTMCNC: 31.5 G/DL (ref 31.5–36.5)
MCV RBC AUTO: 91 FL (ref 78–100)
MONOCYTES # BLD AUTO: 0.9 10E3/UL (ref 0–1.3)
MONOCYTES NFR BLD AUTO: 5 %
NEUTROPHILS # BLD AUTO: 15 10E3/UL (ref 1.6–8.3)
NEUTROPHILS NFR BLD AUTO: 89 %
NRBC # BLD AUTO: 0 10E3/UL
NRBC BLD AUTO-RTO: 0 /100
P AXIS - MUSE: 59 DEGREES
PLATELET # BLD AUTO: 119 10E3/UL (ref 150–450)
POTASSIUM SERPL-SCNC: 4.1 MMOL/L (ref 3.4–5.3)
PR INTERVAL - MUSE: 140 MS
PROCALCITONIN SERPL IA-MCNC: 14.82 NG/ML
QRS DURATION - MUSE: 94 MS
QT - MUSE: 348 MS
QTC - MUSE: 437 MS
R AXIS - MUSE: 7 DEGREES
RBC # BLD AUTO: 3.75 10E6/UL (ref 4.4–5.9)
SODIUM SERPL-SCNC: 133 MMOL/L (ref 135–145)
SYSTOLIC BLOOD PRESSURE - MUSE: NORMAL MMHG
T AXIS - MUSE: 60 DEGREES
VENTRICULAR RATE- MUSE: 95 BPM
WBC # BLD AUTO: 16.8 10E3/UL (ref 4–11)

## 2024-04-29 PROCEDURE — 250N000013 HC RX MED GY IP 250 OP 250 PS 637: Performed by: HOSPITALIST

## 2024-04-29 PROCEDURE — 97530 THERAPEUTIC ACTIVITIES: CPT | Mod: GP

## 2024-04-29 PROCEDURE — 99232 SBSQ HOSP IP/OBS MODERATE 35: CPT | Performed by: NURSE PRACTITIONER

## 2024-04-29 PROCEDURE — 80048 BASIC METABOLIC PNL TOTAL CA: CPT | Performed by: HOSPITALIST

## 2024-04-29 PROCEDURE — 120N000001 HC R&B MED SURG/OB

## 2024-04-29 PROCEDURE — 250N000011 HC RX IP 250 OP 636: Performed by: HOSPITALIST

## 2024-04-29 PROCEDURE — 94640 AIRWAY INHALATION TREATMENT: CPT

## 2024-04-29 PROCEDURE — 250N000011 HC RX IP 250 OP 636: Mod: JZ

## 2024-04-29 PROCEDURE — 97161 PT EVAL LOW COMPLEX 20 MIN: CPT | Mod: GP

## 2024-04-29 PROCEDURE — 97165 OT EVAL LOW COMPLEX 30 MIN: CPT | Mod: GO

## 2024-04-29 PROCEDURE — 999N000157 HC STATISTIC RCP TIME EA 10 MIN

## 2024-04-29 PROCEDURE — 250N000013 HC RX MED GY IP 250 OP 250 PS 637: Performed by: NURSE PRACTITIONER

## 2024-04-29 PROCEDURE — 250N000012 HC RX MED GY IP 250 OP 636 PS 637: Performed by: NURSE PRACTITIONER

## 2024-04-29 PROCEDURE — 85025 COMPLETE CBC W/AUTO DIFF WBC: CPT | Performed by: HOSPITALIST

## 2024-04-29 PROCEDURE — 83605 ASSAY OF LACTIC ACID: CPT | Performed by: NURSE PRACTITIONER

## 2024-04-29 PROCEDURE — 36415 COLL VENOUS BLD VENIPUNCTURE: CPT | Performed by: HOSPITALIST

## 2024-04-29 PROCEDURE — 83036 HEMOGLOBIN GLYCOSYLATED A1C: CPT | Performed by: NURSE PRACTITIONER

## 2024-04-29 PROCEDURE — 36415 COLL VENOUS BLD VENIPUNCTURE: CPT | Performed by: NURSE PRACTITIONER

## 2024-04-29 PROCEDURE — 84145 PROCALCITONIN (PCT): CPT | Performed by: NURSE PRACTITIONER

## 2024-04-29 RX ORDER — ALLOPURINOL 300 MG/1
300 TABLET ORAL EVERY MORNING
Status: DISCONTINUED | OUTPATIENT
Start: 2024-04-29 | End: 2024-05-02 | Stop reason: HOSPADM

## 2024-04-29 RX ORDER — PREDNISONE 5 MG/1
10 TABLET ORAL EVERY MORNING
Status: DISCONTINUED | OUTPATIENT
Start: 2024-04-30 | End: 2024-04-29

## 2024-04-29 RX ORDER — PREDNISONE 5 MG/1
10 TABLET ORAL EVERY MORNING
Status: DISCONTINUED | OUTPATIENT
Start: 2024-04-29 | End: 2024-05-02 | Stop reason: HOSPADM

## 2024-04-29 RX ORDER — ACETAMINOPHEN 10 MG/ML
INJECTION, SOLUTION INTRAVENOUS
Status: COMPLETED
Start: 2024-04-29 | End: 2024-04-29

## 2024-04-29 RX ORDER — FUROSEMIDE 40 MG
40 TABLET ORAL AT BEDTIME
Status: DISCONTINUED | OUTPATIENT
Start: 2024-04-30 | End: 2024-05-02 | Stop reason: HOSPADM

## 2024-04-29 RX ORDER — ACYCLOVIR 400 MG/1
TABLET ORAL
COMMUNITY

## 2024-04-29 RX ORDER — ACETAMINOPHEN 325 MG/1
650 TABLET ORAL EVERY 4 HOURS PRN
Status: DISCONTINUED | OUTPATIENT
Start: 2024-04-29 | End: 2024-04-29

## 2024-04-29 RX ORDER — ACYCLOVIR 400 MG/1
TABLET ORAL
COMMUNITY
End: 2024-05-20

## 2024-04-29 RX ORDER — ALLOPURINOL 300 MG/1
300 TABLET ORAL EVERY MORNING
Status: DISCONTINUED | OUTPATIENT
Start: 2024-04-30 | End: 2024-04-29

## 2024-04-29 RX ORDER — ACETAMINOPHEN 325 MG/1
650 TABLET ORAL 2 TIMES DAILY
Status: COMPLETED | OUTPATIENT
Start: 2024-04-29 | End: 2024-04-29

## 2024-04-29 RX ORDER — LEVOFLOXACIN 5 MG/ML
250 INJECTION, SOLUTION INTRAVENOUS EVERY 24 HOURS
Status: DISCONTINUED | OUTPATIENT
Start: 2024-04-30 | End: 2024-04-30

## 2024-04-29 RX ORDER — ACETAZOLAMIDE 500 MG/1
500 CAPSULE, EXTENDED RELEASE ORAL EVERY MORNING
Status: DISCONTINUED | OUTPATIENT
Start: 2024-04-30 | End: 2024-05-02 | Stop reason: HOSPADM

## 2024-04-29 RX ORDER — ACETAMINOPHEN 10 MG/ML
1000 INJECTION, SOLUTION INTRAVENOUS ONCE
Status: COMPLETED | OUTPATIENT
Start: 2024-04-29 | End: 2024-04-29

## 2024-04-29 RX ORDER — SPIRONOLACTONE 25 MG/1
25 TABLET ORAL DAILY
Status: DISCONTINUED | OUTPATIENT
Start: 2024-04-30 | End: 2024-05-02 | Stop reason: HOSPADM

## 2024-04-29 RX ORDER — MYCOPHENOLATE MOFETIL 500 MG/1
1000 TABLET ORAL 2 TIMES DAILY
Status: DISCONTINUED | OUTPATIENT
Start: 2024-04-29 | End: 2024-05-02 | Stop reason: HOSPADM

## 2024-04-29 RX ORDER — ACETAMINOPHEN 325 MG/1
650 TABLET ORAL EVERY 4 HOURS PRN
Status: DISCONTINUED | OUTPATIENT
Start: 2024-04-30 | End: 2024-05-02 | Stop reason: HOSPADM

## 2024-04-29 RX ADMIN — EZETIMIBE 10 MG: 10 TABLET ORAL at 09:39

## 2024-04-29 RX ADMIN — LEVOTHYROXINE SODIUM 75 MCG: 0.07 TABLET ORAL at 10:46

## 2024-04-29 RX ADMIN — FUROSEMIDE 100 MG: 10 INJECTION, SOLUTION INTRAMUSCULAR; INTRAVENOUS at 00:11

## 2024-04-29 RX ADMIN — GABAPENTIN 300 MG: 300 CAPSULE ORAL at 00:06

## 2024-04-29 RX ADMIN — HEPARIN SODIUM 5000 UNITS: 5000 INJECTION, SOLUTION INTRAVENOUS; SUBCUTANEOUS at 09:40

## 2024-04-29 RX ADMIN — ACETAMINOPHEN 650 MG: 325 TABLET, FILM COATED ORAL at 10:19

## 2024-04-29 RX ADMIN — FLUTICASONE FUROATE AND VILANTEROL TRIFENATATE 1 PUFF: 100; 25 POWDER RESPIRATORY (INHALATION) at 09:10

## 2024-04-29 RX ADMIN — ALLOPURINOL 300 MG: 300 TABLET ORAL at 10:20

## 2024-04-29 RX ADMIN — ACETAMINOPHEN 650 MG: 325 TABLET, FILM COATED ORAL at 00:06

## 2024-04-29 RX ADMIN — ATENOLOL 25 MG: 25 TABLET ORAL at 00:06

## 2024-04-29 RX ADMIN — ASPIRIN 81 MG: 81 TABLET, COATED ORAL at 20:18

## 2024-04-29 RX ADMIN — PREDNISONE 10 MG: 5 TABLET ORAL at 10:20

## 2024-04-29 RX ADMIN — ACETAMINOPHEN 1000 MG: 10 INJECTION INTRAVENOUS at 01:36

## 2024-04-29 RX ADMIN — MYCOPHENOLATE MOFETIL 1000 MG: 500 TABLET ORAL at 10:44

## 2024-04-29 RX ADMIN — ACETAMINOPHEN 1000 MG: 10 INJECTION, SOLUTION INTRAVENOUS at 01:36

## 2024-04-29 RX ADMIN — ACETAMINOPHEN 650 MG: 325 TABLET, FILM COATED ORAL at 17:46

## 2024-04-29 RX ADMIN — UMECLIDINIUM 1 PUFF: 62.5 AEROSOL, POWDER ORAL at 09:10

## 2024-04-29 RX ADMIN — ISOSORBIDE MONONITRATE 60 MG: 30 TABLET, EXTENDED RELEASE ORAL at 10:45

## 2024-04-29 RX ADMIN — ROSUVASTATIN 40 MG: 10 TABLET, FILM COATED ORAL at 20:15

## 2024-04-29 RX ADMIN — INSULIN GLARGINE 30 UNITS: 100 INJECTION, SOLUTION SUBCUTANEOUS at 20:35

## 2024-04-29 RX ADMIN — INSULIN ASPART 10 UNITS: 100 INJECTION, SOLUTION INTRAVENOUS; SUBCUTANEOUS at 17:41

## 2024-04-29 RX ADMIN — HEPARIN SODIUM 5000 UNITS: 5000 INJECTION, SOLUTION INTRAVENOUS; SUBCUTANEOUS at 17:40

## 2024-04-29 RX ADMIN — MYCOPHENOLATE MOFETIL 1000 MG: 500 TABLET ORAL at 20:16

## 2024-04-29 RX ADMIN — CLOPIDOGREL BISULFATE 75 MG: 75 TABLET, FILM COATED ORAL at 10:44

## 2024-04-29 RX ADMIN — INSULIN ASPART 10 UNITS: 100 INJECTION, SOLUTION INTRAVENOUS; SUBCUTANEOUS at 13:09

## 2024-04-29 RX ADMIN — ASPIRIN 81 MG: 81 TABLET, COATED ORAL at 00:06

## 2024-04-29 RX ADMIN — GABAPENTIN 300 MG: 300 CAPSULE ORAL at 10:45

## 2024-04-29 RX ADMIN — GABAPENTIN 300 MG: 300 CAPSULE ORAL at 20:18

## 2024-04-29 RX ADMIN — ROSUVASTATIN 40 MG: 10 TABLET, FILM COATED ORAL at 00:06

## 2024-04-29 ASSESSMENT — ACTIVITIES OF DAILY LIVING (ADL)
TOILETING: 1-->ASSISTANCE (EQUIPMENT/PERSON) NEEDED (NOT DEVELOPMENTALLY APPROPRIATE)
ADLS_ACUITY_SCORE: 49
TOILETING_ISSUES: YES
ADLS_ACUITY_SCORE: 49
ADLS_ACUITY_SCORE: 48
WEAR_GLASSES_OR_BLIND: YES
CONCENTRATING,_REMEMBERING_OR_MAKING_DECISIONS_DIFFICULTY: NO
ADLS_ACUITY_SCORE: 49
ADLS_ACUITY_SCORE: 48
ADLS_ACUITY_SCORE: 52
ADLS_ACUITY_SCORE: 52
DIFFICULTY_EATING/SWALLOWING: NO
WALKING_OR_CLIMBING_STAIRS_DIFFICULTY: YES
DRESSING/BATHING_DIFFICULTY: YES
ADLS_ACUITY_SCORE: 52
VISION_MANAGEMENT: GLASSES
EQUIPMENT_CURRENTLY_USED_AT_HOME: CANE, STRAIGHT;GLUCOMETER
ADLS_ACUITY_SCORE: 49
DOING_ERRANDS_INDEPENDENTLY_DIFFICULTY: NO
ADLS_ACUITY_SCORE: 49
WALKING_OR_CLIMBING_STAIRS: STAIR CLIMBING DIFFICULTY, REQUIRES EQUIPMENT
ADLS_ACUITY_SCORE: 52
DIFFICULTY_COMMUNICATING: NO
ADLS_ACUITY_SCORE: 37
ADLS_ACUITY_SCORE: 52
ADLS_ACUITY_SCORE: 52
ADLS_ACUITY_SCORE: 49
DRESSING/BATHING: BATHING DIFFICULTY, REQUIRES EQUIPMENT;DRESSING DIFFICULTY, REQUIRES EQUIPMENT
ADLS_ACUITY_SCORE: 52
TOILETING: 1-->ASSISTANCE (EQUIPMENT/PERSON) NEEDED
ADLS_ACUITY_SCORE: 52
ADLS_ACUITY_SCORE: 52
ADLS_ACUITY_SCORE: 49
FALL_HISTORY_WITHIN_LAST_SIX_MONTHS: NO
ADLS_ACUITY_SCORE: 48
PREVIOUS_RESPONSIBILITIES: MEAL PREP;HOUSEKEEPING;LAUNDRY;SHOPPING;MEDICATION MANAGEMENT;FINANCES;DRIVING
HEARING_DIFFICULTY_OR_DEAF: NO
ADLS_ACUITY_SCORE: 49
ADLS_ACUITY_SCORE: 48
CHANGE_IN_FUNCTIONAL_STATUS_SINCE_ONSET_OF_CURRENT_ILLNESS/INJURY: YES
TOILETING_ASSISTANCE: TOILETING DIFFICULTY, ASSISTANCE 1 PERSON;TOILETING DIFFICULTY, DEPENDENT
ADLS_ACUITY_SCORE: 52

## 2024-04-29 NOTE — ED PROVIDER NOTES
History     Chief Complaint   Patient presents with    Shortness of Breath    Fever    Rule out Urinary Tract Infection     HPI  Eduar Isaacs is a 68 year old male who is here with fever, difficulty breathing.  Has a history of urine infections with sepsis, thinks he may have a urine infection today.  Unsure if his weight has changed.  Unsure if he is experiencing swelling in his legs.  Says he does not have a chronic cough and has no active cough now.  Denies abdominal pain vomiting diarrhea.  Does have discomfort in his hands and feet but has been attributed to possible PMR.  Does take prednisone and mycophenolate for his immunosuppression for kidney transplant.    Allergies:  Allergies   Allergen Reactions    Cefepime Other (See Comments)     pain, kidney function off.    Codeine Shortness Of Breath    Niacin Itching and Rash    Amoxicillin Itching and Rash    Prilosec [Omeprazole] Itching and Rash    Vancomycin Hives and Rash    Aldactone [Spironolactone]     Atorvastatin Muscle Pain (Myalgia)    Ciprofloxacin Rash    Semaglutide Diarrhea       Problem List:    Patient Active Problem List    Diagnosis Date Noted    Acute cystitis without hematuria 04/28/2024     Priority: Medium    Urinary tract infection without hematuria, site unspecified 06/25/2023     Priority: Medium    Septic shock (H) 06/01/2023     Priority: Medium    Gram-negative sepsis with organ dysfunction (H) 06/01/2023     Priority: Medium    Acute on chronic systolic congestive heart failure (H) 06/01/2023     Priority: Medium    Cardiomyopathy, secondary (H) 06/01/2023     Priority: Medium    Immunocompromised due to corticosteroids (H24) 05/31/2023     Priority: Medium    H/O multiple allergies 05/31/2023     Priority: Medium    Acute renal failure, unspecified acute renal failure type (H24) 05/31/2023     Priority: Medium    Sepsis, due to unspecified organism, unspecified whether acute organ dysfunction present (H) 05/31/2023      Priority: Medium    Hyperglycemia 11/14/2022     Priority: Medium    Hypokalemia 11/14/2022     Priority: Medium    Dehydration 11/12/2022     Priority: Medium    Diabetes mellitus without complication (H) 11/12/2022     Priority: Medium    Elevated serum creatinine 11/12/2022     Priority: Medium    JYOTSNA (obstructive sleep apnea) 11/12/2022     Priority: Medium    Morbid obesity (H) 11/12/2022     Priority: Medium    Coronary artery disease involving native coronary artery 11/12/2022     Priority: Medium    Hyponatremia 11/12/2022     Priority: Medium    Fever, unknown origin 05/21/2021     Priority: Medium    Sepsis (H) 05/21/2021     Priority: Medium    Morbid obesity with BMI of 45.0-49.9, adult (H) 10/21/2016     Priority: Medium    Coronary atherosclerosis 10/21/2016     Priority: Medium    Asymptomatic hyperuricemia 10/19/2015     Priority: Medium    Hyperlipidemia 10/19/2015     Priority: Medium    Long term current use of systemic steroids 10/19/2015     Priority: Medium    Stented coronary artery 05/20/2015     Priority: Medium    Hypophosphatemia 12/04/2014     Priority: Medium    Secondary renal hyperparathyroidism (H24) 05/06/2014     Priority: Medium     Formatting of this note might be different from the original.  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     Priority: Medium    Chronic kidney disease, stage III (moderate) (H) 12/04/2006     Priority: Medium        Past Medical  History:    Past Medical History:   Diagnosis Date    Arthritis     Congestive heart failure (H)     COPD (chronic obstructive pulmonary disease) (H)     Diabetes (H)     Hypertension     Myocardial infarction (H)     Neuromuscular disorder (H)        Past Surgical History:    Past Surgical History:   Procedure Laterality Date    CARDIAC SURGERY      COLONOSCOPY - HIM SCAN  04/08/2012    [facility], polyps, adenomatous    ORTHOPEDIC SURGERY      TRANSPLANT         Family History:    No family history on file.    Social History:  Marital Status:   [5]  Social History     Tobacco Use    Smoking status: Former     Types: Cigarettes    Smokeless tobacco: Never   Substance Use Topics    Alcohol use: Yes     Comment: rare    Drug use: Never        Medications:    No current outpatient medications on file.        Review of Systems   Constitutional:  Positive for fever. Negative for chills.   Respiratory:  Positive for shortness of breath.    Cardiovascular:  Positive for chest pain.   All other systems reviewed and are negative.      Physical Exam   BP: 169/81  Pulse: 99  Temp: (!) 102.3  F (39.1  C)  Resp: 24  Weight: 135.5 kg (298 lb 12.8 oz)  SpO2: 98 %      Physical Exam  Constitutional:       General: He is not in acute distress.     Appearance: Normal appearance. He is obese.   HENT:      Head: Normocephalic and atraumatic.      Right Ear: External ear normal.      Left Ear: External ear normal.      Nose: Nose normal. No rhinorrhea.   Eyes:      Conjunctiva/sclera: Conjunctivae normal.   Cardiovascular:      Rate and Rhythm: Normal rate and regular rhythm.      Pulses: Normal pulses.   Pulmonary:      Effort: Pulmonary effort is normal. No respiratory distress.      Breath sounds: Decreased breath sounds present.   Abdominal:      General: There is no distension.      Palpations: Abdomen is soft.      Tenderness: There is no abdominal tenderness.   Musculoskeletal:         General: No deformity or signs of  injury.      Right lower leg: Edema present.      Left lower leg: Edema present.   Skin:     General: Skin is warm and dry.      Capillary Refill: Capillary refill takes less than 2 seconds.   Neurological:      General: No focal deficit present.      Mental Status: He is alert. Mental status is at baseline.   Psychiatric:         Mood and Affect: Mood normal.         Behavior: Behavior normal.         ED Course        Procedures    Results for orders placed during the hospital encounter of 04/28/24    POC US ECHO LIMITED    Impression  Unable to view heart, do not bill            EKG Interpretation:      Interpreted by Bladimir Dinero MD  Time reviewed:   Symptoms at time of EKG: sob   Rhythm: normal sinus   Rate: normal  Axis: normal  Ectopy: none  Conduction: normal  ST Segments/ T Waves: No ST-T wave changes  Q Waves: none  Comparison to prior:     Clinical Impression: normal EKG      Critical Care time:  was 69 minutes for this patient excluding procedures.             Results for orders placed or performed during the hospital encounter of 04/28/24 (from the past 24 hour(s))   CBC with platelets differential    Narrative    The following orders were created for panel order CBC with platelets differential.  Procedure                               Abnormality         Status                     ---------                               -----------         ------                     CBC with platelets and d...[[id number]]  Abnormal            Final result                 Please view results for these tests on the individual orders.   Comprehensive metabolic panel   Result Value Ref Range    Sodium 132 (L) 135 - 145 mmol/L    Potassium 4.0 3.4 - 5.3 mmol/L    Carbon Dioxide (CO2) 18 (L) 22 - 29 mmol/L    Anion Gap 14 7 - 15 mmol/L    Urea Nitrogen 59.7 (H) 8.0 - 23.0 mg/dL    Creatinine 2.44 (H) 0.67 - 1.17 mg/dL    GFR Estimate 28 (L) >60 mL/min/1.73m2    Calcium 8.3 (L) 8.8 - 10.2 mg/dL    Chloride 100 98 - 107 mmol/L     Glucose 131 (H) 70 - 99 mg/dL    Alkaline Phosphatase 38 (L) 40 - 150 U/L    AST 21 0 - 45 U/L    ALT 11 0 - 70 U/L    Protein Total 6.8 6.4 - 8.3 g/dL    Albumin 3.8 3.5 - 5.2 g/dL    Bilirubin Total 0.3 <=1.2 mg/dL   Lactic acid whole blood   Result Value Ref Range    Lactic Acid 0.9 0.7 - 2.0 mmol/L   Blood gas venous   Result Value Ref Range    pH Venous 7.34 7.32 - 7.43    pCO2 Venous 35 (L) 40 - 50 mm Hg    pO2 Venous 37 25 - 47 mm Hg    Bicarbonate Venous 18 (L) 21 - 28 mmol/L    Base Excess/Deficit Venous -6.7 (L) -3.0 - 3.0 mmol/L    FIO2 21     Oxyhemoglobin Venous 72 70 - 75 %    O2 Sat, Venous 73.3 70.0 - 75.0 %    Narrative    In healthy individuals, oxyhemoglobin (O2Hb) and oxygen saturation (SO2) are approximately equal. In the presence of dyshemoglobins, oxyhemoglobin can be considerably lower than oxygen saturation.   Procalcitonin   Result Value Ref Range    Procalcitonin 2.24 (H) <0.50 ng/mL   Troponin T, High Sensitivity   Result Value Ref Range    Troponin T, High Sensitivity 73 (H) <=22 ng/L   NT pro BNP   Result Value Ref Range    N terminal Pro BNP Inpatient 3,102 (H) 0 - 900 pg/mL   CBC with platelets and differential   Result Value Ref Range    WBC Count 17.9 (H) 4.0 - 11.0 10e3/uL    RBC Count 3.75 (L) 4.40 - 5.90 10e6/uL    Hemoglobin 10.8 (L) 13.3 - 17.7 g/dL    Hematocrit 33.1 (L) 40.0 - 53.0 %    MCV 88 78 - 100 fL    MCH 28.8 26.5 - 33.0 pg    MCHC 32.6 31.5 - 36.5 g/dL    RDW 15.9 (H) 10.0 - 15.0 %    Platelet Count 147 (L) 150 - 450 10e3/uL    % Neutrophils 93 %    % Lymphocytes 2 %    % Monocytes 4 %    % Eosinophils 0 %    % Basophils 0 %    % Immature Granulocytes 1 %    NRBCs per 100 WBC 0 <1 /100    Absolute Neutrophils 16.7 (H) 1.6 - 8.3 10e3/uL    Absolute Lymphocytes 0.3 (L) 0.8 - 5.3 10e3/uL    Absolute Monocytes 0.8 0.0 - 1.3 10e3/uL    Absolute Eosinophils 0.0 0.0 - 0.7 10e3/uL    Absolute Basophils 0.0 0.0 - 0.2 10e3/uL    Absolute Immature Granulocytes 0.1 <=0.4  10e3/uL    Absolute NRBCs 0.0 10e3/uL   [place] Draw    Narrative    The following orders were created for panel order [place] Draw.  Procedure                               Abnormality         Status                     ---------                               -----------         ------                     Extra Blue Top Tube[[id number]]                              Final result               Extra Red Top Tube[[id number]]                               Final result                 Please view results for these tests on the individual orders.   Extra Blue Top Tube   Result Value Ref Range    Hold Specimen JIC    Extra Red Top Tube   Result Value Ref Range    Hold Specimen JIC    Symptomatic Influenza A/B, RSV, & SARS-CoV2 PCR (COVID-19) Nasopharyngeal    Specimen: Nasopharyngeal; Swab   Result Value Ref Range    Influenza A PCR Negative Negative    Influenza B PCR Negative Negative    RSV PCR Negative Negative    SARS CoV2 PCR Negative Negative    Narrative    Testing was performed using the Xpert Xpress CoV2/Flu/RSV Assay on the Cepheid GeneXpert Instrument. This test should be ordered for the detection of SARS-CoV-2, influenza, and RSV viruses in individuals who meet clinical and/or epidemiological criteria. Test performance is unknown in asymptomatic patients. This test is for in vitro diagnostic use under the FDA EUA for laboratories certified under CLIA to perform high or moderate complexity testing. This test has not been FDA cleared or approved. A negative result does not rule out the presence of PCR inhibitors in the specimen or target RNA in concentration below the limit of detection for the assay. If only one viral target is positive but coinfection with multiple targets is suspected, the sample should be re-tested with another FDA cleared, approved, or authorized test, if coinfection would change clinical management. This test was validated by the [facility]. These laboratories are  certified under the Clinical Laboratory Improvement Amendments of 1988 (CLIA-88) as qualified to perform high complexity laboratory testing.   UA with Microscopic   Result Value Ref Range    Color Urine Light Yellow Colorless, Straw, Light Yellow, Yellow    Appearance Urine Slightly Cloudy (A) Clear    Glucose Urine Negative Negative mg/dL    Bilirubin Urine Negative Negative    Ketones Urine Negative Negative mg/dL    Specific Gravity Urine 1.015 1.003 - 1.035    Blood Urine Small (A) Negative    pH Urine 5.5 4.7 - 8.0    Protein Albumin Urine 300 (A) Negative mg/dL    Urobilinogen Urine Normal Normal, 2.0 mg/dL    Nitrite Urine Negative Negative    Leukocyte Esterase Urine Moderate (A) Negative    Bacteria Urine Few (A) None Seen /HPF    WBC Clumps Urine Present (A) None Seen /HPF    Mucus Urine Present (A) None Seen /LPF    RBC Urine 3 (H) <=2 /HPF    WBC Urine 84 (H) <=5 /HPF    Squamous Epithelials Urine <1 <=1 /HPF   POC US ECHO LIMITED    Impression    Unable to view heart, do not bill   EKG 12 lead   Result Value Ref Range    Systolic Blood Pressure  mmHg    Diastolic Blood Pressure  mmHg    Ventricular Rate 95 BPM    Atrial Rate 95 BPM    AR Interval 140 ms    QRS Duration 94 ms     ms    QTc 437 ms    P Axis 59 degrees    R AXIS 7 degrees    T Axis 60 degrees    Interpretation ECG       Sinus rhythm  Normal ECG  No previous ECGs available         Medications   furosemide (LASIX) injection 100 mg (has no administration in time range)   acetaminophen (TYLENOL) tablet 975 mg (975 mg Oral $Given 4/28/24 2129)   levofloxacin (LEVAQUIN) infusion 750 mg (0 mg Intravenous Stopped 4/28/24 2257)       Assessments & Plan (with Medical Decision Making)     I have reviewed the nursing notes.    I have reviewed the findings, diagnosis, plan and need for follow up with the patient.    Critical Care Addendum  My initial assessment, based on my review of prehospital provider report, review of nursing observations,  review of vital signs, focused history, physical exam, and review of cardiac rhythm monitor, established a high suspicion that Eduar Isaacs has sepsis with indication for early goal-directed therapy, which requires immediate intervention, and therefore he is critically ill.     After the initial assessment, the care team initiated multiple lab tests, initiated IV fluid administration, and initiated medication therapy with ceftriaxone  to provide stabilization care. Due to the critical nature of this patient, I reassessed nursing observations, vital signs, physical exam, review of cardiac rhythm monitor, mental status, neurologic status, and respiratory status multiple times prior to his disposition.     Time also spent performing documentation, reviewing test results, and coordination of care.     Critical care time (excluding teaching time and procedures): 69 minutes.         Medical Decision Making  The patient's presentation was of moderate complexity (an acute illness with systemic symptoms).    The patient's evaluation involved:  an assessment requiring an independent historian (family member)  review of external note(s) from 1 sources (last discharge summary)  review of 3+ test result(s) ordered prior to this encounter (see separate area of note for details)  ordering and/or review of 3+ test(s) in this encounter (see separate area of note for details)    The patient's management necessitated high risk (a decision regarding hospitalization).    68-year-old male here with fever, difficulty breathing.  Patient poor historian, unsure if he generally had any urinary symptoms last time he had a UTI which resulted in sepsis.  I suspected telling the triage nurse that he had symptoms of UTI was he has a fever which happened last time because he had a UTI.  No obvious pleural effusions on ultrasound, I do not think this is a CHF exacerbation.  No oxygen requirement, a little tachypneic but that may be due to his  fever.  Will give antipyretics.  Normotensive so will not give fluid bolus.    Reassessment  Labs came back.  Urinalysis positive.  Treated for pyelonephritis.  BMP improved from last visit as was troponin.  Remainder of labs adequately normal.    Current Discharge Medication List          Final diagnoses:   Acute cystitis without hematuria       4/28/2024   [facility]       Bladimir Dinero MD  04/28/24 1263

## 2024-04-29 NOTE — PLAN OF CARE
Goal Outcome Evaluation:upon general assessment, patient was febrile, ice was applied accordingly, patient has not been tremulous, and medications have been administered accordingly, patients daughter did come in with patients current med list and we have been able to arrange his meds accordingly, as the shift has progressed, the temp is now 101.0 the blood pressure is low and provider is aware, patient is however eating and has been tolerating oral intake well, patient reported having two diarrhea stools, patient has voided today with no difficulty, vitals as charted, accu check as charted, patient makes his needs known well, patient was able to work with OT and PT today, ambulated well.

## 2024-04-29 NOTE — PROGRESS NOTES
Chart reviewed and pt discussed in interdisciplinary rounds. No anticipated discharge needs at this time. CTS team will continue to monitor daily in interdisciplinary rounds and will intervene as needed/appropriate.    Checked in with patient.  He denies questions or concerns at this time.  CTS will continue to remain available for support and resources.     Per physical therapy, patient may be in need of a short term rehab stay. Writer will continue to monitor this need and make referrals if necessary.

## 2024-04-29 NOTE — PROGRESS NOTES
Spoke with Duane regarding Cpap. His daughter will bring in his home machine tomorrow. He states he has not been able to use the past few nights. Will monitor his SpO2 and keep HOB elevated

## 2024-04-29 NOTE — PROGRESS NOTES
04/29/24 1000   Appointment Info   Signing Clinician's Name / Credentials (OT) Heather Auguste OTR/L   Living Environment   People in Home alone   Current Living Arrangements house   Home Accessibility stairs to enter home   Number of Stairs, Main Entrance 1   Transportation Anticipated car, drives self   Living Environment Comments Patient reported that he was living alone in a single level home with 1 GEMMA. Bathroom has a tub/ shower combo.   Self-Care   Usual Activity Tolerance good   Current Activity Tolerance fair   Equipment Currently Used at Home cane, straight;walker, standard;glucometer   Fall history within last six months no   Activity/Exercise/Self-Care Comment Patient reported that he was independent with all ADLs at baseline. He reported that he has a cane, walking stick, and fww that he uses at home for mobility. Most recently he has been using his cane.   Instrumental Activities of Daily Living (IADL)   Previous Responsibilities meal prep;housekeeping;laundry;shopping;medication management;finances;driving   IADL Comments Patient reported that he was independent with all IADLs including medication management, meal preparation, driving, and home maintenance.   General Information   Onset of Illness/Injury or Date of Surgery 04/28/24   Referring Physician Axel Petty MD   Patient/Family Therapy Goal Statement (OT) Patient would like to return home. '   Additional Occupational Profile Info/Pertinent History of Current Problem Patient presented to hospital with acute cystitis without hematuria.   Cognitive Status Examination   Orientation Status orientation to person, place and time   Cognitive Status Comments WFL   Pain Assessment   Patient Currently in Pain No   Posture   Posture not impaired   Range of Motion Comprehensive   General Range of Motion no range of motion deficits identified   Strength Comprehensive (MMT)   General Manual Muscle Testing (MMT) Assessment no strength deficits identified    Bed Mobility   Bed Mobility supine-sit;rolling left   Rolling Left Preston (Bed Mobility) modified independence   Supine-Sit Preston (Bed Mobility) modified independence   Assistive Device (Bed Mobility) bed rails   Comment (Bed Mobility) Patient was able to transfer supine-sitting mod I with head of bed elevated.   Transfers   Transfers sit-stand transfer;bed-chair transfer   Transfer Skill: Bed to Chair/Chair to Bed   Bed-Chair Preston (Transfers) supervision   Assistive Device (Bed-Chair Transfers) standard walker   Transfer Comments Patient was able to ambulate ~5 feet to recliner with fww and SBA.   Sit-Stand Transfer   Sit-Stand Preston (Transfers) supervision   Assistive Device (Sit-Stand Transfers) walker, front-wheeled   Sit/Stand Transfer Comments Patient was able to stand from bed with SBA and fww.   Activities of Daily Living   BADL Assessment/Intervention lower body dressing;upper body dressing;toileting   Upper Body Dressing Assessment/Training   Position (Upper Body Dressing) sitting up in bed   Comment, (Upper Body Dressing) Patient was able to don shirt while seated in bed mod I.   Preston Level (Upper Body Dressing) doff;don;pull-over garment;modified independence   Lower Body Dressing Assessment/Training   Position (Lower Body Dressing) edge of bed sitting   Comment, (Lower Body Dressing) Patient required mod assistance to don socks, underwear, and shoes today. When standing, patient was able to pull pants up with SBA.   Preston Level (Lower Body Dressing) doff;don;socks;pants/bottoms   Toileting   Comment, (Toileting) Patient was able to complete clothing management with SBA while standing.   Preston Level (Toileting) adjust/manage clothing;supervision   Clinical Impression   Criteria for Skilled Therapeutic Interventions Met (OT) Yes, treatment indicated   OT Diagnosis Impaired ADLs such as lower body dressing; decreased activity tolerance   Influenced by the  following impairments Acute cystitis without hematuria   OT Problem List-Impairments impacting ADL problems related to;activity tolerance impaired;mobility   Assessment of Occupational Performance 1-3 Performance Deficits   Identified Performance Deficits Impaired ADLs such as lower body dressing; decreased activity tolerance   Planned Therapy Interventions (OT) ADL retraining;strengthening;stretching;home program guidelines;progressive activity/exercise   Clinical Decision Making Complexity (OT) problem focused assessment/low complexity   Risk & Benefits of therapy have been explained evaluation/treatment results reviewed;care plan/treatment goals reviewed;risks/benefits reviewed;current/potential barriers reviewed;participants voiced agreement with care plan;participants included;patient   Clinical Impression Comments Patient was previously living at home alone and was independent with ADLs and IADLs and mod I for mobility with a cane. During evaluation, patient was able to complete upper body dressing and bed mobility mod I. He required mod A for lower body dressing today. He was able to transfer and ambulate ~5 feet with fww and SBA.   OT Total Evaluation Time   OT Eval, Low Complexity Minutes (95500) 18   OT Goals   Therapy Frequency (OT) 5 times/week   OT Predicted Duration/Target Date for Goal Attainment 05/03/24   OT Goals Lower Body Dressing;Toilet Transfer/Toileting   OT: Lower Body Dressing Modified independent;using adaptive equipment   OT: Toilet Transfer/Toileting Modified independent   OT Discharge Planning   OT Plan Progress ADLs and activity tolerance   OT Discharge Recommendation (DC Rec) home;home with assist   OT Rationale for DC Rec Patient was previously living at home alone and was independent with ADLs and IADLs and mod I for mobility with a cane. During evaluation, patient was able to complete upper body dressing and bed mobility mod I. He required mod A for lower body dressing today. He was  able to transfer and ambulate ~5 feet with fww and SBA. Patient currently requiring some assistance with ADLs but anticipate that he will be able to progress during inpatient stay. Recommend discharge home.   OT Brief overview of current status Mod I for bed mobility; mod I for upper body dressing; SBA for transfer; mod A for lower body dressing; SBA and fww for mobility   Total Session Time   Total Session Time (sum of timed and untimed services) 18   Psychosocial Support   Trust Relationship/Rapport care explained;choices provided;emotional support provided;empathic listening provided;questions answered;questions encouraged;reassurance provided;thoughts/feelings acknowledged      5

## 2024-04-29 NOTE — ED NOTES
A/ox4. Symptoms unchanged since arrival. Denies pain. Dyspnea on exertion. Ambulates with steady gait. Updated Dr. Petty of temp increase to 103.4F after tylenol administered. Report given to Sheri FORD on acute care.

## 2024-04-29 NOTE — H&P
[facility]    History and Physical - Hospitalist Service       Date of Admission:  4/28/2024    Assessment & Plan      Eduar Isaacs is a 68 year old male admitted on 4/28/2024 with uti, sepsis    # Sepsis due to UTI  # UTI  -68 year old male  presented to the hospital complaining of fever, chills, difficulty breathing.  He believes he has a urinary tract infection.    -On admission patient is on room air saturating at 98%.   - He meets SIRS criteria with fever of 102.3, tachycardia heart rate of 105, leukocytosis with WBC elevated at 17.9.   - procalcitonin elevated at 2.24,   - UA positive for UTI,   -negative for influenza, RSV, COVID-19.   - Chest x-ray done and does not show signs of pneumonia but official report is pending.   -Patient started on IV antibiotic with Levaquin.    -Blood culture sent,  - urine culture sent.    #acute on chronic diastolic CHF  -comes with shortness of breath, lower extremities  edema and 10 Ib weight gain  -BNP elevated at   -start iv lasix 100 mg bid  -continue home aldactone      # Transplant kidney  # Chronic kidney disease stage III  -Creatinine of 2.44 which is about his baseline is  -avoid nephrotoxic medications  -resume home immunosuppression      # Elevated troponin  -troponin high sensitivity is 73  -EKG unremarkable  -no chest pain  -could be from chf and kidney disease  -trend troponin      # Diabetes mellitus type 2  -resume home lantus   -low dose sliding scale novolog      # chronic diastolic CHF  # Hyperlipidemia  -resume home statins, ;LASIX, ALDACTONOn        Diet:  Diathetic   DVT Prophylaxis: Heparin SQ  Manley Catheter: Not present  Lines: None     Cardiac Monitoring: None  Code Status:  full    Clinically Significant Risk Factors Present on Admission          # Hypocalcemia: Lowest Ca = 8.3 mg/dL in last 2 days, will monitor and replace as appropriate       # Drug Induced Platelet Defect: home medication list includes an antiplatelet medication  "  # Hypertension: Noted on problem list  # Chronic heart failure with preserved ejection fraction: heart failure noted on problem list and last echo with EF >50%     # Severe Obesity: Estimated body mass index is 48.05 kg/m  as calculated from the following:    Height as of 4/19/24: 1.679 m (5' 6.12\").    Weight as of this encounter: 135.5 kg (298 lb 12.8 oz).              Disposition Plan     Medically Ready for Discharge:            Axel Petty MD  Hospitalist Service  [facility]  Securely message with [employer] (more info)  Text page via [facility] Paging/Directory     ______________________________________________________________________    Chief Complaint   Fever, chills    History is obtained from the patient    History of Present Illness   Eduar Isaacs is a 68 year old male with established diagnosis of diabetes mellitus type 2, hyperlipidemia, hypertension, hypothyroidism, diastolic CHF, kidney transplant on immune suppression who presented to the hospital complaining of fever, chills, difficulty breathing, lower extremites edema 10 Lb weight gain.  He believes he has a urinary tract infection given dysuria.  Patient denies cough, sputum, abdominal pain, diarrhea, vomiting.    On admission patient is on room air saturating at 98%.  He meets SIRS criteria with fever of 102.3, tachycardia heart rate of 105, leukocytosis with WBC elevated at 17.9.  Creatinine elevated at 2.44 which is about his baseline, BNP over 3000, procalcitonin elevated at 2.24, high-sensitivity troponin elevated at 73, UA positive for UTI, negative for influenza, RSV, COVID-19.  Chest x-ray done and does not show signs of pneumonia but official report is pending.     Patient assessed as having sepsis due to UTI in the setting of acute on  chronic diastolic CHF.  Patient started on IV antibiotic with Levaquin.  Blood culture sent, urine culture sent.      Past Medical History    Past Medical History:   Diagnosis Date    " Arthritis     Congestive heart failure (H)     COPD (chronic obstructive pulmonary disease) (H)     Diabetes (H)     Hypertension     Myocardial infarction (H)     Neuromuscular disorder (H)        Past Surgical History   Past Surgical History:   Procedure Laterality Date    CARDIAC SURGERY      COLONOSCOPY - HIM SCAN  04/08/2012    [facility], polyps, adenomatous    ORTHOPEDIC SURGERY      TRANSPLANT       Family History:    Family History   History reviewed and is non contributory        Social History:  Marital Status:   [5]  Social History   []Expand by Default            Tobacco Use    Smoking status: Former       Types: Cigarettes    Smokeless tobacco: Never   Substance Use Topics    Alcohol use: Yes       Comment: rare    Drug use: Never        Allergies:  Allergies[]Expand by Default         Allergies   Allergen Reactions    Cefepime Other (See Comments)       pain, kidney function off.    Codeine Shortness Of Breath    Niacin Itching and Rash    Amoxicillin Itching and Rash    Prilosec [Omeprazole] Itching and Rash    Vancomycin Hives and Rash    Aldactone [Spironolactone]      Atorvastatin Muscle Pain (Myalgia)    Ciprofloxacin Rash    Semaglutide Diarrhea          Prior to Admission Medications   Prior to Admission Medications   Prescriptions Last Dose Informant Patient Reported? Taking?   Continuous Blood Gluc  (DEXCOM G6 ) SANAZ  Self No No   Sig: Use to read blood sugars as per 's instructions.   Continuous Blood Gluc  (DEXCOM G7 ) SANAZ   No No   Sig: Use to read blood sugars as per 's instructions.   Continuous Blood Gluc  (DEXCOM G7 ) SANAZ   No No   Sig: Use to read blood sugars as per 's instructions.   Continuous Blood Gluc Sensor (DEXCOM G6 SENSOR) MISC  Self No No   Sig: Change every 10 days.   Continuous Blood Gluc Sensor (DEXCOM G7 SENSOR) MISC   No No   Sig: Change every 10 days.   Continuous Blood Gluc  Transmit (DEXCOM G6 TRANSMITTER) MISC  Self No No   Sig: Change every 3 months.   HUMALOG KWIKPEN 100 UNIT/ML soln  Self Yes No   Sig: Inject 15 Units Subcutaneous 3 times daily (with meals) -hold for BG < 120   Icosapent Ethyl (VASCEPA PO)   Yes No   Sig: Take 2 g by mouth 2 times daily   LACTOBACILLUS PROBIOTIC PO   Yes No   Sig: 3,000 mmu daily   TRELEGY ELLIPTA 100-62.5-25 MCG/INH oral inhaler  Self Yes No   Sig: Inhale 1 puff into the lungs every morning   acetaZOLAMIDE (DIAMOX SEQUEL) 500 MG 12 hr capsule   Yes No   Sig: Take 500 mg by mouth daily   albuterol (PROAIR HFA/PROVENTIL HFA/VENTOLIN HFA) 108 (90 Base) MCG/ACT inhaler  Self Yes No   Sig: Inhale 2 puffs into the lungs every 6 hours as needed for shortness of breath   allopurinol (ZYLOPRIM) 100 MG tablet  Self Yes No   Sig: Take 100 mg by mouth every morning   aspirin (ASA) 81 MG EC tablet  Self Yes No   Sig: Take 81 mg by mouth At Bedtime   atenolol (TENORMIN) 25 MG tablet  Self Yes No   Sig: Take 25 mg by mouth At Bedtime   benzonatate (TESSALON) 200 MG capsule   No No   Sig: Take 1 capsule (200 mg) by mouth 3 times daily as needed for cough   Patient not taking: Reported on 2024   blood glucose (NO BRAND SPECIFIED) test strip  Self No No   Si strip by In Vitro route 4 times daily (before meals and nightly) Use to test blood sugar 4 times daily or as directed.   blood glucose monitoring (SOFTCLIX) lancets  Self No No   Si each by In Vitro route 4 times daily (before meals and nightly) Use to monitor blood glucose once daily as directed.   calcium carbonate (TUMS) 500 MG chewable tablet   Yes No   Sig: Take 2 chew tab by mouth 2 times daily   cholecalciferol (VITAMIN D3) 125 mcg (5000 units) capsule  Self Yes No   Sig: Take 125 mcg by mouth every morning   clopidogrel (PLAVIX) 75 MG tablet  Self Yes No   Sig: Take 75 mg by mouth every morning   colchicine (COLCRYS) 0.6 MG tablet  Self Yes No   Sig: Take 1 tablet by mouth daily as needed  for gout pain   Patient not taking: Reported on 4/19/2024   ezetimibe (ZETIA) 10 MG tablet  Self Yes No   Sig: Take 10 mg by mouth every morning   fluticasone (FLONASE) 50 MCG/ACT nasal spray   Yes No   Sig: Spray 1 spray into both nostrils daily   furosemide (LASIX) 40 MG tablet  Self Yes No   Sig: Take 120 mg by mouth At Bedtime   gabapentin (NEURONTIN) 100 MG capsule  Self Yes No   Sig: Take 300 mg by mouth 2 times daily   insulin glargine (LANTUS PEN) 100 UNIT/ML pen  Self No No   Sig: Inject 30 Units Subcutaneous 2 times daily   insulin pen needle (31G X 8 MM) 31G X 8 MM miscellaneous  Self No No   Sig: Use 5 pen needles daily or as directed.   isosorbide mononitrate (IMDUR) 30 MG 24 hr tablet  Self Yes No   Sig: Take 2 tablets by mouth every morning   levothyroxine (SYNTHROID/LEVOTHROID) 75 MCG tablet   Yes No   Sig: Take 75 mcg by mouth daily   metolazone (ZAROXOLYN) 2.5 MG tablet  Self Yes No   Sig: Take 1 tablet by mouth three times a week -Monday, Wednesday and Friday at bedtime.   mycophenolate (GENERIC EQUIVALENT) 500 MG tablet  Self Yes No   Sig: Take 1,000 mg by mouth 2 times daily   nitroGLYcerin (NITROSTAT) 0.4 MG sublingual tablet  Self Yes No   Sig: Place 0.4 mg under the tongue every 5 minutes as needed for chest pain   omega 3 1000 MG CAPS   Yes No   Sig: Take 1 capsule by mouth 2 times daily   predniSONE (DELTASONE) 5 MG tablet  Self Yes No   Sig: Take 5 mg by mouth every morning   rosuvastatin (CRESTOR) 40 MG tablet  Self Yes No   Sig: Take 40 mg by mouth At Bedtime    spironolactone (ALDACTONE) 25 MG tablet   Yes No   Sig: Take 25 mg by mouth daily      Facility-Administered Medications: None        Review of Systems    The 10 point Review of Systems is negative other than noted in the HPI      Physical Exam   Vital Signs: Temp: (!) 102.3  F (39.1  C) Temp src: Tympanic BP: 127/99 Pulse: 105   Resp: (!) 27 SpO2: 97 % O2 Device: None (Room air)    Weight: 298 lbs 12.8 oz    Constitutional:        General: He is not in acute distress.     Appearance: Normal appearance. He is obese.   HENT:      Head: Normocephalic and atraumatic.      Right Ear: External ear normal.      Left Ear: External ear normal.      Nose: Nose normal. No rhinorrhea.   Eyes:      Conjunctiva/sclera: Conjunctivae normal.   Cardiovascular:      Rate and Rhythm: Normal rate and regular rhythm.      Pulses: Normal pulses.   Pulmonary:      Effort: Pulmonary effort is normal. No respiratory distress.      Breath sounds: Decreased breath sounds present.   Abdominal:      General: There is no distension.      Palpations: Abdomen is soft.      Tenderness: There is no abdominal tenderness.   Musculoskeletal:         General: No deformity or signs of injury.      Right lower leg: Edema present.      Left lower leg: Edema present.   Skin:     General: Skin is warm and dry.      Capillary Refill: Capillary refill takes less than 2 seconds.   Neurological:      General: No focal deficit present.      Mental Status: He is alert. Mental status is at baseline.   Psychiatric:         Mood and Affect: Mood normal.         Behavior: Behavior normal.           Medical Decision Making       78 MINUTES SPENT BY ME on the date of service doing chart review, history, exam, documentation & further activities per the note.      Data

## 2024-04-29 NOTE — PLAN OF CARE
Upon pt. Coming onto floor, pt. Was oriented x4 but was lethargic and this nurse had to repeat herself with questions due to pt. Falling asleep easily r/t high fever (104.2F). After pt was given tylenol IV per provider orders, ice packs/cool eda cloth applied to pt. skin, and fan turned on for pt, his temperature came down to 101.4F. Will continue to monitor and will pass along to oncoming shift. Pt. Alert and oriented x4 upon rechecking with temp decrease. Pt. Answered nurse questions quickly and logically. No c/o pain this shift. Pt. Stayed in bed r/t weakness and male external catheter placed r/t pt. Given lasix at bedtime and pt. Urinating frequently overnight and lethargic/unable to get up to urinal void or go to the bathroom. Will remove in AM on dayshift. Pt. Had to use a olimpia steady upon admit to floor d/t pt. Unable to side step to top of bed or turn self in bed. Skin CDI. Pt. Wears glasses to bed. O2 sats WDL and pt. Had no SOB overnight. Pt. Has a home CPAP machine that daughter will bring in later this morning; pt. Stable overnight; RT aware. Left IV patent and saline locked. Will continue to monitor.     Face to face report given with opportunity to observe patient.    Report given to LILIANA Davies RN   4/29/2024  7:30 AM

## 2024-04-29 NOTE — MEDICATION SCRIBE - ADMISSION MEDICATION HISTORY
Medication Scribe Admission Medication History    Admission medication history is complete. The information provided in this note is only as accurate as the sources available at the time of the update.    Information Source(s): Patient, [facility], and [facility]  via in-person    Pertinent Information:   Patient manages his own medications and is a fair historian. Daughter brought in a handwritten medication list and assisted with medication review, patient was able to answer specifics about medications.   Lasix- per [facility]- listed as 80 mg daily, pt reports he was told to take 40 mg and has been taking this dose for 1 month. He takes at .     Changes made to PTA medication list:  Added: None  Deleted:   Atenolol- discontinued 4/11/24 due to low heart rate  Metolazone- discontinued at [facility]   Tessalon- therapy completed  Iosapent ethyl- not covered by insurance  Changed:   Allopurinol from 100 mg to 300 mg   Prednisone from 5 mg to 10 mg (increased 4/16/24)  Humalog from 20 mg TID to 15-20 mg TID (pt reports taking when he remembers)    Allergies reviewed with patient and updates made in EHR: yes    Medication History Completed By: Krista Mattson 4/29/2024 9:37 AM    PTA Med List   Medication Sig Last Dose    acetaZOLAMIDE (DIAMOX SEQUEL) 500 MG 12 hr capsule Take 500 mg by mouth every morning 4/28/2024 at AM    albuterol (PROAIR HFA/PROVENTIL HFA/VENTOLIN HFA) 108 (90 Base) MCG/ACT inhaler Inhale 2 puffs into the lungs every 6 hours as needed for shortness of breath or wheezing PRN at PRN    allopurinol (ZYLOPRIM) 300 MG tablet Take 300 mg by mouth every morning 4/28/2024 at AM    aspirin (ASA) 81 MG EC tablet Take 81 mg by mouth At Bedtime 4/27/2024 at HS    calcium carbonate antacid 1000 MG CHEW Take 2 tablets by mouth every morning 4/28/2024 at AM    cholecalciferol (VITAMIN D3) 125 mcg (5000 units) capsule Take 125 mcg by mouth every morning 4/28/2024 at AM    clopidogrel (PLAVIX) 75 MG  tablet Take 75 mg by mouth every morning 4/28/2024 at AM    Continuous Glucose  (DEXCOM G7 ) SANAZ Use to read blood sugars as per 's instructions. 4/29/2024    Continuous Glucose Sensor (DEXCOM G7 SENSOR) MISC Change every 10 days. 4/29/2024    ezetimibe (ZETIA) 10 MG tablet Take 10 mg by mouth every morning 4/28/2024 at AM    fluticasone (FLONASE) 50 MCG/ACT nasal spray Spray 1 spray into both nostrils daily as needed Past Week    furosemide (LASIX) 40 MG tablet Take 40 mg by mouth at bedtime 4/27/2024 at HS    gabapentin (NEURONTIN) 300 MG capsule Take 300 mg by mouth 2 times daily 4/28/2024 at AM    HUMALOG KWIKPEN 100 UNIT/ML soln Inject 15-20 Units Subcutaneous 3 times daily (before meals) -hold for BG < 120 4/28/2024 at AM 15 units    insulin glargine (LANTUS PEN) 100 UNIT/ML pen Inject 30 Units Subcutaneous 2 times daily 4/28/2024 at AM    isosorbide mononitrate (IMDUR) 30 MG 24 hr tablet Take 2 tablets by mouth every morning 4/28/2024 at AM    levothyroxine (SYNTHROID/LEVOTHROID) 75 MCG tablet Take 75 mcg by mouth every morning 4/28/2024 at AM    mycophenolate (GENERIC EQUIVALENT) 500 MG tablet Take 1,000 mg by mouth 2 times daily  DX. Z94.0 TRANSPLANT DATE 2000. 4/28/2024 at AM    nitroGLYcerin (NITROSTAT) 0.4 MG sublingual tablet Place 0.4 mg under the tongue every 5 minutes as needed for chest pain PRN at PRN    omega 3 1200 MG CAPS Take 1 capsule by mouth 2 times daily 4/28/2024 at AM    predniSONE (DELTASONE) 5 MG tablet Take 10 mg by mouth every morning 4/28/2024 at AM    rosuvastatin (CRESTOR) 40 MG tablet Take 40 mg by mouth At Bedtime  4/27/2024 at HS    spironolactone (ALDACTONE) 25 MG tablet Take 25 mg by mouth every morning 4/28/2024 at AM    TRELEGY ELLIPTA 100-62.5-25 MCG/INH oral inhaler Inhale 1 puff into the lungs every morning 4/28/2024 at AM

## 2024-04-29 NOTE — PLAN OF CARE
Goal Outcome Evaluation:  Provider aware of the temp and that this writer did place ice to patient.

## 2024-04-29 NOTE — ED TRIAGE NOTES
.ROXANNE Rodriguez CNP assessed patient in triage and determined patient not Urgent Care appropriate. Will be seen in Emergency Department.

## 2024-04-29 NOTE — PLAN OF CARE
Goal Outcome Evaluation:  Provider was updated on low blood pressure and temp.

## 2024-04-29 NOTE — PROGRESS NOTES
"[facility]    Hospitalist Progress Note    Date of Service (when I saw the patient): 04/29/2024    Assessment & Plan       Acute cystitis without hematuria: Concern was raised about possible sepsis, however LA is negative, BP stable, no significant tachycardia even with high feverm so indications of end organ dysfunction, sepsis ruled out. 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 Will continue       Type 2 diabetes mellitus with diabetic neuropathy, with long-term current use of insulin (H): Continue home dose insulin as long as eating well      Chronic systolic heart failure (H): Initial concern for acute exacerbation-however he has minimal LE edema-BNP is at previous baseline considering his renal failure, lungs are clear without hypoxia. Will likely hold today's dose of lasix with high fever and he received 100mg IV last night.         # Hypocalcemia: Lowest Ca = 8.2 mg/dL in last 2 days, will monitor and replace as appropriate       # Drug Induced Platelet Defect: home medication list includes an antiplatelet medication   # Hypertension: Noted on problem list  # Acute heart failure with preserved ejection fraction: heart failure noted on problem list, last echo with EF >50%, and receiving IV diuretics     # Severe Obesity: Estimated body mass index is 45.86 kg/m  as calculated from the following:    Height as of this encounter: 1.702 m (5' 7\").    Weight as of this encounter: 132.8 kg (292 lb 12.8 oz).           DVT Prophylaxis: Heparin SQ  Code Status: Full Code    Disposition: Expected discharge in 1-3 days once culture resulted, improved .    Nicolette Cooper, CNP    Interval History   Denies " chest pain, abdominal pain or nausea. No dyspnea.     -Data reviewed today: I reviewed all new labs and imaging results over the last 24 hours.     Physical Exam   Temp: (!) 101.4  F (38.6  C) Temp src: Tympanic BP: 127/67 Pulse: 87   Resp: (!) 32 SpO2: 98 % O2 Device: None (Room air)    Vitals:    04/28/24 2010 04/29/24 0000   Weight: 135.5 kg (298 lb 12.8 oz) 132.8 kg (292 lb 12.8 oz)     Vital Signs with Ranges  Temp:  [101.4  F (38.6  C)-104.2  F (40.1  C)] 101.4  F (38.6  C)  Pulse:  [] 87  Resp:  [18-32] 32  BP: (127-169)/(67-99) 127/67  SpO2:  [96 %-98 %] 98 %  I/O last 3 completed shifts:  In: -   Out: 500 [Urine:500]    Peripheral IV 04/28/24 Left Antecubital fossa (Active)   Site Assessment WDL 04/28/24 2300   Line Status Saline locked 04/28/24 2300   Dressing Transparent 04/28/24 2300   Dressing Status dry;clean;intact 04/28/24 2300   Line Intervention Flushed 04/28/24 2300   Phlebitis Scale 0-->no symptoms 04/28/24 2300   Infiltration? no 04/28/24 2300   Number of days: 1       External Urinary Catheter  (Active)   Skin penis/scrotum cleansed with soap and water;reddened 04/29/24 0137   Collection Container Other (Comment) 04/29/24 0137   Securement Method Other (Comment) 04/29/24 0137   Number of days: 0     Line/device assessment(s) completed for medical necessity    Constitutional: Awake,alert, ill appearing  Respiratory: Clear bilaterally without wheezes, crackles or rhonchi  Cardiovascular: HRR, no murmurs, rubs, thrills.   GI: Soft,nontender, bowel sounds positive  Skin/Integumen: No unusual rashes, open areas or bruising  Other:      Medications   Current Facility-Administered Medications   Medication Dose Route Frequency Provider Last Rate Last Admin     Current Facility-Administered Medications   Medication Dose Route Frequency Provider Last Rate Last Admin    [Held by provider] allopurinol (ZYLOPRIM) tablet 100 mg  100 mg Oral Axel Vallecillo MD        aspirin EC tablet 81 mg  81 mg  Oral At Bedtime Axel Petty MD   81 mg at 04/29/24 0006    atenolol (TENORMIN) tablet 25 mg  25 mg Oral At Bedtime Axel Petty MD   25 mg at 04/29/24 0006    [Held by provider] clopidogrel (PLAVIX) tablet 75 mg  75 mg Oral QAM Axel Petty MD        ezetimibe (ZETIA) tablet 10 mg  10 mg Oral QAM Axel Petty MD        [Held by provider] fluticasone (FLONASE) 50 MCG/ACT spray 1 spray  1 spray Both Nostrils Daily Axel Petty MD        fluticasone-vilanterol (BREO ELLIPTA) 100-25 MCG/ACT inhaler 1 puff  1 puff Inhalation Daily Axel Petty MD        And    umeclidinium (INCRUSE ELLIPTA) 62.5 MCG/ACT inhaler 1 puff  1 puff Inhalation Daily Axel Petty MD        [Held by provider] furosemide (LASIX) injection 100 mg  100 mg Intravenous BID AC Axel Petty MD   100 mg at 04/29/24 0011    [Held by provider] gabapentin (NEURONTIN) capsule 300 mg  300 mg Oral BID Axel Petty MD   300 mg at 04/29/24 0006    heparin ANTICOAGULANT injection 5,000 Units  5,000 Units Subcutaneous Q8H Axel Petty MD        insulin aspart (NovoLOG) injection (RAPID ACTING)  1-3 Units Subcutaneous TID AC Axel Petty MD        insulin aspart (NovoLOG) injection (RAPID ACTING)  1-3 Units Subcutaneous At Bedtime Axel Petty MD        [Held by provider] insulin glargine (LANTUS PEN) injection 15 Units  15 Units Subcutaneous BID Axel Petty MD        [Held by provider] isosorbide mononitrate (IMDUR) 24 hr tablet 60 mg  60 mg Oral QAM Axel Petty MD        [START ON 4/30/2024] levofloxacin (LEVAQUIN) infusion 750 mg  750 mg Intravenous Q48H Axel Petty MD        [Held by provider] levothyroxine (SYNTHROID/LEVOTHROID) tablet 75 mcg  75 mcg Oral QAM AC Axel Petty MD        [Held by provider] mycophenolate (GENERIC EQUIVALENT) tablet 1,000 mg  1,000 mg Oral BID Axel Petty MD        [Held by provider] predniSONE (DELTASONE) tablet 5 mg  5 mg Oral QAM Axel Petty MD        rosuvastatin  (CRESTOR) tablet 40 mg  40 mg Oral At Bedtime Axel Petty MD   40 mg at 04/29/24 0006    sodium chloride (PF) 0.9% PF flush 3 mL  3 mL Intracatheter Q8H Axel Petty MD   3 mL at 04/29/24 0007    [Held by provider] spironolactone (ALDACTONE) tablet 25 mg  25 mg Oral Daily Axel Petty MD           Data   Recent Labs   Lab 04/29/24  0536 04/29/24  0019 04/28/24 2033   WBC 16.8*  --  17.9*   HGB 10.7*  --  10.8*   MCV 91  --  88   *  --  147*   *  --  132*   POTASSIUM 4.1  --  4.0   CHLORIDE 99  --  100   CO2 18*  --  18*   BUN 60.7*  --  59.7*   CR 2.78*  --  2.44*   ANIONGAP 16*  --  14   HANNAH 8.2*  --  8.3*   GLC 98 96 131*   ALBUMIN  --   --  3.8   PROTTOTAL  --   --  6.8   BILITOTAL  --   --  0.3   ALKPHOS  --   --  38*   ALT  --   --  11   AST  --   --  21       Recent Results (from the past 24 hour(s))   POC US ECHO LIMITED    Impression    Unable to view heart, do not bill   XR Chest Port 1 View    Narrative    PROCEDURE: XR CHEST PORT 1 VIEW 4/28/2024 9:40 PM    HISTORY: Fever    COMPARISONS: 1/7/2024.    TECHNIQUE: Single portable view.    FINDINGS: Heart size is stable. There is some ectasia of the aorta. No  acute infiltrate or effusion is seen.    There are healed left posterolateral rib fractures.         Impression    IMPRESSION: No acute infiltrate.    ALHAJI DE JESUS MD         SYSTEM ID:  [id number]

## 2024-04-29 NOTE — ED TRIAGE NOTES
"\"Here for shortness of breath, it has gotten worse for 2 weeks.  Fever started yesterday.  Also having UTI symptoms for a couple of days.  I have hx of CHF.\"         "

## 2024-04-29 NOTE — PLAN OF CARE
Goal Outcome Evaluation:Face to face report given with opportunity to observe patient.    Report given to Claribel Brown, RN   4/29/2024  3:40 PM

## 2024-04-29 NOTE — PLAN OF CARE
Goal Outcome Evaluation: patient did report having two diarrhea stools when working with PT and OT, this writer did not witness the stools.

## 2024-04-29 NOTE — PLAN OF CARE
[facility] Inpatient Admission Note:    Patient admitted to 3112/3112-1 at approximately 2330 via bed accompanied by transport tech and daughter from emergency room . Report received from LILIANA Michelle in SBAR format at 2257 via telephone. Patient transferred to bed via Bailee steady lift d/t significant weakness from increased fever (103F). Patient is alert and oriented X 3, but d/t fever, this nurse had to repeat questions multiple times as pt. Would fall asleep/lethargic. Pt. denies pain. Patient oriented to room, unit, hourly rounding, and plan of care. Explained admission packet and patient handbook with patient bill of rights brochure. Will continue to monitor and document as needed.     Inpatient Nursing criteria listed below was met:    Health care directives status obtained and documented: Yes    Patient identifies a surrogate decision maker: Yes If yes, who:Marielena (daughter) Contact Information:see flowsheet     If initial lactic acid greater than 2.0, repeat lactic acid drawn within one hour of arrival to unit: NA. If no, state reason: Lactic in ER was 0.9. No repeat needed at this time    Clergy visit ordered if patient requests: No    Skin issues/needs documented: No    Isolation Patient: no     Fall Prevention Yes: Care plan updated, education given and documented, sticker and magnet in place: Yes    Care Plan initiated: Yes    Education Documented (including assessment): Yes    Patient has discharge needs : Yes If yes, please explain:TBD

## 2024-04-29 NOTE — PROGRESS NOTES
04/29/24 1300   Appointment Info   Signing Clinician's Name / Credentials (PT) Sd Ramos DPT   Rehab Comments (PT) Pt was sitting up in recliner with daughter Marielena visiting upon approach.  Pt was agreeable to PT eval.   Living Environment   People in Home alone   Current Living Arrangements house   Home Accessibility stairs to enter home   Number of Stairs, Main Entrance 1   Transportation Anticipated car, drives self   Living Environment Comments Patient reported that he was living alone in a single level home with 1 GEMMA. Bathroom has a tub/ shower combo.   Self-Care   Usual Activity Tolerance good   Current Activity Tolerance fair   Equipment Currently Used at Home walker, rolling;cane, straight   Fall history within last six months no   Activity/Exercise/Self-Care Comment Patient reported that he was independent with all ADLs at baseline. He reported that he has a cane, walking stick, and fww.  He typically uses no device in his home stating the rooms are small and he uses walls/furniture for support.  Pt stated he sometimes uses FWW for mobility outdoors, sometimes his walking stick.  At baseline he is independent with ADLs and IADLs.   General Information   Onset of Illness/Injury or Date of Surgery 04/28/24   Referring Physician Dr. Petty   Patient/Family Therapy Goals Statement (PT) Pt prefers to return home, but may be agreeable if requires short term rehb.   Pertinent History of Current Problem (include personal factors and/or comorbidities that impact the POC) Pt was admitted for Acute cystitis without hematuria.   Cognition   Affect/Mental Status (Cognition) WFL   Orientation Status (Cognition) oriented x 4   Follows Commands (Cognition) WFL   Pain Assessment   Patient Currently in Pain No  (Pt stated he does have back pain with movement at times, but none at rest and denied back pain with amb today.)   Integumentary/Edema   Integumentary/Edema Comments 2+ pitting edema bilat feet/lower legs  "and mod/sig hemosiderin staining of distal lower legs/ankles.   Posture    Posture Forward head position;Protracted shoulders   Range of Motion (ROM)   ROM Comment LE AROM WFL throughout bilat   Strength (Manual Muscle Testing)   Strength (Manual Muscle Testing) Deficits observed during functional mobility   Bed Mobility   Comment, (Bed Mobility) Not completed as pt already up in chair upon approach.   Transfers   Transfers bed-chair transfer;sit-stand transfer   Transfer Safety Concerns Noted decreased step length;decreased weight-shifting ability   Impairments Contributing to Impaired Transfers decreased strength;impaired balance   Bed-Chair Transfer   Assistive Device (Bed-Chair Transfers) walker, front-wheeled   Bed-Chair [place] (Transfers) contact guard   Sit-Stand Transfer   Sit-Stand [place] (Transfers) contact guard;supervision  (CGA/SBA)   Assistive Device (Sit-Stand Transfers) walker, front-wheeled   Comment, (Sit-Stand Transfer) From recliner.   Gait/Stairs (Locomotion)   [place] Level (Gait) contact guard   Assistive Device (Gait) walker, front-wheeled   Distance in Feet (Gait) 40   Comment, (Gait/Stairs) Pt amb approx 40 ft CGA with FWW exhibiting partial step-through to almost step-to at times leading with LLE, but gait was continuous despite smaller steps.  With amb, SpO2 = 94%, HR = 93 on room air.   Balance   Balance Comments Sitting balance = Good- w/o support.  Standing balance = Good- with FWW support.   Sensory Examination   Sensory Perception Comments Pt noted at least mod N/T in bilat feet stated they feel like \"blocks of wood.\"   Coordination   Coordination no deficits were identified   Muscle Tone   Muscle Tone no deficits were identified   Clinical Impression   Criteria for Skilled Therapeutic Intervention Yes, treatment indicated   PT Diagnosis (PT) Generalized weakness   Influenced by the following impairments Functional LE weakness; decreased activity tolerance; decreased " balance.   Functional limitations due to impairments Gait, transfers, stairs, possibly bed mob   Clinical Presentation (PT Evaluation Complexity) stable   Clinical Presentation Rationale Clinical judgment   Clinical Decision Making (Complexity) low complexity   Planned Therapy Interventions (PT) balance training;bed mobility training;gait training;home exercise program;manual therapy techniques;neuromuscular re-education;patient/family education;stair training;strengthening;stretching;transfer training;progressive activity/exercise   Risk & Benefits of therapy have been explained evaluation/treatment results reviewed;care plan/treatment goals reviewed;risks/benefits reviewed;current/potential barriers reviewed;participants voiced agreement with care plan;participants included;patient;daughter   Clinical Impression Comments PT eval completed and pt demonstrated mod/much decreased activity tolerance and mild/mod functional LE weakness leading to difficulty with gait, transfers stairs, and likely bed mobility as well.  Pt would benefit from skilled PT services to help advance activity tolerance and functional mobility.   PT Total Evaluation Time   PT Eval, Low Complexity Minutes (77268) 18   Physical Therapy Goals   PT Frequency 6x/week   PT Predicted Duration/Target Date for Goal Attainment 05/06/24   PT Goals Bed Mobility;Transfers;Gait;Stairs   PT: Bed Mobility Modified independent;Supine to/from sit;Rolling   PT: Transfers Modified independent;Sit to/from stand;Bed to/from chair;Assistive device   PT: Gait Modified independent;150 feet;Rolling walker   PT: Stairs 4 stairs;Supervision/stand-by assist;Rail on both sides   Interventions   Interventions Quick Adds Therapeutic Activity   Therapeutic Activity   Therapeutic Activities: dynamic activities to improve functional performance Minutes (30855) 10   Symptoms Noted During/After Treatment Fatigue   Treatment Detail/Skilled Intervention Near end of eval, pt suddenly  had to use bathroom and pt amb with FWW from recliner over to bathroom CGA/SBA, parked FWW outside bathroom (as very confined area inside bathroom) and pt used sink and grab bar to walk over to toilet.  Pt able to complete all toileting tasks and post-toilet hygiene for having a BM at SPV level including chaning his underwear and washing hands.  Pt then amb back to his recliner CGA/SBA with FWW.  Ended visit with pt up in recliner, call button in easy reach and notified his daughter.   PT Discharge Planning   PT Plan Progress mobilty, activity tolerance and balance.   PT Discharge Recommendation (DC Rec) Transitional Care Facility;home with outpatient physical therapy   PT Rationale for DC Rec Pt would most benefit from short term rehab at this time d/t CGA for transfers and only able to amb 40 ft CGA with FWW and fatigue.  However, pt stated he is already doing much better than last nigth and do anticipate he will continue to improve and if he does then may be able to return home and conitnue with outpatient PT which he stated he was completing prior to admission here.   PT Brief overview of current status Sit->Stand = CGA/SBA.  Amb x 40 ft CGA with FWW.  Toileting = SPV.   PT Equipment Needed at Discharge   (Has equipment in place.)   Total Session Time   Timed Code Treatment Minutes 10   Total Session Time (sum of timed and untimed services) 28   Psychosocial Support   Trust Relationship/Rapport care explained;choices provided;emotional support provided;empathic listening provided;questions answered;questions encouraged;reassurance provided;thoughts/feelings acknowledged

## 2024-04-29 NOTE — PHARMACY-MEDICATION TEACHING
[facility]    Pharmacy      Antimicrobial Stewardship Note             Culture Results: in process     Recommendations/Interventions:  1. Current antibiotic is appropriate for uncomplicated UTI.   2. Should UTI prove to be complicated (defined in UptoDate as: The presence of >105 CFU/mL on urine culture with fever, allograft pain, chills, malaise, or bacteremia with the same organism in urine, or a biopsy with findings consistent with pyelonephritis), appropriate therapy would be meropenem 1 g IV every 12h (for current renal function).    Willa Loredo, [facility]  April 29, 2024

## 2024-04-30 ENCOUNTER — APPOINTMENT (OUTPATIENT)
Dept: PHYSICAL THERAPY | Facility: HOSPITAL | Age: 69
DRG: 690 | End: 2024-04-30
Payer: COMMERCIAL

## 2024-04-30 ENCOUNTER — APPOINTMENT (OUTPATIENT)
Dept: OCCUPATIONAL THERAPY | Facility: HOSPITAL | Age: 69
DRG: 690 | End: 2024-04-30
Payer: COMMERCIAL

## 2024-04-30 LAB
ANION GAP SERPL CALCULATED.3IONS-SCNC: 17 MMOL/L (ref 7–15)
BUN SERPL-MCNC: 74.1 MG/DL (ref 8–23)
CALCIUM SERPL-MCNC: 7.9 MG/DL (ref 8.8–10.2)
CHLORIDE SERPL-SCNC: 102 MMOL/L (ref 98–107)
CREAT SERPL-MCNC: 2.88 MG/DL (ref 0.67–1.17)
DEPRECATED HCO3 PLAS-SCNC: 15 MMOL/L (ref 22–29)
EGFRCR SERPLBLD CKD-EPI 2021: 23 ML/MIN/1.73M2
ERYTHROCYTE [DISTWIDTH] IN BLOOD BY AUTOMATED COUNT: 15.9 % (ref 10–15)
GLUCOSE BLDC GLUCOMTR-MCNC: 101 MG/DL (ref 70–99)
GLUCOSE BLDC GLUCOMTR-MCNC: 130 MG/DL (ref 70–99)
GLUCOSE BLDC GLUCOMTR-MCNC: 141 MG/DL (ref 70–99)
GLUCOSE BLDC GLUCOMTR-MCNC: 144 MG/DL (ref 70–99)
GLUCOSE BLDC GLUCOMTR-MCNC: 168 MG/DL (ref 70–99)
GLUCOSE BLDC GLUCOMTR-MCNC: 96 MG/DL (ref 70–99)
GLUCOSE SERPL-MCNC: 122 MG/DL (ref 70–99)
HCT VFR BLD AUTO: 31.7 % (ref 40–53)
HGB BLD-MCNC: 10.1 G/DL (ref 13.3–17.7)
LACTATE SERPL-SCNC: 1.1 MMOL/L (ref 0.7–2)
LACTATE SERPL-SCNC: 1.4 MMOL/L (ref 0.7–2)
MCH RBC QN AUTO: 28.3 PG (ref 26.5–33)
MCHC RBC AUTO-ENTMCNC: 31.9 G/DL (ref 31.5–36.5)
MCV RBC AUTO: 89 FL (ref 78–100)
PLATELET # BLD AUTO: 124 10E3/UL (ref 150–450)
POTASSIUM SERPL-SCNC: 3.5 MMOL/L (ref 3.4–5.3)
PROCALCITONIN SERPL IA-MCNC: 29.94 NG/ML
PROCALCITONIN SERPL IA-MCNC: 30.59 NG/ML
RBC # BLD AUTO: 3.57 10E6/UL (ref 4.4–5.9)
SODIUM SERPL-SCNC: 134 MMOL/L (ref 135–145)
WBC # BLD AUTO: 11.7 10E3/UL (ref 4–11)

## 2024-04-30 PROCEDURE — 258N000003 HC RX IP 258 OP 636: Performed by: NURSE PRACTITIONER

## 2024-04-30 PROCEDURE — 250N000012 HC RX MED GY IP 250 OP 636 PS 637: Performed by: NURSE PRACTITIONER

## 2024-04-30 PROCEDURE — 250N000013 HC RX MED GY IP 250 OP 250 PS 637: Performed by: HOSPITALIST

## 2024-04-30 PROCEDURE — 85027 COMPLETE CBC AUTOMATED: CPT | Performed by: NURSE PRACTITIONER

## 2024-04-30 PROCEDURE — 250N000013 HC RX MED GY IP 250 OP 250 PS 637: Performed by: NURSE PRACTITIONER

## 2024-04-30 PROCEDURE — 84145 PROCALCITONIN (PCT): CPT | Performed by: NURSE PRACTITIONER

## 2024-04-30 PROCEDURE — 250N000011 HC RX IP 250 OP 636: Performed by: HOSPITALIST

## 2024-04-30 PROCEDURE — 250N000011 HC RX IP 250 OP 636: Performed by: NURSE PRACTITIONER

## 2024-04-30 PROCEDURE — 97530 THERAPEUTIC ACTIVITIES: CPT | Mod: GP

## 2024-04-30 PROCEDURE — 120N000001 HC R&B MED SURG/OB

## 2024-04-30 PROCEDURE — 36415 COLL VENOUS BLD VENIPUNCTURE: CPT | Performed by: NURSE PRACTITIONER

## 2024-04-30 PROCEDURE — 97530 THERAPEUTIC ACTIVITIES: CPT | Mod: GO

## 2024-04-30 PROCEDURE — 99232 SBSQ HOSP IP/OBS MODERATE 35: CPT | Performed by: NURSE PRACTITIONER

## 2024-04-30 PROCEDURE — 83605 ASSAY OF LACTIC ACID: CPT | Performed by: NURSE PRACTITIONER

## 2024-04-30 PROCEDURE — 80048 BASIC METABOLIC PNL TOTAL CA: CPT | Performed by: NURSE PRACTITIONER

## 2024-04-30 RX ORDER — LIDOCAINE 40 MG/G
CREAM TOPICAL
Status: DISCONTINUED | OUTPATIENT
Start: 2024-04-30 | End: 2024-05-02 | Stop reason: HOSPADM

## 2024-04-30 RX ORDER — LEVOFLOXACIN 5 MG/ML
750 INJECTION, SOLUTION INTRAVENOUS
Status: DISCONTINUED | OUTPATIENT
Start: 2024-04-30 | End: 2024-05-02 | Stop reason: HOSPADM

## 2024-04-30 RX ORDER — SACCHAROMYCES BOULARDII 250 MG
250 CAPSULE ORAL 2 TIMES DAILY
Status: DISCONTINUED | OUTPATIENT
Start: 2024-04-30 | End: 2024-05-02 | Stop reason: HOSPADM

## 2024-04-30 RX ORDER — ACETAMINOPHEN 10 MG/ML
1000 INJECTION, SOLUTION INTRAVENOUS EVERY 6 HOURS PRN
Status: DISCONTINUED | OUTPATIENT
Start: 2024-04-30 | End: 2024-05-02 | Stop reason: HOSPADM

## 2024-04-30 RX ORDER — SODIUM CHLORIDE 9 MG/ML
INJECTION, SOLUTION INTRAVENOUS CONTINUOUS
Status: ACTIVE | OUTPATIENT
Start: 2024-04-30 | End: 2024-04-30

## 2024-04-30 RX ADMIN — ALLOPURINOL 300 MG: 300 TABLET ORAL at 08:27

## 2024-04-30 RX ADMIN — INSULIN GLARGINE 30 UNITS: 100 INJECTION, SOLUTION SUBCUTANEOUS at 21:02

## 2024-04-30 RX ADMIN — PREDNISONE 10 MG: 5 TABLET ORAL at 08:27

## 2024-04-30 RX ADMIN — ONDANSETRON 4 MG: 2 INJECTION INTRAMUSCULAR; INTRAVENOUS at 08:50

## 2024-04-30 RX ADMIN — HEPARIN SODIUM 5000 UNITS: 5000 INJECTION, SOLUTION INTRAVENOUS; SUBCUTANEOUS at 08:25

## 2024-04-30 RX ADMIN — MYCOPHENOLATE MOFETIL 1000 MG: 500 TABLET ORAL at 21:03

## 2024-04-30 RX ADMIN — INSULIN GLARGINE 30 UNITS: 100 INJECTION, SOLUTION SUBCUTANEOUS at 10:16

## 2024-04-30 RX ADMIN — EZETIMIBE 10 MG: 10 TABLET ORAL at 08:27

## 2024-04-30 RX ADMIN — MYCOPHENOLATE MOFETIL 1000 MG: 500 TABLET ORAL at 08:26

## 2024-04-30 RX ADMIN — ROSUVASTATIN 40 MG: 10 TABLET, FILM COATED ORAL at 21:03

## 2024-04-30 RX ADMIN — SODIUM CHLORIDE: 9 INJECTION, SOLUTION INTRAVENOUS at 07:36

## 2024-04-30 RX ADMIN — CLOPIDOGREL BISULFATE 75 MG: 75 TABLET, FILM COATED ORAL at 08:28

## 2024-04-30 RX ADMIN — ISOSORBIDE MONONITRATE 60 MG: 30 TABLET, EXTENDED RELEASE ORAL at 08:27

## 2024-04-30 RX ADMIN — ASPIRIN 81 MG: 81 TABLET, COATED ORAL at 21:03

## 2024-04-30 RX ADMIN — FLUTICASONE FUROATE AND VILANTEROL TRIFENATATE 1 PUFF: 100; 25 POWDER RESPIRATORY (INHALATION) at 10:20

## 2024-04-30 RX ADMIN — ACETAMINOPHEN 650 MG: 325 TABLET, FILM COATED ORAL at 08:58

## 2024-04-30 RX ADMIN — GABAPENTIN 300 MG: 300 CAPSULE ORAL at 21:03

## 2024-04-30 RX ADMIN — HEPARIN SODIUM 5000 UNITS: 5000 INJECTION, SOLUTION INTRAVENOUS; SUBCUTANEOUS at 16:08

## 2024-04-30 RX ADMIN — Medication 250 MG: at 21:03

## 2024-04-30 RX ADMIN — Medication 250 MG: at 12:49

## 2024-04-30 RX ADMIN — ACETAMINOPHEN 650 MG: 325 TABLET, FILM COATED ORAL at 01:02

## 2024-04-30 RX ADMIN — LEVOTHYROXINE SODIUM 75 MCG: 0.07 TABLET ORAL at 05:37

## 2024-04-30 RX ADMIN — LEVOFLOXACIN 750 MG: 5 INJECTION, SOLUTION INTRAVENOUS at 19:47

## 2024-04-30 RX ADMIN — GABAPENTIN 300 MG: 300 CAPSULE ORAL at 08:28

## 2024-04-30 RX ADMIN — HEPARIN SODIUM 5000 UNITS: 5000 INJECTION, SOLUTION INTRAVENOUS; SUBCUTANEOUS at 00:58

## 2024-04-30 RX ADMIN — UMECLIDINIUM 1 PUFF: 62.5 AEROSOL, POWDER ORAL at 10:20

## 2024-04-30 ASSESSMENT — ACTIVITIES OF DAILY LIVING (ADL)
ADLS_ACUITY_SCORE: 47
ADLS_ACUITY_SCORE: 41
ADLS_ACUITY_SCORE: 40
ADLS_ACUITY_SCORE: 41
ADLS_ACUITY_SCORE: 47
ADLS_ACUITY_SCORE: 41

## 2024-04-30 NOTE — PROGRESS NOTES
04/30/24 1400   Appointment Info   Signing Clinician's Name / Credentials (PT) Elba Ramos DPT   Therapeutic Activity   Therapeutic Activities: dynamic activities to improve functional performance Minutes (44779) 15   Symptoms Noted During/After Treatment Fatigue   Treatment Detail/Skilled Intervention Pt resting in recliner at start of session. Needed some encouragement to participate. Pt had company and had walked earlier today. Ambulated 300' c FWW and SBA. Pt steady and safe with FWW c mildy decreased gait speed. Once back in room pt fatigued. Took 3 minute rest break and then was able to practice sit <>stands x10 from recliner c supervision   PT Discharge Planning   PT Plan Progress mobilty, activity tolerance and balance.   PT Discharge Recommendation (DC Rec) home with outpatient physical therapy   PT Rationale for DC Rec Pt's mobility is improving and anticipate he will be safe to d/c home   PT Brief overview of current status Amb 300' c FWw and SBA

## 2024-04-30 NOTE — PROGRESS NOTES
04/30/24 1100   Appointment Info   Signing Clinician's Name / Credentials (OT) Heather Auguste OTR/L   Appointment Canceled Reason (OT) Patient declined;With other staff/provider   Appointment Cancel Comments (OT) Attempted to see patient 3 times for OT this AM. On first 2 attempts, patient was busy and with another provider. On third attempt patient declined therapy, reporting that he has not slept since 1AM and was not feeling well as he still has a high fever. Will attempt to see patient again this afternoon as able.

## 2024-04-30 NOTE — PLAN OF CARE
"Pt. Alert and oriented x4 and calls appropriately. Pt was given PRN tylenol PO prior to this nurse taking over for previous nurse. Pt. Temp was monitored overnight and came up to 102.3F. This nurse placed ice packs/cool wash cloths to pt. Skin throughout night, and fan turned on to help decrease temp. Pt. Temp came down to 100.2F. Pt. HRR, LSCTA, Bsx4 and acitve. +1-2 edema in BLE. Pt. Abdomen has umbilical hernia present. Pt. Able to T&R self in bed. No c/o pain this shift. Pt. Slept most of night and was up to stand at edge of bed with nurse present to urinal void. Pt. Mentioned to nurse that he hadn't urinated much throughout the day since his larger 525ml output this morning at 0919. Pt. Stated he would get up to urinate, but \"wouldn't go very much.\" This nurse bladder scanned patient and found 433ml in bladder per scan. Pt. Was encouraged to get up to go to to the bathroom and ended up urinal voiding 475ml of clear, yellow urine. Pt. Bladder scanned again PVR and found 0mL in bladder. Pt. Helped to reposition back in bed and slept remainder of night. Pt. Urinal voided x1 more time prior to change of shift. Pt. Skin CDI, but honey In color. CPAP machine worn overnight. O2 sats WDL and pt. Had no SOB overnight. Left IV patent and saline locked.  at HS. Will continue to monitor.     Face to face report given with opportunity to observe patient.    Report given to LILIANA Carmona RN   4/30/2024  7:28 AM    "

## 2024-04-30 NOTE — PHARMACY-MEDICATION REGIMEN REVIEW
Pharmacy Antimicrobial Stewardship Assessment     Current Antimicrobial Therapy:      Indication: UTI, bacteremia        Fever even with Tylenol use      Culture Results:       Usual bacteria of lactose fermenting gram negative bacilli: Klebsiella, E. Coli, Enterobacter, Citrobacter, and Serratia.      Recommendations/Interventions:  1. Levofloxacin does have coverage for all of the above usual bacteria of fermenting gram negative rods, assuming no drug resistance.     Willa Loredo, [facility]  April 30, 2024

## 2024-04-30 NOTE — PROGRESS NOTES
04/30/24 1300   Appointment Info   Signing Clinician's Name / Credentials (OT) Heather Auguste OTR/L   Interventions   Interventions Quick Adds Therapeutic Activity   Therapeutic Activities   Therapeutic Activity Minutes (87189) 18   Symptoms noted during/after treatment fatigue   Treatment Detail/Skilled Intervention Patient was seated in recliner at time of OT arrival with his friend present and was agreeable to therapy with gentle encouragement. Patient was able to don pants mod I with increased time. He required mod A to don shoes today, reporting that he uses a shoe horn to don shoes at home. Patient was able to pull up pants fully with SBA when standing. He was able to stand from chair with SBA. Patient ambulated 150 feet with SBA and fww. Patient seated in recliner with call light within reach at end of session.   OT Discharge Planning   OT Plan Progress ADLs and activity tolerance   OT Discharge Recommendation (DC Rec) home;home with assist   OT Rationale for DC Rec Patient was able to don pants mod I today and was able to complete transfers with SBA. Patient ambulated 150 feet with SBA and fww, demonstrating improved activity tolerance. Recommend DC home.   OT Brief overview of current status Mod I to don pants; mod A to don shoes; sit-stand transfer with SBA and fww; pt ambulated 150 feet with SBA and fww   Total Session Time   Timed Code Treatment Minutes 18   Total Session Time (sum of timed and untimed services) 18   Psychosocial Support   Trust Relationship/Rapport care explained;choices provided;emotional support provided;empathic listening provided;questions answered;questions encouraged;reassurance provided;thoughts/feelings acknowledged

## 2024-04-30 NOTE — PROGRESS NOTES
[facility]    Hospitalist Progress Note    Date of Service (when I saw the patient): 04/30/2024    Assessment & Plan       Acute cystitis without hematuria: Concern was raised about possible sepsis, however LA is negative, BP stable, no significant tachycardia even with high fever so indications of end organ dysfunction, sepsis ruled out. Continue Levaquin pending UC  -4/30: Procal further elevated today, but LA normal and WBC coming down. Lactose fermenting rods in UC and BC    Bacteremia: 2/4 positive for lactose fermenting rods gram negative rods, final ID pending      Chronic kidney disease, stage III (moderate) (H): Renal function at baseline-small increase this morning after IV lasix  -4/30: Clinically dry and renal function slightly worse-will give 500ml IVF over 4 hours.       Secondary renal hyperparathyroidism (H24): Continue prednisone-watch for need for stress dosing      Immunocompromised due to corticosteroids (H24): Also on cellcept s/p renal transplant. Will continue       Type 2 diabetes mellitus with diabetic neuropathy, with long-term current use of insulin (H): Continue home dose insulin as long as eating well      Chronic systolic heart failure (H): Initial concern for acute exacerbation-however he has minimal LE edema-BNP is at previous baseline considering his renal failure, lungs are clear without hypoxia. Will likely hold today's dose of lasix with high fever and he received 100mg IV last night.   -4/30: Continue to hold diuretics        # Hypocalcemia: Lowest Ca = 7.9 mg/dL in last 2 days, will monitor and replace as appropriate       # Thrombocytopenia: Lowest platelets = 119 in last 2 days, will monitor for bleeding   # Hypertension: Noted on problem list    # Acute heart failure with preserved ejection fraction: heart failure noted on problem list, last echo with EF >50%, and receiving IV diuretics       # Severe Obesity: Estimated body mass index is 45.86 kg/m  as calculated  "from the following:    Height as of this encounter: 1.702 m (5' 7\").    Weight as of this encounter: 132.8 kg (292 lb 12.8 oz)., PRESENT ON ADMISSION         DVT Prophylaxis: Heparin SQ  Code Status: Full Code    Disposition: Expected discharge in 1-3 days once culture resulted, improved .    Nicolette Cooper, CNP    Interval History   Denies chest pain, abdominal pain or nausea. No dyspnea.     -Data reviewed today: I reviewed all new labs and imaging results over the last 24 hours.     Physical Exam   Temp: 100.3  F (37.9  C) Temp src: Tympanic BP: (!) 193/112 Pulse: 120   Resp: 18 SpO2: 96 % O2 Device: None (Room air)    Vitals:    04/28/24 2010 04/29/24 0000   Weight: 135.5 kg (298 lb 12.8 oz) 132.8 kg (292 lb 12.8 oz)     Vital Signs with Ranges  Temp:  [98.1  F (36.7  C)-102.3  F (39.1  C)] 100.3  F (37.9  C)  Pulse:  [] 120  Resp:  [18-20] 18  BP: ()/() 193/112  SpO2:  [95 %-99 %] 96 %  I/O last 3 completed shifts:  In: 240 [P.O.:240]  Out: 1350 [Urine:1350]    Peripheral IV 04/28/24 Left Antecubital fossa (Active)   Site Assessment WDL 04/30/24 0751   Line Status Saline locked 04/30/24 0751   Dressing Transparent 04/30/24 0751   Dressing Status clean;dry;intact 04/30/24 0751   Line Intervention Flushed 04/29/24 3651   Phlebitis Scale 0-->no symptoms 04/30/24 0751   Infiltration? no 04/30/24 0751   Number of days: 2     Line/device assessment(s) completed for medical necessity    Constitutional: Awake,alert, ill appearing  Respiratory: Clear but diminished bilaterally without wheezes, crackles or rhonchi  Cardiovascular: HRR, no murmurs, rubs, thrills.   GI: Soft,nontender, bowel sounds positive  Skin/Integumen: No unusual rashes, open areas or bruising  Other:      Medications   Current Facility-Administered Medications   Medication Dose Route Frequency Provider Last Rate Last Admin    sodium chloride 0.9 % infusion   Intravenous Continuous Nicolette Cooper  mL/hr at 04/30/24 0736 " New Bag at 04/30/24 0736     Current Facility-Administered Medications   Medication Dose Route Frequency Provider Last Rate Last Admin    [Held by provider] acetaZOLAMIDE (DIAMOX SEQUEL) 12 hr capsule 500 mg  500 mg Oral Nicolette Boyle NP        allopurinol (ZYLOPRIM) tablet 300 mg  300 mg Oral QAM Nicolette Cooper NP   300 mg at 04/30/24 0827    aspirin EC tablet 81 mg  81 mg Oral At Bedtime Axel Petty MD   81 mg at 04/29/24 2018    clopidogrel (PLAVIX) tablet 75 mg  75 mg Oral QAM Nicolette Cooper NP   75 mg at 04/30/24 0828    ezetimibe (ZETIA) tablet 10 mg  10 mg Oral QA Axel Petty MD   10 mg at 04/30/24 0827    fluticasone-vilanterol (BREO ELLIPTA) 100-25 MCG/ACT inhaler 1 puff  1 puff Inhalation Daily Axel Petty MD   1 puff at 04/29/24 0910    And    umeclidinium (INCRUSE ELLIPTA) 62.5 MCG/ACT inhaler 1 puff  1 puff Inhalation Daily Axel Petty MD   1 puff at 04/29/24 0910    [Held by provider] furosemide (LASIX) tablet 40 mg  40 mg Oral At Bedtime Nicolette Cooper NP        gabapentin (NEURONTIN) capsule 300 mg  300 mg Oral BID Nicolette Cooper NP   300 mg at 04/30/24 0828    heparin ANTICOAGULANT injection 5,000 Units  5,000 Units Subcutaneous Q8H Axel Petty MD   5,000 Units at 04/30/24 0825    insulin aspart (NovoLOG) injection (RAPID ACTING)  10 Units Subcutaneous TID w/meals Nicolette Cooper NP   10 Units at 04/29/24 1741    insulin aspart (NovoLOG) injection (RAPID ACTING)  1-7 Units Subcutaneous TID AC Nicolette Cooper NP   1 Units at 04/29/24 1744    insulin aspart (NovoLOG) injection (RAPID ACTING)  1-5 Units Subcutaneous At Bedtime Nicolette Cooper NP        insulin glargine (LANTUS PEN) injection 30 Units  30 Units Subcutaneous BID Nicolette Cooper NP   30 Units at 04/29/24 2035    isosorbide mononitrate (IMDUR) 24 hr tablet 60 mg  60 mg Oral Nicolette Boyle NP   60 mg at 04/30/24 0877    levofloxacin (LEVAQUIN) infusion 250  mg  250 mg Intravenous Q24H Nicolette Cooper NP        levothyroxine (SYNTHROID/LEVOTHROID) tablet 75 mcg  75 mcg Oral QAM AC Nicolette Cooper NP   75 mcg at 04/30/24 0537    mycophenolate (GENERIC EQUIVALENT) tablet 1,000 mg  1,000 mg Oral BID Nicolette Cooper NP   1,000 mg at 04/30/24 0826    predniSONE (DELTASONE) tablet 10 mg  10 mg Oral QAM Nicolette Cooper NP   10 mg at 04/30/24 0827    rosuvastatin (CRESTOR) tablet 40 mg  40 mg Oral At Bedtime Axel Petty MD   40 mg at 04/29/24 2015    sodium chloride (PF) 0.9% PF flush 3 mL  3 mL Intracatheter Q8H Nicolette Cooper NP        sodium chloride (PF) 0.9% PF flush 3 mL  3 mL Intracatheter Q8H Axel Petty MD   3 mL at 04/29/24 2337    [Held by provider] spironolactone (ALDACTONE) tablet 25 mg  25 mg Oral Daily Nicolette Cooper NP           Data   Recent Labs   Lab 04/30/24  0746 04/30/24  0513 04/30/24  0100 04/29/24  0945 04/29/24  0536 04/29/24  0019 04/28/24 2033   WBC  --  11.7*  --   --  16.8*  --  17.9*   HGB  --  10.1*  --   --  10.7*  --  10.8*   MCV  --  89  --   --  91  --  88   PLT  --  124*  --   --  119*  --  147*   NA  --  134*  --   --  133*  --  132*   POTASSIUM  --  3.5  --   --  4.1  --  4.0   CHLORIDE  --  102  --   --  99  --  100   CO2  --  15*  --   --  18*  --  18*   BUN  --  74.1*  --   --  60.7*  --  59.7*   CR  --  2.88*  --   --  2.78*  --  2.44*   ANIONGAP  --  17*  --   --  16*  --  14   HANNAH  --  7.9*  --   --  8.2*  --  8.3*   * 122* 130*   < > 98   < > 131*   ALBUMIN  --   --   --   --   --   --  3.8   PROTTOTAL  --   --   --   --   --   --  6.8   BILITOTAL  --   --   --   --   --   --  0.3   ALKPHOS  --   --   --   --   --   --  38*   ALT  --   --   --   --   --   --  11   AST  --   --   --   --   --   --  21    < > = values in this interval not displayed.       No results found for this or any previous visit (from the past 24 hour(s)).

## 2024-04-30 NOTE — PLAN OF CARE
Goal Outcome Evaluation:    Around 0930 Patient became shaky. Cool. Clammy, chest tightness, SOB, BP of 216/120, Pulse of 124, shallow breathing. Canula applied @ 2 LPM.  Temp 100.3 BG 86 Provider in to see patient due to concern with symptoms. No symptoms after occurrence. PRN acetaminophen given prior to occurrence. No pain at time of writing note. PRN ondansetron 4 mg given for nausea. IV fluids discontinued and saline locked. No other concern noted.  Patient measured output of 380. No oher observed output.  Face to face report given with opportunity to observe patient.    Report given to Snow Moody   4/30/2024  12:17 PM

## 2024-04-30 NOTE — PLAN OF CARE
Free from falls/injuries this shift. Assess as charted. Up in chair for dinner. Glucose 185 & 162. HS snak given. States he has had low blood sugars  at times at home. States his alarm on Dexcom has alerted him. Has remained afebrile this shift. Has been up to BR X2 to void.     Addendum: Sepsis flag at 2250- will draw lactic.     Addendum: LA 0.8      Face to face report given with opportunity to observe patient.    Report given to Irving Brown RN   4/29/2024  11:07 PM

## 2024-04-30 NOTE — PLAN OF CARE
Goal Outcome Evaluation:       Pt A&O, able to make needs known. Denied pain. Max temp 100.7 PRN tylenol given. Lungs clear through, utilizing home CPAP while sleeping. Bowels active in all four quadrants. IV lock. 0200 blood glucose 130. Call light within reach, nonslip socks on while out of bed.     Face to face report given with opportunity to observe patient.    Report given to Sheri Nur RN   4/30/2024  1:13 AM

## 2024-05-01 ENCOUNTER — APPOINTMENT (OUTPATIENT)
Dept: OCCUPATIONAL THERAPY | Facility: HOSPITAL | Age: 69
DRG: 690 | End: 2024-05-01
Payer: COMMERCIAL

## 2024-05-01 ENCOUNTER — APPOINTMENT (OUTPATIENT)
Dept: PHYSICAL THERAPY | Facility: HOSPITAL | Age: 69
DRG: 690 | End: 2024-05-01
Payer: COMMERCIAL

## 2024-05-01 LAB
ANION GAP SERPL CALCULATED.3IONS-SCNC: 16 MMOL/L (ref 7–15)
BACTERIA UR CULT: ABNORMAL
BUN SERPL-MCNC: 79.6 MG/DL (ref 8–23)
CALCIUM SERPL-MCNC: 7.9 MG/DL (ref 8.8–10.2)
CHLORIDE SERPL-SCNC: 104 MMOL/L (ref 98–107)
CREAT SERPL-MCNC: 2.88 MG/DL (ref 0.67–1.17)
DEPRECATED HCO3 PLAS-SCNC: 15 MMOL/L (ref 22–29)
EGFRCR SERPLBLD CKD-EPI 2021: 23 ML/MIN/1.73M2
ERYTHROCYTE [DISTWIDTH] IN BLOOD BY AUTOMATED COUNT: 15.9 % (ref 10–15)
GLUCOSE BLDC GLUCOMTR-MCNC: 101 MG/DL (ref 70–99)
GLUCOSE BLDC GLUCOMTR-MCNC: 143 MG/DL (ref 70–99)
GLUCOSE BLDC GLUCOMTR-MCNC: 166 MG/DL (ref 70–99)
GLUCOSE BLDC GLUCOMTR-MCNC: 170 MG/DL (ref 70–99)
GLUCOSE BLDC GLUCOMTR-MCNC: 177 MG/DL (ref 70–99)
GLUCOSE BLDC GLUCOMTR-MCNC: 71 MG/DL (ref 70–99)
GLUCOSE BLDC GLUCOMTR-MCNC: 84 MG/DL (ref 70–99)
GLUCOSE SERPL-MCNC: 89 MG/DL (ref 70–99)
HCT VFR BLD AUTO: 29.1 % (ref 40–53)
HGB BLD-MCNC: 9.4 G/DL (ref 13.3–17.7)
LACTATE SERPL-SCNC: 0.7 MMOL/L (ref 0.7–2)
MCH RBC QN AUTO: 28.7 PG (ref 26.5–33)
MCHC RBC AUTO-ENTMCNC: 32.3 G/DL (ref 31.5–36.5)
MCV RBC AUTO: 89 FL (ref 78–100)
PLATELET # BLD AUTO: 135 10E3/UL (ref 150–450)
POTASSIUM SERPL-SCNC: 3.8 MMOL/L (ref 3.4–5.3)
PROCALCITONIN SERPL IA-MCNC: 23.23 NG/ML
RBC # BLD AUTO: 3.28 10E6/UL (ref 4.4–5.9)
SODIUM SERPL-SCNC: 135 MMOL/L (ref 135–145)
WBC # BLD AUTO: 8.7 10E3/UL (ref 4–11)

## 2024-05-01 PROCEDURE — 36415 COLL VENOUS BLD VENIPUNCTURE: CPT | Performed by: NURSE PRACTITIONER

## 2024-05-01 PROCEDURE — 84145 PROCALCITONIN (PCT): CPT | Performed by: NURSE PRACTITIONER

## 2024-05-01 PROCEDURE — 250N000013 HC RX MED GY IP 250 OP 250 PS 637: Performed by: NURSE PRACTITIONER

## 2024-05-01 PROCEDURE — 120N000001 HC R&B MED SURG/OB

## 2024-05-01 PROCEDURE — 99232 SBSQ HOSP IP/OBS MODERATE 35: CPT | Performed by: NURSE PRACTITIONER

## 2024-05-01 PROCEDURE — 83605 ASSAY OF LACTIC ACID: CPT | Performed by: NURSE PRACTITIONER

## 2024-05-01 PROCEDURE — 85027 COMPLETE CBC AUTOMATED: CPT | Performed by: NURSE PRACTITIONER

## 2024-05-01 PROCEDURE — 97530 THERAPEUTIC ACTIVITIES: CPT | Mod: GO

## 2024-05-01 PROCEDURE — 97530 THERAPEUTIC ACTIVITIES: CPT | Mod: GP | Performed by: PHYSICAL THERAPIST

## 2024-05-01 PROCEDURE — 250N000012 HC RX MED GY IP 250 OP 636 PS 637: Performed by: NURSE PRACTITIONER

## 2024-05-01 PROCEDURE — 250N000013 HC RX MED GY IP 250 OP 250 PS 637: Performed by: HOSPITALIST

## 2024-05-01 PROCEDURE — 250N000011 HC RX IP 250 OP 636: Performed by: HOSPITALIST

## 2024-05-01 PROCEDURE — 80048 BASIC METABOLIC PNL TOTAL CA: CPT | Performed by: NURSE PRACTITIONER

## 2024-05-01 RX ADMIN — UMECLIDINIUM 1 PUFF: 62.5 AEROSOL, POWDER ORAL at 08:51

## 2024-05-01 RX ADMIN — INSULIN GLARGINE 30 UNITS: 100 INJECTION, SOLUTION SUBCUTANEOUS at 20:29

## 2024-05-01 RX ADMIN — ACETAMINOPHEN 650 MG: 325 TABLET, FILM COATED ORAL at 04:19

## 2024-05-01 RX ADMIN — MYCOPHENOLATE MOFETIL 1000 MG: 500 TABLET ORAL at 08:34

## 2024-05-01 RX ADMIN — PREDNISONE 10 MG: 5 TABLET ORAL at 08:34

## 2024-05-01 RX ADMIN — HEPARIN SODIUM 5000 UNITS: 5000 INJECTION, SOLUTION INTRAVENOUS; SUBCUTANEOUS at 08:46

## 2024-05-01 RX ADMIN — CLOPIDOGREL BISULFATE 75 MG: 75 TABLET, FILM COATED ORAL at 08:34

## 2024-05-01 RX ADMIN — GABAPENTIN 300 MG: 300 CAPSULE ORAL at 08:34

## 2024-05-01 RX ADMIN — GABAPENTIN 300 MG: 300 CAPSULE ORAL at 20:32

## 2024-05-01 RX ADMIN — LEVOTHYROXINE SODIUM 75 MCG: 0.07 TABLET ORAL at 05:51

## 2024-05-01 RX ADMIN — ISOSORBIDE MONONITRATE 60 MG: 30 TABLET, EXTENDED RELEASE ORAL at 08:34

## 2024-05-01 RX ADMIN — ROSUVASTATIN 40 MG: 10 TABLET, FILM COATED ORAL at 20:32

## 2024-05-01 RX ADMIN — INSULIN GLARGINE 30 UNITS: 100 INJECTION, SOLUTION SUBCUTANEOUS at 10:24

## 2024-05-01 RX ADMIN — EZETIMIBE 10 MG: 10 TABLET ORAL at 08:34

## 2024-05-01 RX ADMIN — FLUTICASONE FUROATE AND VILANTEROL TRIFENATATE 1 PUFF: 100; 25 POWDER RESPIRATORY (INHALATION) at 08:51

## 2024-05-01 RX ADMIN — Medication 250 MG: at 08:34

## 2024-05-01 RX ADMIN — HEPARIN SODIUM 5000 UNITS: 5000 INJECTION, SOLUTION INTRAVENOUS; SUBCUTANEOUS at 00:53

## 2024-05-01 RX ADMIN — ALLOPURINOL 300 MG: 300 TABLET ORAL at 08:34

## 2024-05-01 RX ADMIN — MYCOPHENOLATE MOFETIL 1000 MG: 500 TABLET ORAL at 20:32

## 2024-05-01 RX ADMIN — Medication 250 MG: at 20:32

## 2024-05-01 RX ADMIN — ACETAMINOPHEN 650 MG: 325 TABLET, FILM COATED ORAL at 10:15

## 2024-05-01 RX ADMIN — INSULIN ASPART 10 UNITS: 100 INJECTION, SOLUTION INTRAVENOUS; SUBCUTANEOUS at 17:57

## 2024-05-01 ASSESSMENT — ACTIVITIES OF DAILY LIVING (ADL)
ADLS_ACUITY_SCORE: 39
ADLS_ACUITY_SCORE: 40
ADLS_ACUITY_SCORE: 39
ADLS_ACUITY_SCORE: 39
ADLS_ACUITY_SCORE: 40
ADLS_ACUITY_SCORE: 39
ADLS_ACUITY_SCORE: 40
ADLS_ACUITY_SCORE: 40
ADLS_ACUITY_SCORE: 39
ADLS_ACUITY_SCORE: 39
ADLS_ACUITY_SCORE: 40
ADLS_ACUITY_SCORE: 39
ADLS_ACUITY_SCORE: 40
ADLS_ACUITY_SCORE: 39
ADLS_ACUITY_SCORE: 40
ADLS_ACUITY_SCORE: 39
ADLS_ACUITY_SCORE: 39
ADLS_ACUITY_SCORE: 40

## 2024-05-01 NOTE — PLAN OF CARE
Pt is A & O X4 , calls appropriately. VS et assessments as charted. PRN Tylenol given today for elevated temp of 100.7. No effective results as pt's temp was 102.9. Pt did have episode at 0912 as charted by student of elevated BP, shaking, diaphoretic, chest tightness. VS were BP-193/112, HR-122, O2-95%, BG-96, attempted to contact provider X3 to come see pt. O2 applied at this time for difficulty breathing per pt. Fluids stopped temporarily. Provider responded shortly after. Pt had two critical ProCal results today, provider aware. Pt's VS stabilized within half an hour. Pt's VS stable rest of the shift. Temp came down to 99.2 by end of shift. Pt walked w/PT . Pt started on probiotic. Call light within reach, wheels locked.     Face to face report given with opportunity to observe patient.    Report given to LILIANA Dennison & Batool Arambula RN   4/30/2024  7:10 PM

## 2024-05-01 NOTE — PLAN OF CARE
Goal Outcome Evaluation:      Plan of Care Reviewed With: patient    Overall Patient Progress: improvingOverall Patient Progress: improving    A/O, VSS, Denies pain or discomfort this shift. Denies dizziness or trouble with ambulation. Fever at 0400 of 99.8, medicated with tylenol. Eating and drinking adequately. BG at 0100= 84, given milk and óscar crackers. Wears CPAP independently, voiding in urinal.   Face to face report given with opportunity to observe patient.    Report given to Romi Ambrocio RN   5/1/2024  1789

## 2024-05-01 NOTE — PLAN OF CARE
"Goal Outcome Evaluation:  Patient has had a low grade temp today, but has not had any tremors or episodes of shortness of breath, patient has walked with staff today and tolerated well, the bilateral lower legs have been wrapped, there is slight edema with venous stasis changes present, the legs are not weeping, patient has been experiencing a bloody nose today, provider did go in to see patient and the nose was packed accordingly, vitals as charted, patient adequately makes his needs known, accu check as charted. Patient has denied pain today with the exception of his \"usual\" back pain, patient is up in the chair at present, patient calls staff accordingly.                       "

## 2024-05-01 NOTE — PLAN OF CARE
Goal Outcome Evaluation:  Patient has had a bloody nose all day, there is ice in place, head is slightly back, provider aware, provider did state she would be in to see patient ASAP.

## 2024-05-01 NOTE — PROGRESS NOTES
"[facility]    Hospitalist Progress Note    Date of Service (when I saw the patient): 05/01/2024    Assessment & Plan       Acute cystitis without hematuria: Concern was raised about possible sepsis, however LA is negative, BP stable, no significant tachycardia even with high fever so indications of end organ dysfunction, sepsis ruled out. Continue Levaquin pending UC  -5/1:E. Coli-continue levaquin    Bacteremia: 2/4 positive for lactose fermenting rods gram negative rods, final ID pending  -5/1: E. Coli-continue Levaquin      Chronic kidney disease, stage III (moderate) (H): Renal function at baseline-small increase this morning after IV lasix  -4/30: Clinically dry and renal function slightly worse-will give 500ml IVF over 4 hours.   -5/1: Stable but not improved      Secondary renal hyperparathyroidism (H24): Continue prednisone-watch for need for stress dosing      Immunocompromised due to corticosteroids (H24): Also on cellcept s/p renal transplant. Will continue       Type 2 diabetes mellitus with diabetic neuropathy, with long-term current use of insulin (H): Continue home dose insulin as long as eating well      Chronic systolic heart failure (H): Initial concern for acute exacerbation-however he has minimal LE edema-BNP is at previous baseline considering his renal failure, lungs are clear without hypoxia. Will likely hold today's dose of lasix with high fever and he received 100mg IV last night.   -4/30: Continue to hold diuretics  -5/1: Holding diuretics again today              # Thrombocytopenia: Lowest platelets = 124 in last 2 days, will monitor for bleeding   # Hypertension: Noted on problem list    # Acute heart failure with preserved ejection fraction: heart failure noted on problem list, last echo with EF >50%, and receiving IV diuretics       # Severe Obesity: Estimated body mass index is 45.86 kg/m  as calculated from the following:    Height as of this encounter: 1.702 m (5' 7\").    " Weight as of this encounter: 132.8 kg (292 lb 12.8 oz)., PRESENT ON ADMISSION         DVT Prophylaxis: Heparin SQ  Code Status: Full Code    Disposition: Expected discharge in 1-3 days once culture resulted, improved .    Nicolette Cooper CNP    Interval History   Denies chest pain, abdominal pain or nausea. No dyspnea.     -Data reviewed today: I reviewed all new labs and imaging results over the last 24 hours.     Physical Exam   Temp: 99.5  F (37.5  C) Temp src: Tympanic BP: 150/73 Pulse: 71   Resp: 20 SpO2: 97 % O2 Device: None (Room air)    Vitals:    04/28/24 2010 04/29/24 0000   Weight: 135.5 kg (298 lb 12.8 oz) 132.8 kg (292 lb 12.8 oz)     Vital Signs with Ranges  Temp:  [98.2  F (36.8  C)-102.9  F (39.4  C)] 99.5  F (37.5  C)  Pulse:  [] 71  Resp:  [16-20] 20  BP: (122-165)/(61-82) 150/73  SpO2:  [95 %-99 %] 97 %  I/O last 3 completed shifts:  In: 480 [P.O.:480]  Out: 2475 [Urine:2475]    Peripheral IV 04/28/24 Left Antecubital fossa (Active)   Site Assessment WDL 04/30/24 2000   Line Status Saline locked 04/30/24 2000   Dressing Transparent 04/30/24 2000   Dressing Status clean;dry;intact 04/30/24 2000   Line Intervention Flushed 04/30/24 1552   Phlebitis Scale 0-->no symptoms 04/30/24 2000   Infiltration? no 04/30/24 2000   Number of days: 3     Line/device assessment(s) completed for medical necessity    Constitutional: Awake,alert, ill appearing  Respiratory: Clear but diminished bilaterally without wheezes, crackles or rhonchi  Cardiovascular: HRR, no murmurs, rubs, thrills.   GI: Soft,nontender, bowel sounds positive  Skin/Integumen: No unusual rashes, open areas or bruising  Other:      Medications   Current Facility-Administered Medications   Medication Dose Route Frequency Provider Last Rate Last Admin     Current Facility-Administered Medications   Medication Dose Route Frequency Provider Last Rate Last Admin    [Held by provider] acetaZOLAMIDE (DIAMOX SEQUEL) 12 hr capsule 500 mg  500 mg  Oral QAM Nicolette Cooper NP        allopurinol (ZYLOPRIM) tablet 300 mg  300 mg Oral Nicolette Boyle NP   300 mg at 05/01/24 0834    aspirin EC tablet 81 mg  81 mg Oral At Bedtime Axel Petty MD   81 mg at 04/30/24 2103    clopidogrel (PLAVIX) tablet 75 mg  75 mg Oral Nicolette Boyle NP   75 mg at 05/01/24 0834    ezetimibe (ZETIA) tablet 10 mg  10 mg Oral QAM Axel Petty MD   10 mg at 05/01/24 0834    fluticasone-vilanterol (BREO ELLIPTA) 100-25 MCG/ACT inhaler 1 puff  1 puff Inhalation Daily Axel Petty MD   1 puff at 05/01/24 0851    And    umeclidinium (INCRUSE ELLIPTA) 62.5 MCG/ACT inhaler 1 puff  1 puff Inhalation Daily Axel Petty MD   1 puff at 05/01/24 0851    [Held by provider] furosemide (LASIX) tablet 40 mg  40 mg Oral At Bedtime Nicolette Coopre NP        gabapentin (NEURONTIN) capsule 300 mg  300 mg Oral BID Nicolette Cooper NP   300 mg at 05/01/24 0834    heparin ANTICOAGULANT injection 5,000 Units  5,000 Units Subcutaneous Q8H Axel Petty MD   5,000 Units at 05/01/24 0846    insulin aspart (NovoLOG) injection (RAPID ACTING)  10 Units Subcutaneous TID w/meals Nicolette Cooper NP   10 Units at 04/29/24 1741    insulin aspart (NovoLOG) injection (RAPID ACTING)  1-7 Units Subcutaneous TID AC Nicolette Cooper NP   1 Units at 04/30/24 1602    insulin aspart (NovoLOG) injection (RAPID ACTING)  1-5 Units Subcutaneous At Bedtime Nicolette Cooper NP        insulin glargine (LANTUS PEN) injection 30 Units  30 Units Subcutaneous BID Nicolette Cooper NP   30 Units at 04/30/24 2102    isosorbide mononitrate (IMDUR) 24 hr tablet 60 mg  60 mg Oral Nicolette Boyle NP   60 mg at 05/01/24 0834    levofloxacin (LEVAQUIN) infusion 750 mg  750 mg Intravenous Q48H Nicolette Cooper,  mL/hr at 04/30/24 1947 750 mg at 04/30/24 1947    levothyroxine (SYNTHROID/LEVOTHROID) tablet 75 mcg  75 mcg Oral QAM AC Nicolette Cooper NP   75 mcg at  05/01/24 0551    mycophenolate (GENERIC EQUIVALENT) tablet 1,000 mg  1,000 mg Oral BID Nicolette Cooper NP   1,000 mg at 05/01/24 0834    predniSONE (DELTASONE) tablet 10 mg  10 mg Oral QAM Nicolette Cooper NP   10 mg at 05/01/24 0834    rosuvastatin (CRESTOR) tablet 40 mg  40 mg Oral At Bedtime Axel Petty MD   40 mg at 04/30/24 2103    saccharomyces boulardii (FLORASTOR) capsule 250 mg  250 mg Oral BID Nicolette Cooper NP   250 mg at 05/01/24 0834    sodium chloride (PF) 0.9% PF flush 3 mL  3 mL Intracatheter Q8H Nicolette Cooper NP   3 mL at 05/01/24 0053    sodium chloride (PF) 0.9% PF flush 3 mL  3 mL Intracatheter Q8H Axel Petty MD   3 mL at 04/30/24 1606    [Held by provider] spironolactone (ALDACTONE) tablet 25 mg  25 mg Oral Daily Nicolette Cooper NP           Data   Recent Labs   Lab 05/01/24  0849 05/01/24  0509 05/01/24  0057 04/30/24  0746 04/30/24  0513 04/29/24  0945 04/29/24  0536 04/29/24  0019 04/28/24 2033   WBC  --  8.7  --   --  11.7*  --  16.8*  --  17.9*   HGB  --  9.4*  --   --  10.1*  --  10.7*  --  10.8*   MCV  --  89  --   --  89  --  91  --  88   PLT  --  135*  --   --  124*  --  119*  --  147*   NA  --  135  --   --  134*  --  133*  --  132*   POTASSIUM  --  3.8  --   --  3.5  --  4.1  --  4.0   CHLORIDE  --  104  --   --  102  --  99  --  100   CO2  --  15*  --   --  15*  --  18*  --  18*   BUN  --  79.6*  --   --  74.1*  --  60.7*  --  59.7*   CR  --  2.88*  --   --  2.88*  --  2.78*  --  2.44*   ANIONGAP  --  16*  --   --  17*  --  16*  --  14   HANNAH  --  7.9*  --   --  7.9*  --  8.2*  --  8.3*   GLC 71 89 84   < > 122*   < > 98   < > 131*   ALBUMIN  --   --   --   --   --   --   --   --  3.8   PROTTOTAL  --   --   --   --   --   --   --   --  6.8   BILITOTAL  --   --   --   --   --   --   --   --  0.3   ALKPHOS  --   --   --   --   --   --   --   --  38*   ALT  --   --   --   --   --   --   --   --  11   AST  --   --   --   --   --   --   --   --  21    <  > = values in this interval not displayed.       No results found for this or any previous visit (from the past 24 hour(s)).

## 2024-05-01 NOTE — PROGRESS NOTES
05/01/24 1200   Appointment Info   Signing Clinician's Name / Credentials (OT) Heather Auguste OTR/L   Therapeutic Activities   Therapeutic Activity Minutes (78585) 10   Symptoms noted during/after treatment none   Treatment Detail/Skilled Intervention Patient was seated in recliner at time of OT arrival. Patient had a tissue in his nose and reported that he had been experiencing a bloody nose since early this AM. Nursing staff aware of this. Patient reported that he had just walked with PT and declined any further ambulation until his nosebleed stopped but was agreeable to work on ADLs. Patient was educated on adaptive equipment for lower body dressing. Patient was able to doff sock mod I with dressing stick. When donning sock with sock aid, patient required min A as his sock got caught on his leg wraps and he would benefit from a wider sock aid. Discussed adaptive equipment with patient and he reported that he believes he has a wide sock aid at home. He was also using a shoe horn for dressing at baseline. Patient declined further therapy due to nose bleed. He was left resting in recliner with call light within reach.   OT Discharge Planning   OT Plan Progress ADLs and activity tolerance   OT Discharge Recommendation (DC Rec) home;home with assist   OT Rationale for DC Rec Patient demonstrated understanding of adaptive equipment for lower body dressing today and was able to don socks with min A and sock aid. Recommend discharge home.   OT Brief overview of current status Min A to don socks with sock aid; mod I to doff socks   OT Equipment Needed at Discharge dressing equipment   Total Session Time   Timed Code Treatment Minutes 10   Total Session Time (sum of timed and untimed services) 10   Psychosocial Support   Trust Relationship/Rapport care explained;questions answered

## 2024-05-01 NOTE — PROGRESS NOTES
05/01/24 1207   Appointment Info   Signing Clinician's Name / Credentials (PT) Tammi Nichols DPT   Interventions   Interventions Quick Adds Therapeutic Activity   Therapeutic Activity   Therapeutic Activities: dynamic activities to improve functional performance Minutes (01211) 24   Symptoms Noted During/After Treatment Fatigue   Treatment Detail/Skilled Intervention Pt resting in bed upon PT arrival, agreeable to PT.  Pt transferred sup>sit mod I to EOB.  Pt transferred sit>stand SBA from EOB.  Pt ambulated 300' with FWW SBA, at moderate pace on RA, sats 90%.  Upon returning to room, pt needing to use bathroom.  Pt independent with toileting and managing in tight space without LOB or instability.  Pt then agreeable to continued ambulation.  Pt ambulated additional 400' with FWW SBA with midl dyspnea, O2 sats 90%.  Discussed FWW and pt states he has a couple to use at home but wasn't.  However pt states he has bilateral neuropathy so balance isn't great at baseline and therefore declined ambulation without AD for today.  Pt returned to room and completed repeated sit<>stands x10 reps with UE assist.  Pt left sitting in chair with clip alarm on and call light in [facility].   PT Discharge Planning   PT Plan Progress mobilty, activity tolerance and balance.   PT Discharge Recommendation (DC Rec) home;home with outpatient physical therapy   PT Rationale for DC Rec Pt is appropriate for discharge home, may benefit from outpatient PT for ongoing work on strength, conditioning, and balance   PT Brief overview of current status sit<>stand SBA, ambulated 300'x1, 400'x1 with FWW SBA   Total Session Time   Timed Code Treatment Minutes 24   Total Session Time (sum of timed and untimed services) 24

## 2024-05-01 NOTE — PLAN OF CARE
Goal Outcome Evaluation:patient went for a walk around the unit with PT, and tolerated well, bilateral legs wrapped with ace wraps after patient ambulated, patient is up in the chair, legs elevated.

## 2024-05-01 NOTE — PLAN OF CARE
Goal Outcome Evaluation:Face to face report given with opportunity to observe patient.    Report given to Nichol Brown, RN   5/1/2024  3:13 PM

## 2024-05-01 NOTE — PHARMACY
Pharmacy Antimicrobial Stewardship Assessment     Current Antimicrobial Therapy:  Anti-infectives (From now, onward)      Start     Dose/Rate Route Frequency Ordered Stop    24  levofloxacin (LEVAQUIN) infusion 750 mg         750 mg  100 mL/hr over 90 Minutes Intravenous EVERY 48 HOURS 24 1110            Indication: UTI, bacteremia    Days of Therapy: 4     Pertinent Labs:    Recent Labs   Lab Test 24  0509 24  0513 24  0536 24  0515 23  0509 23  2245   WBC 8.7 11.7* 16.8* 17.9* 8.6 9.9 9.4       Recent Labs   Lab Test 24  1002 24  0509 24  1419 24  0515   LACT 0.7  --  1.1   < >  --    CRPI  --   --   --   --  121.53*   PCAL  --  23.23* 29.94*   < > 0.65*    < > = values in this interval not displayed.        Temperature:  Temp (24hrs), Av.6  F (37.6  C), Min:98.2  F (36.8  C), Max:100.4  F (38  C)      Culture Results:   30-Day Micro Results       Collected Updated Procedure Result Status      2024 0721 Blood Culture Arm, Right [[id number]]    (Abnormal)   Blood from Arm, Right    Preliminary result Component Value   Culture Escherichia coli  [P]     1 of 2 bottles.        Susceptibility        Escherichia coli      CHRISTINE      Amoxicillin/Clavulanate 4  Susceptible  [*]       Ampicillin 8  Susceptible      Ampicillin/ Sulbactam 4  Susceptible      Cefazolin <=4  Intermediate  [*]       Cefepime <=1  Susceptible      Ceftazidime <=1  Susceptible      Ceftriaxone <=1  Susceptible      Ciprofloxacin <=0.25  Susceptible      Ertapenem <=0.5  Susceptible      Extended Spectrum Beta-Lactamase Negative  ESBL Negative  [*]       Gentamicin <=1  Susceptible      Imipenem <=0.25  Susceptible      Levofloxacin <=0.12  Susceptible      Nitrofurantoin <=16  Susceptible  [*]       Piperacillin/Tazobactam <=4  Susceptible      Tobramycin <=1  Susceptible      Trimethoprim/Sulfamethoxazole <=   Susceptible                   [*]  Suppressed Antibiotic                    04/28/2024 2045 05/01/2024 0742 Urine Culture Aerobic Bacterial [[id number]]    (Abnormal)   Urine, Midstream    Final result Component Value   Culture 50,000-100,000 CFU/mL Escherichia coli        Susceptibility        Escherichia coli      CHRISTINE      Amoxicillin/Clavulanate <=2  Susceptible  [*]       Ampicillin 8  Susceptible      Ampicillin/ Sulbactam 4  Susceptible      Cefazolin <=4  Susceptible      Cefepime <=1  Susceptible      Ceftazidime <=1  Susceptible      Ceftriaxone <=1  Susceptible      Ciprofloxacin <=0.25  Susceptible      Ertapenem <=0.5  Susceptible      Extended Spectrum Beta-Lactamase Negative  ESBL Negative  [*]       Gentamicin <=1  Susceptible      Imipenem <=0.25  Susceptible      Levofloxacin <=0.12  Susceptible      Nitrofurantoin <=16  Susceptible      Piperacillin/Tazobactam <=4  Susceptible      Tobramycin <=1  Susceptible      Trimethoprim/Sulfamethoxazole <=1/19  Susceptible                   [*]  Suppressed Antibiotic                    04/28/2024 2037 04/28/2024 2118 Symptomatic Influenza A/B, RSV, & SARS-CoV2 PCR (COVID-19) Nasopharyngeal [[id number]]    Swab from Nasopharyngeal    Final result Component Value   Influenza A PCR Negative   Influenza B PCR Negative   RSV PCR Negative   SARS CoV2 PCR Negative   NEGATIVE: SARS-CoV-2 (COVID-19) RNA not detected, presumed negative.            04/28/2024 2033 05/01/2024 0721 Blood Culture Peripheral Blood [[id number]]   (Abnormal)   Peripheral Blood    Preliminary result Component Value   Culture Escherichia coli  [P]     1 of 2 bottles.  Please refer to coinciding blood culture ([id number]) for susceptibility testing.                        Recommendations/Interventions:  No recommendations at this time. Will continue to monitor.    Olamide Nolen [facility]  May 1, 2024

## 2024-05-02 ENCOUNTER — APPOINTMENT (OUTPATIENT)
Dept: OCCUPATIONAL THERAPY | Facility: HOSPITAL | Age: 69
DRG: 690 | End: 2024-05-02
Payer: COMMERCIAL

## 2024-05-02 VITALS
RESPIRATION RATE: 20 BRPM | HEART RATE: 84 BPM | HEIGHT: 67 IN | TEMPERATURE: 100.1 F | DIASTOLIC BLOOD PRESSURE: 74 MMHG | SYSTOLIC BLOOD PRESSURE: 158 MMHG | BODY MASS INDEX: 45.96 KG/M2 | WEIGHT: 292.8 LBS | OXYGEN SATURATION: 96 %

## 2024-05-02 LAB
ANION GAP SERPL CALCULATED.3IONS-SCNC: 15 MMOL/L (ref 7–15)
BUN SERPL-MCNC: 75.5 MG/DL (ref 8–23)
CALCIUM SERPL-MCNC: 8 MG/DL (ref 8.8–10.2)
CHLORIDE SERPL-SCNC: 105 MMOL/L (ref 98–107)
CREAT SERPL-MCNC: 2.67 MG/DL (ref 0.67–1.17)
DEPRECATED HCO3 PLAS-SCNC: 17 MMOL/L (ref 22–29)
EGFRCR SERPLBLD CKD-EPI 2021: 26 ML/MIN/1.73M2
ERYTHROCYTE [DISTWIDTH] IN BLOOD BY AUTOMATED COUNT: 16 % (ref 10–15)
GLUCOSE BLDC GLUCOMTR-MCNC: 101 MG/DL (ref 70–99)
GLUCOSE BLDC GLUCOMTR-MCNC: 94 MG/DL (ref 70–99)
GLUCOSE SERPL-MCNC: 115 MG/DL (ref 70–99)
HCT VFR BLD AUTO: 28.2 % (ref 40–53)
HGB BLD-MCNC: 9.1 G/DL (ref 13.3–17.7)
HOLD SPECIMEN: NORMAL
LACTATE SERPL-SCNC: 0.7 MMOL/L (ref 0.7–2)
MCH RBC QN AUTO: 28.4 PG (ref 26.5–33)
MCHC RBC AUTO-ENTMCNC: 32.3 G/DL (ref 31.5–36.5)
MCV RBC AUTO: 88 FL (ref 78–100)
PLATELET # BLD AUTO: 142 10E3/UL (ref 150–450)
POTASSIUM SERPL-SCNC: 4 MMOL/L (ref 3.4–5.3)
PROCALCITONIN SERPL IA-MCNC: 12.85 NG/ML
RBC # BLD AUTO: 3.2 10E6/UL (ref 4.4–5.9)
SODIUM SERPL-SCNC: 137 MMOL/L (ref 135–145)
WBC # BLD AUTO: 7 10E3/UL (ref 4–11)

## 2024-05-02 PROCEDURE — 83605 ASSAY OF LACTIC ACID: CPT | Performed by: NURSE PRACTITIONER

## 2024-05-02 PROCEDURE — 97530 THERAPEUTIC ACTIVITIES: CPT | Mod: GO

## 2024-05-02 PROCEDURE — 80048 BASIC METABOLIC PNL TOTAL CA: CPT | Performed by: NURSE PRACTITIONER

## 2024-05-02 PROCEDURE — 250N000013 HC RX MED GY IP 250 OP 250 PS 637: Performed by: NURSE PRACTITIONER

## 2024-05-02 PROCEDURE — 99239 HOSP IP/OBS DSCHRG MGMT >30: CPT | Performed by: NURSE PRACTITIONER

## 2024-05-02 PROCEDURE — 250N000013 HC RX MED GY IP 250 OP 250 PS 637: Performed by: HOSPITALIST

## 2024-05-02 PROCEDURE — 36415 COLL VENOUS BLD VENIPUNCTURE: CPT | Performed by: NURSE PRACTITIONER

## 2024-05-02 PROCEDURE — 250N000012 HC RX MED GY IP 250 OP 636 PS 637: Performed by: NURSE PRACTITIONER

## 2024-05-02 PROCEDURE — 85027 COMPLETE CBC AUTOMATED: CPT | Performed by: NURSE PRACTITIONER

## 2024-05-02 PROCEDURE — 84145 PROCALCITONIN (PCT): CPT | Performed by: NURSE PRACTITIONER

## 2024-05-02 RX ORDER — LEVOFLOXACIN 250 MG/1
250 TABLET, FILM COATED ORAL DAILY
Qty: 5 TABLET | Refills: 0 | Status: SHIPPED | OUTPATIENT
Start: 2024-05-02 | End: 2024-05-07

## 2024-05-02 RX ORDER — FUROSEMIDE 40 MG
80 TABLET ORAL AT BEDTIME
Qty: 60 TABLET | Refills: 0 | Status: SHIPPED | OUTPATIENT
Start: 2024-05-03

## 2024-05-02 RX ORDER — SACCHAROMYCES BOULARDII 250 MG
250 CAPSULE ORAL 2 TIMES DAILY
Qty: 14 CAPSULE | Refills: 0 | Status: SHIPPED | OUTPATIENT
Start: 2024-05-02 | End: 2024-05-09

## 2024-05-02 RX ADMIN — CLOPIDOGREL BISULFATE 75 MG: 75 TABLET, FILM COATED ORAL at 08:36

## 2024-05-02 RX ADMIN — MYCOPHENOLATE MOFETIL 1000 MG: 500 TABLET ORAL at 08:37

## 2024-05-02 RX ADMIN — LEVOTHYROXINE SODIUM 75 MCG: 0.07 TABLET ORAL at 06:18

## 2024-05-02 RX ADMIN — Medication 250 MG: at 08:37

## 2024-05-02 RX ADMIN — UMECLIDINIUM 1 PUFF: 62.5 AEROSOL, POWDER ORAL at 08:45

## 2024-05-02 RX ADMIN — ISOSORBIDE MONONITRATE 60 MG: 30 TABLET, EXTENDED RELEASE ORAL at 08:38

## 2024-05-02 RX ADMIN — INSULIN GLARGINE 30 UNITS: 100 INJECTION, SOLUTION SUBCUTANEOUS at 09:25

## 2024-05-02 RX ADMIN — EZETIMIBE 10 MG: 10 TABLET ORAL at 08:39

## 2024-05-02 RX ADMIN — FLUTICASONE FUROATE AND VILANTEROL TRIFENATATE 1 PUFF: 100; 25 POWDER RESPIRATORY (INHALATION) at 08:46

## 2024-05-02 RX ADMIN — ALLOPURINOL 300 MG: 300 TABLET ORAL at 08:38

## 2024-05-02 RX ADMIN — PREDNISONE 10 MG: 5 TABLET ORAL at 08:37

## 2024-05-02 RX ADMIN — GABAPENTIN 300 MG: 300 CAPSULE ORAL at 08:39

## 2024-05-02 ASSESSMENT — ACTIVITIES OF DAILY LIVING (ADL)
ADLS_ACUITY_SCORE: 39
ADLS_ACUITY_SCORE: 40
ADLS_ACUITY_SCORE: 40
ADLS_ACUITY_SCORE: 39
ADLS_ACUITY_SCORE: 40
ADLS_ACUITY_SCORE: 39

## 2024-05-02 NOTE — PLAN OF CARE
Pt A&O x 4 this shift. Pt denies pain this shift. Pt afebrile, and remains on room air.   Packing to L nare removed this shift. Pt does not appear to be bleeding after packing removed.  Verbal order received to hold heparin this shift.   Pt independent in room, using urinal. Pt continent of bladder this shift. Pt denies burning and/or frequency with urination.     Face to face report given with opportunity to observe patient.    Report given to  Bernard Adamson RN.     Nichol Bell RN   5/1/2024  7:42 PM

## 2024-05-02 NOTE — PLAN OF CARE
Goal Outcome Evaluation:  Pt VS as charted.  IV removed for discharge. Makes needs known.  Call light in reach.  Friend visiting.   Patient discharged at 2:14 PM via wheel chair accompanied by other:friend and staff and staff. Prescriptions sent to patients preferred pharmacy. All belongings sent with patient.     Discharge instructions reviewed with pt. Listed belongings gathered and returned to patient.    Patient discharged to home.   Report called to na    Surgical Patient   Surgical Procedures during stay: no  Did patient receive discharge instruction on wound care and recognition of infection symptoms? N/A    MISC  Follow up appointment made:  Yes  Home medications returned to patient: N/A  Patient reports pain was well managed at discharge: Yes

## 2024-05-02 NOTE — PROGRESS NOTES
05/02/24 1100   Appointment Info   Signing Clinician's Name / Credentials (OT) Heather Auguste OTR/L   Therapeutic Activities   Therapeutic Activity Minutes (80254) 14   Symptoms noted during/after treatment none   Treatment Detail/Skilled Intervention Patient was seated in recliner at time of OT arrival. Patient declined any ADLs as he wanted to wait until he ate to get washed up but was agreeable to functional mobility. He was able to don gown mod I while standing. He ambulated 300 feet in the hallway with SBA and fww. Patient left resting in recliner with call light within reach at end of session.   OT Discharge Planning   OT Plan Progress ADLs and activity tolerance   OT Discharge Recommendation (DC Rec) home;home with assist   OT Rationale for DC Rec Patient able to complete upper body dressing tasks mod I and was able to ambulate 300 feet with fww. Recommend discharge home.   OT Brief overview of current status Pt ambulated 300 feet with SBA and fww; mod I for upper body dressing   Total Session Time   Timed Code Treatment Minutes 14   Total Session Time (sum of timed and untimed services) 14   Psychosocial Support   Trust Relationship/Rapport care explained;choices provided;emotional support provided;empathic listening provided;questions answered;questions encouraged;reassurance provided;thoughts/feelings acknowledged

## 2024-05-02 NOTE — PLAN OF CARE
Goal Outcome Evaluation:Face to face report given with opportunity to observe patient.    Report given to Jhoan Brown RN   5/2/2024  11:16 AM

## 2024-05-02 NOTE — PROGRESS NOTES
Occupational Therapy Discharge Summary    Reason for therapy discharge:    Discharged to home.    Progress towards therapy goal(s). See goals on Care Plan in [facility] electronic health record for goal details.  Goals partially met.  Barriers to achieving goals:   discharge from facility.    Therapy recommendation(s):    No further therapy is recommended.

## 2024-05-02 NOTE — PLAN OF CARE
Pt is A & O X4. At beginning of shift, pt had moderate bleeding from (L) nare. Pt had spit up small clot from drainage from nare. Dr Parker updated. MD in to see pt. Held ASA per MD order. No new orders obtained. Bleeding stopped towards mid-shift. VS et assessments as charted. Pt's highest temp this shift was 99. 4. Pt has no c/o pain this shift.     Face to face report given with opportunity to observe patient.    Report given to LILIANA Llanos RN   5/2/2024  7:15 AM

## 2024-05-02 NOTE — DISCHARGE SUMMARY
[facility]    Discharge Summary  Hospitalist    Date of Admission:  4/28/2024  Date of Discharge:  5/2/2024  2:15 PM  Discharging Provider: Nicolette Cooper NP  Date of Service (when I saw the patient): 5/2/24    Discharge Diagnoses     Principal Problem:    Acute cystitis without hematuria  Active Problems:    Chronic kidney disease, stage III (moderate) (H)    Bacteremia    Secondary renal hyperparathyroidism (H24)    Immunocompromised due to corticosteroids (H24)    Type 2 diabetes mellitus with diabetic neuropathy, with long-term current use of insulin (H)    Chronic systolic heart failure (H)      History of Present Illness   From admission: Eduar Isaacs is a 68 year old male with established diagnosis of diabetes mellitus type 2, hyperlipidemia, hypertension, hypothyroidism, diastolic CHF, kidney transplant on immune suppression who presented to the hospital complaining of fever, chills, difficulty breathing, lower extremites edema 10 Lb weight gain.  He believes he has a urinary tract infection given dysuria.  Patient denies cough, sputum, abdominal pain, diarrhea, vomiting.     On admission patient is on room air saturating at 98%.  He meets SIRS criteria with fever of 102.3, tachycardia heart rate of 105, leukocytosis with WBC elevated at 17.9.  Creatinine elevated at 2.44 which is about his baseline, BNP over 3000, procalcitonin elevated at 2.24, high-sensitivity troponin elevated at 73, UA positive for UTI, negative for influenza, RSV, COVID-19.  Chest x-ray done and does not show signs of pneumonia but official report is pending.      Patient assessed as having sepsis due to UTI in the setting of acute on  chronic diastolic CHF.  Patient started on IV antibiotic with Levaquin.  Blood culture sent, urine culture sent.    Hospital Course     Acute cystitis without hematuria: Concern was raised about possible sepsis, however LA is negative, BP stable, no significant tachycardia even with  "high fever so indications of end organ dysfunction, sepsis ruled out. Continue Levaquin pending UC  -5/1:E. Coli-continue levaquin  -5/2: Discharged home, finish course of Levaquin for 5 more days     Bacteremia: 2/4 positive for lactose fermenting rods gram negative rods, final ID pending  -5/1: E. Coli-continue Levaquin       Chronic kidney disease, stage III (moderate) (H): Renal function at baseline-small increase this morning after IV lasix  -4/30: Clinically dry and renal function slightly worse-will give 500ml IVF over 4 hours.   -5/1: Stable but not improved  -5/2: Improved       Secondary renal hyperparathyroidism (H24): Continue prednisone-watch for need for stress dosing       Immunocompromised due to corticosteroids (H24): Also on cellcept s/p renal transplant. Will continue        Type 2 diabetes mellitus with diabetic neuropathy, with long-term current use of insulin (H): Continue home dose insulin as long as eating well       Chronic systolic heart failure (H): Initial concern for acute exacerbation-however he has minimal LE edema-BNP is at previous baseline considering his renal failure, lungs are clear without hypoxia. Will likely hold today's dose of lasix with high fever and he received 100mg IV last night.   -4/30: Continue to hold diuretics  -5/1: Holding diuretics again today               # Thrombocytopenia: Lowest platelets = 124 in last 2 days, will monitor for bleeding   # Hypertension: Noted on problem list     # Acute heart failure with preserved ejection fraction: heart failure noted on problem list, last echo with EF >50%, and receiving IV diuretics       # Severe Obesity: Estimated body mass index is 45.86 kg/m  as calculated from the following:    Height as of this encounter: 1.702 m (5' 7\").    Weight as of this encounter: 132.8 kg (292 lb 12.8 oz)., PRESENT ON ADMISSION            Nicolette Cooper CNP      Pending Results     Unresulted Labs Ordered in the Past 30 Days of this " Admission       Date and Time Order Name Status Description    4/28/2024  8:34 PM Blood Culture Arm, Right Preliminary     4/28/2024  8:30 PM Blood Culture Peripheral Blood Preliminary             Code Status   Full Code       Primary Care Physician   Kody Abarca           Discharge Disposition   Discharged to home  Condition at discharge: Stable    Consultations This Hospital Stay   PHYSICAL THERAPY ADULT IP CONSULT  OCCUPATIONAL THERAPY ADULT IP CONSULT    Time Spent on this Encounter   Nicolette VITAL NP, personally saw the patient today and spent greater than 30 minutes discharging this patient.    Discharge Orders   No discharge procedures on file.  Discharge Medications   Current Discharge Medication List        START taking these medications    Details   levofloxacin (LEVAQUIN) 250 MG tablet Take 1 tablet (250 mg) by mouth daily for 5 days  Qty: 5 tablet, Refills: 0    Associated Diagnoses: Bacteremia      saccharomyces boulardii (FLORASTOR) 250 MG capsule Take 1 capsule (250 mg) by mouth 2 times daily for 7 days  Qty: 14 capsule, Refills: 0    Associated Diagnoses: Bacteremia           CONTINUE these medications which have CHANGED    Details   furosemide (LASIX) 40 MG tablet Take 2 tablets (80 mg) by mouth at bedtime  Qty: 60 tablet, Refills: 0    Associated Diagnoses: Chronic systolic heart failure (H)      insulin glargine (LANTUS PEN) 100 UNIT/ML pen Inject 25 Units Subcutaneous 2 times daily  Qty: 15 mL, Refills: 0    Comments: If Lantus is not covered by insurance, may substitute Basaglar or Semglee or other insulin glargine product per insurance preference at same dose and frequency.    Associated Diagnoses: Controlled type 2 diabetes mellitus with hyperglycemia, with long-term current use of insulin (H)           CONTINUE these medications which have NOT CHANGED    Details   acetaZOLAMIDE (DIAMOX SEQUEL) 500 MG 12 hr capsule Take 500 mg by mouth every morning      albuterol (PROAIR  HFA/PROVENTIL HFA/VENTOLIN HFA) 108 (90 Base) MCG/ACT inhaler Inhale 2 puffs into the lungs every 6 hours as needed for shortness of breath or wheezing    Comments: Pharmacy may dispense brand covered by insurance (Proair, or proventil or ventolin or generic albuterol inhaler)      allopurinol (ZYLOPRIM) 300 MG tablet Take 300 mg by mouth every morning      aspirin (ASA) 81 MG EC tablet Take 81 mg by mouth At Bedtime      calcium carbonate antacid 1000 MG CHEW Take 2 tablets by mouth every morning      cholecalciferol (VITAMIN D3) 125 mcg (5000 units) capsule Take 125 mcg by mouth every morning      clopidogrel (PLAVIX) 75 MG tablet Take 75 mg by mouth every morning      Continuous Glucose  (DEXCOM G7 ) SANAZ Use to read blood sugars as per 's instructions.      Continuous Glucose Sensor (DEXCOM G7 SENSOR) MISC Change every 10 days.      ezetimibe (ZETIA) 10 MG tablet Take 10 mg by mouth every morning      fluticasone (FLONASE) 50 MCG/ACT nasal spray Spray 1 spray into both nostrils daily as needed      gabapentin (NEURONTIN) 300 MG capsule Take 300 mg by mouth 2 times daily      HUMALOG KWIKPEN 100 UNIT/ML soln Inject 15-20 Units Subcutaneous 3 times daily (before meals) -hold for BG < 120      isosorbide mononitrate (IMDUR) 30 MG 24 hr tablet Take 2 tablets by mouth every morning      levothyroxine (SYNTHROID/LEVOTHROID) 75 MCG tablet Take 75 mcg by mouth every morning      mycophenolate (GENERIC EQUIVALENT) 500 MG tablet Take 1,000 mg by mouth 2 times daily  DX. Z94.0 TRANSPLANT DATE 2000.      nitroGLYcerin (NITROSTAT) 0.4 MG sublingual tablet Place 0.4 mg under the tongue every 5 minutes as needed for chest pain      omega 3 1200 MG CAPS Take 1 capsule by mouth 2 times daily      predniSONE (DELTASONE) 5 MG tablet Take 10 mg by mouth every morning      rosuvastatin (CRESTOR) 40 MG tablet Take 40 mg by mouth At Bedtime       spironolactone (ALDACTONE) 25 MG tablet Take 25 mg by mouth  every morning      TRELEGY ELLIPTA 100-62.5-25 MCG/INH oral inhaler Inhale 1 puff into the lungs every morning      blood glucose (NO BRAND SPECIFIED) test strip 1 strip by In Vitro route 4 times daily (before meals and nightly) Use to test blood sugar 4 times daily or as directed.  Qty: 100 strip, Refills: 0    Associated Diagnoses: Diabetes mellitus without complication (H)      blood glucose monitoring (SOFTCLIX) lancets 1 each by In Vitro route 4 times daily (before meals and nightly) Use to monitor blood glucose once daily as directed.  Qty: 200 each, Refills: 0    Associated Diagnoses: Diabetes mellitus without complication (H)           Allergies   Allergies   Allergen Reactions    Cefepime Other (See Comments)     pain, kidney function off.    Codeine Shortness Of Breath    Niacin Itching and Rash    Amoxicillin Itching and Rash    Prilosec [Omeprazole] Itching and Rash    Vancomycin Hives and Rash    Aldactone [Spironolactone]     Atorvastatin Muscle Pain (Myalgia)    Ciprofloxacin Rash     Tolerated Levaquin 6/2023 without any rash or other reaction    Semaglutide Diarrhea     Data   Most Recent 3 CBC's:  Recent Labs   Lab Test 05/02/24  0506 05/01/24  0509 04/30/24  0513   WBC 7.0 8.7 11.7*   HGB 9.1* 9.4* 10.1*   MCV 88 89 89   * 135* 124*      Most Recent 3 BMP's:  Recent Labs   Lab Test 05/02/24  0922 05/02/24  0506 05/02/24  0446 05/01/24  0849 05/01/24  0509 04/30/24  0746 04/30/24  0513   NA  --  137  --   --  135  --  134*   POTASSIUM  --  4.0  --   --  3.8  --  3.5   CHLORIDE  --  105  --   --  104  --  102   CO2  --  17*  --   --  15*  --  15*   BUN  --  75.5*  --   --  79.6*  --  74.1*   CR  --  2.67*  --   --  2.88*  --  2.88*   ANIONGAP  --  15  --   --  16*  --  17*   HANNAH  --  8.0*  --   --  7.9*  --  7.9*   GLC 94 115* 101*   < > 89   < > 122*    < > = values in this interval not displayed.     Most Recent 2 LFT's:  Recent Labs   Lab Test 04/28/24 2033 06/01/23  0442   AST 21 48    ALT 11 16   ALKPHOS 38* 99   BILITOTAL 0.3 0.6     Most Recent INR's and Anticoagulation Dosing History:  Anticoagulation Dose History          Latest Ref Rng & Units 6/1/2023   Recent Dosing and Labs   INR 0.9 - 1.1 1.4          Details          This result is from an external source.             Most Recent 3 Troponin's:  Recent Labs   Lab Test 05/21/21  1452   TROPI <0.015     Most Recent Cholesterol Panel:  Recent Labs   Lab Test 08/14/23  1200   TRIG 233*     Most Recent 6 Bacteria Isolates From Any Culture (See EPIC Reports for Culture Details):  Recent Labs   Lab Test 05/22/21  0652 05/22/21  0644 05/21/21  1516 05/21/21  1452   CULT No growth after 6 days No growth after 6 days No growth after 6 days No growth after 6 days  50,000 to 100,000 colonies/mL  Enterococcus faecalis  *     Most Recent TSH, T4 and A1c Labs:  Recent Labs   Lab Test 04/29/24  0536 11/13/22  0612 11/12/22  1516   TSH  --   --  4.82*   T4  --   --  1.32   A1C 6.1*   < >  --     < > = values in this interval not displayed.     Results for orders placed or performed during the hospital encounter of 04/28/24   XR Chest Port 1 View    Narrative    PROCEDURE: XR CHEST PORT 1 VIEW 4/28/2024 9:40 PM    HISTORY: Fever    COMPARISONS: 1/7/2024.    TECHNIQUE: Single portable view.    FINDINGS: Heart size is stable. There is some ectasia of the aorta. No  acute infiltrate or effusion is seen.    There are healed left posterolateral rib fractures.         Impression    IMPRESSION: No acute infiltrate.    ALHAJI DE JESUS MD         SYSTEM ID:  [id number]   POC US ECHO LIMITED    Impression    Unable to view heart, do not bill        yes

## 2024-05-02 NOTE — PROGRESS NOTES
Physical Therapy Discharge Summary    Reason for therapy discharge:    Discharged to home.    Progress towards therapy goal(s). See goals on Care Plan in [facility] electronic health record for goal details.  Goals met    Therapy recommendation(s):    Pt doing well with functional mobility. May benefit from outpatient PT for balance

## 2024-05-04 LAB
BACTERIA BLD CULT: ABNORMAL
BACTERIA BLD CULT: ABNORMAL

## 2024-05-20 DIAGNOSIS — Z79.4 TYPE 2 DIABETES MELLITUS WITH HYPERGLYCEMIA, WITH LONG-TERM CURRENT USE OF INSULIN (H): Primary | ICD-10-CM

## 2024-05-20 DIAGNOSIS — E11.65 TYPE 2 DIABETES MELLITUS WITH HYPERGLYCEMIA, WITH LONG-TERM CURRENT USE OF INSULIN (H): Primary | ICD-10-CM

## 2024-05-20 RX ORDER — ACYCLOVIR 400 MG/1
TABLET ORAL
Qty: 9 EACH | Refills: 3 | Status: SHIPPED | OUTPATIENT
Start: 2024-05-20

## 2024-05-20 NOTE — TELEPHONE ENCOUNTER
Dexcom Sensor      Last Written Prescription Date:  12/4/23  Last Fill Quantity: 3,   # refills: 5  Last Office Visit: 4/19/24  Future Office visit:       Routing refill request to provider for review/approval because:  Drug not active on patient's medication list

## 2024-06-26 ENCOUNTER — TRANSFERRED RECORDS (OUTPATIENT)
Dept: HEALTH INFORMATION MANAGEMENT | Facility: CLINIC | Age: 69
End: 2024-06-26

## 2024-06-26 LAB
ALT SERPL-CCNC: 10 U/L (ref 18–65)
AST SERPL-CCNC: 10 U/L (ref 10–40)
CHOLESTEROL (EXTERNAL): 138 MG/DL
CREATININE (EXTERNAL): 2.52 MG/DL (ref 0.8–1.5)
GFR ESTIMATED (EXTERNAL): 27 ML/MIN/1.73M2
GLUCOSE (EXTERNAL): 125 MG/DL (ref 70–100)
HBA1C MFR BLD: 5.8 %
HDLC SERPL-MCNC: 50 MG/DL
HEP C HIM: NORMAL
LDL CHOLESTEROL CALCULATED (EXTERNAL): 41 MG/DL
POTASSIUM (EXTERNAL): 4 MMOL/L (ref 3.5–5.1)
TRIGLYCERIDES (EXTERNAL): 233 MG/DL

## 2024-08-08 ENCOUNTER — TRANSFERRED RECORDS (OUTPATIENT)
Dept: HEALTH INFORMATION MANAGEMENT | Facility: CLINIC | Age: 69
End: 2024-08-08
Payer: COMMERCIAL

## 2024-08-13 ENCOUNTER — TRANSFERRED RECORDS (OUTPATIENT)
Dept: HEALTH INFORMATION MANAGEMENT | Facility: CLINIC | Age: 69
End: 2024-08-13

## 2024-09-03 ENCOUNTER — TRANSFERRED RECORDS (OUTPATIENT)
Dept: HEALTH INFORMATION MANAGEMENT | Facility: CLINIC | Age: 69
End: 2024-09-03

## 2024-09-15 ENCOUNTER — HEALTH MAINTENANCE LETTER (OUTPATIENT)
Age: 69
End: 2024-09-15

## 2024-10-18 ENCOUNTER — TRANSFERRED RECORDS (OUTPATIENT)
Dept: HEALTH INFORMATION MANAGEMENT | Facility: CLINIC | Age: 69
End: 2024-10-18
Payer: COMMERCIAL

## 2024-10-18 ENCOUNTER — ALLIED HEALTH/NURSE VISIT (OUTPATIENT)
Dept: EDUCATION SERVICES | Facility: OTHER | Age: 69
End: 2024-10-18
Attending: NURSE PRACTITIONER
Payer: COMMERCIAL

## 2024-10-18 VITALS
WEIGHT: 304 LBS | RESPIRATION RATE: 16 BRPM | SYSTOLIC BLOOD PRESSURE: 136 MMHG | DIASTOLIC BLOOD PRESSURE: 78 MMHG | HEIGHT: 67 IN | HEART RATE: 73 BPM | BODY MASS INDEX: 47.71 KG/M2 | OXYGEN SATURATION: 98 %

## 2024-10-18 DIAGNOSIS — E11.65 TYPE 2 DIABETES MELLITUS WITH HYPERGLYCEMIA, WITH LONG-TERM CURRENT USE OF INSULIN (H): Primary | ICD-10-CM

## 2024-10-18 DIAGNOSIS — Z79.4 TYPE 2 DIABETES MELLITUS WITH HYPERGLYCEMIA, WITH LONG-TERM CURRENT USE OF INSULIN (H): Primary | ICD-10-CM

## 2024-10-18 LAB
ALBUMIN (URINE) MG/SPEC: 563 MG/L
ALBUMIN/CREATININE RATIO: 2252 MG/GM (ref 0–30)
CREATININE (EXTERNAL): 2.39 MG/DL (ref 0.8–1.5)
CREATININE (URINE): 25 MG/DL
GFR ESTIMATED (EXTERNAL): 29 ML/MIN/1.73M2
GLUCOSE (EXTERNAL): 106 MG/DL (ref 70–100)
POTASSIUM (EXTERNAL): 3.5 MMOL/L (ref 3.5–5.1)

## 2024-10-18 PROCEDURE — 99213 OFFICE O/P EST LOW 20 MIN: CPT | Performed by: NURSE PRACTITIONER

## 2024-10-18 PROCEDURE — 95251 CONT GLUC MNTR ANALYSIS I&R: CPT | Performed by: NURSE PRACTITIONER

## 2024-10-18 PROCEDURE — G0463 HOSPITAL OUTPT CLINIC VISIT: HCPCS

## 2024-10-18 ASSESSMENT — PAIN SCALES - GENERAL: PAINLEVEL: WORST PAIN (10)

## 2024-10-18 NOTE — PROGRESS NOTES
"  Assessment & Plan     Type 2 diabetes mellitus with hyperglycemia, with long-term current use of insulin (H)  Having labs completed at [facility] today  Reviewed CGM with a GMI of 6.9  Duane is doing great with his current treatment  Duane was started on Wegovy - per nephrology.  Currently tolerating treatment. Encouraged to continue to go slow with Wegovy since he had side effects from Ozempic (possible due to higher dose)  Follow up in 6 months or as needed  - GLUCOSE MONITOR, 72 HOUR, PHYS INTERP            I spent a total of 24 minutes on the day of the visit.   Time spent by me doing chart review, history and exam, documentation and further activities per the note    BMI  Estimated body mass index is 48.33 kg/m  as calculated from the following:    Height as of this encounter: 1.689 m (5' 6.5\").    Weight as of this encounter: 137.9 kg (304 lb).   Weight management plan: Discussed healthy diet and exercise guidelines      Patient Instructions   Continue working on healthy eating and moving (start low and slow, work up to 30 min, 5x/week)    BG goals:  Fasting and before meals <180, >80  2 hour after eating <180    We only need 1/2 of these numbers to be within target then your A1c will be within target    Practicing health promotion can help improve diabetes (suggested by the ADA, March 2019)   Meditation    Apps: for IPhone and Android    -Calm    -Headspace    -Insight Timer   Yoga   Mindful eating - portion sizing     Medication changes   No changes with medication       Recommendation  -try bike or walking 5-10 minutes after eating  -make sure to eat a protein with any all carb meal or snack       Follow up   6 month     Call me sooner if any problems/concerns and/or questions develop including consistent low BGs <70 or consistent high BGs >200  [phone] (Unit Coordinator)    [phone] (Nurse)- MYKEL diabetic nurse - call with any question     No follow-ups on file.    Subjective   Duane is a 69 year " old, presenting for the following health issues:  Diabetes    HPI     Diabetes Follow-up    How often are you checking your blood sugar? Continuous glucose monitor  Dexcom Personal CGM:  Glucose Management indicator: 6.9  Ave: 150+/-35  Time in Range:  Very High 1%  High  17%  Time in range 82%  Low  0%  Very Low 0%  What time of day are you checking your blood sugars (select all that apply)?  Before and after meals  Have you had any blood sugars above 200?  Yes rare   Have you had any blood sugars below 70?  No  What symptoms do you notice when your blood sugar is low?  Does not feel the symptoms   What concerns do you have today about your diabetes? None   Do you have any of these symptoms? (Select all that apply)  Numbness in feet and Burning in feet  Have you had a diabetic eye exam in the last 12 months? Completed   Getting labs completed at [facility] this week     Diabetes Medications:  Lantus 25 units 2 times daily   Humalog 15-20 units with meals   Side effect from ozempic - severe diarrhea   Started on low dose wegovy taking it slow - started a couple months ago   ASA use: 81 mg daily  Statin use: Ezetimibe 10 mg daily and crestor 40 mg daily       BP Readings from Last 2 Encounters:   10/18/24 136/78   05/02/24 158/74     Hemoglobin A1C (%)   Date Value   04/29/2024 6.1 (H)   11/13/2022 13.1 (H)   05/21/2021 7.2 (H)               Review of Systems  CONSTITUTIONAL: NEGATIVE for fever, chills, change in weight  INTEGUMENTARY/SKIN: NEGATIVE for worrisome rashes, moles or lesions  ENT/MOUTH: NEGATIVE for ear, mouth and throat problems  RESP: NEGATIVE for significant cough or SOB  CV: swelling in his legs   GI: NEGATIVE for nausea, abdominal pain, heartburn, or change in bowel habits  : limited urination during the day, increased urination at night   MUSCULOSKELETAL: all over pain -   NEURO: chronic weakness  ENDOCRINE: Hx diabetes  PSYCHIATRIC: NEGATIVE for changes in mood or affect      Objective    BP  "136/78   Pulse 73   Resp 16   Ht 1.689 m (5' 6.5\")   Wt 137.9 kg (304 lb)   SpO2 98%   BMI 48.33 kg/m    Body mass index is 48.33 kg/m .  Physical Exam   GENERAL: alert, no distress, and obese  RESP: lungs clear to auscultation - no rales, rhonchi or wheezes  CV: regular rate and rhythm, normal S1 S2, no S3 or S4, no murmur, click or rub, no peripheral edema  ABDOMEN: soft, nontender, no hepatosplenomegaly, no masses and bowel sounds normal  PSYCH: mentation appears normal, affect normal/bright    Lab Results   Component Value Date    A1C 6.1 04/29/2024    A1C 13.1 11/13/2022    A1C 7.2 05/21/2021             Signed Electronically by: SAKINA Morrissey CNP    "

## 2024-10-18 NOTE — PATIENT INSTRUCTIONS
Continue working on healthy eating and moving (start low and slow, work up to 30 min, 5x/week)    BG goals:  Fasting and before meals <180, >80  2 hour after eating <180    We only need 1/2 of these numbers to be within target then your A1c will be within target    Practicing health promotion can help improve diabetes (suggested by the ADA, March 2019)   Meditation    Apps: for IPhone and Android    -Calm    -Headspace    -Insight Timer   Yoga   Mindful eating - portion sizing     Medication changes   No changes with medication       Recommendation  -try bike or walking 5-10 minutes after eating  -make sure to eat a protein with any all carb meal or snack       Follow up   6 month     Call me sooner if any problems/concerns and/or questions develop including consistent low BGs <70 or consistent high BGs >200  [phone] (Unit Coordinator)    [phone] (Nurse)- MYKEL diabetic nurse - call with any question

## 2024-10-22 ENCOUNTER — APPOINTMENT (OUTPATIENT)
Dept: ULTRASOUND IMAGING | Facility: HOSPITAL | Age: 69
DRG: 872 | End: 2024-10-22
Attending: PHYSICIAN ASSISTANT
Payer: COMMERCIAL

## 2024-10-22 ENCOUNTER — HOSPITAL ENCOUNTER (INPATIENT)
Facility: HOSPITAL | Age: 69
LOS: 13 days | Discharge: NURSING, REHAB OR LONG-TERM CARE | DRG: 872 | End: 2024-11-05
Attending: PHYSICIAN ASSISTANT | Admitting: INTERNAL MEDICINE
Payer: COMMERCIAL

## 2024-10-22 DIAGNOSIS — E11.40 TYPE 2 DIABETES MELLITUS WITH DIABETIC NEUROPATHY, WITH LONG-TERM CURRENT USE OF INSULIN (H): ICD-10-CM

## 2024-10-22 DIAGNOSIS — E03.9 HYPOTHYROIDISM, UNSPECIFIED TYPE: ICD-10-CM

## 2024-10-22 DIAGNOSIS — Z79.4 TYPE 2 DIABETES MELLITUS WITH DIABETIC NEUROPATHY, WITH LONG-TERM CURRENT USE OF INSULIN (H): ICD-10-CM

## 2024-10-22 DIAGNOSIS — L03.115 CELLULITIS OF RIGHT LOWER LEG: ICD-10-CM

## 2024-10-22 DIAGNOSIS — I50.23 ACUTE ON CHRONIC SYSTOLIC CONGESTIVE HEART FAILURE (H): ICD-10-CM

## 2024-10-22 DIAGNOSIS — R78.81 BACTEREMIA: ICD-10-CM

## 2024-10-22 DIAGNOSIS — D84.9 IMMUNOSUPPRESSED STATUS (H): ICD-10-CM

## 2024-10-22 DIAGNOSIS — Z94.0 KIDNEY REPLACED BY TRANSPLANT: ICD-10-CM

## 2024-10-22 DIAGNOSIS — J44.9 CHRONIC OBSTRUCTIVE PULMONARY DISEASE, UNSPECIFIED COPD TYPE (H): ICD-10-CM

## 2024-10-22 DIAGNOSIS — I27.20 PULMONARY HYPERTENSION (H): ICD-10-CM

## 2024-10-22 DIAGNOSIS — I50.22 CHRONIC SYSTOLIC HEART FAILURE (H): Primary | ICD-10-CM

## 2024-10-22 LAB
ANION GAP SERPL CALCULATED.3IONS-SCNC: 15 MMOL/L (ref 7–15)
BUN SERPL-MCNC: 60 MG/DL (ref 8–23)
CALCIUM SERPL-MCNC: 9 MG/DL (ref 8.8–10.4)
CHLORIDE SERPL-SCNC: 101 MMOL/L (ref 98–107)
CREAT SERPL-MCNC: 2.48 MG/DL (ref 0.67–1.17)
CRP SERPL-MCNC: <3 MG/L
EGFRCR SERPLBLD CKD-EPI 2021: 28 ML/MIN/1.73M2
ERYTHROCYTE [DISTWIDTH] IN BLOOD BY AUTOMATED COUNT: 14.5 % (ref 10–15)
GLUCOSE BLDC GLUCOMTR-MCNC: 135 MG/DL (ref 70–99)
GLUCOSE SERPL-MCNC: 182 MG/DL (ref 70–99)
HCO3 SERPL-SCNC: 20 MMOL/L (ref 22–29)
HCT VFR BLD AUTO: 41.3 % (ref 40–53)
HGB BLD-MCNC: 13.7 G/DL (ref 13.3–17.7)
HOLD SPECIMEN: NORMAL
LACTATE SERPL-SCNC: 1 MMOL/L (ref 0.7–2)
MCH RBC QN AUTO: 30.6 PG (ref 26.5–33)
MCHC RBC AUTO-ENTMCNC: 33.2 G/DL (ref 31.5–36.5)
MCV RBC AUTO: 92 FL (ref 78–100)
PLATELET # BLD AUTO: 138 10E3/UL (ref 150–450)
POTASSIUM SERPL-SCNC: 4.1 MMOL/L (ref 3.4–5.3)
PROCALCITONIN SERPL IA-MCNC: 0.21 NG/ML
RBC # BLD AUTO: 4.47 10E6/UL (ref 4.4–5.9)
SODIUM SERPL-SCNC: 136 MMOL/L (ref 135–145)
WBC # BLD AUTO: 14.4 10E3/UL (ref 4–11)

## 2024-10-22 PROCEDURE — 83605 ASSAY OF LACTIC ACID: CPT | Performed by: INTERNAL MEDICINE

## 2024-10-22 PROCEDURE — 80048 BASIC METABOLIC PNL TOTAL CA: CPT | Performed by: STUDENT IN AN ORGANIZED HEALTH CARE EDUCATION/TRAINING PROGRAM

## 2024-10-22 PROCEDURE — 99285 EMERGENCY DEPT VISIT HI MDM: CPT | Mod: 25

## 2024-10-22 PROCEDURE — 96374 THER/PROPH/DIAG INJ IV PUSH: CPT

## 2024-10-22 PROCEDURE — 87040 BLOOD CULTURE FOR BACTERIA: CPT | Performed by: PHYSICIAN ASSISTANT

## 2024-10-22 PROCEDURE — 86140 C-REACTIVE PROTEIN: CPT | Performed by: PHYSICIAN ASSISTANT

## 2024-10-22 PROCEDURE — G0378 HOSPITAL OBSERVATION PER HR: HCPCS

## 2024-10-22 PROCEDURE — 250N000011 HC RX IP 250 OP 636: Performed by: INTERNAL MEDICINE

## 2024-10-22 PROCEDURE — 250N000013 HC RX MED GY IP 250 OP 250 PS 637: Performed by: INTERNAL MEDICINE

## 2024-10-22 PROCEDURE — 36415 COLL VENOUS BLD VENIPUNCTURE: CPT | Performed by: PHYSICIAN ASSISTANT

## 2024-10-22 PROCEDURE — 84145 PROCALCITONIN (PCT): CPT | Performed by: PHYSICIAN ASSISTANT

## 2024-10-22 PROCEDURE — 93971 EXTREMITY STUDY: CPT | Mod: RT

## 2024-10-22 PROCEDURE — 999N000157 HC STATISTIC RCP TIME EA 10 MIN

## 2024-10-22 PROCEDURE — 99284 EMERGENCY DEPT VISIT MOD MDM: CPT | Performed by: PHYSICIAN ASSISTANT

## 2024-10-22 PROCEDURE — 250N000013 HC RX MED GY IP 250 OP 250 PS 637: Performed by: PHYSICIAN ASSISTANT

## 2024-10-22 PROCEDURE — 250N000011 HC RX IP 250 OP 636: Performed by: PHYSICIAN ASSISTANT

## 2024-10-22 PROCEDURE — 99222 1ST HOSP IP/OBS MODERATE 55: CPT | Mod: AI | Performed by: INTERNAL MEDICINE

## 2024-10-22 PROCEDURE — 85027 COMPLETE CBC AUTOMATED: CPT | Performed by: STUDENT IN AN ORGANIZED HEALTH CARE EDUCATION/TRAINING PROGRAM

## 2024-10-22 PROCEDURE — 82962 GLUCOSE BLOOD TEST: CPT

## 2024-10-22 PROCEDURE — 36415 COLL VENOUS BLD VENIPUNCTURE: CPT | Performed by: STUDENT IN AN ORGANIZED HEALTH CARE EDUCATION/TRAINING PROGRAM

## 2024-10-22 RX ORDER — ACETAMINOPHEN 325 MG/1
650 TABLET ORAL EVERY 4 HOURS PRN
Status: DISCONTINUED | OUTPATIENT
Start: 2024-10-22 | End: 2024-10-28

## 2024-10-22 RX ORDER — HYDROMORPHONE HYDROCHLORIDE 1 MG/ML
0.2 INJECTION, SOLUTION INTRAMUSCULAR; INTRAVENOUS; SUBCUTANEOUS
Status: DISCONTINUED | OUTPATIENT
Start: 2024-10-22 | End: 2024-10-27

## 2024-10-22 RX ORDER — INSULIN LISPRO 100 [IU]/ML
1-5 INJECTION, SOLUTION INTRAVENOUS; SUBCUTANEOUS AT BEDTIME
Status: DISCONTINUED | OUTPATIENT
Start: 2024-10-22 | End: 2024-10-31

## 2024-10-22 RX ORDER — INSULIN LISPRO 100 [IU]/ML
1-7 INJECTION, SOLUTION INTRAVENOUS; SUBCUTANEOUS
Status: DISCONTINUED | OUTPATIENT
Start: 2024-10-23 | End: 2024-11-05 | Stop reason: HOSPADM

## 2024-10-22 RX ORDER — LIDOCAINE 40 MG/G
CREAM TOPICAL
Status: DISCONTINUED | OUTPATIENT
Start: 2024-10-22 | End: 2024-11-05 | Stop reason: HOSPADM

## 2024-10-22 RX ORDER — HYDROMORPHONE HYDROCHLORIDE 1 MG/ML
0.4 INJECTION, SOLUTION INTRAMUSCULAR; INTRAVENOUS; SUBCUTANEOUS
Status: DISCONTINUED | OUTPATIENT
Start: 2024-10-22 | End: 2024-10-27

## 2024-10-22 RX ORDER — FENTANYL CITRATE 50 UG/ML
50 INJECTION, SOLUTION INTRAMUSCULAR; INTRAVENOUS ONCE
Status: COMPLETED | OUTPATIENT
Start: 2024-10-22 | End: 2024-10-22

## 2024-10-22 RX ORDER — LINEZOLID 600 MG/1
600 TABLET, FILM COATED ORAL EVERY 12 HOURS SCHEDULED
Status: DISCONTINUED | OUTPATIENT
Start: 2024-10-22 | End: 2024-11-01

## 2024-10-22 RX ORDER — ONDANSETRON 2 MG/ML
4 INJECTION INTRAMUSCULAR; INTRAVENOUS EVERY 6 HOURS PRN
Status: DISCONTINUED | OUTPATIENT
Start: 2024-10-22 | End: 2024-11-05 | Stop reason: HOSPADM

## 2024-10-22 RX ORDER — DEXTROSE MONOHYDRATE 25 G/50ML
25-50 INJECTION, SOLUTION INTRAVENOUS
Status: DISCONTINUED | OUTPATIENT
Start: 2024-10-22 | End: 2024-11-05 | Stop reason: HOSPADM

## 2024-10-22 RX ORDER — ONDANSETRON 4 MG/1
4 TABLET, ORALLY DISINTEGRATING ORAL EVERY 6 HOURS PRN
Status: DISCONTINUED | OUTPATIENT
Start: 2024-10-22 | End: 2024-11-05 | Stop reason: HOSPADM

## 2024-10-22 RX ORDER — CLINDAMYCIN PHOSPHATE 600 MG/50ML
600 INJECTION, SOLUTION INTRAVENOUS ONCE
Status: COMPLETED | OUTPATIENT
Start: 2024-10-22 | End: 2024-10-22

## 2024-10-22 RX ORDER — OXYCODONE HYDROCHLORIDE 5 MG/1
5 TABLET ORAL ONCE
Status: COMPLETED | OUTPATIENT
Start: 2024-10-22 | End: 2024-10-22

## 2024-10-22 RX ORDER — AMOXICILLIN 250 MG
2 CAPSULE ORAL 2 TIMES DAILY PRN
Status: DISCONTINUED | OUTPATIENT
Start: 2024-10-22 | End: 2024-11-05 | Stop reason: HOSPADM

## 2024-10-22 RX ORDER — ACETAMINOPHEN 650 MG/1
650 SUPPOSITORY RECTAL EVERY 4 HOURS PRN
Status: DISCONTINUED | OUTPATIENT
Start: 2024-10-22 | End: 2024-10-28

## 2024-10-22 RX ORDER — CALCIUM CARBONATE 500 MG/1
1000 TABLET, CHEWABLE ORAL 4 TIMES DAILY PRN
Status: DISCONTINUED | OUTPATIENT
Start: 2024-10-22 | End: 2024-10-23

## 2024-10-22 RX ORDER — AMOXICILLIN 250 MG
1 CAPSULE ORAL 2 TIMES DAILY PRN
Status: DISCONTINUED | OUTPATIENT
Start: 2024-10-22 | End: 2024-11-05 | Stop reason: HOSPADM

## 2024-10-22 RX ORDER — NICOTINE POLACRILEX 4 MG
15-30 LOZENGE BUCCAL
Status: DISCONTINUED | OUTPATIENT
Start: 2024-10-22 | End: 2024-11-05 | Stop reason: HOSPADM

## 2024-10-22 RX ADMIN — ACETAMINOPHEN 650 MG: 325 TABLET ORAL at 20:51

## 2024-10-22 RX ADMIN — CLINDAMYCIN IN 5 PERCENT DEXTROSE 600 MG: 12 INJECTION, SOLUTION INTRAVENOUS at 20:26

## 2024-10-22 RX ADMIN — LINEZOLID 600 MG: 600 TABLET, FILM COATED ORAL at 20:58

## 2024-10-22 RX ADMIN — OXYCODONE HYDROCHLORIDE 5 MG: 5 TABLET ORAL at 19:41

## 2024-10-22 RX ADMIN — FENTANYL CITRATE 50 MCG: 50 INJECTION INTRAMUSCULAR; INTRAVENOUS at 18:42

## 2024-10-22 RX ADMIN — HYDROMORPHONE HYDROCHLORIDE 0.4 MG: 1 INJECTION, SOLUTION INTRAMUSCULAR; INTRAVENOUS; SUBCUTANEOUS at 20:55

## 2024-10-22 ASSESSMENT — ENCOUNTER SYMPTOMS
SHORTNESS OF BREATH: 0
FEVER: 0
CHILLS: 0

## 2024-10-22 ASSESSMENT — ACTIVITIES OF DAILY LIVING (ADL)
ADLS_ACUITY_SCORE: 37
ADLS_ACUITY_SCORE: 26
ADLS_ACUITY_SCORE: 37
ADLS_ACUITY_SCORE: 37

## 2024-10-22 NOTE — ED PROVIDER NOTES
History     Chief Complaint   Patient presents with    Leg Pain     Right lower     The history is provided by the patient.     Eduar Isaacs is a 69 year old male who presented to the emergency department ambulatory for evaluation of right calf pain.  Began to notice some increasing discomfort in his posterior right lower leg 2 days ago and his friend looked at it today and found it to be quite red and hot as well as significantly painful.  Past medical history is most significant for renal transplant patient currently anticoagulated as well as immunosuppressed.  He was admitted in April for bacteremia.  He currently denies any fevers or chills.    Allergies:  Allergies   Allergen Reactions    Cefepime Other (See Comments)     pain, kidney function off.    Codeine Shortness Of Breath    Niacin Itching and Rash    Amoxicillin Itching and Rash    Prilosec [Omeprazole] Itching and Rash    Vancomycin Hives and Rash    Aldactone [Spironolactone]     Atorvastatin Muscle Pain (Myalgia)    Ciprofloxacin Rash     Tolerated Levaquin 6/2023 without any rash or other reaction    Semaglutide Diarrhea       Problem List:    Patient Active Problem List    Diagnosis Date Noted    Immunosuppressed status (H) 10/22/2024     Priority: Medium    Cellulitis of right lower leg 10/22/2024     Priority: Medium    Chronic systolic heart failure (H) 04/29/2024     Priority: Medium    Bacteremia 04/29/2024     Priority: Medium    Acute cystitis without hematuria 04/28/2024     Priority: Medium    Anemia of renal disease 06/19/2023     Priority: Medium    Chronic obstructive pulmonary disease, unspecified COPD type (H) 06/19/2023     Priority: Medium    Hypothyroidism, unspecified type 06/19/2023     Priority: Medium    Pulmonary hypertension (H) 06/19/2023     Priority: Medium    Severe episode of recurrent major depressive disorder, without psychotic features (H) 06/19/2023     Priority: Medium    Hx of gout 06/16/2023     Priority:  Medium    Type 2 diabetes mellitus with diabetic neuropathy, with long-term current use of insulin (H) 06/16/2023     Priority: Medium    Acute on chronic systolic congestive heart failure (H) 06/01/2023     Priority: Medium    Cardiomyopathy, secondary (H) 06/01/2023     Priority: Medium    Immunocompromised due to corticosteroids (H) 05/31/2023     Priority: Medium    JYOTSNA (obstructive sleep apnea) 11/12/2022     Priority: Medium    Sepsis (H) 05/21/2021     Priority: Medium    Morbid obesity with BMI of 45.0-49.9, adult (H) 10/21/2016     Priority: Medium    Asymptomatic hyperuricemia 10/19/2015     Priority: Medium    Long term current use of systemic steroids 10/19/2015     Priority: Medium    Secondary renal hyperparathyroidism (H) 05/06/2014     Priority: Medium     Formatting of this note might be different from the original.  IMO Update      Hypertension 10/15/2012     Priority: Medium    Dyslipidemia 10/14/2010     Priority: Medium    History of kidney transplant 10/14/2010     Priority: Medium    Chronic kidney disease, stage III (moderate) (H) 12/04/2006     Priority: Medium        Past Medical History:    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    Congestive heart failure (H)     COPD (chronic obstructive pulmonary disease) (H)     Coronary artery disease involving native coronary artery 11/12/2022    Coronary atherosclerosis 10/21/2016    Diabetes (H)     Dyslipidemia 10/14/2010    Gram-negative sepsis with organ dysfunction (H) 06/01/2023    Hallux rigidus 04/20/2009    Hallux varus 04/20/2009     History of kidney transplant 10/14/2010    Hx of gout 06/16/2023    Hypertension     Hyponatremia 11/12/2022    Hypophosphatemia 12/04/2014    Hypothyroidism, unspecified type 06/19/2023    Immunocompromised due to corticosteroids (H) 05/31/2023    Immunosuppressive management encounter following kidney transplant 10/19/2009    Long term current use of systemic steroids 10/19/2015    Metatarsus adductus 04/20/2009    Morbid obesity (H) 11/12/2022    Myocardial infarction (H)     Neuromuscular disorder (H)     JYOTSNA (obstructive sleep apnea) 11/12/2022    Pulmonary hypertension (H) 06/19/2023    Secondary renal hyperparathyroidism (H) 05/06/2014    Sepsis, due to unspecified organism, unspecified whether acute organ dysfunction present (H) 05/31/2023    Septic shock (H) 06/01/2023    Severe episode of recurrent major depressive disorder, without psychotic features (H) 06/19/2023    Stented coronary artery 05/20/2015    Urinary tract infection without hematuria, site unspecified 06/25/2023       Past Surgical History:    Past Surgical History:   Procedure Laterality Date    CARDIAC SURGERY      COLONOSCOPY - HIM SCAN  04/08/2012    [facility], polyps, adenomatous    ORTHOPEDIC SURGERY      TRANSPLANT  10/25/2000    [facility]       Family History:    No family history on file.    Social History:  Marital Status:   [5]  Social History     Tobacco Use    Smoking status: Former     Types: Cigarettes    Smokeless tobacco: Never    Tobacco comments:     Quit 1998   Substance Use Topics    Alcohol use: Yes     Comment: rarely hear and there    Drug use: Never        Medications:    acetaZOLAMIDE (DIAMOX SEQUEL) 500 MG 12 hr capsule  albuterol (PROAIR HFA/PROVENTIL HFA/VENTOLIN HFA) 108 (90 Base) MCG/ACT inhaler  allopurinol (ZYLOPRIM) 300 MG tablet  aspirin (ASA) 81 MG EC tablet  blood glucose (NO BRAND SPECIFIED) test strip  blood glucose monitoring (SOFTCLIX) lancets  calcium carbonate antacid 1000 MG  CHEW  cholecalciferol (VITAMIN D3) 125 mcg (5000 units) capsule  clopidogrel (PLAVIX) 75 MG tablet  Continuous Glucose  (DEXCOM G7 ) SANAZ  Continuous Glucose Sensor (DEXCOM G7 SENSOR) MISC  ezetimibe (ZETIA) 10 MG tablet  fluticasone (FLONASE) 50 MCG/ACT nasal spray  furosemide (LASIX) 40 MG tablet  gabapentin (NEURONTIN) 300 MG capsule  HUMALOG KWIKPEN 100 UNIT/ML soln  insulin glargine (LANTUS PEN) 100 UNIT/ML pen  isosorbide mononitrate (IMDUR) 30 MG 24 hr tablet  levothyroxine (SYNTHROID/LEVOTHROID) 75 MCG tablet  mycophenolate (GENERIC EQUIVALENT) 500 MG tablet  nitroGLYcerin (NITROSTAT) 0.4 MG sublingual tablet  omega 3 1200 MG CAPS  predniSONE (DELTASONE) 5 MG tablet  rosuvastatin (CRESTOR) 40 MG tablet  sarilumab (KEVZARA) 200 MG/1.14ML injection  Semaglutide-Weight Management (WEGOVY) 0.25 MG/0.5ML pen  spironolactone (ALDACTONE) 25 MG tablet  TRELEGY ELLIPTA 100-62.5-25 MCG/INH oral inhaler          Review of Systems   Constitutional:  Negative for chills and fever.   Respiratory:  Negative for shortness of breath.    Cardiovascular:  Negative for chest pain.   Musculoskeletal:         See HPI   Allergic/Immunologic: Positive for immunocompromised state.       Physical Exam   BP: (!) 192/84  Pulse: 72  Temp: 98.1  F (36.7  C)  Resp: 16  SpO2: 97 %      Physical Exam  Vitals and nursing note reviewed.   Constitutional:       General: He is not in acute distress.     Appearance: He is obese. He is not toxic-appearing or diaphoretic.      Comments: Chronically ill-appearing but pleasant and talkative 69-year-old male found seated upright in no distress   Pulmonary:      Effort: Pulmonary effort is normal.   Musculoskeletal:      Comments: Bilateral lower extremity chronic edema with what appears to be mixed venous insufficiency as well as a possibility of underlying peripheral artery disease.  Examination of the right posterior lower extremity reveals rather significant discomfort as well as  edema and heat.  See attached picture.   Skin:     General: Skin is warm and dry.      Capillary Refill: Capillary refill takes less than 2 seconds.   Neurological:      General: No focal deficit present.      Mental Status: He is alert and oriented to person, place, and time.               ED Course        Procedures              Critical Care time:  none     IV Antibiotics given and/or elevated Lactate of 0 and no sepsis note found - Delete this reminder and enter the sepsis note or '.edcms' before signing chart.>>>         Results for orders placed or performed during the hospital encounter of 10/22/24 (from the past 24 hour(s))   CBC with platelets   Result Value Ref Range    WBC Count 14.4 (H) 4.0 - 11.0 10e3/uL    RBC Count 4.47 4.40 - 5.90 10e6/uL    Hemoglobin 13.7 13.3 - 17.7 g/dL    Hematocrit 41.3 40.0 - 53.0 %    MCV 92 78 - 100 fL    MCH 30.6 26.5 - 33.0 pg    MCHC 33.2 31.5 - 36.5 g/dL    RDW 14.5 10.0 - 15.0 %    Platelet Count 138 (L) 150 - 450 10e3/uL   Basic metabolic panel   Result Value Ref Range    Sodium 136 135 - 145 mmol/L    Potassium 4.1 3.4 - 5.3 mmol/L    Chloride 101 98 - 107 mmol/L    Carbon Dioxide (CO2) 20 (L) 22 - 29 mmol/L    Anion Gap 15 7 - 15 mmol/L    Urea Nitrogen 60.0 (H) 8.0 - 23.0 mg/dL    Creatinine 2.48 (H) 0.67 - 1.17 mg/dL    GFR Estimate 28 (L) >60 mL/min/1.73m2    Calcium 9.0 8.8 - 10.4 mg/dL    Glucose 182 (H) 70 - 99 mg/dL   CRP inflammation   Result Value Ref Range    CRP Inflammation <3.00 <5.00 mg/L   Procalcitonin   Result Value Ref Range    Procalcitonin 0.21 <0.50 ng/mL   Extra Tube    Narrative    The following orders were created for panel order Extra Tube.  Procedure                               Abnormality         Status                     ---------                               -----------         ------                     Extra Blue Top Tube[[id number]]                              Final result               Extra Red Top Tube[[id number]]                                Final result               Extra Heparinized Syringe[[id number]]                        Final result                 Please view results for these tests on the individual orders.   Extra Blue Top Tube   Result Value Ref Range    Hold Specimen JIC    Extra Red Top Tube   Result Value Ref Range    Hold Specimen JIC    Extra Heparinized Syringe   Result Value Ref Range    Hold Specimen Ok    US Lower Extremity Venous Duplex Right    Narrative    EXAMINATION: DOPPLER VENOUS ULTRASOUND OF THE RIGHT LOWER EXTREMITY,  10/22/2024 7:21 PM     HISTORY: Pain and swelling    COMPARISON: No relevant priors available for comparison    TECHNIQUE:  Gray-scale evaluation with compression, spectral flow, and  color Doppler assessment of the deep venous system of the right leg  from groin to the calf.    FINDINGS:  The right common femoral, femoral, proximal deep femoral, and  popliteal veins are patent without thrombus. Normal Doppler waveforms.  Normal compressibility and augmentation response. The deep calf veins  are within normal limits. Distal lower extremity subcutaneous edema.      Impression    IMPRESSION:  No evidence of right lower extremity deep venous thrombosis.    YAHAIRA ZARCO MD         SYSTEM ID:  [id number]       Medications   clindamycin (CLEOCIN) 600 mg in 50 mL D5W intermittent infusion (has no administration in time range)   fentaNYL (PF) (SUBLIMAZE) injection 50 mcg (50 mcg Intravenous $Given 10/22/24 1842)   oxyCODONE (ROXICODONE) tablet 5 mg (5 mg Oral $Given 10/22/24 1941)       Assessments & Plan (with Medical Decision Making)   This is a pleasant and talkative but chronically ill and immunosuppressed 69-year-old male who presented to the emergency department for evaluation of abrupt onset of right posterior leg swelling, redness, and discomfort.  Significant discomfort on exam.  CRP is unremarkable.  Mild leukocytosis.  Given the abrupt nature I did elect to perform ultrasound of the lower  extremity which was negative for evidence of DVT.  He continued to have rather significant discomfort without any evidence of acute limb ischemia.  He was requiring IV and oral narcotics.  Given examination findings as well as heat and erythema with the above picture I do believe that observation is in his best medical interest.  The patient is immunosuppressed.  He was admitted approximately 6 months ago for bacteremia.  He has a multitude of antibiotic allergies.   at this point I think clindamycin is most reasonable starting medication. Graciously accepted by Dr. Travis.    This document was prepared using a combination of typing and voice generated software.  While every attempt was made for accuracy, spelling and grammatical errors may exist.     I have reviewed the nursing notes.    I have reviewed the findings, diagnosis, plan and need for follow up with the patient.           Medical Decision Making  The patient's presentation was of moderate complexity (an undiagnosed new problem with uncertain prognosis).    The patient's evaluation involved:  ordering and/or review of 3+ test(s) in this encounter (ultrasound)    The patient's management necessitated moderate risk (prescription drug management including medications given in the ED), high risk (a parenteral controlled substance), and high risk (a decision regarding hospitalization).        New Prescriptions    No medications on file       Final diagnoses:   Cellulitis of right lower leg   Immunosuppressed status (H)       10/22/2024   [facility]       Eleonora Tinsley PA-C  10/22/24 1952

## 2024-10-22 NOTE — ED TRIAGE NOTES
Pt states his right lower leg is very painful to the touch, its red, and warm. Pt was seen by a clinic nurse and told to come in.

## 2024-10-23 PROBLEM — Z94.0 KIDNEY REPLACED BY TRANSPLANT: Status: ACTIVE | Noted: 2024-10-23

## 2024-10-23 LAB
ALBUMIN SERPL BCG-MCNC: 3.9 G/DL (ref 3.5–5.2)
ALP SERPL-CCNC: 37 U/L (ref 40–150)
ALT SERPL W P-5'-P-CCNC: 15 U/L (ref 0–70)
ANION GAP SERPL CALCULATED.3IONS-SCNC: 15 MMOL/L (ref 7–15)
AST SERPL W P-5'-P-CCNC: 16 U/L (ref 0–45)
BASOPHILS # BLD AUTO: 0.1 10E3/UL (ref 0–0.2)
BASOPHILS NFR BLD AUTO: 0 %
BILIRUB SERPL-MCNC: 0.6 MG/DL
BUN SERPL-MCNC: 60.5 MG/DL (ref 8–23)
CALCIUM SERPL-MCNC: 8.5 MG/DL (ref 8.8–10.4)
CHLORIDE SERPL-SCNC: 100 MMOL/L (ref 98–107)
CREAT SERPL-MCNC: 3.19 MG/DL (ref 0.67–1.17)
EGFRCR SERPLBLD CKD-EPI 2021: 21 ML/MIN/1.73M2
EOSINOPHIL # BLD AUTO: 0 10E3/UL (ref 0–0.7)
EOSINOPHIL NFR BLD AUTO: 0 %
ERYTHROCYTE [DISTWIDTH] IN BLOOD BY AUTOMATED COUNT: 14.6 % (ref 10–15)
GLUCOSE BLDC GLUCOMTR-MCNC: 133 MG/DL (ref 70–99)
GLUCOSE BLDC GLUCOMTR-MCNC: 136 MG/DL (ref 70–99)
GLUCOSE BLDC GLUCOMTR-MCNC: 145 MG/DL (ref 70–99)
GLUCOSE BLDC GLUCOMTR-MCNC: 165 MG/DL (ref 70–99)
GLUCOSE BLDC GLUCOMTR-MCNC: 194 MG/DL (ref 70–99)
GLUCOSE BLDC GLUCOMTR-MCNC: 227 MG/DL (ref 70–99)
GLUCOSE SERPL-MCNC: 155 MG/DL (ref 70–99)
HCO3 SERPL-SCNC: 20 MMOL/L (ref 22–29)
HCT VFR BLD AUTO: 41.8 % (ref 40–53)
HGB BLD-MCNC: 13.5 G/DL (ref 13.3–17.7)
IMM GRANULOCYTES # BLD: 0.3 10E3/UL
IMM GRANULOCYTES NFR BLD: 1 %
LYMPHOCYTES # BLD AUTO: 0.6 10E3/UL (ref 0.8–5.3)
LYMPHOCYTES NFR BLD AUTO: 3 %
MCH RBC QN AUTO: 30.6 PG (ref 26.5–33)
MCHC RBC AUTO-ENTMCNC: 32.3 G/DL (ref 31.5–36.5)
MCV RBC AUTO: 95 FL (ref 78–100)
MONOCYTES # BLD AUTO: 1.4 10E3/UL (ref 0–1.3)
MONOCYTES NFR BLD AUTO: 7 %
NEUTROPHILS # BLD AUTO: 18.7 10E3/UL (ref 1.6–8.3)
NEUTROPHILS NFR BLD AUTO: 89 %
NRBC # BLD AUTO: 0 10E3/UL
NRBC BLD AUTO-RTO: 0 /100
PLATELET # BLD AUTO: 109 10E3/UL (ref 150–450)
POTASSIUM SERPL-SCNC: 4.2 MMOL/L (ref 3.4–5.3)
PROT SERPL-MCNC: 5.7 G/DL (ref 6.4–8.3)
RBC # BLD AUTO: 4.41 10E6/UL (ref 4.4–5.9)
SODIUM SERPL-SCNC: 135 MMOL/L (ref 135–145)
WBC # BLD AUTO: 21 10E3/UL (ref 4–11)

## 2024-10-23 PROCEDURE — 120N000001 HC R&B MED SURG/OB

## 2024-10-23 PROCEDURE — 250N000011 HC RX IP 250 OP 636: Performed by: INTERNAL MEDICINE

## 2024-10-23 PROCEDURE — 96372 THER/PROPH/DIAG INJ SC/IM: CPT | Performed by: INTERNAL MEDICINE

## 2024-10-23 PROCEDURE — 250N000013 HC RX MED GY IP 250 OP 250 PS 637: Performed by: INTERNAL MEDICINE

## 2024-10-23 PROCEDURE — G0378 HOSPITAL OBSERVATION PER HR: HCPCS

## 2024-10-23 PROCEDURE — 82962 GLUCOSE BLOOD TEST: CPT

## 2024-10-23 PROCEDURE — 250N000012 HC RX MED GY IP 250 OP 636 PS 637: Performed by: INTERNAL MEDICINE

## 2024-10-23 PROCEDURE — 80053 COMPREHEN METABOLIC PANEL: CPT | Performed by: INTERNAL MEDICINE

## 2024-10-23 PROCEDURE — 36415 COLL VENOUS BLD VENIPUNCTURE: CPT | Performed by: INTERNAL MEDICINE

## 2024-10-23 PROCEDURE — 99233 SBSQ HOSP IP/OBS HIGH 50: CPT | Performed by: INTERNAL MEDICINE

## 2024-10-23 PROCEDURE — 85025 COMPLETE CBC W/AUTO DIFF WBC: CPT | Performed by: INTERNAL MEDICINE

## 2024-10-23 RX ORDER — ROSUVASTATIN CALCIUM 10 MG/1
40 TABLET, COATED ORAL AT BEDTIME
Status: DISCONTINUED | OUTPATIENT
Start: 2024-10-23 | End: 2024-10-24

## 2024-10-23 RX ORDER — NALOXONE HYDROCHLORIDE 0.4 MG/ML
0.4 INJECTION, SOLUTION INTRAMUSCULAR; INTRAVENOUS; SUBCUTANEOUS
Status: DISCONTINUED | OUTPATIENT
Start: 2024-10-23 | End: 2024-11-05 | Stop reason: HOSPADM

## 2024-10-23 RX ORDER — NALOXONE HYDROCHLORIDE 0.4 MG/ML
0.2 INJECTION, SOLUTION INTRAMUSCULAR; INTRAVENOUS; SUBCUTANEOUS
Status: DISCONTINUED | OUTPATIENT
Start: 2024-10-23 | End: 2024-11-05 | Stop reason: HOSPADM

## 2024-10-23 RX ORDER — SPIRONOLACTONE 25 MG/1
25 TABLET ORAL EVERY MORNING
Status: DISCONTINUED | OUTPATIENT
Start: 2024-10-23 | End: 2024-11-05 | Stop reason: HOSPADM

## 2024-10-23 RX ORDER — LEVOTHYROXINE SODIUM 75 UG/1
75 TABLET ORAL EVERY MORNING
Status: DISCONTINUED | OUTPATIENT
Start: 2024-10-23 | End: 2024-11-05 | Stop reason: HOSPADM

## 2024-10-23 RX ORDER — ALBUTEROL SULFATE 90 UG/1
2 INHALANT RESPIRATORY (INHALATION) EVERY 6 HOURS PRN
Status: DISCONTINUED | OUTPATIENT
Start: 2024-10-23 | End: 2024-11-05 | Stop reason: HOSPADM

## 2024-10-23 RX ORDER — FLUTICASONE PROPIONATE 50 MCG
1 SPRAY, SUSPENSION (ML) NASAL DAILY PRN
Status: DISCONTINUED | OUTPATIENT
Start: 2024-10-23 | End: 2024-11-05 | Stop reason: HOSPADM

## 2024-10-23 RX ORDER — PREDNISONE 5 MG/1
10 TABLET ORAL EVERY MORNING
Status: DISCONTINUED | OUTPATIENT
Start: 2024-10-23 | End: 2024-11-05 | Stop reason: HOSPADM

## 2024-10-23 RX ORDER — ISOSORBIDE MONONITRATE 30 MG/1
60 TABLET, EXTENDED RELEASE ORAL EVERY MORNING
Status: DISCONTINUED | OUTPATIENT
Start: 2024-10-23 | End: 2024-11-05 | Stop reason: HOSPADM

## 2024-10-23 RX ORDER — CLINDAMYCIN PHOSPHATE 900 MG/50ML
900 INJECTION, SOLUTION INTRAVENOUS EVERY 8 HOURS
Status: DISCONTINUED | OUTPATIENT
Start: 2024-10-23 | End: 2024-10-26

## 2024-10-23 RX ORDER — EZETIMIBE 10 MG/1
10 TABLET ORAL EVERY MORNING
Status: DISCONTINUED | OUTPATIENT
Start: 2024-10-23 | End: 2024-11-05 | Stop reason: HOSPADM

## 2024-10-23 RX ORDER — FUROSEMIDE 40 MG/1
80 TABLET ORAL AT BEDTIME
Status: DISCONTINUED | OUTPATIENT
Start: 2024-10-23 | End: 2024-10-27

## 2024-10-23 RX ORDER — FLUTICASONE FUROATE AND VILANTEROL 100; 25 UG/1; UG/1
1 POWDER RESPIRATORY (INHALATION) DAILY
Status: DISCONTINUED | OUTPATIENT
Start: 2024-10-23 | End: 2024-10-23

## 2024-10-23 RX ORDER — TRIAMCINOLONE ACETONIDE 1 MG/G
CREAM TOPICAL
COMMUNITY
Start: 2024-09-11

## 2024-10-23 RX ORDER — CLOPIDOGREL BISULFATE 75 MG/1
75 TABLET ORAL EVERY MORNING
Status: DISCONTINUED | OUTPATIENT
Start: 2024-10-23 | End: 2024-11-01

## 2024-10-23 RX ORDER — NITROGLYCERIN 0.4 MG/1
0.4 TABLET SUBLINGUAL EVERY 5 MIN PRN
Status: DISCONTINUED | OUTPATIENT
Start: 2024-10-23 | End: 2024-11-05 | Stop reason: HOSPADM

## 2024-10-23 RX ORDER — CALCIUM CARBONATE 500 MG/1
2000 TABLET, CHEWABLE ORAL EVERY MORNING
Status: DISCONTINUED | OUTPATIENT
Start: 2024-10-23 | End: 2024-11-05 | Stop reason: HOSPADM

## 2024-10-23 RX ORDER — GABAPENTIN 300 MG/1
300 CAPSULE ORAL 2 TIMES DAILY
Status: DISCONTINUED | OUTPATIENT
Start: 2024-10-23 | End: 2024-11-05 | Stop reason: HOSPADM

## 2024-10-23 RX ORDER — ACETAZOLAMIDE 500 MG/1
500 CAPSULE, EXTENDED RELEASE ORAL EVERY MORNING
Status: DISCONTINUED | OUTPATIENT
Start: 2024-10-23 | End: 2024-11-05 | Stop reason: HOSPADM

## 2024-10-23 RX ORDER — HEPARIN SODIUM 5000 [USP'U]/.5ML
5000 INJECTION, SOLUTION INTRAVENOUS; SUBCUTANEOUS EVERY 8 HOURS
Status: DISCONTINUED | OUTPATIENT
Start: 2024-10-23 | End: 2024-11-04

## 2024-10-23 RX ORDER — MYCOPHENOLATE MOFETIL 500 MG/1
1000 TABLET ORAL 2 TIMES DAILY
Status: DISCONTINUED | OUTPATIENT
Start: 2024-10-23 | End: 2024-11-05 | Stop reason: HOSPADM

## 2024-10-23 RX ORDER — ASPIRIN 81 MG/1
81 TABLET ORAL AT BEDTIME
Status: DISCONTINUED | OUTPATIENT
Start: 2024-10-23 | End: 2024-11-05 | Stop reason: HOSPADM

## 2024-10-23 RX ORDER — ACETAMINOPHEN 500 MG
TABLET ORAL
COMMUNITY

## 2024-10-23 RX ORDER — PEN NEEDLE, DIABETIC 32GX 5/32"
NEEDLE, DISPOSABLE MISCELLANEOUS
COMMUNITY
Start: 2024-10-21

## 2024-10-23 RX ORDER — CHLORAL HYDRATE 500 MG
1 CAPSULE ORAL 2 TIMES DAILY
COMMUNITY

## 2024-10-23 RX ORDER — ALLOPURINOL 300 MG/1
300 TABLET ORAL EVERY MORNING
Status: DISCONTINUED | OUTPATIENT
Start: 2024-10-23 | End: 2024-11-05 | Stop reason: HOSPADM

## 2024-10-23 RX ADMIN — ACETAMINOPHEN 650 MG: 325 TABLET ORAL at 08:33

## 2024-10-23 RX ADMIN — CLINDAMYCIN PHOSPHATE 900 MG: 900 INJECTION, SOLUTION INTRAVENOUS at 21:13

## 2024-10-23 RX ADMIN — ISOSORBIDE MONONITRATE 60 MG: 30 TABLET, EXTENDED RELEASE ORAL at 10:29

## 2024-10-23 RX ADMIN — FUROSEMIDE 80 MG: 40 TABLET ORAL at 21:14

## 2024-10-23 RX ADMIN — CLOPIDOGREL 75 MG: 75 TABLET ORAL at 10:29

## 2024-10-23 RX ADMIN — GABAPENTIN 300 MG: 300 CAPSULE ORAL at 21:14

## 2024-10-23 RX ADMIN — Medication 125 MCG: at 10:29

## 2024-10-23 RX ADMIN — CLINDAMYCIN PHOSPHATE 900 MG: 900 INJECTION, SOLUTION INTRAVENOUS at 12:06

## 2024-10-23 RX ADMIN — GABAPENTIN 300 MG: 300 CAPSULE ORAL at 10:30

## 2024-10-23 RX ADMIN — LINEZOLID 600 MG: 600 TABLET, FILM COATED ORAL at 08:13

## 2024-10-23 RX ADMIN — MYCOPHENOLATE MOFETIL 1000 MG: 500 TABLET ORAL at 21:14

## 2024-10-23 RX ADMIN — LINEZOLID 600 MG: 600 TABLET, FILM COATED ORAL at 21:14

## 2024-10-23 RX ADMIN — LEVOTHYROXINE SODIUM 75 MCG: 75 TABLET ORAL at 10:29

## 2024-10-23 RX ADMIN — PREDNISONE 10 MG: 10 TABLET ORAL at 10:29

## 2024-10-23 RX ADMIN — SPIRONOLACTONE 25 MG: 25 TABLET ORAL at 10:29

## 2024-10-23 RX ADMIN — HYDROMORPHONE HYDROCHLORIDE 0.4 MG: 1 INJECTION, SOLUTION INTRAMUSCULAR; INTRAVENOUS; SUBCUTANEOUS at 00:49

## 2024-10-23 RX ADMIN — ASPIRIN 81 MG: 81 TABLET, COATED ORAL at 04:15

## 2024-10-23 RX ADMIN — ROSUVASTATIN CALCIUM 40 MG: 10 TABLET, FILM COATED ORAL at 04:15

## 2024-10-23 RX ADMIN — INSULIN LISPRO 2 UNITS: 100 INJECTION, SOLUTION INTRAVENOUS; SUBCUTANEOUS at 17:40

## 2024-10-23 RX ADMIN — ACETAMINOPHEN 650 MG: 325 TABLET ORAL at 01:01

## 2024-10-23 RX ADMIN — INSULIN LISPRO 2 UNITS: 100 INJECTION, SOLUTION INTRAVENOUS; SUBCUTANEOUS at 13:01

## 2024-10-23 RX ADMIN — ALLOPURINOL 300 MG: 300 TABLET ORAL at 10:29

## 2024-10-23 RX ADMIN — MYCOPHENOLATE MOFETIL 1000 MG: 500 TABLET ORAL at 10:30

## 2024-10-23 RX ADMIN — HEPARIN SODIUM 5000 UNITS: 5000 INJECTION, SOLUTION INTRAVENOUS; SUBCUTANEOUS at 10:30

## 2024-10-23 RX ADMIN — ACETAZOLAMIDE 500 MG: 500 CAPSULE, EXTENDED RELEASE ORAL at 10:30

## 2024-10-23 RX ADMIN — HEPARIN SODIUM 5000 UNITS: 5000 INJECTION, SOLUTION INTRAVENOUS; SUBCUTANEOUS at 18:27

## 2024-10-23 RX ADMIN — CALCIUM CARBONATE (ANTACID) CHEW TAB 500 MG 2000 MG: 500 CHEW TAB at 10:30

## 2024-10-23 RX ADMIN — ASPIRIN 81 MG: 81 TABLET, COATED ORAL at 21:14

## 2024-10-23 RX ADMIN — CLINDAMYCIN PHOSPHATE 900 MG: 900 INJECTION, SOLUTION INTRAVENOUS at 04:16

## 2024-10-23 RX ADMIN — EZETIMIBE 10 MG: 10 TABLET ORAL at 10:29

## 2024-10-23 ASSESSMENT — ACTIVITIES OF DAILY LIVING (ADL)
ADLS_ACUITY_SCORE: 0
ADLS_ACUITY_SCORE: 26
ADLS_ACUITY_SCORE: 0

## 2024-10-23 NOTE — ED NOTES
Face to face report given with opportunity to observe patient.    Report given to LILIANA Massey RN   10/22/2024  7:12 PM

## 2024-10-23 NOTE — PLAN OF CARE
"Goal Outcome Evaluation:      Plan of Care Reviewed With: patient, Duane    Overall Patient Progress: improving       Problem: Adult Inpatient Plan of Care  Goal: Plan of Care Review  Description: The Plan of Care Review/Shift note should be completed every shift.  The Outcome Evaluation is a brief statement about your assessment that the patient is improving, declining, or no change.  This information will be displayed automatically on your shift  note.  10/23/2024 1604 by Migdalia Low, RN  Outcome: Progressing  Flowsheets (Taken 10/23/2024 1604)  Plan of Care Reviewed With: patient  Overall Patient Progress: improving  10/23/2024 1604 by Migdalia Low, RN  Outcome: Progressing  Flowsheets (Taken 10/23/2024 1604)  Plan of Care Reviewed With: patient  Overall Patient Progress: improving  Goal: Patient-Specific Goal (Individualized)  Description: You can add care plan individualizations to a care plan. Examples of Individualization might be:  \"Parent requests to be called daily at 9am for status\", \"I have a hard time hearing out of my right ear\", or \"Do not touch me to wake me up as it startles  me\".  10/23/2024 1604 by Migdalia Low, RN  Outcome: Progressing  10/23/2024 1604 by Migdalia Low, RN  Outcome: Progressing  Goal: Absence of Hospital-Acquired Illness or Injury  10/23/2024 1604 by Migdalia Low, RN  Outcome: Progressing  10/23/2024 1604 by Migdalia Low, RN  Outcome: Progressing  Intervention: Identify and Manage Fall Risk  Recent Flowsheet Documentation  Taken 10/23/2024 0823 by Migdalia Low, RN  Safety Promotion/Fall Prevention:   activity supervised   clutter free environment maintained   lighting adjusted   mobility aid in reach   nonskid shoes/slippers when out of bed   patient and family education   room door open   room near nurse's station   room organization consistent   safety round/check completed   supervised activity  Intervention: Prevent Skin Injury  Recent " Flowsheet Documentation  Taken 10/23/2024 0823 by Migdalia Low RN  Body Position:   position changed independently   upper extremity elevated   weight shifting  Intervention: Prevent and Manage VTE (Venous Thromboembolism) Risk  Recent Flowsheet Documentation  Taken 10/23/2024 0823 by Migdalia Low RN  VTE Prevention/Management: (heparin subQ) other (see comments)  Intervention: Prevent Infection  Recent Flowsheet Documentation  Taken 10/23/2024 0823 by Migdalia Low RN  Infection Prevention:   environmental surveillance performed   equipment surfaces disinfected   hand hygiene promoted   personal protective equipment utilized   single patient room provided  Goal: Optimal Comfort and Wellbeing  10/23/2024 1604 by Migdalia Low RN  Outcome: Progressing  10/23/2024 1604 by Migdalia Low RN  Outcome: Progressing  Intervention: Monitor Pain and Promote Comfort  Recent Flowsheet Documentation  Taken 10/23/2024 0823 by Migdalia Low RN  Pain Management Interventions:   repositioned   pain management plan reviewed with patient/caregiver   care clustered   cold applied   medication (see MAR)  Goal: Readiness for Transition of Care  10/23/2024 1604 by Migdalia Low RN  Outcome: Progressing  10/23/2024 1604 by Migdalia Low RN  Outcome: Progressing     Problem: Pain Acute  Goal: Optimal Pain Control and Function  10/23/2024 1604 by Migdalia Low RN  Outcome: Progressing  10/23/2024 1604 by Migdalia Low RN  Outcome: Progressing  Intervention: Develop Pain Management Plan  Recent Flowsheet Documentation  Taken 10/23/2024 0823 by Migdalia Low, RN  Pain Management Interventions:   repositioned   pain management plan reviewed with patient/caregiver   care clustered   cold applied   medication (see MAR)  Intervention: Prevent or Manage Pain  Recent Flowsheet Documentation  Taken 10/23/2024 0823 by Migdalia Low RN  Sensory Stimulation Regulation: care  clustered     Problem: Comorbidity Management  Goal: Maintenance of COPD Symptom Control  10/23/2024 1604 by Migdalia Low, RN  Outcome: Progressing  10/23/2024 1604 by Migdalia Low RN  Outcome: Progressing  Goal: Blood Glucose Levels Within Targeted Range  10/23/2024 1604 by Migdalia Low, RN  Outcome: Progressing  10/23/2024 1604 by Migdalia Low, RN  Outcome: Progressing  Goal: Maintenance of Heart Failure Symptom Control  10/23/2024 1604 by Migdalia Low RN  Outcome: Progressing  10/23/2024 1604 by Migdalia Low RN  Outcome: Progressing  Goal: Blood Pressure in Desired Range  10/23/2024 1604 by Migdalia Low RN  Outcome: Progressing  10/23/2024 1604 by Migdalia Low RN  Outcome: Progressing     Problem: Fall Injury Risk  Goal: Absence of Fall and Fall-Related Injury  Outcome: Progressing  Intervention: Promote Injury-Free Environment  Recent Flowsheet Documentation  Taken 10/23/2024 0823 by Migdalia Low, RN  Safety Promotion/Fall Prevention:   activity supervised   clutter free environment maintained   lighting adjusted   mobility aid in reach   nonskid shoes/slippers when out of bed   patient and family education   room door open   room near nurse's station   room organization consistent   safety round/check completed   supervised activity     Problem: Infection  Goal: Absence of Infection Signs and Symptoms  Outcome: Progressing

## 2024-10-23 NOTE — PROGRESS NOTES
Hospital CPAP setup for patient. CPAP of 15 set as found in pt chart. Lg nasal mask and H2O added to reservoir.

## 2024-10-23 NOTE — PLAN OF CARE
Asked Dr. Pierce for clarification if he would like Heparin injection 5,000 units held or given due to patient's platelet count being 109. He stated ok to give.

## 2024-10-23 NOTE — PLAN OF CARE
Face to face report given with opportunity to observe patient.    Report given to LILIANA Dwyer RN   10/23/2024  7:21 AM

## 2024-10-23 NOTE — MEDICATION SCRIBE - ADMISSION MEDICATION HISTORY
Medication Scribe Admission Medication History    Admission medication history is complete. The information provided in this note is only as accurate as the sources available at the time of the update.    Information Source(s): Patient, [facility], and [facility]  via in-person    Pertinent Information:   Patient manages his own medications at home. [facility] patient - chart reviewed and dispense hx checked.   Furosemide 40 mg: Patient is taking differently than prescribed. He is currently taking 3 tablets (120 mg) at bedtime. It is prescribed as 2 tabs (80 mg) at bedtime. Patient reports Dr. Hernandez is aware. Increased dose is not reflected in [facility] chart. Last filled 7/31/24 #180/90  Isosorbide 30 mg: Patient is taking differently than prescribed. Patient is prescribed 90 mg (3x 30 mg) every morning per [facility]; increased on 8/13/24. Patient has been taking 60 mg (2 tabs) every morning. Last filled 8/13/24 #180/60. Patient reports he was unaware the dose had changed.   Spironolactone is listed as an allergy. Patient is currently taking Spironolactone. He reported he had issues with diarrhea when he was taking a higher dose but is tolerating his current dose without issues.    Changes made to PTA medication list:  Added: APAP, Pen Needles, Triamcinolone  Deleted: None  Changed: Updated  Vit D: from 5,000 units daily to 1,000 units daily   Fish oil: from 1200 mg BID to 1,000 mg BID  Spironolactone: patient takes at bedtime      Allergies reviewed with patient and updates made in EHR: yes    Medication History Completed By: Michelle Emanuel 10/23/2024 3:05 PM    PTA Med List   Medication Sig Note Last Dose/Taking    acetaminophen (TYLENOL) 500 MG tablet Take three tablets (1,500 mg) by mouth 1 to 2 times daily as needed for pain. Acetaminophen should be limited to 4,000 mg per day from all sources. 10/23/2024: Patient was given 650 mg 10/22/24 @ 2051 in the ER; Averages 1-2 times weekly  10/22/2024 at  8:51 PM    acetaZOLAMIDE (DIAMOX SEQUEL) 500 MG 12 hr capsule Take 500 mg by mouth every morning  10/22/2024 at  9:00 AM    albuterol (PROAIR HFA/PROVENTIL HFA/VENTOLIN HFA) 108 (90 Base) MCG/ACT inhaler Inhale 2 puffs into the lungs every 6 hours as needed for shortness of breath or wheezing  Past Week    allopurinol (ZYLOPRIM) 300 MG tablet Take 300 mg by mouth every morning  10/22/2024 at  9:00 AM    aspirin (ASA) 81 MG EC tablet Take 81 mg by mouth At Bedtime  10/21/2024 at 10:00 PM    blood glucose (NO BRAND SPECIFIED) test strip 1 strip by In Vitro route 4 times daily (before meals and nightly) Use to test blood sugar 4 times daily or as directed. 10/23/2024: Uses before replacing Dexcom or when Dexcom is not working Past Month    blood glucose monitoring (SOFTCLIX) lancets 1 each by In Vitro route 4 times daily (before meals and nightly) Use to monitor blood glucose once daily as directed.  Past Month    calcium carbonate antacid 1000 MG CHEW Take 2 tablets by mouth every morning  Past Week    Cholecalciferol (VITAMIN D3) 25 MCG (1000 UT) CAPS Take 1 capsule by mouth every morning.  10/22/2024 at  9:00 AM    clopidogrel (PLAVIX) 75 MG tablet Take 75 mg by mouth every morning  10/22/2024 at  9:00 AM    Continuous Glucose  (DEXCOM G7 ) SANAZ Use to read blood sugars as per 's instructions.  10/23/2024    Continuous Glucose Sensor (DEXCOM G7 SENSOR) MISC CHANGE EVERY 10 DAYS 10/23/2024: Currently wearing 10/23/2024    ezetimibe (ZETIA) 10 MG tablet Take 10 mg by mouth every morning  10/22/2024 at  9:00 AM    fish oil-omega-3 fatty acids 1000 MG capsule Take 1 g by mouth 2 times daily.  10/22/2024 at  9:00 AM    fluticasone (FLONASE) 50 MCG/ACT nasal spray Spray 1 spray into both nostrils daily as needed  Past Week    furosemide (LASIX) 40 MG tablet Take 2 tablets (80 mg) by mouth at bedtime (Patient taking differently: Take 120 mg by mouth at bedtime.)  10/21/2024 at  10:00 PM    gabapentin (NEURONTIN) 300 MG capsule Take 300 mg by mouth 2 times daily  10/22/2024 at  9:00 AM    GNP ULTICARE PEN NEEDLES 32G X 6 MM miscellaneous Use 5 times daily for insulin injections as directed.  10/22/2024 at  9:00 AM    HUMALOG KWIKPEN 100 UNIT/ML soln Inject 15-20 Units Subcutaneous 3 times daily (before meals) -hold for BG < 120 10/23/2024: Reports TID - last injected 15 units 10/22/24 @ 9 AM 10/22/2024 at  9:00 AM    insulin glargine (LANTUS PEN) 100 UNIT/ML pen Inject 25 Units Subcutaneous 2 times daily 10/23/2024: Last injected 25 units 10/22/24 @ 9 AM 10/22/2024 at  9:00 AM    isosorbide mononitrate (IMDUR) 30 MG 24 hr tablet Take 3 tablets by mouth every morning. (Patient taking differently: Take 2 tablets by mouth every morning.)  10/22/2024 at  9:00 AM    levothyroxine (SYNTHROID/LEVOTHROID) 75 MCG tablet Take 75 mcg by mouth every morning. Takes during the night upon waking to use restroom.  10/21/2024 at  2:00 AM    mycophenolate (GENERIC EQUIVALENT) 500 MG tablet Take 1,000 mg by mouth 2 times daily  DX. Z94.0 TRANSPLANT DATE 2000.  10/22/2024 at  9:00 AM    nitroGLYcerin (NITROSTAT) 0.4 MG sublingual tablet Place 0.4 mg under the tongue every 5 minutes as needed for chest pain  More than a month    predniSONE (DELTASONE) 5 MG tablet Take 10 mg by mouth every morning  10/22/2024 at  9:00 AM    rosuvastatin (CRESTOR) 40 MG tablet Take 40 mg by mouth At Bedtime   10/21/2024 at 10:00 PM    sarilumab (KEVZARA) 200 MG/1.14ML injection Inject 200 mg subcutaneously every 14 days. On Wednesdays - for pain. 10/23/2024: Patient does not remember if he injected last Wed (10/16/24) or the Wed prior (10/9/24) Past Month    Semaglutide-Weight Management (WEGOVY) 0.25 MG/0.5ML pen Inject 0.25 mg subcutaneously once a week. On Wednesday. 10/23/2024: Next injection due: Now 10/16/2024 Morning    spironolactone (ALDACTONE) 25 MG tablet Take 25 mg by mouth at bedtime.  10/21/2024 at 10:00 PM     ARTEM ELLIPTA 100-62.5-25 MCG/INH oral inhaler Inhale 1 puff into the lungs every morning  10/22/2024 at  9:00 AM    triamcinolone (KENALOG) 0.1 % external cream Apply topically to lower legs 3 times per week at bedtime. Avoid open sores.  10/22/2024 at  8:30 AM

## 2024-10-23 NOTE — PROGRESS NOTES
[facility]    Hospitalist Progress Note    Date of Service (when I saw the patient): 10/23/2024    Assessment & Plan   Eduar Isaacs is a 69 year old male who was admitted on 10/22/2024.    Right lower extremity cellulitis: With resultant sepsis.  Tmax 102.1, WBC 21.0, procalcitonin and lactic acid were both negative.  In the setting of chronic venous stasis and chronic lower extremity wound.  Ultrasound of right lower extremity on admission negative for DVT, did show subcutaneous edema consistent with cellulitis.  Patient has history of multiple antibiotic allergies/intolerances including cephalosporins, penicillins, vancomycin, and quinolones.  -Patient is on Zyvox and clindamycin empirically, will continue  -Blood cultures pending    Status post renal transplant: On CellCept and low-dose prednisone chronically, which will be continued.  GFR appears to be in the 20s chronically, this is stable currently.  -Follow renal function and urine output  -Avoid nephrotoxic substances/medications    Insulin-dependent diabetes mellitus type 2: Patient is on Lantus.  -Sliding scale insulin  -Continue home insulin regimen as able    Essential hypertension: Continue home medications    Obstructive sleep apnea: Patient also with history of pulmonary hypertension.  -Continue CPAP at night    Hypothyroidism: Continue home medications    FEN: Diabetic diet as tolerated.    Clinically Significant Risk Factors Present on Admission           # Hypocalcemia: Lowest Ca = 8.5 mg/dL in last 2 days, will monitor and replace as appropriate       # Drug Induced Platelet Defect: home medication list includes an antiplatelet medication  # Acute Kidney Injury, unspecified: based on a >150% or 0.3 mg/dL increase in last creatinine compared to past 90 day average, will monitor renal function  # Hypertension: Noted on problem list  # Chronic heart failure with preserved ejection fraction: heart failure noted on problem list and  "last echo with EF >50%        # Severe Obesity: Estimated body mass index is 48.18 kg/m  as calculated from the following:    Height as of this encounter: 1.676 m (5' 6\").    Weight as of this encounter: 135.4 kg (298 lb 8.1 oz).              DVT Prophylaxis: Heparin SQ    Code Status: Full Code    Disposition: Expected discharge in 2-3 days once clinically improved.    Fletcher Norton MD, MD        Interval History   Patient seen in room.  Patient endorses moderate clinical improvement.  Still has pain in the right lower leg.  No acute events overnight, no new symptoms.    -Data reviewed today: I reviewed all new labs and imaging results over the last 24 hours. I personally reviewed imaging reports.    Physical Exam   Temp: 99  F (37.2  C) Temp src: Tympanic BP: 122/54 Pulse: 93   Resp: 18 SpO2: 96 % O2 Device: BiPAP/CPAP    Vitals:    10/22/24 2118   Weight: 135.4 kg (298 lb 8.1 oz)     Vital Signs with Ranges  Temp:  [98.1  F (36.7  C)-102.1  F (38.9  C)] 99  F (37.2  C)  Pulse:  [] 93  Resp:  [16-24] 18  BP: (122-213)/() 122/54  SpO2:  [93 %-99 %] 96 %    Intake/Output Summary (Last 24 hours) at 10/23/2024 0753  Last data filed at 10/23/2024 0054  Gross per 24 hour   Intake --   Output 300 ml   Net -300 ml       Peripheral IV 10/22/24 Anterior;Distal;Right Upper arm (Active)   Site Assessment WDL 10/23/24 0415   Line Status Saline locked 10/23/24 0415   Dressing Transparent 10/23/24 0415   Dressing Status clean;dry;intact 10/23/24 0415   Line Intervention Flushed 10/23/24 0415   Phlebitis Scale 0-->no symptoms 10/23/24 0415   Number of days: 1       Rash 10/22/24 2100 Right lower leg other (see comments) (Active)   Distribution regional 10/23/24 0049   Color red;purple 10/23/24 0049   Configuration/Shape asymmetric 10/23/24 0049   Borders indistinct;irregular 10/23/24 0049   Characteristics edema;burning;pain/discomfort 10/23/24 0049   Care, Rash open to air 10/23/24 0049   Number of days: 1     No " line/device    Constitutional - AA, NAD  HEENT - atraumatic, normocephalic  Neck - supple, no masses, no JVD  CVS - S1 S2 RRR, no murmurs, rubs, gallops  Respiratory - CTA b/l  GI - soft, NT, ND, + bowel sounds, no organomegaly  Musculoskeletal -chronic lymphedema lower extremities bilaterally, erythema in lower right lower extremity fading from previously marked border, no lesions  Neuro - oriented x 3, no gross focal deficits      Medications   Current Facility-Administered Medications   Medication Dose Route Frequency Provider Last Rate Last Admin     Current Facility-Administered Medications   Medication Dose Route Frequency Provider Last Rate Last Admin    acetaZOLAMIDE (DIAMOX SEQUEL) 12 hr capsule 500 mg  500 mg Oral Thee Varghese MD        allopurinol (ZYLOPRIM) tablet 300 mg  300 mg Oral Thee Varghese MD        aspirin EC tablet 81 mg  81 mg Oral At Bedtime Thee Travis MD   81 mg at 10/23/24 0415    calcium carbonate (TUMS) chewable tablet 2,000 mg  2,000 mg Oral Thee Varghese MD        cholecalciferol (VITAMIN D3) capsule 125 mcg  125 mcg Oral BERE Thee Travis MD        clindamycin (CLEOCIN) 900 mg in 50 mL D5W intermittent infusion  900 mg Intravenous Q8H Thee Travis  mL/hr at 10/23/24 0416 900 mg at 10/23/24 0416    clopidogrel (PLAVIX) tablet 75 mg  75 mg Oral Thee Varghese MD        ezetimibe (ZETIA) tablet 10 mg  10 mg Oral BERE Thee Travis MD        fluticasone-vilanterol (BREO ELLIPTA) 100-25 MCG/ACT inhaler 1 puff  1 puff Inhalation Daily Thee Travis MD        And    umeclidinium (INCRUSE ELLIPTA) 62.5 MCG/ACT inhaler 1 puff  1 puff Inhalation Daily Thee Travis MD        furosemide (LASIX) tablet 80 mg  80 mg Oral At Bedtime Thee Travis MD        gabapentin (NEURONTIN) capsule 300 mg  300 mg Oral BID Thee Travis MD        insulin aspart (NovoLOG) injection (RAPID ACTING)  1-7 Units Subcutaneous TID AC Thee Travis MD        insulin aspart (NovoLOG) injection  (RAPID ACTING)  1-5 Units Subcutaneous At Bedtime Thee Travis MD        insulin glargine (LANTUS PEN) injection 10 Units  10 Units Subcutaneous At Bedtime Thee Travis MD        isosorbide mononitrate (IMDUR) 24 hr tablet 60 mg  60 mg Oral Thee Varghese MD        levothyroxine (SYNTHROID/LEVOTHROID) tablet 75 mcg  75 mcg Oral Thee Varghese MD        linezolid (ZYVOX) tablet 600 mg  600 mg Oral Q12H [facility] (08/20) Thee Travis MD   600 mg at 10/22/24 2058    mycophenolate (GENERIC EQUIVALENT) tablet 1,000 mg  1,000 mg Oral BID Thee Travis MD        predniSONE (DELTASONE) tablet 10 mg  10 mg Oral [facility] Thee Travis MD        rosuvastatin (CRESTOR) tablet 40 mg  40 mg Oral At Bedtime Thee Travis MD   40 mg at 10/23/24 0415    sodium chloride (PF) 0.9% PF flush 3 mL  3 mL Intracatheter Q8H Thee Travis MD   3 mL at 10/23/24 0416    spironolactone (ALDACTONE) tablet 25 mg  25 mg Oral [facility] Thee Travis MD           Data   Recent Labs   Lab 10/23/24  0602 10/23/24  0100 10/22/24  2019 10/22/24  1745   WBC 21.0*  --   --  14.4*   HGB 13.5  --   --  13.7   MCV 95  --   --  92   *  --   --  138*     --   --  136   POTASSIUM 4.2  --   --  4.1   CHLORIDE 100  --   --  101   CO2 20*  --   --  20*   BUN 60.5*  --   --  60.0*   CR 3.19*  --   --  2.48*   ANIONGAP 15  --   --  15   HANNAH 8.5*  --   --  9.0   * 136* 135* 182*   ALBUMIN 3.9  --   --   --    PROTTOTAL 5.7*  --   --   --    BILITOTAL 0.6  --   --   --    ALKPHOS 37*  --   --   --    ALT 15  --   --   --    AST 16  --   --   --      Lactic Acid   Date Value Ref Range Status   05/02/2024 0.7 0.7 - 2.0 mmol/L Final   05/01/2024 0.7 0.7 - 2.0 mmol/L Final   04/30/2024 1.1 0.7 - 2.0 mmol/L Final   05/23/2021 1.4 0.7 - 2.0 mmol/L Final   05/22/2021 1.4 0.7 - 2.0 mmol/L Final   05/21/2021 1.8 0.7 - 2.0 mmol/L Final     Lactic Acid, Initial   Date Value Ref Range Status   10/22/2024 1.0 0.7 - 2.0 mmol/L Final       Recent Results (from the past  24 hours)   US Lower Extremity Venous Duplex Right    Narrative    EXAMINATION: DOPPLER VENOUS ULTRASOUND OF THE RIGHT LOWER EXTREMITY,  10/22/2024 7:21 PM     HISTORY: Pain and swelling    COMPARISON: No relevant priors available for comparison    TECHNIQUE:  Gray-scale evaluation with compression, spectral flow, and  color Doppler assessment of the deep venous system of the right leg  from groin to the calf.    FINDINGS:  The right common femoral, femoral, proximal deep femoral, and  popliteal veins are patent without thrombus. Normal Doppler waveforms.  Normal compressibility and augmentation response. The deep calf veins  are within normal limits. Distal lower extremity subcutaneous edema.      Impression    IMPRESSION:  No evidence of right lower extremity deep venous thrombosis.    YAHAIRA ZARCO MD         SYSTEM ID:  [id number]       Fletcher Norton MD

## 2024-10-23 NOTE — ED NOTES
"PT UP TO RESTROOM. SLIGHTLY UNSTEADY WHEN STARTED TO WALK. WRITER OBTAINED WALKER FOR PT TO USE. STATES HIS \"PAIN IS SO BAD\" WHEN HE IS WALKING. PT HAS TO TAKE REST PERIODS ON WAY TO RESTROOM.  "

## 2024-10-23 NOTE — PLAN OF CARE
Goal Outcome Evaluation:    Plan of Care Reviewed With: Patient    Overall Patient Progress: Progressing    Pt is alert and oriented. Reports moderate to severe pain to RLE. PRN Dilaudid and Tylenol given. Marked borders to rash. Temps ranging from 100.4-102.1 F Tylenol, cool clothes and ice packs utilized. BP elevated in 200's upon admission. Provider notified via [facility] with no changes in orders. BP came down to 120's -140's. CPAP used during the night. Rest of VS and assessment as charted. Call light in reach.

## 2024-10-23 NOTE — PROGRESS NOTES
Checked in with Duane and daughter, Leeanne.  No questions or concerns noted. Confirmed stable housing.      Medical record and Colt Score reviewed. Participated in interdisciplinary rounds.  No apparent needs noted at this time. Care Transitions will remain available if needs arise.   VIOLA Lima @[phone] October 23, 2024

## 2024-10-23 NOTE — H&P
"[facility]    History and Physical  Hospitalist       Date of Admission:  10/22/2024    Assessment & Plan   Eduar Isaacs is a 69 year old male who presents with Pain, redness and swelling of the right lower extremity    Clinically Significant Risk Factors Present on Admission                 # Drug Induced Platelet Defect: home medication list includes an antiplatelet medication   # Hypertension: Noted on problem list  # Chronic heart failure with preserved ejection fraction: heart failure noted on problem list and last echo with EF >50%        # Severe Obesity: Estimated body mass index is 48.18 kg/m  as calculated from the following:    Height as of this encounter: 1.676 m (5' 6\").    Weight as of this encounter: 135.4 kg (298 lb 8.1 oz).                 Active Problems:   Right lower extremity cellulitis and the patient with a chronic venous stasis and chronic lower extremity wound as well as immunosuppression that patient has multiple allergies to different antibiotics.  I am going to cover with Zyvox by mouth plus clindamycin IV for now.  History renal transplant-continue home dose of CellCept.  Please touch base with transplant team in the morning  Accelerated hypertension-blood pressure seems to be normalized since admission from the emergency room  Diabetes-continue with ADA diet, Accu-Cheks, coverage with Lantus and sliding scale insulin.    5.  Hypothyroidism-continue with supplemental levothyroxine  6.  Morbid obesity-deferred to outpatient management  7.  Pulmonary hypertension/sleep apnea-continue with nocturnal CPAP  8.  Anemia of chronic illness there is no need for transfusion.  Monitor H&H closely  9.  Gout-continue with all urinal for floor prevention    DVT Prophylaxis: Defer to primary service  Code Status: Full Code    Thee Travis MD    Primary Care Physician   Kody Abarca    Chief Complaint   Right lower extremity swelling    History is obtained from the patient and " chart review.    History of Present Illness   Eduar Isaacs is a 69 year old male who presents with Complex past medical history including renal failure, status post renal transplant, diabetes, hypertension, congestive heart failure, hypothyroidism, anemia of chronic illness, morbid obesity, obstructive sleep apnea on CPAP, gout, pulmonary hypertension who presents to emergency room for relation of right calf pain.  Patient has chronic venous stasis and according to him his chronic wound.  Patient started to notice some discomfort in his right lower extremity about 2 days ago.  Patient has allergies to multiple antibiotics.  Since he is immunocompromised was admitted for IV antibiotics and care.    Past Medical History    I have reviewed this patient's medical history and updated it with pertinent information if needed.   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    Congestive heart failure (H)     COPD (chronic obstructive pulmonary disease) (H)     Coronary artery disease involving native coronary artery 11/12/2022    Coronary atherosclerosis 10/21/2016    Diabetes (H)     Dyslipidemia 10/14/2010    Gram-negative sepsis with organ dysfunction (H) 06/01/2023    Hallux rigidus 04/20/2009    Hallux varus 04/20/2009    History of kidney transplant 10/14/2010    Hx of gout 06/16/2023    Hypertension     Hyponatremia 11/12/2022    Hypophosphatemia 12/04/2014    Hypothyroidism, unspecified type 06/19/2023    Immunocompromised due to corticosteroids (H) 05/31/2023    Immunosuppressive management encounter  following kidney transplant 10/19/2009    Long term current use of systemic steroids 10/19/2015    Metatarsus adductus 04/20/2009    Morbid obesity (H) 11/12/2022    Myocardial infarction (H)     Neuromuscular disorder (H)     JYOTSNA (obstructive sleep apnea) 11/12/2022    Pulmonary hypertension (H) 06/19/2023    Secondary renal hyperparathyroidism (H) 05/06/2014    Formatting of this note might be different from the original.  IMO Update      Sepsis, due to unspecified organism, unspecified whether acute organ dysfunction present (H) 05/31/2023    Septic shock (H) 06/01/2023    Severe episode of recurrent major depressive disorder, without psychotic features (H) 06/19/2023    Stented coronary artery 05/20/2015    Urinary tract infection without hematuria, site unspecified 06/25/2023       Past Surgical History   I have reviewed this patient's surgical history and updated it with pertinent information if needed.  Past Surgical History:   Procedure Laterality Date    CARDIAC SURGERY      COLONOSCOPY - HIM SCAN  04/08/2012    [facility], polyps, adenomatous    ORTHOPEDIC SURGERY      TRANSPLANT  10/25/2000    [facility] at [facility]       Prior to Admission Medications   Prior to Admission Medications   Prescriptions Last Dose Informant Patient Reported? Taking?   Continuous Glucose  (DEXCOM G7 ) SANAZ  Self Yes No   Sig: Use to read blood sugars as per 's instructions.   Continuous Glucose Sensor (DEXCOM G7 SENSOR) MISC  Self No No   Sig: CHANGE EVERY 10 DAYS   HUMALOG KWIKPEN 100 UNIT/ML soln 10/22/2024 at am Self Yes Yes   Sig: Inject 15-20 Units Subcutaneous 3 times daily (before meals) -hold for BG < 120   Semaglutide-Weight Management (WEGOVY) 0.25 MG/0.5ML pen Past Week  Yes Yes   Sig: Inject 0.25 mg subcutaneously once a week.   TRELEGY ELLIPTA 100-62.5-25 MCG/INH oral inhaler 10/22/2024 at am Self Yes Yes   Sig: Inhale 1 puff into the lungs every morning   acetaZOLAMIDE (DIAMOX SEQUEL) 500 MG  12 hr capsule 10/22/2024 at am Self Yes Yes   Sig: Take 500 mg by mouth every morning   albuterol (PROAIR HFA/PROVENTIL HFA/VENTOLIN HFA) 108 (90 Base) MCG/ACT inhaler 10/22/2024 at am Self Yes Yes   Sig: Inhale 2 puffs into the lungs every 6 hours as needed for shortness of breath or wheezing   allopurinol (ZYLOPRIM) 300 MG tablet 10/22/2024 at am Self Yes Yes   Sig: Take 300 mg by mouth every morning   aspirin (ASA) 81 MG EC tablet 10/21/2024 at hs Self Yes Yes   Sig: Take 81 mg by mouth At Bedtime   blood glucose (NO BRAND SPECIFIED) test strip  Self No No   Si strip by In Vitro route 4 times daily (before meals and nightly) Use to test blood sugar 4 times daily or as directed.   blood glucose monitoring (SOFTCLIX) lancets  Self No No   Si each by In Vitro route 4 times daily (before meals and nightly) Use to monitor blood glucose once daily as directed.   calcium carbonate antacid 1000 MG CHEW 10/22/2024 at am Self Yes Yes   Sig: Take 2 tablets by mouth every morning   cholecalciferol (VITAMIN D3) 125 mcg (5000 units) capsule 10/22/2024 at am Self Yes Yes   Sig: Take 125 mcg by mouth every morning   clopidogrel (PLAVIX) 75 MG tablet 10/22/2024 at am Self Yes Yes   Sig: Take 75 mg by mouth every morning   ezetimibe (ZETIA) 10 MG tablet 10/22/2024 at am Self Yes Yes   Sig: Take 10 mg by mouth every morning   fluticasone (FLONASE) 50 MCG/ACT nasal spray More than a month Self Yes Yes   Sig: Spray 1 spray into both nostrils daily as needed   furosemide (LASIX) 40 MG tablet 10/22/2024 at am Self No Yes   Sig: Take 2 tablets (80 mg) by mouth at bedtime   gabapentin (NEURONTIN) 300 MG capsule 10/22/2024 at am Self Yes Yes   Sig: Take 300 mg by mouth 2 times daily   insulin glargine (LANTUS PEN) 100 UNIT/ML pen 10/22/2024 at am Self No Yes   Sig: Inject 25 Units Subcutaneous 2 times daily   isosorbide mononitrate (IMDUR) 30 MG 24 hr tablet 10/22/2024 at am Self Yes Yes   Sig: Take 2 tablets by mouth every  morning   levothyroxine (SYNTHROID/LEVOTHROID) 75 MCG tablet 10/22/2024 at am Self Yes Yes   Sig: Take 75 mcg by mouth every morning   mycophenolate (GENERIC EQUIVALENT) 500 MG tablet 10/22/2024 at am Self Yes Yes   Sig: Take 1,000 mg by mouth 2 times daily  DX. Z94.0 TRANSPLANT DATE 2000.   nitroGLYcerin (NITROSTAT) 0.4 MG sublingual tablet More than a month Self Yes Yes   Sig: Place 0.4 mg under the tongue every 5 minutes as needed for chest pain   omega 3 1200 MG CAPS 10/22/2024 at am Self Yes Yes   Sig: Take 1 capsule by mouth 2 times daily   predniSONE (DELTASONE) 5 MG tablet 10/22/2024 at pm Self Yes Yes   Sig: Take 10 mg by mouth every morning   rosuvastatin (CRESTOR) 40 MG tablet 10/21/2024 at hs Self Yes Yes   Sig: Take 40 mg by mouth At Bedtime    sarilumab (KEVZARA) 200 MG/1.14ML injection Past Month  Yes Yes   Sig: Inject 200 mg subcutaneously every 14 days. For pain   spironolactone (ALDACTONE) 25 MG tablet 10/22/2024 at am Self Yes Yes   Sig: Take 25 mg by mouth every morning      Facility-Administered Medications: None     Allergies   Allergies   Allergen Reactions    Cefepime Other (See Comments)     pain, kidney function off.    Codeine Shortness Of Breath    Niacin Itching and Rash    Amoxicillin Itching and Rash    Prilosec [Omeprazole] Itching and Rash    Vancomycin Hives and Rash    Aldactone [Spironolactone]     Atorvastatin Muscle Pain (Myalgia)    Ciprofloxacin Rash     Tolerated Levaquin 6/2023 without any rash or other reaction    Semaglutide Diarrhea       Social History   I have reviewed this patient's social history and updated it with pertinent information if needed. Eduar Isaacs  reports that he has quit smoking. His smoking use included cigarettes. He has never used smokeless tobacco. He reports current alcohol use. He reports that he does not use drugs.    Family History   I have reviewed this patient's family history and updated it with pertinent information if needed.   No  family history on file.    Review of Systems     REVIEW OF SYSTEMS:    1.General: no fever, chills, not in distress  2.HEENT: no HA, no blurry vision, no swallow problems, no nasal congestion, no sore throat  3.Pulmonary: no cough, SOB, wheezes  4.CVS: no CP, no palpitations, no RIVERA, no SOB, no intermittent claudication  5.GI: no nausea, vomiting or diarrhea, no abdominal pain, no constipation, no hematemesis or hematochezia  6.: no renal colic, no hematuria, urinary frequency or urgency  7.Extremities: no edema  8.Neurological: no dizziness, vertigo, double or blurry vision, no no focal weakness, no paresthesia, no swallow or speech problems  9.Musculosceletal: no joint pains, no joint swelling, no back pain  10.Dermatological: no skin rashes, no lesions, no pruritus  11.Hematological: no bleeding, no hx/o clots  12.Endocrinological: no heat/cold intolerance, no hx/o diabetes  13.Psychiatric: no suicidal or homicidal thoughts      Physical Exam   Temp: 100.4  F (38  C) Temp src: Tympanic BP: 141/61 Pulse: 117   Resp: 20 SpO2: 93 % O2 Device: None (Room air)    Vital Signs with Ranges  Temp:  [98.1  F (36.7  C)-102.1  F (38.9  C)] 100.4  F (38  C)  Pulse:  [] 117  Resp:  [16-24] 20  BP: (141-213)/() 141/61  SpO2:  [93 %-99 %] 93 %  298 lbs 8.05 oz    Constitutional:   Not in distress, pleasant, lucid, cooperative,  Head - atraumatic, eyes - pupils equal, round, reactive to light, extra ocular movement intact, MMM  Neck - supple, thyroid not enlarged, LN not palpated  Lungs - clear to auscultation, no dullness on percussion  CVS - heart sounds S1, S2, no additional murmurs gallop, regular rate and rhythm  Gastrointestinal-abdomen is soft, non-tender, non-distended, no organomegaly, positive bowel sounds  Extremities no clubbing, cyanosis or edema  Neurological-cranial nerve II-XII grossly intact, no meningeal signs, no cerebellar signs, no sensory deficit  Musculoskeletal - DJD related changes in  multiple joints, no effusions, ROM preserved  Dermatological - the skin dry, warm, no rashes  Psychiatric-patient is AAO X3, patient has normal affect    Data   Data reviewed today:  I personally reviewed the Ultrasound image(s) showing No signs of deep vein thrombosis .  Recent Labs   Lab 10/23/24  0100 10/22/24  2019 10/22/24  1745   WBC  --   --  14.4*   HGB  --   --  13.7   MCV  --   --  92   PLT  --   --  138*   NA  --   --  136   POTASSIUM  --   --  4.1   CHLORIDE  --   --  101   CO2  --   --  20*   BUN  --   --  60.0*   CR  --   --  2.48*   ANIONGAP  --   --  15   HANNAH  --   --  9.0   * 135* 182*       Recent Results (from the past 24 hours)   US Lower Extremity Venous Duplex Right    Narrative    EXAMINATION: DOPPLER VENOUS ULTRASOUND OF THE RIGHT LOWER EXTREMITY,  10/22/2024 7:21 PM     HISTORY: Pain and swelling    COMPARISON: No relevant priors available for comparison    TECHNIQUE:  Gray-scale evaluation with compression, spectral flow, and  color Doppler assessment of the deep venous system of the right leg  from groin to the calf.    FINDINGS:  The right common femoral, femoral, proximal deep femoral, and  popliteal veins are patent without thrombus. Normal Doppler waveforms.  Normal compressibility and augmentation response. The deep calf veins  are within normal limits. Distal lower extremity subcutaneous edema.      Impression    IMPRESSION:  No evidence of right lower extremity deep venous thrombosis.    YAHAIRA ZARCO MD         SYSTEM ID:  [id number]       As the provider for the telehealth service, I attest that I introduced myself to the patient, provided my credentials, disclosed by location and determined that based on a review of the patient's chart and discussion with members of the patient's treatment team, telemedicine via real-time, 2 way, and interactive audio and video platform is an appropriate and effective means of providing the service.  The patient and I mutually agree this  visit is appropriate for telemedicine.  The virtual encounter was taken place from  [place], [place].  The encounter took approximately 35 minutes.  The nurse was present during the entire time and I was able to move the stethoscope in appropriate directions.  The patient was evaluated at the Hospital         Portions of this note may be dictated using Dragon voice recognition software. Variances in spelling and vocabulary are possible and unintentional. Not all errors may be caught and/or corrected. Please notify the author if any discrepancies are noted and/or if the meaning of any statement is unclear.          Patient verbally consented for treatment via video visit with patient currently located at [facility] and provider located in [place].         Time Spent: 40 minutes

## 2024-10-23 NOTE — PLAN OF CARE
[facility] Inpatient Admission Note:    Patient admitted to 3228/3228-1 at approximately 2109 via bed accompanied by transport tech from emergency room . Report received from LILIANA Massey  in SBAR format at 2035 via telephone. Patient transferred to bed via self.. Patient is alert and oriented X 3, reports pain; rates at 8 on 0-10 scale.  Patient oriented to room, unit, hourly rounding, and plan of care. Explained admission packet and patient handbook with patient bill of rights brochure. Will continue to monitor and document as needed.     Inpatient Nursing criteria listed below was met:    Health care directives status obtained and documented: Yes    Patient identifies a surrogate decision maker: No If yes     If initial lactic acid greater than 2.0, repeat lactic acid drawn within one hour of arrival to unit: NA.     Clergy visit ordered if patient requests: N/A    Skin issues/needs documented: Yes    Isolation Patient: no     Fall Prevention Yes: Care plan updated, education given and documented, sticker and magnet in place: Yes    Care Plan initiated: Yes    Education Documented (including assessment): Yes    Patient has discharge needs : No

## 2024-10-24 ENCOUNTER — APPOINTMENT (OUTPATIENT)
Dept: ULTRASOUND IMAGING | Facility: HOSPITAL | Age: 69
DRG: 872 | End: 2024-10-24
Attending: INTERNAL MEDICINE
Payer: COMMERCIAL

## 2024-10-24 LAB
ALBUMIN SERPL BCG-MCNC: 3.2 G/DL (ref 3.5–5.2)
ALP SERPL-CCNC: 49 U/L (ref 40–150)
ALT SERPL W P-5'-P-CCNC: 15 U/L (ref 0–70)
ANION GAP SERPL CALCULATED.3IONS-SCNC: 18 MMOL/L (ref 7–15)
AST SERPL W P-5'-P-CCNC: 30 U/L (ref 0–45)
BILIRUB SERPL-MCNC: 0.4 MG/DL
BUN SERPL-MCNC: 76.5 MG/DL (ref 8–23)
CALCIUM SERPL-MCNC: 7.9 MG/DL (ref 8.8–10.4)
CHLORIDE SERPL-SCNC: 98 MMOL/L (ref 98–107)
CK SERPL-CCNC: 945 U/L (ref 39–308)
CREAT SERPL-MCNC: 4.1 MG/DL (ref 0.67–1.17)
CRP SERPL-MCNC: 409.05 MG/L
EGFRCR SERPLBLD CKD-EPI 2021: 15 ML/MIN/1.73M2
ERYTHROCYTE [DISTWIDTH] IN BLOOD BY AUTOMATED COUNT: 14.9 % (ref 10–15)
GLUCOSE BLDC GLUCOMTR-MCNC: 106 MG/DL (ref 70–99)
GLUCOSE BLDC GLUCOMTR-MCNC: 117 MG/DL (ref 70–99)
GLUCOSE BLDC GLUCOMTR-MCNC: 156 MG/DL (ref 70–99)
GLUCOSE BLDC GLUCOMTR-MCNC: 172 MG/DL (ref 70–99)
GLUCOSE BLDC GLUCOMTR-MCNC: 191 MG/DL (ref 70–99)
GLUCOSE SERPL-MCNC: 136 MG/DL (ref 70–99)
HCO3 SERPL-SCNC: 17 MMOL/L (ref 22–29)
HCT VFR BLD AUTO: 35.5 % (ref 40–53)
HGB BLD-MCNC: 11.8 G/DL (ref 13.3–17.7)
MCH RBC QN AUTO: 31.1 PG (ref 26.5–33)
MCHC RBC AUTO-ENTMCNC: 33.2 G/DL (ref 31.5–36.5)
MCV RBC AUTO: 93 FL (ref 78–100)
PLATELET # BLD AUTO: 111 10E3/UL (ref 150–450)
POTASSIUM SERPL-SCNC: 4.9 MMOL/L (ref 3.4–5.3)
PROT SERPL-MCNC: 5.4 G/DL (ref 6.4–8.3)
RBC # BLD AUTO: 3.8 10E6/UL (ref 4.4–5.9)
SODIUM SERPL-SCNC: 133 MMOL/L (ref 135–145)
WBC # BLD AUTO: 22.3 10E3/UL (ref 4–11)

## 2024-10-24 PROCEDURE — 86140 C-REACTIVE PROTEIN: CPT | Performed by: INTERNAL MEDICINE

## 2024-10-24 PROCEDURE — 85027 COMPLETE CBC AUTOMATED: CPT | Performed by: INTERNAL MEDICINE

## 2024-10-24 PROCEDURE — 250N000012 HC RX MED GY IP 250 OP 636 PS 637: Performed by: INTERNAL MEDICINE

## 2024-10-24 PROCEDURE — 250N000013 HC RX MED GY IP 250 OP 250 PS 637: Performed by: INTERNAL MEDICINE

## 2024-10-24 PROCEDURE — 36415 COLL VENOUS BLD VENIPUNCTURE: CPT | Performed by: INTERNAL MEDICINE

## 2024-10-24 PROCEDURE — 76775 US EXAM ABDO BACK WALL LIM: CPT

## 2024-10-24 PROCEDURE — 250N000011 HC RX IP 250 OP 636: Performed by: INTERNAL MEDICINE

## 2024-10-24 PROCEDURE — 120N000001 HC R&B MED SURG/OB

## 2024-10-24 PROCEDURE — 82550 ASSAY OF CK (CPK): CPT | Performed by: INTERNAL MEDICINE

## 2024-10-24 PROCEDURE — 80053 COMPREHEN METABOLIC PANEL: CPT | Performed by: INTERNAL MEDICINE

## 2024-10-24 PROCEDURE — 99233 SBSQ HOSP IP/OBS HIGH 50: CPT | Performed by: INTERNAL MEDICINE

## 2024-10-24 RX ORDER — ROSUVASTATIN CALCIUM 10 MG/1
10 TABLET, COATED ORAL AT BEDTIME
Status: DISCONTINUED | OUTPATIENT
Start: 2024-10-24 | End: 2024-10-31 | Stop reason: DRUGHIGH

## 2024-10-24 RX ORDER — SACCHAROMYCES BOULARDII 250 MG
250 CAPSULE ORAL 2 TIMES DAILY
Status: DISCONTINUED | OUTPATIENT
Start: 2024-10-24 | End: 2024-11-05 | Stop reason: HOSPADM

## 2024-10-24 RX ADMIN — CLINDAMYCIN PHOSPHATE 900 MG: 900 INJECTION, SOLUTION INTRAVENOUS at 20:25

## 2024-10-24 RX ADMIN — PREDNISONE 10 MG: 10 TABLET ORAL at 08:41

## 2024-10-24 RX ADMIN — ACETAMINOPHEN 650 MG: 325 TABLET ORAL at 02:20

## 2024-10-24 RX ADMIN — HYDROMORPHONE HYDROCHLORIDE 0.2 MG: 1 INJECTION, SOLUTION INTRAMUSCULAR; INTRAVENOUS; SUBCUTANEOUS at 12:48

## 2024-10-24 RX ADMIN — HEPARIN SODIUM 5000 UNITS: 5000 INJECTION, SOLUTION INTRAVENOUS; SUBCUTANEOUS at 02:16

## 2024-10-24 RX ADMIN — CALCIUM CARBONATE (ANTACID) CHEW TAB 500 MG 2000 MG: 500 CHEW TAB at 08:41

## 2024-10-24 RX ADMIN — ROSUVASTATIN CALCIUM 10 MG: 10 TABLET, FILM COATED ORAL at 22:06

## 2024-10-24 RX ADMIN — ACETAMINOPHEN 650 MG: 325 TABLET ORAL at 11:17

## 2024-10-24 RX ADMIN — CLINDAMYCIN PHOSPHATE 900 MG: 900 INJECTION, SOLUTION INTRAVENOUS at 05:08

## 2024-10-24 RX ADMIN — GABAPENTIN 300 MG: 300 CAPSULE ORAL at 22:05

## 2024-10-24 RX ADMIN — MYCOPHENOLATE MOFETIL 1000 MG: 500 TABLET ORAL at 22:05

## 2024-10-24 RX ADMIN — ALLOPURINOL 300 MG: 300 TABLET ORAL at 08:42

## 2024-10-24 RX ADMIN — LEVOTHYROXINE SODIUM 75 MCG: 75 TABLET ORAL at 08:42

## 2024-10-24 RX ADMIN — EZETIMIBE 10 MG: 10 TABLET ORAL at 08:42

## 2024-10-24 RX ADMIN — GABAPENTIN 300 MG: 300 CAPSULE ORAL at 08:41

## 2024-10-24 RX ADMIN — INSULIN LISPRO 1 UNITS: 100 INJECTION, SOLUTION INTRAVENOUS; SUBCUTANEOUS at 13:36

## 2024-10-24 RX ADMIN — HEPARIN SODIUM 5000 UNITS: 5000 INJECTION, SOLUTION INTRAVENOUS; SUBCUTANEOUS at 18:31

## 2024-10-24 RX ADMIN — HEPARIN SODIUM 5000 UNITS: 5000 INJECTION, SOLUTION INTRAVENOUS; SUBCUTANEOUS at 11:11

## 2024-10-24 RX ADMIN — ISOSORBIDE MONONITRATE 60 MG: 30 TABLET, EXTENDED RELEASE ORAL at 08:42

## 2024-10-24 RX ADMIN — MYCOPHENOLATE MOFETIL 1000 MG: 500 TABLET ORAL at 08:41

## 2024-10-24 RX ADMIN — Medication 250 MG: at 22:05

## 2024-10-24 RX ADMIN — CLOPIDOGREL 75 MG: 75 TABLET ORAL at 08:41

## 2024-10-24 RX ADMIN — Medication 125 MCG: at 08:42

## 2024-10-24 RX ADMIN — CLINDAMYCIN PHOSPHATE 900 MG: 900 INJECTION, SOLUTION INTRAVENOUS at 12:42

## 2024-10-24 RX ADMIN — LINEZOLID 600 MG: 600 TABLET, FILM COATED ORAL at 08:42

## 2024-10-24 RX ADMIN — ASPIRIN 81 MG: 81 TABLET, COATED ORAL at 22:06

## 2024-10-24 RX ADMIN — LINEZOLID 600 MG: 600 TABLET, FILM COATED ORAL at 20:25

## 2024-10-24 NOTE — PHARMACY-MEDICATION REGIMEN REVIEW
Pharmacy Antimicrobial Stewardship Assessment     Current Antimicrobial Therapy:  Anti-infectives (From now, onward)      Start     Dose/Rate Route Frequency Ordered Stop    10/23/24 0400  clindamycin (CLEOCIN) 900 mg in 50 mL D5W intermittent infusion         900 mg  100 mL/hr over 30 Minutes Intravenous EVERY 8 HOURS 10/23/24 0256      10/22/24 2005  linezolid (ZYVOX) tablet 600 mg         600 mg Oral EVERY 12 HOURS SCHEDULED 10/22/24 2001              Indication: SSTI    Days of Therapy: 3     Pertinent Labs:    Recent Labs   Lab Test 10/24/24  0622 10/23/24  0602 10/22/24  1745 05/02/24  0506 24  0509 24  0513 24  0536   WBC 22.3* 21.0* 14.4* 7.0 8.7 11.7* 16.8*       Recent Labs   Lab Test 10/24/24  0622 10/22/24  2020 10/22/24  1745 05/02/24  0905 24  0506 243 23  0515   LACT  --  1.0  --  0.7  --    < >  --    CRPI 409.05*  --  <3.00  --   --   --  121.53*   PCAL  --   --  0.21  --  12.85*   < > 0.65*    < > = values in this interval not displayed.        Temperature:  Temp (24hrs), Av.1  F (37.3  C), Min:98.2  F (36.8  C), Max:99.8  F (37.7  C)      Culture Results:   30-Day Micro Results       Collected Updated Procedure Result Status      10/22/2024 2020 10/24/2024 0942 Blood Culture Arm, Left [[id number]]   Blood from Arm, Left    Preliminary result Component Value   Culture No growth after 1 day  [P]                         Recommendations/Interventions:    Linezolid is a restricted antibiotic for resistant bacteria (VRE and MRSA pneumonia) if not responsive to Vancomycin.     The patient has allergies to cefepime, (pain, kidney function off), amoxicillin (itching, rash), vancomycin (itching, rash), ciprofloxacin (tolerated Levaquin 2023 without any rash or other reaction). Patient s/p renal transplant and CrCl 22.8 mL/min currently.     Recommend switching linezolid to levofloxacin 750 mg q48h for CrCl of 22.8 mL/min.     Camryn Phillips, [facility]  ,  2024

## 2024-10-24 NOTE — PROGRESS NOTES
[facility]    Hospitalist Progress Note    Date of Service (when I saw the patient): 10/24/2024    Assessment & Plan   Eduar Isaacs is a 69 year old male who was admitted on 10/22/2024.    Right lower extremity cellulitis: With resultant sepsis.  Tmax 102.1, WBC 21.0, procalcitonin and lactic acid were both negative.  In the setting of chronic venous stasis and chronic lower extremity wound.  Ultrasound of right lower extremity on admission negative for DVT, did show subcutaneous edema consistent with cellulitis.  Patient has history of multiple antibiotic allergies/intolerances including cephalosporins, penicillins, vancomycin, and quinolones.  -10/24: Patient is on Zyvox and clindamycin empirically, will continue.  Blood cultures pending.  Fever curve stable as last 24 hours.  .    Status post renal transplant: On CellCept and low-dose prednisone chronically, which will be continued.  GFR appears to be in the 20s chronically, this is stable currently.  -10/24: Creatinine increasing, 4.10 today.  Urine output is still adequate, over 1000 cc overnight. Avoiding nephrotoxic substances/medications.  Spoke with Dr. Mclaughlin, nephrology at [facility].  Recommended ultrasound of the transplanted kidney, CK level.  No current need to transfer to higher level of care, but will continue to monitor.  Patient had transplant approximately 24 years ago at [facility], however he has recently transferred his care to the [facility].    Insulin-dependent diabetes mellitus type 2: Patient is on Lantus.  -Sliding scale insulin  -Continue home insulin regimen as able    Essential hypertension: Continue home medications    Obstructive sleep apnea: Patient also with history of pulmonary hypertension.  -Continue CPAP at night    Hypothyroidism: Continue home medications    FEN: Diabetic diet as tolerated.    Clinically Significant Risk Factors         # Hyponatremia: Lowest  "Na = 133 mmol/L in last 2 days, will monitor as appropriate   # Hypocalcemia: Lowest Ca = 7.9 mg/dL in last 2 days, will monitor and replace as appropriate     # Hypoalbuminemia: Lowest albumin = 3.2 g/dL at 10/24/2024  6:22 AM, will monitor as appropriate   # Thrombocytopenia: Lowest platelets = 109 in last 2 days, will monitor for bleeding    # Acute Kidney Injury, unspecified: based on a >150% or 0.3 mg/dL increase in last creatinine compared to past 90 day average, will monitor renal function  # Hypertension: Noted on problem list  # Chronic heart failure with preserved ejection fraction: heart failure noted on problem list and last echo with EF >50%          # Severe Obesity: Estimated body mass index is 48.18 kg/m  as calculated from the following:    Height as of this encounter: 1.676 m (5' 6\").    Weight as of this encounter: 135.4 kg (298 lb 8.1 oz)., PRESENT ON ADMISSION            DVT Prophylaxis: Heparin SQ    Code Status: Full Code    Disposition: Expected discharge in 2-3 days once clinically improved.    Fletcher Norton MD, MD        Interval History   Patient seen in room.  Afebrile overnight.  Patient still has pain in the right lower leg.  Erythema extended slightly beyond marked borders.  No acute events overnight, no new symptoms.    -Data reviewed today: I reviewed all new labs and imaging results over the last 24 hours. I personally reviewed imaging reports.    Physical Exam   Temp: 98.2  F (36.8  C) Temp src: Tympanic BP: 130/61 Pulse: 96   Resp: 16 SpO2: 96 % O2 Device: None (Room air)    Vitals:    10/22/24 2118   Weight: 135.4 kg (298 lb 8.1 oz)     Vital Signs with Ranges  Temp:  [98.2  F (36.8  C)-99.8  F (37.7  C)] 98.2  F (36.8  C)  Pulse:  [88-96] 96  Resp:  [16-18] 16  BP: (130-135)/(61-79) 130/61  SpO2:  [95 %-96 %] 96 %      Intake/Output Summary (Last 24 hours) at 10/24/2024 1214  Last data filed at 10/24/2024 1100  Gross per 24 hour   Intake 240 ml   Output 800 ml   Net -560 ml "         Peripheral IV 10/22/24 Anterior;Distal;Right Upper arm (Active)   Site Assessment WDL 10/23/24 2110   Line Status Saline locked 10/23/24 2110   Dressing Transparent 10/23/24 2110   Dressing Status clean;dry;intact 10/23/24 2110   Line Intervention Flushed 10/23/24 2110   Phlebitis Scale 0-->no symptoms 10/23/24 2110   Infiltration? no 10/23/24 1527   Number of days: 2       Rash 10/22/24 2100 Right lower leg other (see comments) (Active)   Distribution regional 10/24/24 0220   Color red;purple 10/24/24 0220   Configuration/Shape asymmetric 10/24/24 0220   Borders indistinct;raised 10/24/24 0220   Characteristics burning;edema;firm;pain/discomfort 10/24/24 0220   Care, Rash open to air 10/24/24 0220   Number of days: 2     No line/device    Constitutional - AA, NAD  HEENT - atraumatic, normocephalic  Neck - supple, no masses, no JVD  CVS - S1 S2 RRR, no murmurs, rubs, gallops  Respiratory - CTA b/l  GI - soft, NT, ND, + bowel sounds, no organomegaly  Musculoskeletal - chronic lymphedema lower extremities bilaterally, erythema in lower right lower extremity fading from previously marked border, no lesions  Neuro - oriented x 3, no gross focal deficits      Medications   Current Facility-Administered Medications   Medication Dose Route Frequency Provider Last Rate Last Admin     Current Facility-Administered Medications   Medication Dose Route Frequency Provider Last Rate Last Admin    [Held by provider] acetaZOLAMIDE (DIAMOX SEQUEL) 12 hr capsule 500 mg  500 mg Oral Thee Varghese MD   500 mg at 10/23/24 1030    allopurinol (ZYLOPRIM) tablet 300 mg  300 mg Oral Thee Varghese MD   300 mg at 10/24/24 0842    aspirin EC tablet 81 mg  81 mg Oral At Bedtime Thee Travis MD   81 mg at 10/23/24 2114    calcium carbonate (TUMS) chewable tablet 2,000 mg  2,000 mg Oral Thee Varghese MD   2,000 mg at 10/24/24 0841    cholecalciferol (VITAMIN D3) capsule 125 mcg  125 mcg Oral Thee Varghese MD   125 mcg at  10/24/24 0842    clindamycin (CLEOCIN) 900 mg in 50 mL D5W intermittent infusion  900 mg Intravenous Q8H Thee Travis  mL/hr at 10/24/24 0508 900 mg at 10/24/24 0508    clopidogrel (PLAVIX) tablet 75 mg  75 mg Oral BEREM Thee Travis MD   75 mg at 10/24/24 0841    ezetimibe (ZETIA) tablet 10 mg  10 mg Oral BERE Thee Travis MD   10 mg at 10/24/24 0842    Fluticasone-Umeclidin-Vilant (TRELEGY ELLIPTA) 100-62.5-25 MCG/ACT oral inhaler 1 puff  1 puff Inhalation Daily Thee Travis MD   1 puff at 10/24/24 0842    [Held by provider] furosemide (LASIX) tablet 80 mg  80 mg Oral At Bedtime Thee Travis MD   80 mg at 10/23/24 2114    gabapentin (NEURONTIN) capsule 300 mg  300 mg Oral BID Thee Travis MD   300 mg at 10/24/24 0841    heparin ANTICOAGULANT injection 5,000 Units  5,000 Units Subcutaneous Q8H ErrolFletcher MD   5,000 Units at 10/24/24 1111    insulin glargine (LANTUS PEN) injection 10 Units  10 Units Subcutaneous At Bedtime Thee Travis MD   10 Units at 10/23/24 2129    insulin lispro (HumaLOG KWIKPEN) injection (RAPID ACTING) 1-5 Units  1-5 Units Subcutaneous At Bedtime Thee Travis MD        insulin lispro (HumaLOG KWIKPEN) injection (RAPID ACTING) 1-7 Units  1-7 Units Subcutaneous TID AC Thee Travis MD   2 Units at 10/23/24 1740    isosorbide mononitrate (IMDUR) 24 hr tablet 60 mg  60 mg Oral Thee Varghese MD   60 mg at 10/24/24 0842    levothyroxine (SYNTHROID/LEVOTHROID) tablet 75 mcg  75 mcg Oral Thee Varghese MD   75 mcg at 10/24/24 0842    linezolid (ZYVOX) tablet 600 mg  600 mg Oral Q12H [facility] (08/20) Thee Travis MD   600 mg at 10/24/24 0842    mycophenolate (GENERIC EQUIVALENT) tablet 1,000 mg  1,000 mg Oral BID Thee Travis MD   1,000 mg at 10/24/24 0841    predniSONE (DELTASONE) tablet 10 mg  10 mg Oral QAM Thee Travis MD   10 mg at 10/24/24 0841    rosuvastatin (CRESTOR) tablet 40 mg  40 mg Oral At Bedtime Thee Travis MD   40 mg at 10/23/24 0415    sodium chloride (PF) 0.9%  PF flush 3 mL  3 mL Intracatheter Q8H Thee Travis MD   3 mL at 10/24/24 0555    [Held by provider] spironolactone (ALDACTONE) tablet 25 mg  25 mg Oral QAM Thee Travis MD   25 mg at 10/23/24 1029       Data   Recent Labs   Lab 10/24/24  0801 10/24/24  0622 10/24/24  0206 10/23/24  0812 10/23/24  0602 10/22/24  2019 10/22/24  1745   WBC  --  22.3*  --   --  21.0*  --  14.4*   HGB  --  11.8*  --   --  13.5  --  13.7   MCV  --  93  --   --  95  --  92   PLT  --  111*  --   --  109*  --  138*   NA  --  133*  --   --  135  --  136   POTASSIUM  --  4.9  --   --  4.2  --  4.1   CHLORIDE  --  98  --   --  100  --  101   CO2  --  17*  --   --  20*  --  20*   BUN  --  76.5*  --   --  60.5*  --  60.0*   CR  --  4.10*  --   --  3.19*  --  2.48*   ANIONGAP  --  18*  --   --  15  --  15   HANNAH  --  7.9*  --   --  8.5*  --  9.0   * 136* 106*   < > 155*   < > 182*   ALBUMIN  --  3.2*  --   --  3.9  --   --    PROTTOTAL  --  5.4*  --   --  5.7*  --   --    BILITOTAL  --  0.4  --   --  0.6  --   --    ALKPHOS  --  49  --   --  37*  --   --    ALT  --  15  --   --  15  --   --    AST  --  30  --   --  16  --   --     < > = values in this interval not displayed.     Lactic Acid   Date Value Ref Range Status   05/02/2024 0.7 0.7 - 2.0 mmol/L Final   05/01/2024 0.7 0.7 - 2.0 mmol/L Final   04/30/2024 1.1 0.7 - 2.0 mmol/L Final   05/23/2021 1.4 0.7 - 2.0 mmol/L Final   05/22/2021 1.4 0.7 - 2.0 mmol/L Final   05/21/2021 1.8 0.7 - 2.0 mmol/L Final     Lactic Acid, Initial   Date Value Ref Range Status   10/22/2024 1.0 0.7 - 2.0 mmol/L Final       No results found for this or any previous visit (from the past 24 hours).      Fletcher Norton MD

## 2024-10-24 NOTE — PLAN OF CARE
Plan of Care Reviewed With: Duane, patient    Overall Patient Progress: Progressing    Patient alert, oriented, and makes needs known. Vitals and assessment as charted. Continues to report moderate to severe pain in RLE, relieved with tylenol and ice packs. Borders remain marked with no new growth. Free from falls and/or injuries this shift. Call light within reach.    Face to face report given with opportunity to observe patient.    Report given to LILIANA Benton RN   10/23/2024

## 2024-10-24 NOTE — PLAN OF CARE
Goal Outcome Evaluation:    Plan of Care Reviewed With: Patient    Overall Patient Progress: Progressing    Pt is alert and oriented. Reports mild pain at rest and moderate pain with activity or touch to RLE. Tylenol and ice packs given. Marked borders extended x2. Pt reports feeling better in am but as the night has gone on, he is not feeling as well. Reports leg feeling really heavy. Rosuvastatin held due to contraindicated alerts. Noc hospitalist notified and ordered to hold medication tonight and have day time hospitalist address. VSS. Highest temp was 99 Assessment as charted. Call light in reach, uses appropriately.

## 2024-10-24 NOTE — PLAN OF CARE
Face to face report given with opportunity to observe patient.    Report given to LILIANA Dwyer RN   10/24/2024  7:12 AM

## 2024-10-25 LAB
ALBUMIN SERPL BCG-MCNC: 3.1 G/DL (ref 3.5–5.2)
ALP SERPL-CCNC: 66 U/L (ref 40–150)
ALT SERPL W P-5'-P-CCNC: 15 U/L (ref 0–70)
ANION GAP SERPL CALCULATED.3IONS-SCNC: 19 MMOL/L (ref 7–15)
AST SERPL W P-5'-P-CCNC: 22 U/L (ref 0–45)
BILIRUB SERPL-MCNC: 0.3 MG/DL
BUN SERPL-MCNC: 82.5 MG/DL (ref 8–23)
CALCIUM SERPL-MCNC: 8.1 MG/DL (ref 8.8–10.4)
CHLORIDE SERPL-SCNC: 98 MMOL/L (ref 98–107)
CREAT SERPL-MCNC: 3.87 MG/DL (ref 0.67–1.17)
CRP SERPL-MCNC: 402.69 MG/L
EGFRCR SERPLBLD CKD-EPI 2021: 16 ML/MIN/1.73M2
ERYTHROCYTE [DISTWIDTH] IN BLOOD BY AUTOMATED COUNT: 14.6 % (ref 10–15)
GLUCOSE BLDC GLUCOMTR-MCNC: 141 MG/DL (ref 70–99)
GLUCOSE BLDC GLUCOMTR-MCNC: 155 MG/DL (ref 70–99)
GLUCOSE BLDC GLUCOMTR-MCNC: 158 MG/DL (ref 70–99)
GLUCOSE BLDC GLUCOMTR-MCNC: 161 MG/DL (ref 70–99)
GLUCOSE SERPL-MCNC: 158 MG/DL (ref 70–99)
HCO3 SERPL-SCNC: 16 MMOL/L (ref 22–29)
HCT VFR BLD AUTO: 34.7 % (ref 40–53)
HGB BLD-MCNC: 11.3 G/DL (ref 13.3–17.7)
MCH RBC QN AUTO: 30.4 PG (ref 26.5–33)
MCHC RBC AUTO-ENTMCNC: 32.6 G/DL (ref 31.5–36.5)
MCV RBC AUTO: 93 FL (ref 78–100)
PLATELET # BLD AUTO: 104 10E3/UL (ref 150–450)
POTASSIUM SERPL-SCNC: 4 MMOL/L (ref 3.4–5.3)
PROT SERPL-MCNC: 5.6 G/DL (ref 6.4–8.3)
RBC # BLD AUTO: 3.72 10E6/UL (ref 4.4–5.9)
SODIUM SERPL-SCNC: 133 MMOL/L (ref 135–145)
WBC # BLD AUTO: 17.9 10E3/UL (ref 4–11)

## 2024-10-25 PROCEDURE — 250N000013 HC RX MED GY IP 250 OP 250 PS 637: Performed by: INTERNAL MEDICINE

## 2024-10-25 PROCEDURE — 258N000003 HC RX IP 258 OP 636: Performed by: INTERNAL MEDICINE

## 2024-10-25 PROCEDURE — 85014 HEMATOCRIT: CPT | Performed by: INTERNAL MEDICINE

## 2024-10-25 PROCEDURE — 250N000011 HC RX IP 250 OP 636: Performed by: INTERNAL MEDICINE

## 2024-10-25 PROCEDURE — 86140 C-REACTIVE PROTEIN: CPT | Performed by: INTERNAL MEDICINE

## 2024-10-25 PROCEDURE — 120N000001 HC R&B MED SURG/OB

## 2024-10-25 PROCEDURE — 250N000011 HC RX IP 250 OP 636: Mod: JZ | Performed by: INTERNAL MEDICINE

## 2024-10-25 PROCEDURE — 36415 COLL VENOUS BLD VENIPUNCTURE: CPT | Performed by: INTERNAL MEDICINE

## 2024-10-25 PROCEDURE — 80048 BASIC METABOLIC PNL TOTAL CA: CPT | Performed by: INTERNAL MEDICINE

## 2024-10-25 PROCEDURE — 250N000012 HC RX MED GY IP 250 OP 636 PS 637: Performed by: INTERNAL MEDICINE

## 2024-10-25 PROCEDURE — 99233 SBSQ HOSP IP/OBS HIGH 50: CPT | Performed by: INTERNAL MEDICINE

## 2024-10-25 RX ORDER — AZTREONAM 1 G/1
1 INJECTION, POWDER, LYOPHILIZED, FOR SOLUTION INTRAMUSCULAR; INTRAVENOUS ONCE
Status: COMPLETED | OUTPATIENT
Start: 2024-10-25 | End: 2024-10-25

## 2024-10-25 RX ADMIN — ACETAMINOPHEN 650 MG: 325 TABLET ORAL at 04:23

## 2024-10-25 RX ADMIN — AZTREONAM 1 G: 1 INJECTION, POWDER, LYOPHILIZED, FOR SOLUTION INTRAMUSCULAR; INTRAVENOUS at 11:06

## 2024-10-25 RX ADMIN — AZTREONAM 500 MG: 1 INJECTION, POWDER, LYOPHILIZED, FOR SOLUTION INTRAMUSCULAR; INTRAVENOUS at 18:13

## 2024-10-25 RX ADMIN — HEPARIN SODIUM 5000 UNITS: 5000 INJECTION, SOLUTION INTRAVENOUS; SUBCUTANEOUS at 02:23

## 2024-10-25 RX ADMIN — INSULIN LISPRO 1 UNITS: 100 INJECTION, SOLUTION INTRAVENOUS; SUBCUTANEOUS at 08:04

## 2024-10-25 RX ADMIN — GABAPENTIN 300 MG: 300 CAPSULE ORAL at 21:12

## 2024-10-25 RX ADMIN — LINEZOLID 600 MG: 600 TABLET, FILM COATED ORAL at 08:06

## 2024-10-25 RX ADMIN — CLOPIDOGREL 75 MG: 75 TABLET ORAL at 09:39

## 2024-10-25 RX ADMIN — ROSUVASTATIN CALCIUM 10 MG: 10 TABLET, FILM COATED ORAL at 21:11

## 2024-10-25 RX ADMIN — ALLOPURINOL 300 MG: 300 TABLET ORAL at 09:39

## 2024-10-25 RX ADMIN — HEPARIN SODIUM 5000 UNITS: 5000 INJECTION, SOLUTION INTRAVENOUS; SUBCUTANEOUS at 18:14

## 2024-10-25 RX ADMIN — ASPIRIN 81 MG: 81 TABLET, COATED ORAL at 21:11

## 2024-10-25 RX ADMIN — GABAPENTIN 300 MG: 300 CAPSULE ORAL at 09:38

## 2024-10-25 RX ADMIN — PREDNISONE 10 MG: 10 TABLET ORAL at 09:39

## 2024-10-25 RX ADMIN — HYDROMORPHONE HYDROCHLORIDE 0.4 MG: 1 INJECTION, SOLUTION INTRAMUSCULAR; INTRAVENOUS; SUBCUTANEOUS at 16:07

## 2024-10-25 RX ADMIN — CLINDAMYCIN PHOSPHATE 900 MG: 900 INJECTION, SOLUTION INTRAVENOUS at 04:07

## 2024-10-25 RX ADMIN — LEVOTHYROXINE SODIUM 75 MCG: 75 TABLET ORAL at 09:38

## 2024-10-25 RX ADMIN — ISOSORBIDE MONONITRATE 60 MG: 30 TABLET, EXTENDED RELEASE ORAL at 09:38

## 2024-10-25 RX ADMIN — LINEZOLID 600 MG: 600 TABLET, FILM COATED ORAL at 21:12

## 2024-10-25 RX ADMIN — CLINDAMYCIN PHOSPHATE 900 MG: 900 INJECTION, SOLUTION INTRAVENOUS at 12:23

## 2024-10-25 RX ADMIN — Medication 250 MG: at 21:12

## 2024-10-25 RX ADMIN — EZETIMIBE 10 MG: 10 TABLET ORAL at 09:38

## 2024-10-25 RX ADMIN — Medication 125 MCG: at 09:38

## 2024-10-25 RX ADMIN — INSULIN LISPRO 1 UNITS: 100 INJECTION, SOLUTION INTRAVENOUS; SUBCUTANEOUS at 16:11

## 2024-10-25 RX ADMIN — HEPARIN SODIUM 5000 UNITS: 5000 INJECTION, SOLUTION INTRAVENOUS; SUBCUTANEOUS at 09:38

## 2024-10-25 RX ADMIN — HYDROMORPHONE HYDROCHLORIDE 0.2 MG: 1 INJECTION, SOLUTION INTRAMUSCULAR; INTRAVENOUS; SUBCUTANEOUS at 08:06

## 2024-10-25 RX ADMIN — INSULIN LISPRO 1 UNITS: 100 INJECTION, SOLUTION INTRAVENOUS; SUBCUTANEOUS at 12:19

## 2024-10-25 RX ADMIN — MYCOPHENOLATE MOFETIL 1000 MG: 500 TABLET ORAL at 21:12

## 2024-10-25 RX ADMIN — CLINDAMYCIN PHOSPHATE 900 MG: 900 INJECTION, SOLUTION INTRAVENOUS at 20:07

## 2024-10-25 RX ADMIN — MYCOPHENOLATE MOFETIL 1000 MG: 500 TABLET ORAL at 09:39

## 2024-10-25 RX ADMIN — Medication 250 MG: at 09:38

## 2024-10-25 RX ADMIN — HYDROMORPHONE HYDROCHLORIDE 0.4 MG: 1 INJECTION, SOLUTION INTRAMUSCULAR; INTRAVENOUS; SUBCUTANEOUS at 11:05

## 2024-10-25 RX ADMIN — CALCIUM CARBONATE (ANTACID) CHEW TAB 500 MG 2000 MG: 500 CHEW TAB at 09:39

## 2024-10-25 NOTE — PLAN OF CARE
"Plan of Care Reviewed With: Duane patient    Overall Patient Progress: Progressing      Problem: Adult Inpatient Plan of Care  Goal: Plan of Care Review  Description: The Plan of Care Review/Shift note should be completed every shift.  The Outcome Evaluation is a brief statement about your assessment that the patient is improving, declining, or no change.  This information will be displayed automatically on your shift  note.  Outcome: Progressing  Goal: Patient-Specific Goal (Individualized)  Description: You can add care plan individualizations to a care plan. Examples of Individualization might be:  \"Parent requests to be called daily at 9am for status\", \"I have a hard time hearing out of my right ear\", or \"Do not touch me to wake me up as it startles  me\".  Outcome: Progressing  Goal: Absence of Hospital-Acquired Illness or Injury  Outcome: Progressing  Goal: Optimal Comfort and Wellbeing  Outcome: Progressing  Intervention: Monitor Pain and Promote Comfort  Recent Flowsheet Documentation  Taken 10/24/2024 1842 by Migdalia Low, RN  Pain Management Interventions:   ambulation/increased activity   care clustered   pain management plan reviewed with patient/caregiver   repositioned  Taken 10/24/2024 1300 by Migdalia Low, RN  Pain Management Interventions:   ambulation/increased activity   care clustered   pain management plan reviewed with patient/caregiver   repositioned  Taken 10/24/2024 1248 by Migdalia Low, RN  Pain Management Interventions:   ambulation/increased activity   care clustered   pain management plan reviewed with patient/caregiver   repositioned   medication (see MAR)  Taken 10/24/2024 1203 by Migdalia Low, RN  Pain Management Interventions:   ambulation/increased activity   care clustered   pain management plan reviewed with patient/caregiver   repositioned  Taken 10/24/2024 1117 by Migdalia Low, RN  Pain Management Interventions:   ambulation/increased activity   care " This pt was contacted and the nurse visit made for Hep A.   clustered   pain management plan reviewed with patient/caregiver   repositioned   medication (see MAR)  Goal: Readiness for Transition of Care  Outcome: Progressing     Problem: Pain Acute  Goal: Optimal Pain Control and Function  Outcome: Progressing  Intervention: Develop Pain Management Plan  Recent Flowsheet Documentation  Taken 10/24/2024 1842 by Migdalia Low, RN  Pain Management Interventions:   ambulation/increased activity   care clustered   pain management plan reviewed with patient/caregiver   repositioned  Taken 10/24/2024 1300 by Migdalia Low, RN  Pain Management Interventions:   ambulation/increased activity   care clustered   pain management plan reviewed with patient/caregiver   repositioned  Taken 10/24/2024 1248 by Migdalia Low, RN  Pain Management Interventions:   ambulation/increased activity   care clustered   pain management plan reviewed with patient/caregiver   repositioned   medication (see MAR)  Taken 10/24/2024 1203 by Migdalia Low, RN  Pain Management Interventions:   ambulation/increased activity   care clustered   pain management plan reviewed with patient/caregiver   repositioned  Taken 10/24/2024 1117 by Migdalia Low, RN  Pain Management Interventions:   ambulation/increased activity   care clustered   pain management plan reviewed with patient/caregiver   repositioned   medication (see MAR)     Problem: Comorbidity Management  Goal: Maintenance of COPD Symptom Control  Outcome: Progressing  Goal: Blood Glucose Levels Within Targeted Range  Outcome: Progressing  Goal: Maintenance of Heart Failure Symptom Control  Outcome: Progressing  Goal: Blood Pressure in Desired Range  Outcome: Progressing     Problem: Fall Injury Risk  Goal: Absence of Fall and Fall-Related Injury  Outcome: Progressing     Problem: Infection  Goal: Absence of Infection Signs and Symptoms  Outcome: Progressing

## 2024-10-25 NOTE — PROGRESS NOTES
[facility]    Hospitalist Progress Note    Date of Service (when I saw the patient): 10/25/2024    Assessment & Plan   Eduar Isaacs is a 69 year old male who was admitted on 10/22/2024.    Right lower extremity cellulitis: With resultant sepsis.  Tmax 102.1, WBC 21.0, procalcitonin and lactic acid were both negative.  In the setting of chronic venous stasis and chronic lower extremity wound.  Ultrasound of right lower extremity on admission negative for DVT, did show subcutaneous edema consistent with cellulitis.  Patient has history of multiple antibiotic allergies/intolerances including cephalosporins, penicillins, vancomycin, and quinolones.  -10/24: Patient is on Zyvox and clindamycin empirically, will continue.  Blood cultures pending.  Fever curve stable as last 24 hours.  .  -10/25: Patient continues on Zyvox, clindamycin empirically.  Blood cultures no growth to date x 72 hours.  CRP remains elevated above 400, WBC slightly downtrending from 22 to 18.  Fever curve remains stable.  Will add aztreonam empirically for gram-negative coverage.    Status post renal transplant: On CellCept and low-dose prednisone chronically, which will be continued.  GFR appears to be in the 20s chronically, this is stable currently.  -10/24: Creatinine increasing, 4.10 today.  Urine output is still adequate, over 1000 cc overnight. Avoiding nephrotoxic substances/medications.  Spoke with Dr. Mclaughlin, nephrology at [facility].  Recommended ultrasound of the transplanted kidney, CK level.  No current need to transfer to higher level of care, but will continue to monitor.  Patient had transplant approximately 24 years ago at [facility], however he has recently transferred his care to the [place].  -10/25: Creatinine stable at 3.87.  Urine output adequate.    Insulin-dependent diabetes mellitus type 2: Patient is on Lantus.  -Sliding scale insulin  -Continue home  "insulin regimen as able    Essential hypertension: Continue home medications    Obstructive sleep apnea: Patient also with history of pulmonary hypertension.  -Continue CPAP at night    Hypothyroidism: Continue home medications    FEN: Diabetic diet as tolerated.    Clinically Significant Risk Factors         # Hyponatremia: Lowest Na = 133 mmol/L in last 2 days, will monitor as appropriate      # Anion Gap Metabolic Acidosis: Highest Anion Gap = 19 mmol/L in last 2 days, will monitor and treat as appropriate  # Hypoalbuminemia: Lowest albumin = 3.1 g/dL at 10/25/2024  5:31 AM, will monitor as appropriate   # Thrombocytopenia: Lowest platelets = 104 in last 2 days, will monitor for bleeding    # Acute Kidney Injury, unspecified: based on a >150% or 0.3 mg/dL increase in last creatinine compared to past 90 day average, will monitor renal function  # Hypertension: Noted on problem list  # Chronic heart failure with preserved ejection fraction: heart failure noted on problem list and last echo with EF >50%          # Severe Obesity: Estimated body mass index is 48.18 kg/m  as calculated from the following:    Height as of this encounter: 1.676 m (5' 6\").    Weight as of this encounter: 135.4 kg (298 lb 8.1 oz)., PRESENT ON ADMISSION            DVT Prophylaxis: Heparin SQ    Code Status: Full Code    Disposition: Expected discharge in 2-3 days once clinically improved.    Fletcher Norton MD, MD        Interval History   Patient seen in room.  Afebrile overnight.  Patient still has continued pain in the right lower leg.  Erythema extended slightly beyond marked borders.  No acute events overnight, no new symptoms.    -Data reviewed today: I reviewed all new labs and imaging results over the last 24 hours. I personally reviewed imaging reports.    Physical Exam   Temp: 98.6  F (37  C) Temp src: Tympanic BP: 143/70 Pulse: 78   Resp: 20 SpO2: 95 % O2 Device: None (Room air)    Vitals:    10/22/24 2118   Weight: 135.4 kg (298 " lb 8.1 oz)     Vital Signs with Ranges  Temp:  [98.6  F (37  C)-99.7  F (37.6  C)] 98.6  F (37  C)  Pulse:  [78-88] 78  Resp:  [18-20] 20  BP: (142-152)/(70-80) 143/70  SpO2:  [95 %-96 %] 95 %        Intake/Output Summary (Last 24 hours) at 10/25/2024 0932  Last data filed at 10/25/2024 0900  Gross per 24 hour   Intake 1449 ml   Output 1100 ml   Net 349 ml         Peripheral IV 10/22/24 Anterior;Distal;Right Upper arm (Active)   Site Assessment WDL 10/25/24 0800   Line Status Saline locked 10/25/24 0800   Dressing Transparent 10/25/24 0800   Dressing Status clean;dry;intact 10/25/24 0800   Line Intervention Flushed 10/25/24 0800   Phlebitis Scale 0-->no symptoms 10/25/24 0800   Infiltration? no 10/25/24 0800   Number of days: 3       Rash 10/22/24 2100 Right lower leg other (see comments) (Active)   Distribution regional 10/25/24 0412   Color red;purple 10/25/24 0412   Configuration/Shape asymmetric 10/25/24 0412   Borders indistinct;other (see comments) 10/25/24 0412   Characteristics burning;edema;firm;pain/discomfort 10/25/24 0412   Care, Rash open to air 10/25/24 0412   Number of days: 3     No line/device    Constitutional - AA, NAD  HEENT - atraumatic, normocephalic  Neck - supple, no masses, no JVD  CVS - S1 S2 RRR, no murmurs, rubs, gallops  Respiratory - CTA b/l  GI - soft, NT, ND, + bowel sounds, no organomegaly  Musculoskeletal - chronic lymphedema lower extremities bilaterally, erythema in lower right lower extremity stable from previously marked border, no lesions  Neuro - oriented x 3, no gross focal deficits      Medications   Current Facility-Administered Medications   Medication Dose Route Frequency Provider Last Rate Last Admin     Current Facility-Administered Medications   Medication Dose Route Frequency Provider Last Rate Last Admin    [Held by provider] acetaZOLAMIDE (DIAMOX SEQUEL) 12 hr capsule 500 mg  500 mg Oral QAM Thee Travis MD   500 mg at 10/23/24 1030    allopurinol (ZYLOPRIM) tablet  300 mg  300 mg Oral Thee Varghese MD   300 mg at 10/24/24 0842    aspirin EC tablet 81 mg  81 mg Oral At Bedtime Thee Travis MD   81 mg at 10/24/24 2206    aztreonam (AZACTAM) 2 g vial to attach to  mL bag  2 g Intravenous Q12H Fletcher Norton MD        calcium carbonate (TUMS) chewable tablet 2,000 mg  2,000 mg Oral Thee Varghese MD   2,000 mg at 10/24/24 0841    cholecalciferol (VITAMIN D3) capsule 125 mcg  125 mcg Oral Thee Varghese MD   125 mcg at 10/24/24 0842    clindamycin (CLEOCIN) 900 mg in 50 mL D5W intermittent infusion  900 mg Intravenous Q8H Thee Travis  mL/hr at 10/25/24 0407 900 mg at 10/25/24 0407    clopidogrel (PLAVIX) tablet 75 mg  75 mg Oral Thee Varghese MD   75 mg at 10/24/24 0841    ezetimibe (ZETIA) tablet 10 mg  10 mg Oral Thee Varghese MD   10 mg at 10/24/24 0842    Fluticasone-Umeclidin-Vilant (TRELEGY ELLIPTA) 100-62.5-25 MCG/ACT oral inhaler 1 puff  1 puff Inhalation Daily Thee Travis MD   1 puff at 10/24/24 0842    [Held by provider] furosemide (LASIX) tablet 80 mg  80 mg Oral At Bedtime Thee Travis MD   80 mg at 10/23/24 2114    gabapentin (NEURONTIN) capsule 300 mg  300 mg Oral BID Thee Travis MD   300 mg at 10/24/24 2205    heparin ANTICOAGULANT injection 5,000 Units  5,000 Units Subcutaneous Q8H Fletcher Norton MD   5,000 Units at 10/25/24 0223    insulin glargine (LANTUS PEN) injection 10 Units  10 Units Subcutaneous At Bedtime Thee Travis MD   10 Units at 10/24/24 2212    insulin lispro (HumaLOG KWIKPEN) injection (RAPID ACTING) 1-5 Units  1-5 Units Subcutaneous At Bedtime Thee Travis MD        insulin lispro (HumaLOG KWIKPEN) injection (RAPID ACTING) 1-7 Units  1-7 Units Subcutaneous TID AC Thee Travis MD   1 Units at 10/25/24 0804    isosorbide mononitrate (IMDUR) 24 hr tablet 60 mg  60 mg Oral Thee Varghese MD   60 mg at 10/24/24 0842    levothyroxine (SYNTHROID/LEVOTHROID) tablet 75 mcg  75 mcg Oral Thee Varghese MD   75  mcg at 10/24/24 0842    linezolid (ZYVOX) tablet 600 mg  600 mg Oral Q12H [facility] (08/20) Thee Travis MD   600 mg at 10/25/24 0806    mycophenolate (GENERIC EQUIVALENT) tablet 1,000 mg  1,000 mg Oral BID Thee Travis MD   1,000 mg at 10/24/24 2205    predniSONE (DELTASONE) tablet 10 mg  10 mg Oral QAM Thee Travis MD   10 mg at 10/24/24 0841    rosuvastatin (CRESTOR) tablet 10 mg  10 mg Oral At Bedtime Fletcher Norton MD   10 mg at 10/24/24 2206    saccharomyces boulardii (FLORASTOR) capsule 250 mg  250 mg Oral BID Fletcher Norton MD   250 mg at 10/24/24 2205    sodium chloride (PF) 0.9% PF flush 3 mL  3 mL Intracatheter Q8H Thee Travis MD   3 mL at 10/25/24 0411    [Held by provider] spironolactone (ALDACTONE) tablet 25 mg  25 mg Oral QAM Thee Travis MD   25 mg at 10/23/24 1029       Data   Recent Labs   Lab 10/25/24  0802 10/25/24  0531 10/24/24  2208 10/24/24  0801 10/24/24  0622 10/23/24  0812 10/23/24  0602   WBC  --  17.9*  --   --  22.3*  --  21.0*   HGB  --  11.3*  --   --  11.8*  --  13.5   MCV  --  93  --   --  93  --  95   PLT  --  104*  --   --  111*  --  109*   NA  --  133*  --   --  133*  --  135   POTASSIUM  --  4.0  --   --  4.9  --  4.2   CHLORIDE  --  98  --   --  98  --  100   CO2  --  16*  --   --  17*  --  20*   BUN  --  82.5*  --   --  76.5*  --  60.5*   CR  --  3.87*  --   --  4.10*  --  3.19*   ANIONGAP  --  19*  --   --  18*  --  15   HANNAH  --  8.1*  --   --  7.9*  --  8.5*   * 158* 156*   < > 136*   < > 155*   ALBUMIN  --  3.1*  --   --  3.2*  --  3.9   PROTTOTAL  --  5.6*  --   --  5.4*  --  5.7*   BILITOTAL  --  0.3  --   --  0.4  --  0.6   ALKPHOS  --  66  --   --  49  --  37*   ALT  --  15  --   --  15  --  15   AST  --  22  --   --  30  --  16    < > = values in this interval not displayed.     Lactic Acid   Date Value Ref Range Status   05/02/2024 0.7 0.7 - 2.0 mmol/L Final   05/01/2024 0.7 0.7 - 2.0 mmol/L Final   04/30/2024 1.1 0.7 - 2.0 mmol/L Final   05/23/2021 1.4 0.7 -  2.0 mmol/L Final   05/22/2021 1.4 0.7 - 2.0 mmol/L Final   05/21/2021 1.8 0.7 - 2.0 mmol/L Final     Lactic Acid, Initial   Date Value Ref Range Status   10/22/2024 1.0 0.7 - 2.0 mmol/L Final       Recent Results (from the past 24 hours)   US Renal Transplant without Doppler    Narrative    PROCEDURE: US RENAL TRANSPLANT WITHOUT DOPPLER 10/24/2024 12:21 PM    HISTORY: acute kidney injury    COMPARISONS: None.    TECHNIQUE: Imaging of renal transplant. Evaluation is limited by body  habitus.    FINDINGS: Transplanted kidney lies in the left lower quadrant. It  measures 13.8 x 6.7 x 6.4 cm. No renal mass or hydronephrosis is seen.  There is no significant cortical loss.    Bladder is grossly normal.         Impression    IMPRESSION: No mass or hydronephrosis in the transplanted kidney.    ALHAJI DE JESUS MD         SYSTEM ID:  [id number]         Fletcher Norton MD

## 2024-10-25 NOTE — PLAN OF CARE
Plan of Care Reviewed With: Duane, patient     Overall Patient Progress: Progressing     Patient alert, oriented, and makes needs known. Vitals and assessment as charted. Continues to report moderate to severe pain in RLE, relieved with tylenol and dilaudid. Borders remain marked. Free from falls and/or injuries this shift. Call light within reach.     Face to face report given with opportunity to observe patient.    Report given to LILIANA Fortune RN   10/24/2024

## 2024-10-25 NOTE — PLAN OF CARE
Goal Outcome Evaluation:      Plan of Care Reviewed With: patient    Overall Patient Progress: improvingOverall Patient Progress: improving    Alert and oriented. Pain to RLE, PRN tylenol. Stand by assist. Urinal at bedside. IV clindamycin. Saline locked when antibiotic not infusing. Vitals and assessments as charted. Uses call light appropriately.     Face to face report given with opportunity to observe patient.    Report given to LILIANA Nguyen RN   10/25/2024  7:13 AM

## 2024-10-25 NOTE — PLAN OF CARE
Alert and oriented. SBA to BR with walker. 7/10 pain reported in R leg. PRN dilaudid given x3. SL between antibx. VS and assessments as charted.

## 2024-10-26 LAB
ALBUMIN SERPL BCG-MCNC: 3.3 G/DL (ref 3.5–5.2)
ALP SERPL-CCNC: 65 U/L (ref 40–150)
ALT SERPL W P-5'-P-CCNC: 18 U/L (ref 0–70)
ANION GAP SERPL CALCULATED.3IONS-SCNC: 15 MMOL/L (ref 7–15)
AST SERPL W P-5'-P-CCNC: 21 U/L (ref 0–45)
BILIRUB SERPL-MCNC: 0.3 MG/DL
BUN SERPL-MCNC: 82.7 MG/DL (ref 8–23)
CALCIUM SERPL-MCNC: 8.5 MG/DL (ref 8.8–10.4)
CHLORIDE SERPL-SCNC: 102 MMOL/L (ref 98–107)
CREAT SERPL-MCNC: 3.34 MG/DL (ref 0.67–1.17)
CRP SERPL-MCNC: 280.2 MG/L
EGFRCR SERPLBLD CKD-EPI 2021: 19 ML/MIN/1.73M2
ERYTHROCYTE [DISTWIDTH] IN BLOOD BY AUTOMATED COUNT: 14.3 % (ref 10–15)
GLUCOSE BLDC GLUCOMTR-MCNC: 129 MG/DL (ref 70–99)
GLUCOSE BLDC GLUCOMTR-MCNC: 141 MG/DL (ref 70–99)
GLUCOSE BLDC GLUCOMTR-MCNC: 182 MG/DL (ref 70–99)
GLUCOSE BLDC GLUCOMTR-MCNC: 220 MG/DL (ref 70–99)
GLUCOSE SERPL-MCNC: 160 MG/DL (ref 70–99)
HCO3 SERPL-SCNC: 17 MMOL/L (ref 22–29)
HCT VFR BLD AUTO: 33.3 % (ref 40–53)
HGB BLD-MCNC: 10.9 G/DL (ref 13.3–17.7)
HOLD SPECIMEN: NORMAL
MCH RBC QN AUTO: 30.5 PG (ref 26.5–33)
MCHC RBC AUTO-ENTMCNC: 32.7 G/DL (ref 31.5–36.5)
MCV RBC AUTO: 93 FL (ref 78–100)
PLATELET # BLD AUTO: 132 10E3/UL (ref 150–450)
POTASSIUM SERPL-SCNC: 4 MMOL/L (ref 3.4–5.3)
PROT SERPL-MCNC: 6 G/DL (ref 6.4–8.3)
RBC # BLD AUTO: 3.57 10E6/UL (ref 4.4–5.9)
SODIUM SERPL-SCNC: 134 MMOL/L (ref 135–145)
WBC # BLD AUTO: 11.4 10E3/UL (ref 4–11)

## 2024-10-26 PROCEDURE — 84155 ASSAY OF PROTEIN SERUM: CPT | Performed by: INTERNAL MEDICINE

## 2024-10-26 PROCEDURE — 250N000011 HC RX IP 250 OP 636: Mod: JW | Performed by: INTERNAL MEDICINE

## 2024-10-26 PROCEDURE — 85014 HEMATOCRIT: CPT | Performed by: INTERNAL MEDICINE

## 2024-10-26 PROCEDURE — 258N000003 HC RX IP 258 OP 636: Performed by: INTERNAL MEDICINE

## 2024-10-26 PROCEDURE — 250N000013 HC RX MED GY IP 250 OP 250 PS 637: Performed by: INTERNAL MEDICINE

## 2024-10-26 PROCEDURE — 36415 COLL VENOUS BLD VENIPUNCTURE: CPT | Performed by: INTERNAL MEDICINE

## 2024-10-26 PROCEDURE — 250N000012 HC RX MED GY IP 250 OP 636 PS 637: Performed by: INTERNAL MEDICINE

## 2024-10-26 PROCEDURE — 82947 ASSAY GLUCOSE BLOOD QUANT: CPT | Performed by: INTERNAL MEDICINE

## 2024-10-26 PROCEDURE — 99232 SBSQ HOSP IP/OBS MODERATE 35: CPT | Performed by: INTERNAL MEDICINE

## 2024-10-26 PROCEDURE — 250N000011 HC RX IP 250 OP 636: Performed by: INTERNAL MEDICINE

## 2024-10-26 PROCEDURE — 120N000001 HC R&B MED SURG/OB

## 2024-10-26 PROCEDURE — 86140 C-REACTIVE PROTEIN: CPT | Performed by: INTERNAL MEDICINE

## 2024-10-26 RX ADMIN — GABAPENTIN 300 MG: 300 CAPSULE ORAL at 21:47

## 2024-10-26 RX ADMIN — ROSUVASTATIN CALCIUM 10 MG: 10 TABLET, FILM COATED ORAL at 21:47

## 2024-10-26 RX ADMIN — Medication 250 MG: at 09:11

## 2024-10-26 RX ADMIN — Medication 125 MCG: at 09:11

## 2024-10-26 RX ADMIN — INSULIN LISPRO 1 UNITS: 100 INJECTION, SOLUTION INTRAVENOUS; SUBCUTANEOUS at 18:25

## 2024-10-26 RX ADMIN — HYDROMORPHONE HYDROCHLORIDE 0.4 MG: 1 INJECTION, SOLUTION INTRAMUSCULAR; INTRAVENOUS; SUBCUTANEOUS at 08:53

## 2024-10-26 RX ADMIN — Medication 250 MG: at 21:47

## 2024-10-26 RX ADMIN — ASPIRIN 81 MG: 81 TABLET, COATED ORAL at 21:47

## 2024-10-26 RX ADMIN — LINEZOLID 600 MG: 600 TABLET, FILM COATED ORAL at 09:11

## 2024-10-26 RX ADMIN — CLINDAMYCIN PHOSPHATE 900 MG: 900 INJECTION, SOLUTION INTRAVENOUS at 04:15

## 2024-10-26 RX ADMIN — ALLOPURINOL 300 MG: 300 TABLET ORAL at 09:12

## 2024-10-26 RX ADMIN — CLOPIDOGREL 75 MG: 75 TABLET ORAL at 09:11

## 2024-10-26 RX ADMIN — CLINDAMYCIN PHOSPHATE 900 MG: 900 INJECTION, SOLUTION INTRAVENOUS at 12:01

## 2024-10-26 RX ADMIN — GABAPENTIN 300 MG: 300 CAPSULE ORAL at 09:11

## 2024-10-26 RX ADMIN — HEPARIN SODIUM 5000 UNITS: 5000 INJECTION, SOLUTION INTRAVENOUS; SUBCUTANEOUS at 12:05

## 2024-10-26 RX ADMIN — HEPARIN SODIUM 5000 UNITS: 5000 INJECTION, SOLUTION INTRAVENOUS; SUBCUTANEOUS at 02:42

## 2024-10-26 RX ADMIN — HYDROMORPHONE HYDROCHLORIDE 0.4 MG: 1 INJECTION, SOLUTION INTRAMUSCULAR; INTRAVENOUS; SUBCUTANEOUS at 11:57

## 2024-10-26 RX ADMIN — MYCOPHENOLATE MOFETIL 1000 MG: 500 TABLET ORAL at 21:46

## 2024-10-26 RX ADMIN — MYCOPHENOLATE MOFETIL 1000 MG: 500 TABLET ORAL at 09:11

## 2024-10-26 RX ADMIN — CALCIUM CARBONATE (ANTACID) CHEW TAB 500 MG 2000 MG: 500 CHEW TAB at 09:12

## 2024-10-26 RX ADMIN — LINEZOLID 600 MG: 600 TABLET, FILM COATED ORAL at 21:47

## 2024-10-26 RX ADMIN — INSULIN LISPRO 2 UNITS: 100 INJECTION, SOLUTION INTRAVENOUS; SUBCUTANEOUS at 12:05

## 2024-10-26 RX ADMIN — LEVOTHYROXINE SODIUM 75 MCG: 75 TABLET ORAL at 09:12

## 2024-10-26 RX ADMIN — HYDROMORPHONE HYDROCHLORIDE 0.4 MG: 1 INJECTION, SOLUTION INTRAMUSCULAR; INTRAVENOUS; SUBCUTANEOUS at 22:05

## 2024-10-26 RX ADMIN — ISOSORBIDE MONONITRATE 60 MG: 30 TABLET, EXTENDED RELEASE ORAL at 09:11

## 2024-10-26 RX ADMIN — EZETIMIBE 10 MG: 10 TABLET ORAL at 09:11

## 2024-10-26 RX ADMIN — HYDROMORPHONE HYDROCHLORIDE 0.4 MG: 1 INJECTION, SOLUTION INTRAMUSCULAR; INTRAVENOUS; SUBCUTANEOUS at 16:39

## 2024-10-26 RX ADMIN — HEPARIN SODIUM 5000 UNITS: 5000 INJECTION, SOLUTION INTRAVENOUS; SUBCUTANEOUS at 18:47

## 2024-10-26 RX ADMIN — AZTREONAM 500 MG: 1 INJECTION, POWDER, LYOPHILIZED, FOR SOLUTION INTRAMUSCULAR; INTRAVENOUS at 02:38

## 2024-10-26 RX ADMIN — AZTREONAM 500 MG: 1 INJECTION, POWDER, LYOPHILIZED, FOR SOLUTION INTRAMUSCULAR; INTRAVENOUS at 12:44

## 2024-10-26 RX ADMIN — PREDNISONE 10 MG: 10 TABLET ORAL at 09:11

## 2024-10-26 RX ADMIN — AZTREONAM 500 MG: 1 INJECTION, POWDER, LYOPHILIZED, FOR SOLUTION INTRAMUSCULAR; INTRAVENOUS at 18:48

## 2024-10-26 RX ADMIN — HYDROMORPHONE HYDROCHLORIDE 0.4 MG: 1 INJECTION, SOLUTION INTRAMUSCULAR; INTRAVENOUS; SUBCUTANEOUS at 01:04

## 2024-10-26 NOTE — PLAN OF CARE
"Plan of Care Reviewed With:  Patient    Overall Patient Progress: Progressing    /80 (BP Location: Left arm, Patient Position: Semi-Darden's, Cuff Size: Adult Regular)   Pulse 84   Temp 98.2  F (36.8  C) (Tympanic)   Resp 17   Ht 1.676 m (5' 6\")   Wt 135.4 kg (298 lb 8.1 oz)   SpO2 95%   BMI 48.18 kg/m      Pt. Is A&Ox4  and pleasant. Patients been sleeping on and off wearing his CPAP stating that he didn't get a lot of sleep last night. Rates pain to moderate to sever throughout the day with little relief from the IV dilaudid. Patient gets lispro and lantus.     Face to face report given with opportunity to observe patient.    Report given to Claribel Hill RN   10/26/2024  3:24 PM    "

## 2024-10-26 NOTE — PLAN OF CARE
Goal Outcome Evaluation:      Plan of Care Reviewed With: patient    Overall Patient Progress: improvingOverall Patient Progress: improving    Alert and oriented. Pain to RLE, PRN dilaudid. Redness on RLE within marked borders. IV clindamycin and aztreonam. Saline lock when antibiotic not infusing. SBA with walker. Vitals and assessments as charted. Uses call light appropriately.     Face to face report given with opportunity to observe patient.    Report given to Sharif García RN   10/26/2024  7:18 AM

## 2024-10-26 NOTE — PROGRESS NOTES
[facility]    Hospitalist Progress Note    Date of Service (when I saw the patient): 10/26/2024    Assessment & Plan   Eduar Isaacs is a 69 year old male who was admitted on 10/22/2024.    Right lower extremity cellulitis: With resultant sepsis.  Tmax 102.1, WBC 21.0, procalcitonin and lactic acid were both negative.  In the setting of chronic venous stasis and chronic lower extremity wound.  Ultrasound of right lower extremity on admission negative for DVT, did show subcutaneous edema consistent with cellulitis.  Patient has history of multiple antibiotic allergies/intolerances including cephalosporins, penicillins, vancomycin, and quinolones.  -10/24: Patient is on Zyvox and clindamycin empirically, will continue.  Blood cultures pending.  Fever curve stable as last 24 hours.  .  -10/25: Patient continues on Zyvox, clindamycin empirically.  Blood cultures no growth to date x 72 hours.  CRP remains elevated above 400, WBC slightly downtrending from 22 to 18.  Fever curve remains stable.  Will add aztreonam empirically for gram-negative coverage.  -10/26: Patient on Zyvox, clinda, aztreonam.  CRP finally downtrending, 280 today.  WBC improving 17.9 to 11.4.  Fever curve stable.  Appearance of legs slowly improving.  Blood culture no growth to date.    Status post renal transplant: On CellCept and low-dose prednisone chronically, which will be continued.  GFR appears to be in the 20s chronically, this is stable currently.  -10/24: Creatinine increasing, 4.10 today.  Urine output is still adequate, over 1000 cc overnight. Avoiding nephrotoxic substances/medications.  Spoke with Dr. Mclaughlin, nephrology at [facility].  Recommended ultrasound of the transplanted kidney, CK level.  No current need to transfer to higher level of care, but will continue to monitor.  Patient had transplant approximately 24 years ago at [facility], however he has recently  "transferred his care to the [facility].  -10/25: Creatinine stable at 3.87.  Urine output adequate.  -10/26: Creatinine improving, 3.34 today.  Urine output adequate.  Continue holding home diuretics.    Insulin-dependent diabetes mellitus type 2: Patient is on Lantus.  -Sliding scale insulin  -Continue home insulin regimen as able    Essential hypertension: Continue home medications    Obstructive sleep apnea: Patient also with history of pulmonary hypertension.  -Continue CPAP at night    Hypothyroidism: Continue home medications    FEN: Diabetic diet as tolerated.    Clinically Significant Risk Factors         # Hyponatremia: Lowest Na = 133 mmol/L in last 2 days, will monitor as appropriate      # Anion Gap Metabolic Acidosis: Highest Anion Gap = 19 mmol/L in last 2 days, will monitor and treat as appropriate  # Hypoalbuminemia: Lowest albumin = 3.1 g/dL at 10/25/2024  5:31 AM, will monitor as appropriate   # Thrombocytopenia: Lowest platelets = 104 in last 2 days, will monitor for bleeding     # Hypertension: Noted on problem list  # Chronic heart failure with preserved ejection fraction: heart failure noted on problem list and last echo with EF >50%          # Severe Obesity: Estimated body mass index is 48.18 kg/m  as calculated from the following:    Height as of this encounter: 1.676 m (5' 6\").    Weight as of this encounter: 135.4 kg (298 lb 8.1 oz)., PRESENT ON ADMISSION            DVT Prophylaxis: Heparin SQ    Code Status: Full Code    Disposition: Expected discharge in 1-2 days once clinically improved.  Will get PT evaluation, as patient is having trouble walking on his foot.    Fletcher Norton MD, MD        Interval History   Patient seen in room.  Fever curve remains stable.  Patient still has continued pain in the right lower leg but improving.  Erythema slowly fading.  No acute events overnight, no new symptoms.    -Data reviewed today: I reviewed all new labs and imaging results over the last 24 " hours. I personally reviewed imaging reports.    Physical Exam   Temp: 98.6  F (37  C) Temp src: Tympanic BP: 153/80 Pulse: 81   Resp: 18 SpO2: 94 % O2 Device: None (Room air)    Vitals:    10/22/24 2118   Weight: 135.4 kg (298 lb 8.1 oz)     Vital Signs with Ranges  Temp:  [98.6  F (37  C)-99.3  F (37.4  C)] 98.6  F (37  C)  Pulse:  [80-84] 81  Resp:  [18] 18  BP: (135-156)/(68-80) 153/80  SpO2:  [94 %-96 %] 94 %      Intake/Output Summary (Last 24 hours) at 10/26/2024 0932  Last data filed at 10/26/2024 0451  Gross per 24 hour   Intake --   Output 2200 ml   Net -2200 ml         Peripheral IV 10/22/24 Anterior;Distal;Right Upper arm (Active)   Site Assessment WDL except;Leaking;Positional 10/26/24 0451   Line Status Saline locked;blood return noted 10/26/24 0451   Dressing Transparent 10/26/24 0451   Dressing Status clean;dry;intact 10/26/24 0451   Line Intervention Flushed 10/26/24 0451   Phlebitis Scale 0-->no symptoms 10/26/24 0451   Infiltration? no 10/26/24 0451   Number of days: 4       Peripheral IV 10/26/24 Anterior;Right Lower forearm (Active)   Site Assessment WDL 10/26/24 0451   Line Status Saline locked 10/26/24 0451   Dressing Transparent 10/26/24 0451   Dressing Status clean;dry;intact 10/26/24 0451   Line Intervention Flushed 10/26/24 0451   Phlebitis Scale 0-->no symptoms 10/26/24 0451   Infiltration? no 10/26/24 0451   Number of days: 0       Rash 10/22/24 2100 Right lower leg other (see comments) (Active)   Distribution regional 10/26/24 0451   Color red;purple 10/26/24 0451   Configuration/Shape asymmetric 10/25/24 2122   Borders indistinct;other (see comments) 10/25/24 0412   Characteristics burning;edema;firm;pain/discomfort 10/26/24 0451   Care, Rash open to air 10/26/24 0451   Number of days: 4     No line/device    Constitutional - AA, NAD  HEENT - atraumatic, normocephalic  Neck - supple, no masses, no JVD  CVS - S1 S2 RRR, no murmurs, rubs, gallops  Respiratory - CTA b/l  GI - soft, NT,  ND, + bowel sounds, no organomegaly  Musculoskeletal - chronic lymphedema lower extremities bilaterally, erythema in lower right lower extremity now fading from previously marked border, no lesions  Neuro - oriented x 3, no gross focal deficits      Medications   Current Facility-Administered Medications   Medication Dose Route Frequency Provider Last Rate Last Admin     Current Facility-Administered Medications   Medication Dose Route Frequency Provider Last Rate Last Admin    [Held by provider] acetaZOLAMIDE (DIAMOX SEQUEL) 12 hr capsule 500 mg  500 mg Oral Thee Varghese MD   500 mg at 10/23/24 1030    allopurinol (ZYLOPRIM) tablet 300 mg  300 mg Oral Thee Varghese MD   300 mg at 10/26/24 0912    aspirin EC tablet 81 mg  81 mg Oral At Bedtime Thee Travis MD   81 mg at 10/25/24 2111    aztreonam (AZACTAM) 500 mg in sodium chloride 0.9 % 50 mL intermittent infusion  500 mg Intravenous Q8H Fletcher Norton MD 50 mL/hr at 10/26/24 0238 500 mg at 10/26/24 0238    calcium carbonate (TUMS) chewable tablet 2,000 mg  2,000 mg Oral Thee Varghese MD   2,000 mg at 10/26/24 0912    cholecalciferol (VITAMIN D3) capsule 125 mcg  125 mcg Oral Thee Varghese MD   125 mcg at 10/26/24 0911    clindamycin (CLEOCIN) 900 mg in 50 mL D5W intermittent infusion  900 mg Intravenous Q8H Thee Travis  mL/hr at 10/26/24 0415 900 mg at 10/26/24 0415    clopidogrel (PLAVIX) tablet 75 mg  75 mg Oral Thee Varghese MD   75 mg at 10/26/24 0911    ezetimibe (ZETIA) tablet 10 mg  10 mg Oral Thee Varghese MD   10 mg at 10/26/24 0911    Fluticasone-Umeclidin-Vilant (TRELEGY ELLIPTA) 100-62.5-25 MCG/ACT oral inhaler 1 puff  1 puff Inhalation Daily Thee Travis MD   1 puff at 10/25/24 0939    [Held by provider] furosemide (LASIX) tablet 80 mg  80 mg Oral At Bedtime Thee Travis MD   80 mg at 10/23/24 2114    gabapentin (NEURONTIN) capsule 300 mg  300 mg Oral BID Sister, MD Thee   300 mg at 10/26/24 0911    heparin  ANTICOAGULANT injection 5,000 Units  5,000 Units Subcutaneous Q8H Fletcher Norton MD   5,000 Units at 10/26/24 0242    insulin glargine (LANTUS PEN) injection 10 Units  10 Units Subcutaneous At Bedtime Thee Travis MD   10 Units at 10/25/24 2119    insulin lispro (HumaLOG KWIKPEN) injection (RAPID ACTING) 1-5 Units  1-5 Units Subcutaneous At Bedtime Thee Tarvis MD        insulin lispro (HumaLOG KWIKPEN) injection (RAPID ACTING) 1-7 Units  1-7 Units Subcutaneous TID AC hTee Travis MD   1 Units at 10/25/24 1611    isosorbide mononitrate (IMDUR) 24 hr tablet 60 mg  60 mg Oral BERE Thee Travis MD   60 mg at 10/26/24 0911    levothyroxine (SYNTHROID/LEVOTHROID) tablet 75 mcg  75 mcg Oral BEREM Thee Travis MD   75 mcg at 10/26/24 0912    linezolid (ZYVOX) tablet 600 mg  600 mg Oral Q12H [facility] (08/20) Thee Travis MD   600 mg at 10/26/24 0911    mycophenolate (GENERIC EQUIVALENT) tablet 1,000 mg  1,000 mg Oral BID Thee Travis MD   1,000 mg at 10/26/24 0911    predniSONE (DELTASONE) tablet 10 mg  10 mg Oral QAM Thee Travis MD   10 mg at 10/26/24 0911    rosuvastatin (CRESTOR) tablet 10 mg  10 mg Oral At Bedtime Fletcher Norton MD   10 mg at 10/25/24 2111    saccharomyces boulardii (FLORASTOR) capsule 250 mg  250 mg Oral BID Fletcher Norton MD   250 mg at 10/26/24 0911    sodium chloride (PF) 0.9% PF flush 3 mL  3 mL Intracatheter Q8H Thee Travis MD   3 mL at 10/26/24 0415    [Held by provider] spironolactone (ALDACTONE) tablet 25 mg  25 mg Oral QAM Thee Travis MD   25 mg at 10/23/24 1029       Data   Recent Labs   Lab 10/26/24  0910 10/26/24  0649 10/25/24  2117 10/25/24  0802 10/25/24  0531 10/24/24  0801 10/24/24  0622   WBC  --  11.4*  --   --  17.9*  --  22.3*   HGB  --  10.9*  --   --  11.3*  --  11.8*   MCV  --  93  --   --  93  --  93   PLT  --  132*  --   --  104*  --  111*   NA  --  134*  --   --  133*  --  133*   POTASSIUM  --  4.0  --   --  4.0  --  4.9   CHLORIDE  --  102  --   --  98  --  98   CO2   --  17*  --   --  16*  --  17*   BUN  --  82.7*  --   --  82.5*  --  76.5*   CR  --  3.34*  --   --  3.87*  --  4.10*   ANIONGAP  --  15  --   --  19*  --  18*   HANNAH  --  8.5*  --   --  8.1*  --  7.9*   * 160* 155*   < > 158*   < > 136*   ALBUMIN  --  3.3*  --   --  3.1*  --  3.2*   PROTTOTAL  --  6.0*  --   --  5.6*  --  5.4*   BILITOTAL  --  0.3  --   --  0.3  --  0.4   ALKPHOS  --  65  --   --  66  --  49   ALT  --  18  --   --  15  --  15   AST  --  21  --   --  22  --  30    < > = values in this interval not displayed.     Lactic Acid   Date Value Ref Range Status   05/02/2024 0.7 0.7 - 2.0 mmol/L Final   05/01/2024 0.7 0.7 - 2.0 mmol/L Final   04/30/2024 1.1 0.7 - 2.0 mmol/L Final   05/23/2021 1.4 0.7 - 2.0 mmol/L Final   05/22/2021 1.4 0.7 - 2.0 mmol/L Final   05/21/2021 1.8 0.7 - 2.0 mmol/L Final     Lactic Acid, Initial   Date Value Ref Range Status   10/22/2024 1.0 0.7 - 2.0 mmol/L Final       No results found for this or any previous visit (from the past 24 hours).        Fletcher Norton MD

## 2024-10-26 NOTE — PHARMACY-MEDICATION REGIMEN REVIEW
Pharmacy Antimicrobial Stewardship Assessment     Current Antimicrobial Therapy:                  Recommendations/Interventions:  1. Ordered MRSA nares PCR to see if linezolid can be discontinued.  2. Spoke with Dr. Norton and clindamycin can be discontinued.   3. Will continue to monitor and recommend as appropriate.    Willa Loredo, [facility]  October 26, 2024

## 2024-10-27 LAB
ALBUMIN SERPL BCG-MCNC: 3.1 G/DL (ref 3.5–5.2)
ALP SERPL-CCNC: 67 U/L (ref 40–150)
ALT SERPL W P-5'-P-CCNC: 23 U/L (ref 0–70)
ANION GAP SERPL CALCULATED.3IONS-SCNC: 15 MMOL/L (ref 7–15)
AST SERPL W P-5'-P-CCNC: 27 U/L (ref 0–45)
BILIRUB SERPL-MCNC: 0.3 MG/DL
BUN SERPL-MCNC: 72.1 MG/DL (ref 8–23)
CALCIUM SERPL-MCNC: 8.9 MG/DL (ref 8.8–10.4)
CHLORIDE SERPL-SCNC: 105 MMOL/L (ref 98–107)
CREAT SERPL-MCNC: 2.84 MG/DL (ref 0.67–1.17)
CRP SERPL-MCNC: 238.08 MG/L
EGFRCR SERPLBLD CKD-EPI 2021: 24 ML/MIN/1.73M2
ERYTHROCYTE [DISTWIDTH] IN BLOOD BY AUTOMATED COUNT: 14.2 % (ref 10–15)
GLUCOSE BLDC GLUCOMTR-MCNC: 139 MG/DL (ref 70–99)
GLUCOSE BLDC GLUCOMTR-MCNC: 143 MG/DL (ref 70–99)
GLUCOSE BLDC GLUCOMTR-MCNC: 147 MG/DL (ref 70–99)
GLUCOSE BLDC GLUCOMTR-MCNC: 151 MG/DL (ref 70–99)
GLUCOSE SERPL-MCNC: 146 MG/DL (ref 70–99)
HCO3 SERPL-SCNC: 17 MMOL/L (ref 22–29)
HCT VFR BLD AUTO: 34.8 % (ref 40–53)
HGB BLD-MCNC: 11.4 G/DL (ref 13.3–17.7)
HOLD SPECIMEN: NORMAL
MCH RBC QN AUTO: 30.2 PG (ref 26.5–33)
MCHC RBC AUTO-ENTMCNC: 32.8 G/DL (ref 31.5–36.5)
MCV RBC AUTO: 92 FL (ref 78–100)
PLATELET # BLD AUTO: 128 10E3/UL (ref 150–450)
POTASSIUM SERPL-SCNC: 3.9 MMOL/L (ref 3.4–5.3)
PROT SERPL-MCNC: 6 G/DL (ref 6.4–8.3)
RBC # BLD AUTO: 3.77 10E6/UL (ref 4.4–5.9)
SODIUM SERPL-SCNC: 137 MMOL/L (ref 135–145)
WBC # BLD AUTO: 7.3 10E3/UL (ref 4–11)

## 2024-10-27 PROCEDURE — 36415 COLL VENOUS BLD VENIPUNCTURE: CPT | Performed by: INTERNAL MEDICINE

## 2024-10-27 PROCEDURE — 258N000003 HC RX IP 258 OP 636: Performed by: INTERNAL MEDICINE

## 2024-10-27 PROCEDURE — 250N000011 HC RX IP 250 OP 636: Performed by: INTERNAL MEDICINE

## 2024-10-27 PROCEDURE — 82947 ASSAY GLUCOSE BLOOD QUANT: CPT | Performed by: INTERNAL MEDICINE

## 2024-10-27 PROCEDURE — 250N000013 HC RX MED GY IP 250 OP 250 PS 637: Performed by: INTERNAL MEDICINE

## 2024-10-27 PROCEDURE — 250N000012 HC RX MED GY IP 250 OP 636 PS 637: Performed by: INTERNAL MEDICINE

## 2024-10-27 PROCEDURE — 99233 SBSQ HOSP IP/OBS HIGH 50: CPT | Performed by: HOSPITALIST

## 2024-10-27 PROCEDURE — 120N000001 HC R&B MED SURG/OB

## 2024-10-27 PROCEDURE — 86140 C-REACTIVE PROTEIN: CPT | Performed by: INTERNAL MEDICINE

## 2024-10-27 PROCEDURE — 87641 MR-STAPH DNA AMP PROBE: CPT | Performed by: INTERNAL MEDICINE

## 2024-10-27 PROCEDURE — 85014 HEMATOCRIT: CPT | Performed by: INTERNAL MEDICINE

## 2024-10-27 PROCEDURE — 250N000013 HC RX MED GY IP 250 OP 250 PS 637: Performed by: HOSPITALIST

## 2024-10-27 RX ORDER — FUROSEMIDE 40 MG/1
80 TABLET ORAL AT BEDTIME
Status: DISCONTINUED | OUTPATIENT
Start: 2024-10-27 | End: 2024-10-28

## 2024-10-27 RX ORDER — HYDRALAZINE HYDROCHLORIDE 20 MG/ML
10 INJECTION INTRAMUSCULAR; INTRAVENOUS EVERY 6 HOURS PRN
Status: DISCONTINUED | OUTPATIENT
Start: 2024-10-27 | End: 2024-11-05 | Stop reason: HOSPADM

## 2024-10-27 RX ORDER — FUROSEMIDE 40 MG/1
80 TABLET ORAL AT BEDTIME
Status: DISCONTINUED | OUTPATIENT
Start: 2024-10-27 | End: 2024-10-27

## 2024-10-27 RX ADMIN — HYDROMORPHONE HYDROCHLORIDE 0.2 MG: 1 INJECTION, SOLUTION INTRAMUSCULAR; INTRAVENOUS; SUBCUTANEOUS at 18:01

## 2024-10-27 RX ADMIN — Medication 125 MCG: at 09:34

## 2024-10-27 RX ADMIN — Medication 250 MG: at 09:33

## 2024-10-27 RX ADMIN — CLOPIDOGREL 75 MG: 75 TABLET ORAL at 09:34

## 2024-10-27 RX ADMIN — HYDROMORPHONE HYDROCHLORIDE 0.4 MG: 1 INJECTION, SOLUTION INTRAMUSCULAR; INTRAVENOUS; SUBCUTANEOUS at 01:31

## 2024-10-27 RX ADMIN — Medication 250 MG: at 21:27

## 2024-10-27 RX ADMIN — PREDNISONE 10 MG: 10 TABLET ORAL at 09:34

## 2024-10-27 RX ADMIN — HYDROMORPHONE HYDROCHLORIDE 0.4 MG: 1 INJECTION, SOLUTION INTRAMUSCULAR; INTRAVENOUS; SUBCUTANEOUS at 07:32

## 2024-10-27 RX ADMIN — ISOSORBIDE MONONITRATE 60 MG: 30 TABLET, EXTENDED RELEASE ORAL at 09:33

## 2024-10-27 RX ADMIN — CALCIUM CARBONATE (ANTACID) CHEW TAB 500 MG 2000 MG: 500 CHEW TAB at 09:32

## 2024-10-27 RX ADMIN — HYDROMORPHONE HYDROCHLORIDE 0.4 MG: 1 INJECTION, SOLUTION INTRAMUSCULAR; INTRAVENOUS; SUBCUTANEOUS at 11:11

## 2024-10-27 RX ADMIN — MYCOPHENOLATE MOFETIL 1000 MG: 500 TABLET ORAL at 21:27

## 2024-10-27 RX ADMIN — HYDROMORPHONE HYDROCHLORIDE 0.2 MG: 1 INJECTION, SOLUTION INTRAMUSCULAR; INTRAVENOUS; SUBCUTANEOUS at 14:57

## 2024-10-27 RX ADMIN — HEPARIN SODIUM 5000 UNITS: 5000 INJECTION, SOLUTION INTRAVENOUS; SUBCUTANEOUS at 01:58

## 2024-10-27 RX ADMIN — LINEZOLID 600 MG: 600 TABLET, FILM COATED ORAL at 09:33

## 2024-10-27 RX ADMIN — AZTREONAM 500 MG: 1 INJECTION, POWDER, LYOPHILIZED, FOR SOLUTION INTRAMUSCULAR; INTRAVENOUS at 11:15

## 2024-10-27 RX ADMIN — AZTREONAM 500 MG: 1 INJECTION, POWDER, LYOPHILIZED, FOR SOLUTION INTRAMUSCULAR; INTRAVENOUS at 20:04

## 2024-10-27 RX ADMIN — HEPARIN SODIUM 5000 UNITS: 5000 INJECTION, SOLUTION INTRAVENOUS; SUBCUTANEOUS at 17:58

## 2024-10-27 RX ADMIN — LEVOTHYROXINE SODIUM 75 MCG: 75 TABLET ORAL at 09:34

## 2024-10-27 RX ADMIN — HEPARIN SODIUM 5000 UNITS: 5000 INJECTION, SOLUTION INTRAVENOUS; SUBCUTANEOUS at 11:08

## 2024-10-27 RX ADMIN — INSULIN LISPRO 1 UNITS: 100 INJECTION, SOLUTION INTRAVENOUS; SUBCUTANEOUS at 11:08

## 2024-10-27 RX ADMIN — ROSUVASTATIN CALCIUM 10 MG: 10 TABLET, FILM COATED ORAL at 21:28

## 2024-10-27 RX ADMIN — FUROSEMIDE 80 MG: 40 TABLET ORAL at 17:58

## 2024-10-27 RX ADMIN — GABAPENTIN 300 MG: 300 CAPSULE ORAL at 21:28

## 2024-10-27 RX ADMIN — GABAPENTIN 300 MG: 300 CAPSULE ORAL at 09:33

## 2024-10-27 RX ADMIN — LINEZOLID 600 MG: 600 TABLET, FILM COATED ORAL at 21:28

## 2024-10-27 RX ADMIN — HYDROMORPHONE HYDROCHLORIDE 0.4 MG: 1 INJECTION, SOLUTION INTRAMUSCULAR; INTRAVENOUS; SUBCUTANEOUS at 19:58

## 2024-10-27 RX ADMIN — MYCOPHENOLATE MOFETIL 1000 MG: 500 TABLET ORAL at 09:34

## 2024-10-27 RX ADMIN — ASPIRIN 81 MG: 81 TABLET, COATED ORAL at 21:28

## 2024-10-27 RX ADMIN — AZTREONAM 500 MG: 1 INJECTION, POWDER, LYOPHILIZED, FOR SOLUTION INTRAMUSCULAR; INTRAVENOUS at 01:58

## 2024-10-27 RX ADMIN — EZETIMIBE 10 MG: 10 TABLET ORAL at 09:34

## 2024-10-27 RX ADMIN — ALLOPURINOL 300 MG: 300 TABLET ORAL at 09:34

## 2024-10-27 NOTE — PHARMACY-MEDICATION REGIMEN REVIEW
Pharmacy Antimicrobial Stewardship Assessment     Current Antimicrobial Therapy (SSTI):               Recommendations/Interventions:  1. Working with nursing to get MRSA nasal swab collected in order to determine whether linezolid can be discontinued.     Willa Loredo RPH  October 27, 2024

## 2024-10-27 NOTE — PLAN OF CARE
"Plan of Care Reviewed With:     Overall Patient Progress:    BP (!) 194/78   Pulse 86   Temp 98.9  F (37.2  C) (Tympanic)   Resp 19   Ht 1.676 m (5' 6\")   Wt 135.4 kg (298 lb 8.1 oz)   SpO2 95%   BMI 48.18 kg/m      Patient is A&Ox4 and makes his needs know appropriately. He's leg is still edematized so its been elevated for most of today. Daughter was helping him move his leg today and mad a 1 cm cut in his leg with her nail so I cleaned the cute with rubbing alcohol and the cut has scabbed over. Pain has been hard to manage today only dropping down to a 5. Has been getting dilaudid, see MAR. BS have been lower.  IMPORTANT: Patients last two blood pressures were higher. 211/87 and 194/78.  prescribed PRN apresoline and he was given his lasix earlier today to see if that would help.     Face to face report given with opportunity to observe patient.    Report given to LILIANA Mendoza RN   10/27/2024  7:02 PM     "

## 2024-10-27 NOTE — PLAN OF CARE
Goal Outcome Evaluation:      Plan of Care Reviewed With: patient    Overall Patient Progress: improvingOverall Patient Progress: improving    Alert and oriented. Pain to RLE, PRN dialudid. IV aztreonam. Redness on RLE within marked borders. Uses urinal at bedside. SBA with walker. Vitals and assessments as charted. Uses call light appropriately.     Face to face report given with opportunity to observe patient.    Report given to Sharif García RN   10/27/2024  7:00 AM

## 2024-10-27 NOTE — PLAN OF CARE
Goal Outcome Evaluation:      Plan of Care Reviewed With: patient    Overall Patient Progress: no changeOverall Patient Progress: no change  Free from falls/injuries this shift. Assess as charted. Med with IV Dilaudid X2 this shift for pain.  Good relief. Independently places CPAP for naps.     Face to face report given with opportunity to observe patient.    Report given to Don Brown RN   10/26/2024  11:45 PM

## 2024-10-27 NOTE — PROGRESS NOTES
[facility]    Medicine Progress Note - Hospitalist Service    Date of Admission:  10/22/2024    Assessment & Plan   Right lower extremity cellulitis:  Chronic venous stasis and chronic lower extremity wound.  Sepsis  Elevated CRP  Component      Latest Ref Rng 10/24/2024  6:22 AM 10/25/2024  5:31 AM 10/26/2024  6:49 AM 10/27/2024  6:43 AM   WBC      4.0 - 11.0 10e3/uL 22.3 (H)  17.9 (H)  11.4 (H)  7.3      ON admission Tmax 102.1, WBC 21.0,  Normal lactic acid      Ultrasound of right lower ext showing No evidence of right lower extremity deep venous thrombosis. Did have subcutaneous edema consistent with cellulitis. Due to his allergies he was started on Aztreonam and  and zyvox.    -10/24: Patient is on Zyvox and clindamycin empirically, will continue.  Blood cultures pending.  Fever curve stable as last 24 hours.  .  -10/25: Patient continues on Zyvox, clindamycin empirically.  Blood cultures no growth to date x 72 hours.  CRP remains elevated above 400, WBC slightly downtrending from 22 to 18.  Fever curve remains stable.  Will add aztreonam empirically for gram-negative coverage.  -10/26: Patient was on Zyvox, aztreonam, clinda stopped on 10/26.  CRP finally downtrending, 280 today.  WBC improving 17.9 to 11.4.  Fever curve stable.  Appearance of legs slowly improving.  Blood culture no growth to date.   -10/27 discussed with pharmacy will continue regimen.     Status post renal transplant:   KAYLAN on CKD  stage IV   Component      Latest Ref Rng 10/22/2024  5:45 PM 10/23/2024  6:02 AM 10/24/2024  6:22 AM 10/25/2024  5:31 AM 10/26/2024  6:49 AM 10/27/2024  6:43 AM   Creatinine      0.67 - 1.17 mg/dL 2.48 (H)  3.19 (H)  4.10 (H)  3.87 (H)  3.34 (H)  2.84 (H)     Renal us done on 10/24 No mass or hydronephrosis in the transplanted kidney.   On CellCept and low-dose prednisone chronically, which will be continued.  GFR appears to be in the 20s chronically, this is stable currently.  -10/24:  "Creatinine increasing, 4.10 today.  Urine output is still adequate, over 1000 cc overnight. Avoiding nephrotoxic substances/medications.  Spoke with Dr. Mclaughlin, nephrology at [facility].  Recommended ultrasound of the transplanted kidney, CK level.  No current need to transfer to higher level of care, but will continue to monitor.  Patient had transplant approximately 24 years ago at [facility], however he has recently transferred his care to the [facility].  -10/25: Creatinine stable at 3.87.  Urine output adequate.  -10/26: Creatinine improving, 3.34 today.  Urine output adequate.  Continue holding home diuretics.   -10/27  Daily labs  Avoid nephrotic meds  Out patient follow up with nephrology    Hyponatremia POA resolved     Insulin-dependent diabetes mellitus type 2:   -on home lantus 10 hs  -Sliding scale insulin      Essential hypertension:   -home lasix  -holding home   -home imdur    Hyperlipemia  On statin      Obstructive sleep apnea:   Pulmonary hypertension.  -home CPAP   -home diamox    Hypothyroidism:   -home synthroid           Diet: Regular Diet Adult    DVT Prophylaxis: Heparin SQ  Manley Catheter: Not present  Lines: None     Cardiac Monitoring: None  Code Status: Full Code      Clinically Significant Risk Factors         # Hyponatremia: Lowest Na = 134 mmol/L in last 2 days, will monitor as appropriate       # Hypoalbuminemia: Lowest albumin = 3.1 g/dL at 10/27/2024  6:43 AM, will monitor as appropriate   # Thrombocytopenia: Lowest platelets = 128 in last 2 days, will monitor for bleeding   # Hypertension: Noted on problem list  # Chronic heart failure with preserved ejection fraction: heart failure noted on problem list and last echo with EF >50%          # Severe Obesity: Estimated body mass index is 48.18 kg/m  as calculated from the following:    Height as of this encounter: 1.676 m (5' 6\").    Weight as of this encounter: 135.4 kg (298 lb 8.1 oz).      "        Disposition Plan     Medically Ready for Discharge: Anticipated in 2-4 Days             Ivy Bruner DO  Hospitalist Service  [facility]  Securely message with [employer] (more info)  Text page via [employer] Paging/Directory   ______________________________________________________________________    Interval History   Patient was seen this morning for medical rounds.   Patient denied any chest pain or shortness of breath. He has been having  right leg swelling and pain  Little better today. His cr is better today. His diuretics are being started. Discussed with him regarding recs to elevate leg.     Physical Exam   Vital Signs: Temp: 99.1  F (37.3  C) Temp src: Tympanic BP: 173/77 Pulse: 93   Resp: 19 SpO2: 95 % O2 Device: None (Room air)    Weight: 298 lbs 8.05 oz    Physical Exam  Constitutional:       Appearance: He is obese.   HENT:      Right Ear: External ear normal.      Left Ear: External ear normal.      Nose: Nose normal.      Mouth/Throat:      Pharynx: Oropharynx is clear.   Eyes:      Conjunctiva/sclera: Conjunctivae normal.   Cardiovascular:      Rate and Rhythm: Normal rate.   Pulmonary:      Effort: Pulmonary effort is normal.   Abdominal:      General: Abdomen is flat.   Musculoskeletal:         General: Swelling present.   Skin:     Coloration: Skin is not jaundiced.      Findings: Erythema present.   Neurological:      Mental Status: He is alert. Mental status is at baseline.         Medical Decision Making       65 MINUTES SPENT BY ME on the date of service doing chart review, history, exam, documentation & further activities per the note.      Data     I have personally reviewed the following data over the past 24 hrs:    7.3  \   11.4 (L)   / 128 (L)     137 105 72.1 (H) /  147 (H)   3.9 17 (L) 2.84 (H) \     ALT: 23 AST: 27 AP: 67 TBILI: 0.3   ALB: 3.1 (L) TOT PROTEIN: 6.0 (L) LIPASE: N/A     Procal: N/A CRP: 238.08 (H) Lactic Acid: N/A         Imaging results reviewed over  the past 24 hrs:   No results found for this or any previous visit (from the past 24 hours).

## 2024-10-28 LAB
ALBUMIN SERPL BCG-MCNC: 3 G/DL (ref 3.5–5.2)
ALP SERPL-CCNC: 64 U/L (ref 40–150)
ALT SERPL W P-5'-P-CCNC: 23 U/L (ref 0–70)
ANION GAP SERPL CALCULATED.3IONS-SCNC: 17 MMOL/L (ref 7–15)
AST SERPL W P-5'-P-CCNC: 24 U/L (ref 0–45)
BACTERIA BLD CULT: NO GROWTH
BILIRUB SERPL-MCNC: 0.3 MG/DL
BUN SERPL-MCNC: 63.7 MG/DL (ref 8–23)
CALCIUM SERPL-MCNC: 9.1 MG/DL (ref 8.8–10.4)
CHLORIDE SERPL-SCNC: 104 MMOL/L (ref 98–107)
CREAT SERPL-MCNC: 2.32 MG/DL (ref 0.67–1.17)
CRP SERPL-MCNC: 272.53 MG/L
EGFRCR SERPLBLD CKD-EPI 2021: 30 ML/MIN/1.73M2
ERYTHROCYTE [DISTWIDTH] IN BLOOD BY AUTOMATED COUNT: 14.2 % (ref 10–15)
GLUCOSE BLDC GLUCOMTR-MCNC: 124 MG/DL (ref 70–99)
GLUCOSE BLDC GLUCOMTR-MCNC: 129 MG/DL (ref 70–99)
GLUCOSE BLDC GLUCOMTR-MCNC: 144 MG/DL (ref 70–99)
GLUCOSE BLDC GLUCOMTR-MCNC: 151 MG/DL (ref 70–99)
GLUCOSE BLDC GLUCOMTR-MCNC: 166 MG/DL (ref 70–99)
GLUCOSE SERPL-MCNC: 128 MG/DL (ref 70–99)
HCO3 SERPL-SCNC: 15 MMOL/L (ref 22–29)
HCT VFR BLD AUTO: 35.3 % (ref 40–53)
HGB BLD-MCNC: 11.4 G/DL (ref 13.3–17.7)
LACTATE SERPL-SCNC: 1.2 MMOL/L (ref 0.7–2)
MCH RBC QN AUTO: 30.2 PG (ref 26.5–33)
MCHC RBC AUTO-ENTMCNC: 32.3 G/DL (ref 31.5–36.5)
MCV RBC AUTO: 93 FL (ref 78–100)
MRSA DNA SPEC QL NAA+PROBE: NEGATIVE
NT-PROBNP SERPL-MCNC: 4336 PG/ML (ref 0–900)
PLATELET # BLD AUTO: 120 10E3/UL (ref 150–450)
POTASSIUM SERPL-SCNC: 3.9 MMOL/L (ref 3.4–5.3)
PROCALCITONIN SERPL IA-MCNC: 4.79 NG/ML
PROT SERPL-MCNC: 6.1 G/DL (ref 6.4–8.3)
RBC # BLD AUTO: 3.78 10E6/UL (ref 4.4–5.9)
SA TARGET DNA: NEGATIVE
SODIUM SERPL-SCNC: 136 MMOL/L (ref 135–145)
TROPONIN T SERPL HS-MCNC: 94 NG/L
TROPONIN T SERPL HS-MCNC: 95 NG/L
TROPONIN T SERPL HS-MCNC: 96 NG/L
WBC # BLD AUTO: 6.6 10E3/UL (ref 4–11)

## 2024-10-28 PROCEDURE — 250N000012 HC RX MED GY IP 250 OP 636 PS 637: Performed by: INTERNAL MEDICINE

## 2024-10-28 PROCEDURE — 250N000011 HC RX IP 250 OP 636: Mod: JZ | Performed by: HOSPITALIST

## 2024-10-28 PROCEDURE — 84484 ASSAY OF TROPONIN QUANT: CPT | Performed by: HOSPITALIST

## 2024-10-28 PROCEDURE — 36415 COLL VENOUS BLD VENIPUNCTURE: CPT | Performed by: INTERNAL MEDICINE

## 2024-10-28 PROCEDURE — 93005 ELECTROCARDIOGRAM TRACING: CPT

## 2024-10-28 PROCEDURE — 250N000011 HC RX IP 250 OP 636: Performed by: INTERNAL MEDICINE

## 2024-10-28 PROCEDURE — 80053 COMPREHEN METABOLIC PANEL: CPT | Performed by: INTERNAL MEDICINE

## 2024-10-28 PROCEDURE — 36415 COLL VENOUS BLD VENIPUNCTURE: CPT | Performed by: HOSPITALIST

## 2024-10-28 PROCEDURE — 258N000003 HC RX IP 258 OP 636: Performed by: INTERNAL MEDICINE

## 2024-10-28 PROCEDURE — 86140 C-REACTIVE PROTEIN: CPT | Performed by: INTERNAL MEDICINE

## 2024-10-28 PROCEDURE — 250N000013 HC RX MED GY IP 250 OP 250 PS 637: Performed by: INTERNAL MEDICINE

## 2024-10-28 PROCEDURE — 93010 ELECTROCARDIOGRAM REPORT: CPT | Performed by: INTERNAL MEDICINE

## 2024-10-28 PROCEDURE — 85027 COMPLETE CBC AUTOMATED: CPT | Performed by: INTERNAL MEDICINE

## 2024-10-28 PROCEDURE — 250N000013 HC RX MED GY IP 250 OP 250 PS 637: Performed by: HOSPITALIST

## 2024-10-28 PROCEDURE — 84145 PROCALCITONIN (PCT): CPT | Performed by: HOSPITALIST

## 2024-10-28 PROCEDURE — 83605 ASSAY OF LACTIC ACID: CPT | Performed by: HOSPITALIST

## 2024-10-28 PROCEDURE — 200N000001 HC R&B ICU

## 2024-10-28 PROCEDURE — 83880 ASSAY OF NATRIURETIC PEPTIDE: CPT | Performed by: HOSPITALIST

## 2024-10-28 PROCEDURE — 250N000011 HC RX IP 250 OP 636: Mod: JZ | Performed by: INTERNAL MEDICINE

## 2024-10-28 PROCEDURE — 99233 SBSQ HOSP IP/OBS HIGH 50: CPT | Performed by: HOSPITALIST

## 2024-10-28 RX ORDER — AMLODIPINE BESYLATE 5 MG/1
5 TABLET ORAL DAILY
Status: DISCONTINUED | OUTPATIENT
Start: 2024-10-28 | End: 2024-10-29

## 2024-10-28 RX ORDER — OXYMETAZOLINE HYDROCHLORIDE 0.05 G/100ML
2 SPRAY NASAL 2 TIMES DAILY
Status: DISCONTINUED | OUTPATIENT
Start: 2024-10-28 | End: 2024-10-28

## 2024-10-28 RX ORDER — FUROSEMIDE 40 MG/1
120 TABLET ORAL AT BEDTIME
Status: DISCONTINUED | OUTPATIENT
Start: 2024-10-28 | End: 2024-10-31

## 2024-10-28 RX ORDER — LABETALOL HYDROCHLORIDE 5 MG/ML
20 INJECTION, SOLUTION INTRAVENOUS EVERY 6 HOURS PRN
Status: DISCONTINUED | OUTPATIENT
Start: 2024-10-28 | End: 2024-11-05 | Stop reason: HOSPADM

## 2024-10-28 RX ORDER — ACETAMINOPHEN 325 MG/1
975 TABLET ORAL EVERY 4 HOURS PRN
Status: DISCONTINUED | OUTPATIENT
Start: 2024-10-28 | End: 2024-11-01

## 2024-10-28 RX ORDER — AZTREONAM 1 G/1
1 INJECTION, POWDER, LYOPHILIZED, FOR SOLUTION INTRAMUSCULAR; INTRAVENOUS EVERY 8 HOURS
Status: DISCONTINUED | OUTPATIENT
Start: 2024-10-28 | End: 2024-10-29

## 2024-10-28 RX ORDER — NITROGLYCERIN 20 MG/100ML
10-200 INJECTION INTRAVENOUS CONTINUOUS
Status: DISCONTINUED | OUTPATIENT
Start: 2024-10-28 | End: 2024-10-28

## 2024-10-28 RX ORDER — ACETAMINOPHEN 650 MG/1
650 SUPPOSITORY RECTAL EVERY 4 HOURS PRN
Status: DISCONTINUED | OUTPATIENT
Start: 2024-10-28 | End: 2024-11-01

## 2024-10-28 RX ADMIN — PREDNISONE 10 MG: 10 TABLET ORAL at 09:01

## 2024-10-28 RX ADMIN — INSULIN LISPRO 1 UNITS: 100 INJECTION, SOLUTION INTRAVENOUS; SUBCUTANEOUS at 17:34

## 2024-10-28 RX ADMIN — AZTREONAM 1 G: 1 INJECTION, POWDER, LYOPHILIZED, FOR SOLUTION INTRAMUSCULAR; INTRAVENOUS at 12:43

## 2024-10-28 RX ADMIN — GABAPENTIN 300 MG: 300 CAPSULE ORAL at 20:11

## 2024-10-28 RX ADMIN — SPIRONOLACTONE 25 MG: 25 TABLET ORAL at 09:00

## 2024-10-28 RX ADMIN — INSULIN LISPRO 1 UNITS: 100 INJECTION, SOLUTION INTRAVENOUS; SUBCUTANEOUS at 11:58

## 2024-10-28 RX ADMIN — HEPARIN SODIUM 5000 UNITS: 5000 INJECTION, SOLUTION INTRAVENOUS; SUBCUTANEOUS at 03:03

## 2024-10-28 RX ADMIN — FUROSEMIDE 120 MG: 40 TABLET ORAL at 21:15

## 2024-10-28 RX ADMIN — AZTREONAM 1 G: 1 INJECTION, POWDER, LYOPHILIZED, FOR SOLUTION INTRAMUSCULAR; INTRAVENOUS at 21:14

## 2024-10-28 RX ADMIN — ROSUVASTATIN CALCIUM 10 MG: 10 TABLET, FILM COATED ORAL at 21:15

## 2024-10-28 RX ADMIN — EZETIMIBE 10 MG: 10 TABLET ORAL at 09:01

## 2024-10-28 RX ADMIN — AMLODIPINE BESYLATE 5 MG: 5 TABLET ORAL at 10:53

## 2024-10-28 RX ADMIN — AZTREONAM 500 MG: 1 INJECTION, POWDER, LYOPHILIZED, FOR SOLUTION INTRAMUSCULAR; INTRAVENOUS at 03:53

## 2024-10-28 RX ADMIN — ASPIRIN 81 MG: 81 TABLET, COATED ORAL at 21:15

## 2024-10-28 RX ADMIN — MYCOPHENOLATE MOFETIL 1000 MG: 500 TABLET ORAL at 09:01

## 2024-10-28 RX ADMIN — GABAPENTIN 300 MG: 300 CAPSULE ORAL at 09:00

## 2024-10-28 RX ADMIN — LEVOTHYROXINE SODIUM 75 MCG: 75 TABLET ORAL at 09:01

## 2024-10-28 RX ADMIN — LINEZOLID 600 MG: 600 TABLET, FILM COATED ORAL at 09:00

## 2024-10-28 RX ADMIN — HYDROMORPHONE HYDROCHLORIDE 1 MG: 1 INJECTION, SOLUTION INTRAMUSCULAR; INTRAVENOUS; SUBCUTANEOUS at 12:37

## 2024-10-28 RX ADMIN — MYCOPHENOLATE MOFETIL 1000 MG: 500 TABLET ORAL at 20:11

## 2024-10-28 RX ADMIN — Medication 250 MG: at 09:00

## 2024-10-28 RX ADMIN — ISOSORBIDE MONONITRATE 60 MG: 30 TABLET, EXTENDED RELEASE ORAL at 09:01

## 2024-10-28 RX ADMIN — ACETAMINOPHEN 975 MG: 325 TABLET ORAL at 20:12

## 2024-10-28 RX ADMIN — ACETAMINOPHEN 975 MG: 325 TABLET ORAL at 15:27

## 2024-10-28 RX ADMIN — CALCIUM CARBONATE (ANTACID) CHEW TAB 500 MG 2000 MG: 500 CHEW TAB at 09:00

## 2024-10-28 RX ADMIN — LINEZOLID 600 MG: 600 TABLET, FILM COATED ORAL at 20:12

## 2024-10-28 RX ADMIN — CLOPIDOGREL 75 MG: 75 TABLET ORAL at 09:00

## 2024-10-28 RX ADMIN — HYDROMORPHONE HYDROCHLORIDE 1 MG: 1 INJECTION, SOLUTION INTRAMUSCULAR; INTRAVENOUS; SUBCUTANEOUS at 02:50

## 2024-10-28 RX ADMIN — HYDRALAZINE HYDROCHLORIDE 10 MG: 20 INJECTION INTRAMUSCULAR; INTRAVENOUS at 05:26

## 2024-10-28 RX ADMIN — SALINE NASAL SPRAY 2 SPRAY: 1.5 SOLUTION NASAL at 14:40

## 2024-10-28 RX ADMIN — Medication 125 MCG: at 09:01

## 2024-10-28 RX ADMIN — ALLOPURINOL 300 MG: 300 TABLET ORAL at 09:01

## 2024-10-28 RX ADMIN — HYDROMORPHONE HYDROCHLORIDE 1 MG: 1 INJECTION, SOLUTION INTRAMUSCULAR; INTRAVENOUS; SUBCUTANEOUS at 09:16

## 2024-10-28 RX ADMIN — HYDRALAZINE HYDROCHLORIDE 10 MG: 20 INJECTION INTRAMUSCULAR; INTRAVENOUS at 12:30

## 2024-10-28 RX ADMIN — Medication 250 MG: at 20:12

## 2024-10-28 RX ADMIN — NITROGLYCERIN 10 MCG/MIN: 20 INJECTION INTRAVENOUS at 15:44

## 2024-10-28 RX ADMIN — ACETAZOLAMIDE 500 MG: 500 CAPSULE, EXTENDED RELEASE ORAL at 09:01

## 2024-10-28 NOTE — PROGRESS NOTES
10/28/24 1600   Appointment Info   Signing Clinician's Name / Credentials (PT) Sd Ramos DPT   Appointment Canceled Reason (PT) Medical status   Appointment Cancel Comments (PT) Attempted to see pt and even took subjective for eval, but pt having nosebleed and after getting pt cleaned up he declined any mobility afraid the bleeding would significantly worsen. Spoke with Gigi Bruner whom stated to hold on PT until nose bleeds stopped and BP was lower.  Pt then moved to the ICU by the afternoon.  Will check on pt status tomorrow.

## 2024-10-28 NOTE — PROVIDER NOTIFICATION
Patient's redness on right leg has exceeded the marked borders. There is now redness and warmth beyond just the calf, it is up to the groin on the inner thigh and outer thigh. Patient is also continuing to have pain 7-8/10. Pictures of leg added to chart. Notified provider of above.     Keep leg elevated on two pillows per provider. PRN dilaudid increased to 1mg Q4.

## 2024-10-28 NOTE — PLAN OF CARE
Goal Outcome Evaluation:      Plan of Care Reviewed With: patient    Overall Patient Progress: improvingOverall Patient Progress: improving    Alert and oriented. Pain 7-8/10 to RLE. Redness exceeds marked borders up inner and outer thigh. PRN dilaudid orders increased to 1mg Q4. Cpap on overnight. Elevated BP, PRN hydralazine given x1 this shift. SBA with walker. Uses urinal at bedside. Vitals and assessments as charted. Uses call light appropriately.     Face to face report given with opportunity to observe patient.    Report given to Sharif García RN   10/28/2024  7:18 AM

## 2024-10-28 NOTE — PROGRESS NOTES
[facility]    Medicine Progress Note - Hospitalist Service    Date of Admission:  10/22/2024    Assessment & Plan   Right lower extremity cellulitis:  Chronic venous stasis and chronic lower extremity wound.  Sepsis  Elevated CRP   Elevated procal    Component      Latest Ref Rng 10/24/2024  6:22 AM 10/25/2024  5:31 AM 10/26/2024  6:49 AM 10/27/2024  6:43 AM 10/28/2024  5:43 AM   WBC      4.0 - 11.0 10e3/uL 22.3 (H)  17.9 (H)  11.4 (H)  7.3  6.6       ON admission Tmax 102.1, WBC 21.0,  Normal lactic acid    Ultrasound of right lower ext showing No evidence of right lower extremity deep venous thrombosis. Did have subcutaneous edema consistent with cellulitis. Due to his allergies he was started on Aztreonam and  and zyvox.    -10/24: Patient is on Zyvox and clindamycin empirically, will continue.  Blood cultures pending.  Fever curve stable as last 24 hours.  .  -10/25: Patient continues on Zyvox, clindamycin empirically.  Blood cultures no growth to date x 72 hours.  CRP remains elevated above 400, WBC slightly downtrending from 22 to 18.  Fever curve remains stable.  Will add aztreonam empirically for gram-negative coverage.  -10/26: Patient was on Zyvox, aztreonam, clinda stopped on 10/26.  CRP finally downtrending, 280 today.  WBC improving 17.9 to 11.4.  Fever curve stable.  Appearance of legs slowly improving.  Blood culture no growth to date.  -10/27 discussed with pharmacy will continue regimen.   On 10/28 he has had low grad fevers, cultures so far negative   Discussed with pharmacy his wbc has been normal now, CRP is up little but the erythema appears to be improving in his right leg and he stated the pain is better. Will hold off on further adjustment of antibiotics and will discuss with ID tomorrow.          Status post renal transplant  KAYLAN on CKD  stage IV    Component      Latest Ref Rng 10/24/2024  6:22 AM 10/25/2024  5:31 AM 10/26/2024  6:49 AM 10/27/2024  6:43 AM  10/28/2024  5:43 AM   Creatinine      0.67 - 1.17 mg/dL 4.10 (H)  3.87 (H)  3.34 (H)  2.84 (H)  2.32 (H)      Renal us done on 10/24 No mass or hydronephrosis in the transplanted kidney.   On CellCept and low-dose prednisone chronically, which will be continued.  GFR appears to be in the 20s chronically, this is stable currently.  -10/24: Creatinine increasing, 4.10 today.  Urine output is still adequate, over 1000 cc overnight. Avoiding nephrotoxic substances/medications.  Spoke with Dr. Mclaughlin, nephrology at [facility].  Recommended ultrasound of the transplanted kidney, CK level.  No current need to transfer to higher level of care, but will continue to monitor.  Patient had transplant approximately 24 years ago at [facility], however he has recently transferred his care to the [facility].  -10/25: Creatinine stable at 3.87.  Urine output adequate.  -10/26: Creatinine improving, 3.34 today.  Urine output adequate.  Continue holding home diuretics.  -10/27  Daily labs  Avoid nephrotic meds  Out patient follow up with nephrology  -on 10/28 restart home lasix and diamox     Elevated troponin chronic  Component      Latest Ref Rng 4/28/2024  8:33 PM 10/28/2024  3:11 PM   Troponin T, High Sensitivity      <=22 ng/L 73 (H)  96 (H)       No chest pain  EKG no ST elevation or depressions  Will repeat trop  Suspect  demand 2nd from HTN and infection    Epistaxis   Resolved with pressure  Ordered prn nasal saline  Hold heparin today  If has further bleeding may need Rhino rocket  Will avoid using afrin     Hyponatremia POA resolved     Insulin-dependent diabetes mellitus type 2:   -On home lantus 10 hs  -Sliding scale insulin      Essential hypertension:   -home lasix 120 mg at HS  -holding home diamox   -home imdur  -started amlodipine after discussing with transplant nephology Dr Plaza at the [facility]  -did move to ICU for nitroglycerin drip as BP was not improving and patient  "developed epistasis  -prn hydralazine   -prn coreg    Hyperlipemia  On statin      Obstructive sleep apnea:   Pulmonary hypertension.  -home CPAP   -home diamox    Hypothyroidism:   -home synthroid           Diet: Regular Diet Adult    DVT Prophylaxis: Heparin subcutaneous, holding as patient did have epistaxis   Manley Catheter: Not present  Lines: None     Cardiac Monitoring: None  Code Status: Full Code      Clinically Significant Risk Factors               # Hypoalbuminemia: Lowest albumin = 3 g/dL at 10/28/2024  5:43 AM, will monitor as appropriate   # Thrombocytopenia: Lowest platelets = 120 in last 2 days, will monitor for bleeding   # Hypertension: Noted on problem list  # Chronic heart failure with preserved ejection fraction: heart failure noted on problem list and last echo with EF >50%          # Severe Obesity: Estimated body mass index is 48.18 kg/m  as calculated from the following:    Height as of this encounter: 1.676 m (5' 6\").    Weight as of this encounter: 135.4 kg (298 lb 8.1 oz).             Disposition Plan     Medically Ready for Discharge: Anticipated in 2-4 Days             Ivy Bruner DO  Hospitalist Service  [facility]  Securely message with [employer] (more info)  Text page via [facility] Paging/Directory   ______________________________________________________________________    Interval History   Patient was seen during the morning for medical rounds. He has had low grade fever, BP has been difficulty to control. [facility] and recs was to start amlodipine which we did. He subsequently was moved to the ICU as his blood pressure continued to be  resistant to treatment. He was started on nitroglycerin.     Physical Exam   Vital Signs: Temp: 98.8  F (37.1  C) Temp src: Tympanic BP: (!) 198/90 Pulse: 100   Resp: 17 SpO2: 95 % O2 Device: None (Room air)    Weight: 298 lbs 8.05 oz    Physical Exam  Constitutional:       Appearance: He is obese. "   HENT:      Right Ear: External ear normal.      Left Ear: External ear normal.      Nose: Nose normal.      Mouth/Throat:      Pharynx: Oropharynx is clear.   Eyes:      Conjunctiva/sclera: Conjunctivae normal.   Cardiovascular:      Rate and Rhythm: Regular rhythm. Tachycardia present.   Pulmonary:      Effort: Pulmonary effort is normal.   Abdominal:      General: Abdomen is flat.   Musculoskeletal:         General: Swelling present.   Skin:     Coloration: Skin is not jaundiced.      Findings: Erythema present.   Neurological:      Mental Status: He is alert. Mental status is at baseline.               Medical Decision Making       65 MINUTES SPENT BY ME on the date of service doing chart review, history, exam, documentation & further activities per the note.      Data     I have personally reviewed the following data over the past 24 hrs:    6.6  \   11.4 (L)   / 120 (L)     136 104 63.7 (H) /  144 (H)   3.9 15 (L) 2.32 (H) \     ALT: 23 AST: 24 AP: 64 TBILI: 0.3   ALB: 3.0 (L) TOT PROTEIN: 6.1 (L) LIPASE: N/A     Trop: 96 (H) BNP: 4,336 (H)     Procal: 4.79 (H) CRP: 272.53 (H) Lactic Acid: 1.2         Imaging results reviewed over the past 24 hrs:   No results found for this or any previous visit (from the past 24 hours).

## 2024-10-28 NOTE — PLAN OF CARE
Plan of Care Reviewed With: Patient    Overall Patient Progress: Not progressing    Patient AxO, VSS, assessment as charted. Tmax 100.1, PRN Tylenol given. 5/10 pain to RLE, PRN Dilaudid for pain per MAR. Nitroglycerin gtt running at 10 mcg/min to left PIV. HRR ST 100s. LS equal, clear bilaterally. Abd round, nondistended, soft with audible, normoactive BS. No bms noted. Adequate urine output, uses the urinal. PILO LE edema, worse on the RLE. RLE red and warm to touch, growing past previously drawn boarders. Up x1 assist with a gait belt/walker. Consistent carb diet, tolerating well. Bed alarm on, call light within reach.      Face to face report given with opportunity to observe patient.    Report given to Skyla FORD.    Areli Patricia RN   10/28/2024  7:26 PM

## 2024-10-28 NOTE — PHARMACY
Pharmacy Antimicrobial Stewardship Assessment     Current Antimicrobial Therapy:  Anti-infectives (From now, onward)      Start     Dose/Rate Route Frequency Ordered Stop    10/28/24 1200  aztreonam (AZACTAM) 1 g vial to attach to  mL bag        Note to Pharmacy: Aztreonam 1 g loading dose followed by 500 mg every 8 hours (for a CrCl of 24 ml/min)    1 g  over 20-60 Minutes Intravenous EVERY 8 HOURS 10/28/24 0935      10/22/24 2005  linezolid (ZYVOX) tablet 600 mg         600 mg Oral EVERY 12 HOURS SCHEDULED 10/22/24 2001            Indication: SSTI    Days of Therapy:     Day 4 of aztreonam  Day 7 of linezolid         Pertinent Labs:    Recent Labs   Lab Test 10/28/24  0543 10/27/24  0643 10/26/24  0649 10/25/24  0531 10/24/24  0622 10/23/24  0602 10/22/24  1745   WBC 6.6 7.3 11.4* 17.9* 22.3* 21.0* 14.4*       Recent Labs   Lab Test 10/28/24  0543 10/27/24  0643 10/26/24  0649 10/25/24  0531 10/24/24  0622 10/22/24  2020 10/22/24  1745 24  0905 24  0515   LACT  --   --   --   --   --  1.0  --  0.7   < >  --    CRPI 272.53* 238.08* 280.20* 402.69* 409.05*  --  <3.00  --   --  121.53*   PCAL 4.79*  --   --   --   --   --  0.21  --    < > 0.65*    < > = values in this interval not displayed.        Temperature:  Temp (24hrs), Av.4  F (37.4  C), Min:98.8  F (37.1  C), Max:100.2  F (37.9  C)      Culture Results:   30-Day Micro Results       Collected Updated Procedure Result Status      10/27/2024 1249 10/27/2024 1254 MRSA MSSA PCR, Nasal Swab [[id number]]   Swab from Nares, Bilateral    In process Component Value   No component results            10/22/2024 2020 10/28/2024 0643 Blood Culture Arm, Left [[id number]]   Blood from Arm, Left    Final result Component Value   Culture No Growth                      Recommendations/Interventions:  1. MRSA nasal swab pending  2. Recommend ID consult for antibiotics  3. Renal function is improving so increased aztreonam to 1 g every 8  hours per renal dosing policy  4. Coadministration of linezolid and beta-2 agonists including albuterol and Trelegy (both ordered) may result in adverse cardiovascular effects characterized by hypertension. As a result, patient's blood pressure should be closely monitored.    Olamide Nolen, [facility]  October 28, 2024

## 2024-10-29 ENCOUNTER — APPOINTMENT (OUTPATIENT)
Dept: CT IMAGING | Facility: HOSPITAL | Age: 69
DRG: 872 | End: 2024-10-29
Attending: HOSPITALIST
Payer: COMMERCIAL

## 2024-10-29 ENCOUNTER — APPOINTMENT (OUTPATIENT)
Dept: ULTRASOUND IMAGING | Facility: HOSPITAL | Age: 69
DRG: 872 | End: 2024-10-29
Attending: HOSPITALIST
Payer: COMMERCIAL

## 2024-10-29 LAB
ALBUMIN SERPL BCG-MCNC: 3 G/DL (ref 3.5–5.2)
ALP SERPL-CCNC: 71 U/L (ref 40–150)
ALT SERPL W P-5'-P-CCNC: 30 U/L (ref 0–70)
ANION GAP SERPL CALCULATED.3IONS-SCNC: 16 MMOL/L (ref 7–15)
AST SERPL W P-5'-P-CCNC: 30 U/L (ref 0–45)
ATRIAL RATE - MUSE: 109 BPM
BILIRUB SERPL-MCNC: 0.4 MG/DL
BUN SERPL-MCNC: 63.9 MG/DL (ref 8–23)
CALCIUM SERPL-MCNC: 9.3 MG/DL (ref 8.8–10.4)
CHLORIDE SERPL-SCNC: 103 MMOL/L (ref 98–107)
CREAT SERPL-MCNC: 2.46 MG/DL (ref 0.67–1.17)
CRP SERPL-MCNC: 324.55 MG/L
DIASTOLIC BLOOD PRESSURE - MUSE: NORMAL MMHG
EGFRCR SERPLBLD CKD-EPI 2021: 28 ML/MIN/1.73M2
ERYTHROCYTE [DISTWIDTH] IN BLOOD BY AUTOMATED COUNT: 14.4 % (ref 10–15)
GLUCOSE BLDC GLUCOMTR-MCNC: 136 MG/DL (ref 70–99)
GLUCOSE BLDC GLUCOMTR-MCNC: 143 MG/DL (ref 70–99)
GLUCOSE BLDC GLUCOMTR-MCNC: 154 MG/DL (ref 70–99)
GLUCOSE BLDC GLUCOMTR-MCNC: 161 MG/DL (ref 70–99)
GLUCOSE BLDC GLUCOMTR-MCNC: 170 MG/DL (ref 70–99)
GLUCOSE SERPL-MCNC: 140 MG/DL (ref 70–99)
HCO3 SERPL-SCNC: 17 MMOL/L (ref 22–29)
HCT VFR BLD AUTO: 34.7 % (ref 40–53)
HGB BLD-MCNC: 11.1 G/DL (ref 13.3–17.7)
INTERPRETATION ECG - MUSE: NORMAL
MCH RBC QN AUTO: 30.3 PG (ref 26.5–33)
MCHC RBC AUTO-ENTMCNC: 32 G/DL (ref 31.5–36.5)
MCV RBC AUTO: 95 FL (ref 78–100)
P AXIS - MUSE: 63 DEGREES
PLATELET # BLD AUTO: 132 10E3/UL (ref 150–450)
POTASSIUM SERPL-SCNC: 4 MMOL/L (ref 3.4–5.3)
PR INTERVAL - MUSE: 150 MS
PROCALCITONIN SERPL IA-MCNC: 3.85 NG/ML
PROT SERPL-MCNC: 6.1 G/DL (ref 6.4–8.3)
QRS DURATION - MUSE: 96 MS
QT - MUSE: 346 MS
QTC - MUSE: 465 MS
R AXIS - MUSE: 28 DEGREES
RBC # BLD AUTO: 3.66 10E6/UL (ref 4.4–5.9)
SODIUM SERPL-SCNC: 136 MMOL/L (ref 135–145)
SYSTOLIC BLOOD PRESSURE - MUSE: NORMAL MMHG
T AXIS - MUSE: 69 DEGREES
VENTRICULAR RATE- MUSE: 109 BPM
WBC # BLD AUTO: 7.2 10E3/UL (ref 4–11)

## 2024-10-29 PROCEDURE — 85014 HEMATOCRIT: CPT | Performed by: INTERNAL MEDICINE

## 2024-10-29 PROCEDURE — 250N000013 HC RX MED GY IP 250 OP 250 PS 637: Performed by: HOSPITALIST

## 2024-10-29 PROCEDURE — 73700 CT LOWER EXTREMITY W/O DYE: CPT | Mod: RT,XS

## 2024-10-29 PROCEDURE — 250N000011 HC RX IP 250 OP 636: Performed by: INTERNAL MEDICINE

## 2024-10-29 PROCEDURE — 86140 C-REACTIVE PROTEIN: CPT | Performed by: INTERNAL MEDICINE

## 2024-10-29 PROCEDURE — 250N000012 HC RX MED GY IP 250 OP 636 PS 637: Performed by: INTERNAL MEDICINE

## 2024-10-29 PROCEDURE — 36415 COLL VENOUS BLD VENIPUNCTURE: CPT | Performed by: INTERNAL MEDICINE

## 2024-10-29 PROCEDURE — 99233 SBSQ HOSP IP/OBS HIGH 50: CPT | Performed by: HOSPITALIST

## 2024-10-29 PROCEDURE — 80053 COMPREHEN METABOLIC PANEL: CPT | Performed by: INTERNAL MEDICINE

## 2024-10-29 PROCEDURE — 73700 CT LOWER EXTREMITY W/O DYE: CPT | Mod: RT

## 2024-10-29 PROCEDURE — 250N000013 HC RX MED GY IP 250 OP 250 PS 637: Performed by: INTERNAL MEDICINE

## 2024-10-29 PROCEDURE — 250N000011 HC RX IP 250 OP 636: Performed by: HOSPITALIST

## 2024-10-29 PROCEDURE — 93971 EXTREMITY STUDY: CPT | Mod: RT

## 2024-10-29 PROCEDURE — 200N000001 HC R&B ICU

## 2024-10-29 PROCEDURE — 84145 PROCALCITONIN (PCT): CPT | Performed by: HOSPITALIST

## 2024-10-29 RX ORDER — CLINDAMYCIN PHOSPHATE 900 MG/50ML
900 INJECTION, SOLUTION INTRAVENOUS EVERY 8 HOURS
Status: DISCONTINUED | OUTPATIENT
Start: 2024-10-29 | End: 2024-11-01

## 2024-10-29 RX ORDER — AMLODIPINE BESYLATE 5 MG/1
5 TABLET ORAL DAILY
Status: DISCONTINUED | OUTPATIENT
Start: 2024-10-29 | End: 2024-10-31

## 2024-10-29 RX ADMIN — Medication 250 MG: at 10:40

## 2024-10-29 RX ADMIN — LEVOTHYROXINE SODIUM 75 MCG: 75 TABLET ORAL at 09:23

## 2024-10-29 RX ADMIN — MYCOPHENOLATE MOFETIL 1000 MG: 500 TABLET ORAL at 21:26

## 2024-10-29 RX ADMIN — ROSUVASTATIN CALCIUM 10 MG: 10 TABLET, FILM COATED ORAL at 21:14

## 2024-10-29 RX ADMIN — CLINDAMYCIN PHOSPHATE 900 MG: 900 INJECTION, SOLUTION INTRAVENOUS at 14:12

## 2024-10-29 RX ADMIN — ASPIRIN 81 MG: 81 TABLET, COATED ORAL at 21:15

## 2024-10-29 RX ADMIN — ACETAMINOPHEN 975 MG: 325 TABLET ORAL at 09:12

## 2024-10-29 RX ADMIN — CALCIUM CARBONATE (ANTACID) CHEW TAB 500 MG 2000 MG: 500 CHEW TAB at 10:40

## 2024-10-29 RX ADMIN — LINEZOLID 600 MG: 600 TABLET, FILM COATED ORAL at 21:15

## 2024-10-29 RX ADMIN — GABAPENTIN 300 MG: 300 CAPSULE ORAL at 09:17

## 2024-10-29 RX ADMIN — LINEZOLID 600 MG: 600 TABLET, FILM COATED ORAL at 09:20

## 2024-10-29 RX ADMIN — HYDROMORPHONE HYDROCHLORIDE 1 MG: 1 INJECTION, SOLUTION INTRAMUSCULAR; INTRAVENOUS; SUBCUTANEOUS at 12:52

## 2024-10-29 RX ADMIN — GABAPENTIN 300 MG: 300 CAPSULE ORAL at 21:14

## 2024-10-29 RX ADMIN — HEPARIN SODIUM 5000 UNITS: 5000 INJECTION, SOLUTION INTRAVENOUS; SUBCUTANEOUS at 14:12

## 2024-10-29 RX ADMIN — ONDANSETRON 4 MG: 2 INJECTION INTRAMUSCULAR; INTRAVENOUS at 09:22

## 2024-10-29 RX ADMIN — ACETAZOLAMIDE 500 MG: 500 CAPSULE, EXTENDED RELEASE ORAL at 09:20

## 2024-10-29 RX ADMIN — HYDROMORPHONE HYDROCHLORIDE 1 MG: 1 INJECTION, SOLUTION INTRAMUSCULAR; INTRAVENOUS; SUBCUTANEOUS at 08:53

## 2024-10-29 RX ADMIN — HEPARIN SODIUM 5000 UNITS: 5000 INJECTION, SOLUTION INTRAVENOUS; SUBCUTANEOUS at 21:28

## 2024-10-29 RX ADMIN — CLINDAMYCIN PHOSPHATE 900 MG: 900 INJECTION, SOLUTION INTRAVENOUS at 21:33

## 2024-10-29 RX ADMIN — AMLODIPINE BESYLATE 5 MG: 5 TABLET ORAL at 10:40

## 2024-10-29 RX ADMIN — INSULIN LISPRO 1 UNITS: 100 INJECTION, SOLUTION INTRAVENOUS; SUBCUTANEOUS at 10:37

## 2024-10-29 RX ADMIN — FUROSEMIDE 120 MG: 40 TABLET ORAL at 21:15

## 2024-10-29 RX ADMIN — ISOSORBIDE MONONITRATE 60 MG: 30 TABLET, EXTENDED RELEASE ORAL at 09:27

## 2024-10-29 RX ADMIN — HYDROMORPHONE HYDROCHLORIDE 1 MG: 1 INJECTION, SOLUTION INTRAMUSCULAR; INTRAVENOUS; SUBCUTANEOUS at 00:57

## 2024-10-29 RX ADMIN — AZTREONAM 1 G: 1 INJECTION, POWDER, LYOPHILIZED, FOR SOLUTION INTRAMUSCULAR; INTRAVENOUS at 05:00

## 2024-10-29 RX ADMIN — MYCOPHENOLATE MOFETIL 1000 MG: 500 TABLET ORAL at 09:24

## 2024-10-29 RX ADMIN — PREDNISONE 10 MG: 10 TABLET ORAL at 09:20

## 2024-10-29 RX ADMIN — ALLOPURINOL 300 MG: 300 TABLET ORAL at 09:19

## 2024-10-29 RX ADMIN — INSULIN LISPRO 1 UNITS: 100 INJECTION, SOLUTION INTRAVENOUS; SUBCUTANEOUS at 18:25

## 2024-10-29 RX ADMIN — HYDROMORPHONE HYDROCHLORIDE 1 MG: 1 INJECTION, SOLUTION INTRAMUSCULAR; INTRAVENOUS; SUBCUTANEOUS at 16:12

## 2024-10-29 RX ADMIN — HYDROMORPHONE HYDROCHLORIDE 1 MG: 1 INJECTION, SOLUTION INTRAMUSCULAR; INTRAVENOUS; SUBCUTANEOUS at 21:30

## 2024-10-29 RX ADMIN — ONDANSETRON 4 MG: 4 TABLET, ORALLY DISINTEGRATING ORAL at 00:52

## 2024-10-29 RX ADMIN — CLOPIDOGREL 75 MG: 75 TABLET ORAL at 09:17

## 2024-10-29 RX ADMIN — SPIRONOLACTONE 25 MG: 25 TABLET ORAL at 09:19

## 2024-10-29 RX ADMIN — Medication 250 MG: at 21:14

## 2024-10-29 RX ADMIN — EZETIMIBE 10 MG: 10 TABLET ORAL at 09:20

## 2024-10-29 NOTE — PROVIDER NOTIFICATION
"1045 - This writer notified hospitalist \"pt requested pain meds prior to going down to CT, but it'll be about an hour too soon for IV dilaudid. Could I get a one time dose or possible PO meds started other than tylenol?\"    1100 - provider changed dilaudid orders to q3 hrs. Also this writer updated provider in person that last two SBPs have been in the 160s while on 10 mcg/min of nitroglycerin. Okay to cycle through a couple more BPs before adjusting if needed.     1300 - provider updated on advanced redness from RLE up into right abd. Photo in pt's chart.     1400 - clarified if provider would like to initiate IV maintenance fluids.   Per provider - if pt is eating and drinking we won't start IV fluids.   "

## 2024-10-29 NOTE — PLAN OF CARE
Plan of Care Reviewed With: Patient    Overall Patient Progress:not progressing       Pt is A&O, Tmax:100.2. PRN Tylenol administered. RLE edematous. Redness exceeding boarders, warm to touch. PRN dilaudid x1 for discomfort. Nitroglycerin gtt infusing at 40mcg/min. HRR SR .  Using bedside urinal. Able to make needs known, call light within reach.    Face to face report given with opportunity to observe patient.    Report given to LILIANA Saleh RN   10/29/2024  7:34 AM h.

## 2024-10-29 NOTE — PLAN OF CARE
Plan of Care Reviewed With: Patient    Overall Patient Progress: Progressing       PIV x2. A&Ox4. Nitroglycerin drip off at 1122. Started 5 mg amlodipine daily. HRR 90-100s. SBPs 130-160s. Pain to RLE managed with IV dilaudid. Reports adequate relief. Right Abd/thigh outlined. Right pedal pulse via doppler with blanchable redness and 2-3+ edema. Nausea at start of shift with IV zofran administered. Complained of post nasal drainage from nose bleed on nights. Resolved throughout the day. PT to see tomorrow. Weight shifts independently in bed with RLE elevated on pillow. BGs 140-170s. Coverage administered with Humalog before meals.     Face to face report given with opportunity to observe patient.    Report given to LILIANA Palacio RN   10/29/2024  7:00 PM

## 2024-10-29 NOTE — PROGRESS NOTES
[facility]    Medicine Progress Note - Hospitalist Service    Date of Admission:  10/22/2024    Assessment & Plan   Right lower extremity cellulitis:  Chronic venous stasis and chronic lower extremity wound.  Sepsis  Elevated CRP   Elevated procal    Component      Latest Ref Rng 10/26/2024  6:49 AM 10/27/2024  6:43 AM 10/28/2024  5:43 AM 10/29/2024  4:38 AM   WBC      4.0 - 11.0 10e3/uL 11.4 (H)  7.3  6.6  7.2       Component      Latest Ref Rng 10/25/2024  5:31 AM 10/26/2024  6:49 AM 10/27/2024  6:43 AM 10/28/2024  5:43 AM 10/29/2024  4:38 AM   CRP Inflammation      <5.00 mg/L 402.69 (H)  280.20 (H)  238.08 (H)  272.53 (H)  324.55 (H)       Component      Latest Ref Rng 10/22/2024  5:45 PM 10/28/2024  5:43 AM   Procalcitonin      <0.50 ng/mL 0.21  4.79 (H)       ON admission Tmax 102.1, WBC 21.0,  Normal lactic acid    Ultrasound of right lower ext showing No evidence of right lower extremity deep venous thrombosis. Did have subcutaneous edema consistent with cellulitis. Due to his allergies he was started on Aztreonam and  and zyvox.    -10/24: Patient is on Zyvox and clindamycin empirically, will continue.  Blood cultures pending.  Fever curve stable as last 24 hours.  .  -10/25: Patient continues on Zyvox, clindamycin empirically.  Blood cultures no growth to date x 72 hours.  CRP remains elevated above 400, WBC slightly downtrending from 22 to 18.  Fever curve remains stable.  Will add aztreonam empirically for gram-negative coverage.  -10/26: Patient was on Zyvox, aztreonam, clinda stopped on 10/26.  CRP finally downtrending, 280 today.  WBC improving 17.9 to 11.4.  Fever curve stable.  Appearance of legs slowly improving.  Blood culture no growth to date.  -10/27 discussed with pharmacy will continue regimen.   On 10/28 he has had low grad fevers, cultures so far negative   Discussed with pharmacy his wbc has been normal now, CRP is up little but the erythema appears to be improving in his  right leg and he stated the pain is better. Will hold off on further adjustment of antibiotics and will discuss with ID tomorrow.  10/29 Discussed with Dr Guerrero ID recs for ct imaging to rule out abscess in right leg. Ct was done no air or abscess seen  Antibiotics were adjusted to remove aztreonam as gram negative coverage likely not adding anything and clindamycin will have more gram positive coverage. Discussed with Pharmacy   CRP did worsen today          Addendum  Updated patient and family about plan and care     Status post renal transplant  KAYLAN on CKD  stage IV       Component      Latest Ref Rng 10/24/2024  6:22 AM 10/25/2024  5:31 AM 10/26/2024  6:49 AM 10/27/2024  6:43 AM 10/28/2024  5:43 AM 10/29/2024  4:38 AM   Creatinine      0.67 - 1.17 mg/dL 4.10 (H)  3.87 (H)  3.34 (H)  2.84 (H)  2.32 (H)  2.46 (H)    Renal us done on 10/24 No mass or hydronephrosis in the transplanted kidney.   On CellCept and low-dose prednisone chronically, which will be continued.  GFR appears to be in the 20s chronically, this is stable currently.  -10/24: Creatinine increasing, 4.10 today.  Urine output is still adequate, over 1000 cc overnight. Avoiding nephrotoxic substances/medications.  Spoke with Dr. Mclaughlin, nephrology at [facility].  Recommended ultrasound of the transplanted kidney, CK level.  No current need to transfer to higher level of care, but will continue to monitor.  Patient had transplant approximately 24 years ago at [facility], however he has recently transferred his care to the [place].  -10/25: Creatinine stable at 3.87.  Urine output adequate.  -10/26: Creatinine improving, 3.34 today.  Urine output adequate.  Continue holding home diuretics.  -10/27  Daily labs  Avoid nephrotic meds  Out patient follow up with nephrology  -on 10/28 restart home lasix and diamox     Elevated troponin chronic  Component      Latest Ref Rng 10/28/2024  3:11 PM 10/28/2024  6:25 PM  "10/28/2024  8:25 PM   Troponin T, High Sensitivity      <=22 ng/L 96 (H)  95 (H)  94 (H)    No chest pain  EKG no ST elevation or depressions  Will repeat trop  Suspect  demand 2nd from HTN and infection    Epistaxis  resolved   Resolved with pressure  Ordered prn nasal saline  Hold heparin today  If has further bleeding may need Rhino rocket  Will avoid using afrin     Hyponatremia POA resolved     Insulin-dependent diabetes mellitus type 2:   -On home lantus 10 hs  -Sliding scale insulin      Essential hypertension:   -home lasix 120 mg at HS  -holding home diamox   -home imdur  -started amlodipine after discussing with transplant nephology Dr Plaza at the [facility]  -did move to ICU for nitroglycerin drip as BP was not improving and patient developed epistasis  -prn hydralazine   -prn coreg    Hyperlipemia  On statin      Obstructive sleep apnea:   Pulmonary hypertension.  -home CPAP   -home diamox    Hypothyroidism:   -home synthroid           Diet: Consistent Carbohydrate Diet Moderate Consistent Carb (60 g CHO per Meal) Diet    DVT Prophylaxis: Heparin SQ  Manley Catheter: Not present  Lines: None     Cardiac Monitoring: ACTIVE order. Indication: ICU  Code Status: Full Code      Clinically Significant Risk Factors               # Hypoalbuminemia: Lowest albumin = 3 g/dL at 10/29/2024  4:38 AM, will monitor as appropriate   # Thrombocytopenia: Lowest platelets = 120 in last 2 days, will monitor for bleeding   # Hypertension: Noted on problem list  # Chronic heart failure with preserved ejection fraction: heart failure noted on problem list and last echo with EF >50%          # Severe Obesity: Estimated body mass index is 48.18 kg/m  as calculated from the following:    Height as of this encounter: 1.676 m (5' 6\").    Weight as of this encounter: 135.4 kg (298 lb 8.1 oz).             Disposition Plan     Medically Ready for Discharge: Anticipated in 2-4 Days             Ivy Bruner DO  Hospitalist " Service  [facility]  Securely message with [employer] (more info)  Text page via [facility] Paging/Directory   ______________________________________________________________________    Interval History   Patient was seen this morning for medical rounds.   Patient was seen this morning we did discuss regarding his care and plan.  I did reach out to infectious disease regarding antibiotic selection and any further recommendation.  I did discuss with Dr. Guerrero who is infectious disease at [facility] and recommendation was for CT imaging of the right leg to make sure there is no abscess.  I did did go over the plan with the patient and his son was also present.  Patient has had low-grade fevers slightly decreased appetite and blood pressure is better controlled in ICU.  Denied any diarrhea no abdominal pain.  Physical Exam   Vital Signs: Temp: 100.3  F (37.9  C) Temp src: Tympanic BP: 133/79 Pulse: 101   Resp: 16 SpO2: (!) 86 % O2 Device: Nasal cannula Oxygen Delivery: 1 LPM  Weight: 298 lbs 8.05 oz    Physical Exam  Constitutional:       Appearance: He is obese.   HENT:      Right Ear: External ear normal.      Left Ear: External ear normal.      Nose: Nose normal.      Mouth/Throat:      Pharynx: Oropharynx is clear.   Eyes:      Conjunctiva/sclera: Conjunctivae normal.   Cardiovascular:      Rate and Rhythm: Normal rate.   Pulmonary:      Effort: Pulmonary effort is normal.   Abdominal:      General: Abdomen is flat.   Musculoskeletal:         General: Swelling present.   Skin:     Coloration: Skin is not jaundiced.      Findings: Erythema present.   Neurological:      Mental Status: He is alert. Mental status is at baseline.         Medical Decision Making       65 MINUTES SPENT BY ME on the date of service doing chart review, history, exam, documentation & further activities per the note.      Data     I have personally reviewed the following data over the past 24 hrs:    7.2  \   11.1 (L)   / 132 (L)     136 103  63.9 (H) /  143 (H)   4.0 17 (L) 2.46 (H) \     ALT: 30 AST: 30 AP: 71 TBILI: 0.4   ALB: 3.0 (L) TOT PROTEIN: 6.1 (L) LIPASE: N/A     Trop: 94 (H) BNP: 4,336 (H)     Procal: 3.85 (H) CRP: 324.55 (H) Lactic Acid: 1.2         Imaging results reviewed over the past 24 hrs:   Recent Results (from the past 24 hours)   US Lower Extremity Venous Duplex Right    Narrative    Exam:US LOWER EXTREMITY VENOUS DUPLEX RIGHT    History: Right leg pain and swelling    Comparisons: 10/22/2024    Technique: Venous duplex ultrasonography of the right lower extremity  was performed.     Findings: The common femoral vein, superficial femoral vein and  popliteal vein are fully compressible with spontaneous and augmentable  venous flow.           Impression    Impression: No evidence of deep venous thrombosis within the right  lower extremity.    AMBIKA HERRERA MD         SYSTEM ID:  [id number]

## 2024-10-29 NOTE — PROGRESS NOTES
10/29/24 1100   Appointment Info   Signing Clinician's Name / Credentials (PT) Elba Ramos DPT   Appointment Canceled Reason (PT) Medical status   Appointment Cancel Comments (PT) Holding PT per Dr. Bruner. Waiting for CT scan results

## 2024-10-29 NOTE — PHARMACY
Pharmacy Antimicrobial Stewardship Assessment     Current Antimicrobial Therapy:  Anti-infectives (From now, onward)      Start     Dose/Rate Route Frequency Ordered Stop    10/29/24 1330  clindamycin (CLEOCIN) 900 mg in 50 mL D5W intermittent infusion         900 mg  100 mL/hr over 30 Minutes Intravenous EVERY 8 HOURS 10/29/24 1255      10/22/24 2005  linezolid (ZYVOX) tablet 600 mg         600 mg Oral EVERY 12 HOURS SCHEDULED 10/22/24 2001              Indication: SSTI in setting of multiple antibiotic allergies/intolerances including cefepime, amoxicillin, vancomycin, and ciprofloxacin    Days of Therapy:         Pertinent Labs:    Recent labs: (last 7 days)     10/22/24  1745 10/22/24  2020 10/23/24  0602 10/26/24  0649 10/27/24  0643 10/28/24  0543 10/28/24  1511 10/29/24  0438   WBC 14.4*  --    < > 11.4* 7.3 6.6  --  7.2   LACT  --  1.0  --   --   --   --  1.2  --    PCAL 0.21  --   --   --   --  4.79*  --  3.85*    < > = values in this interval not displayed.       Temperature:  Temp (24hrs), Av.6  F (37.6  C), Min:97.7  F (36.5  C), Max:100.3  F (37.9  C)      Culture Results:   30-Day Micro Results       Collected Updated Procedure Result Status      10/27/2024 1249 10/28/2024 2012 MRSA MSSA PCR, Nasal Swab [[id number]]    Swab from Nares, Bilateral    Final result Component Value   MRSA Target DNA Negative   SA Target DNA Negative            10/22/2024 2020 10/28/2024 0643 Blood Culture Arm, Left [[id number]]   Blood from Arm, Left    Final result Component Value   Culture No Growth                   Recommendations/Interventions:  1. Recommend ID consult.    Kyleigh Dao, [facility]  2024

## 2024-10-29 NOTE — PROGRESS NOTES
"CLINICAL NUTRITION SERVICES  -  ASSESSMENT NOTE    REASON FOR ASSESSMENT:  LOS      NUTRITION HISTORY  Eduar Isaacs is a 69 year old male admitted for right lower extremity cellulitis. PMH includes CHF, CAD, HTN. Pulmonary hypertension, COPD, type 2 diabetes (A1C 5.8 in 6/2024), hypothyroidism, hx of kidney transplant. Weight history appears stable. Skips some meals, appetite varies.    Allergies     Allergies   Allergen Reactions    Cefepime Other (See Comments)     pain, kidney function off.    Codeine Shortness Of Breath    Niacin Itching and Rash    Amoxicillin Itching and Rash    Prilosec [Omeprazole] Itching and Rash    Vancomycin Hives and Rash    Aldactone [Spironolactone] Diarrhea     Patient reports he was having issues with higher dose. Is currently taking 25 mg every morning without issue.    Atorvastatin Muscle Pain (Myalgia)    Ciprofloxacin Rash     Tolerated Levaquin 6/2023 without any rash or other reaction    Semaglutide Diarrhea       CURRENT NUTRITION ORDERS  Diet Order:   Orders Placed This Encounter      Consistent Carbohydrate Diet Moderate Consistent Carb (60 g CHO per Meal) Diet  Current Intake/Tolerance: 7 meals documented with 100% intake. Mostly adequate    ANTHROPOMETRICS  Height: 5' 6\"  Weight: 298 lbs 8.05 oz  Body mass index is 48.18 kg/m .  Weight Status:  Obesity Grade III BMI >40  Weight History:   Wt Readings from Last 10 Encounters:   10/22/24 135.4 kg (298 lb 8.1 oz)- admit   10/18/24 137.9 kg (304 lb)   08/14/24 136.1 kg (300 lb)   04/29/24 132.8 kg (292 lb 12.8 oz)   04/19/24 130.6 kg (287 lb 14.4 oz)   12/01/23 130.5 kg (287 lb 12.8 oz)   06/26/23 131.5 kg (289 lb 14.5 oz)   06/01/23 140 kg (308 lb 10.3 oz)   02/24/23 143.2 kg (315 lb 9.6 oz)   01/27/23 137 kg (302 lb)        LABS  Labs reviewed    MEDICATIONS  Medications reviewed- lantus, sliding scale insulin, lasix    Malnutrition Diagnosis: Patient does not meet two of the criteria necessary for diagnosing " malnutrition    NUTRITION INTERVENTIONS  Recommendations / Nutrition Prescription  Encourage intake during meal times as needed    MONITORING AND EVALUATION:  Intake, weight, labs

## 2024-10-30 ENCOUNTER — APPOINTMENT (OUTPATIENT)
Dept: PHYSICAL THERAPY | Facility: HOSPITAL | Age: 69
DRG: 872 | End: 2024-10-30
Payer: COMMERCIAL

## 2024-10-30 LAB
ALBUMIN SERPL BCG-MCNC: 3 G/DL (ref 3.5–5.2)
ALP SERPL-CCNC: 70 U/L (ref 40–150)
ALT SERPL W P-5'-P-CCNC: 28 U/L (ref 0–70)
ANION GAP SERPL CALCULATED.3IONS-SCNC: 16 MMOL/L (ref 7–15)
AST SERPL W P-5'-P-CCNC: 26 U/L (ref 0–45)
BILIRUB SERPL-MCNC: 0.3 MG/DL
BUN SERPL-MCNC: 64.5 MG/DL (ref 8–23)
CALCIUM SERPL-MCNC: 9.2 MG/DL (ref 8.8–10.4)
CHLORIDE SERPL-SCNC: 99 MMOL/L (ref 98–107)
CREAT SERPL-MCNC: 2.61 MG/DL (ref 0.67–1.17)
CRP SERPL-MCNC: 348.3 MG/L
EGFRCR SERPLBLD CKD-EPI 2021: 26 ML/MIN/1.73M2
ERYTHROCYTE [DISTWIDTH] IN BLOOD BY AUTOMATED COUNT: 14.2 % (ref 10–15)
GLUCOSE BLDC GLUCOMTR-MCNC: 128 MG/DL (ref 70–99)
GLUCOSE BLDC GLUCOMTR-MCNC: 177 MG/DL (ref 70–99)
GLUCOSE BLDC GLUCOMTR-MCNC: 179 MG/DL (ref 70–99)
GLUCOSE BLDC GLUCOMTR-MCNC: 181 MG/DL (ref 70–99)
GLUCOSE SERPL-MCNC: 132 MG/DL (ref 70–99)
HCO3 SERPL-SCNC: 18 MMOL/L (ref 22–29)
HCT VFR BLD AUTO: 33.1 % (ref 40–53)
HGB BLD-MCNC: 10.6 G/DL (ref 13.3–17.7)
MCH RBC QN AUTO: 30.1 PG (ref 26.5–33)
MCHC RBC AUTO-ENTMCNC: 32 G/DL (ref 31.5–36.5)
MCV RBC AUTO: 94 FL (ref 78–100)
PLATELET # BLD AUTO: 121 10E3/UL (ref 150–450)
POTASSIUM SERPL-SCNC: 4.2 MMOL/L (ref 3.4–5.3)
PROCALCITONIN SERPL IA-MCNC: 3.37 NG/ML
PROT SERPL-MCNC: 6.1 G/DL (ref 6.4–8.3)
RBC # BLD AUTO: 3.52 10E6/UL (ref 4.4–5.9)
SODIUM SERPL-SCNC: 133 MMOL/L (ref 135–145)
WBC # BLD AUTO: 6.1 10E3/UL (ref 4–11)

## 2024-10-30 PROCEDURE — 250N000011 HC RX IP 250 OP 636: Performed by: INTERNAL MEDICINE

## 2024-10-30 PROCEDURE — 86140 C-REACTIVE PROTEIN: CPT | Performed by: INTERNAL MEDICINE

## 2024-10-30 PROCEDURE — 99233 SBSQ HOSP IP/OBS HIGH 50: CPT | Performed by: HOSPITALIST

## 2024-10-30 PROCEDURE — 250N000013 HC RX MED GY IP 250 OP 250 PS 637: Performed by: INTERNAL MEDICINE

## 2024-10-30 PROCEDURE — 80053 COMPREHEN METABOLIC PANEL: CPT | Performed by: INTERNAL MEDICINE

## 2024-10-30 PROCEDURE — 84145 PROCALCITONIN (PCT): CPT | Performed by: HOSPITALIST

## 2024-10-30 PROCEDURE — 85027 COMPLETE CBC AUTOMATED: CPT | Performed by: INTERNAL MEDICINE

## 2024-10-30 PROCEDURE — 36415 COLL VENOUS BLD VENIPUNCTURE: CPT | Performed by: INTERNAL MEDICINE

## 2024-10-30 PROCEDURE — 250N000011 HC RX IP 250 OP 636: Performed by: HOSPITALIST

## 2024-10-30 PROCEDURE — 97162 PT EVAL MOD COMPLEX 30 MIN: CPT | Mod: GP | Performed by: PHYSICAL THERAPIST

## 2024-10-30 PROCEDURE — 250N000009 HC RX 250: Performed by: PHYSICIAN ASSISTANT

## 2024-10-30 PROCEDURE — 250N000013 HC RX MED GY IP 250 OP 250 PS 637: Performed by: HOSPITALIST

## 2024-10-30 PROCEDURE — 250N000012 HC RX MED GY IP 250 OP 636 PS 637: Performed by: INTERNAL MEDICINE

## 2024-10-30 PROCEDURE — 200N000001 HC R&B ICU

## 2024-10-30 PROCEDURE — 31238 NSL/SINS NDSC SRG NSL HEMRRG: CPT | Mod: 50 | Performed by: OTOLARYNGOLOGY

## 2024-10-30 RX ADMIN — AMLODIPINE BESYLATE 5 MG: 5 TABLET ORAL at 10:25

## 2024-10-30 RX ADMIN — SPIRONOLACTONE 25 MG: 25 TABLET ORAL at 10:25

## 2024-10-30 RX ADMIN — PREDNISONE 10 MG: 10 TABLET ORAL at 10:27

## 2024-10-30 RX ADMIN — EZETIMIBE 10 MG: 10 TABLET ORAL at 10:24

## 2024-10-30 RX ADMIN — ONDANSETRON 4 MG: 2 INJECTION INTRAMUSCULAR; INTRAVENOUS at 09:22

## 2024-10-30 RX ADMIN — LINEZOLID 600 MG: 600 TABLET, FILM COATED ORAL at 20:56

## 2024-10-30 RX ADMIN — LINEZOLID 600 MG: 600 TABLET, FILM COATED ORAL at 10:24

## 2024-10-30 RX ADMIN — MYCOPHENOLATE MOFETIL 1000 MG: 500 TABLET ORAL at 20:56

## 2024-10-30 RX ADMIN — Medication 250 MG: at 20:56

## 2024-10-30 RX ADMIN — GABAPENTIN 300 MG: 300 CAPSULE ORAL at 10:25

## 2024-10-30 RX ADMIN — CLINDAMYCIN PHOSPHATE 900 MG: 900 INJECTION, SOLUTION INTRAVENOUS at 05:52

## 2024-10-30 RX ADMIN — CALCIUM CARBONATE (ANTACID) CHEW TAB 500 MG 2000 MG: 500 CHEW TAB at 10:25

## 2024-10-30 RX ADMIN — ACETAZOLAMIDE 500 MG: 500 CAPSULE, EXTENDED RELEASE ORAL at 10:27

## 2024-10-30 RX ADMIN — ISOSORBIDE MONONITRATE 60 MG: 30 TABLET, EXTENDED RELEASE ORAL at 10:24

## 2024-10-30 RX ADMIN — ROSUVASTATIN CALCIUM 10 MG: 10 TABLET, FILM COATED ORAL at 21:02

## 2024-10-30 RX ADMIN — CLINDAMYCIN PHOSPHATE 900 MG: 900 INJECTION, SOLUTION INTRAVENOUS at 13:36

## 2024-10-30 RX ADMIN — ALLOPURINOL 300 MG: 300 TABLET ORAL at 10:25

## 2024-10-30 RX ADMIN — Medication 1 SPRAY: at 13:24

## 2024-10-30 RX ADMIN — FUROSEMIDE 120 MG: 40 TABLET ORAL at 21:02

## 2024-10-30 RX ADMIN — HYDROMORPHONE HYDROCHLORIDE 1 MG: 1 INJECTION, SOLUTION INTRAMUSCULAR; INTRAVENOUS; SUBCUTANEOUS at 16:50

## 2024-10-30 RX ADMIN — HYDROMORPHONE HYDROCHLORIDE 1 MG: 1 INJECTION, SOLUTION INTRAMUSCULAR; INTRAVENOUS; SUBCUTANEOUS at 06:01

## 2024-10-30 RX ADMIN — LEVOTHYROXINE SODIUM 75 MCG: 75 TABLET ORAL at 10:24

## 2024-10-30 RX ADMIN — CLINDAMYCIN PHOSPHATE 900 MG: 900 INJECTION, SOLUTION INTRAVENOUS at 20:56

## 2024-10-30 RX ADMIN — HEPARIN SODIUM 5000 UNITS: 5000 INJECTION, SOLUTION INTRAVENOUS; SUBCUTANEOUS at 05:51

## 2024-10-30 RX ADMIN — Medication 250 MG: at 10:24

## 2024-10-30 RX ADMIN — HYDROMORPHONE HYDROCHLORIDE 1 MG: 1 INJECTION, SOLUTION INTRAMUSCULAR; INTRAVENOUS; SUBCUTANEOUS at 20:56

## 2024-10-30 RX ADMIN — Medication 125 MCG: at 10:24

## 2024-10-30 RX ADMIN — GABAPENTIN 300 MG: 300 CAPSULE ORAL at 20:56

## 2024-10-30 RX ADMIN — HYDROMORPHONE HYDROCHLORIDE 1 MG: 1 INJECTION, SOLUTION INTRAMUSCULAR; INTRAVENOUS; SUBCUTANEOUS at 09:22

## 2024-10-30 RX ADMIN — INSULIN LISPRO 1 UNITS: 100 INJECTION, SOLUTION INTRAVENOUS; SUBCUTANEOUS at 18:32

## 2024-10-30 RX ADMIN — ACETAMINOPHEN 975 MG: 325 TABLET ORAL at 13:40

## 2024-10-30 RX ADMIN — HYDROMORPHONE HYDROCHLORIDE 1 MG: 1 INJECTION, SOLUTION INTRAMUSCULAR; INTRAVENOUS; SUBCUTANEOUS at 13:40

## 2024-10-30 RX ADMIN — MYCOPHENOLATE MOFETIL 1000 MG: 500 TABLET ORAL at 10:25

## 2024-10-30 NOTE — PLAN OF CARE
Plan of Care Reviewed With: Patient     Overall Patient Progress:progressing    Pt is A&O, VS and assessment as charted, afebrile, HRR, -150's. PRN dilaudid x2 for RLE discomfort. On RA. Denies nausea. RLE warm to touch, blanchable redness remains within in boarders. PIV's saline locked. Using bedside urinal, able to make needs known, call light within reach.       Face to face report given with opportunity to observe patient.    Report given to LILIANA Saleh RN   10/30/2024  7:25 AM

## 2024-10-30 NOTE — CONSULTS
Otolaryngology Consultation    Patient: Eduar Isaacs  : 1955    Chief complaint of epistaxis referral from Dr. Bruner    HPI:  Eduar Isaacs is a 69 year old male seen today for epistaxis. He reports chronic hx of epistaxis L>R, but today it was his right side. He has significant medical hx of transplant, immunosuppressant, HTN and chronic anticoagulation    Generally chronic nosebleeds at home and able to stop with packing/ saline/ Bactroban.   He has hx of Plavix and ASA use  Hx of nasal cauterization years ago.     Onset- chronic  Worse while inpatient  He had a severe epistaxis last year which required a rocket.   No regular use of nasal saline  No recent nasal trauma or injury.  No prior nasal or sinus surgery.     Plavix currently held    No current outpatient medications on file.       Allergies: Cefepime, Codeine, Niacin, Amoxicillin, Prilosec [omeprazole], Vancomycin, Aldactone [spironolactone], Atorvastatin, Ciprofloxacin, and Semaglutide     Past Medical History:   Diagnosis Date    Acute on chronic systolic congestive heart failure (H) 2023    Acute renal failure, unspecified acute renal failure type (H) 2023    Anemia of renal disease 2023    Arthritis     Asymptomatic hyperuricemia 10/19/2015    Cardiomyopathy, secondary (H) 2023    Chronic obstructive pulmonary disease, unspecified COPD type (H) 2023    Congenital contracture of gastrocnemius 2009    Congestive heart failure (H)     COPD (chronic obstructive pulmonary disease) (H)     Coronary artery disease involving native coronary artery 2022    Coronary atherosclerosis 10/21/2016    Diabetes (H)     Dyslipidemia 10/14/2010    Gram-negative sepsis with organ dysfunction (H) 2023    Hallux rigidus 2009    Hallux varus 2009    History of kidney transplant 10/14/2010    Hx of gout 2023    Hypertension     Hyponatremia 2022    Hypophosphatemia 2014     "Hypothyroidism, unspecified type 06/19/2023    Immunocompromised due to corticosteroids (H) 05/31/2023    Immunosuppressive management encounter following kidney transplant 10/19/2009    Long term current use of systemic steroids 10/19/2015    Metatarsus adductus 04/20/2009    Morbid obesity (H) 11/12/2022    Myocardial infarction (H)     Neuromuscular disorder (H)     JYOTSNA (obstructive sleep apnea) 11/12/2022    Pulmonary hypertension (H) 06/19/2023    Secondary renal hyperparathyroidism (H) 05/06/2014    Formatting of this note might be different from the original.  IMO Update      Sepsis, due to unspecified organism, unspecified whether acute organ dysfunction present (H) 05/31/2023    Septic shock (H) 06/01/2023    Severe episode of recurrent major depressive disorder, without psychotic features (H) 06/19/2023    Stented coronary artery 05/20/2015    Urinary tract infection without hematuria, site unspecified 06/25/2023       Past Surgical History:   Procedure Laterality Date    CARDIAC SURGERY      COLONOSCOPY - HIM SCAN  04/08/2012    [facility], polyps, adenomatous    ORTHOPEDIC SURGERY      TRANSPLANT  10/25/2000    Kidney at [facility]       ENT family history reviewed    Social History     Tobacco Use    Smoking status: Former     Types: Cigarettes    Smokeless tobacco: Never    Tobacco comments:     Quit 1998   Substance Use Topics    Alcohol use: Yes     Comment: rarely hear and there    Drug use: Never       Review of Systems  ROS: 10 point ROS neg other than the symptoms noted above in the HPI and lower extremity pain    Physical Exam  /85   Pulse 103   Temp 99.6  F (37.6  C) (Tympanic)   Resp 18   Ht 1.676 m (5' 6\")   Wt 142 kg (313 lb 0.9 oz)   SpO2 92%   BMI 50.53 kg/m    General - The patient is well nourished and well developed, and appears to have good nutritional status.  Alert and oriented to person and place, answers questions and cooperates with examination appropriately.   In " hospital bed.   Head and Face - Normocephalic and atraumatic, with no gross asymmetry noted.  The facial nerve is intact, with strong symmetric movements.  Voice and Breathing - The patient was breathing comfortably without the use of accessory muscles. There was no wheezing, stridor, or stertor.  The patients voice was clear and strong, and had appropriate pitch and quality.  Ears -The external auditory canals are patent, the tympanic membranes are intact without effusion, retraction or mass.  Bony landmarks are intact.  Eyes - Extraocular movements intact, and the pupils were reactive to light.  Sclera were not icteric or injected, conjunctiva were pink and moist.  Mouth - Examination of the oral cavity showed pink, healthy oral mucosa. No lesions or ulcerations noted.  The tongue was mobile and midline, and the dentition were in good condition.    Throat - The walls of the oropharynx were smooth, pink, moist, symmetric, and had no lesions or ulcerations.  The tonsillar pillars and soft palate were symmetric.  The uvula was midline on elevation.    Posterior oropharynx with scant dried blood.  Grade 2 symmetric tonsils no mass  Neck obese limited exam.  Normal midline excursion of the laryngotracheal complex during swallowing.  Full range of motion on passive movement.  Palpation of the occipital, submental, submandibular, internal jugular chain, and supraclavicular nodes did not demonstrate any david abnormal lymph nodes or masses.  Palpation of the thyroid was soft and smooth, with no large palpable fixed nodules or goiter appreciated.  The trachea was mobile and midline.  Nose - External contour is symmetric, no gross deflection or scars.  Nasal mucosa is pink and moist with no abnormal mucus.  The septum and turbinates were evaluated: Septum is grossly midline.  Dried blood left scant oozing right inferior meatus.  No polyps, masses, or purulence noted on examination.    PROCEDURE: Nasal endoscopy and cautery  with packing    To evaluate the nose and sinuses, I performed rigid nasal endoscopy.  I applied topical nasal lidocaine and neosynephrine.    I began with the LEFT side using a 0 degree rigid nasal endoscope, and then similarly examined the RIGHT side    Findings:  Inferior turbinates:  2+  Septum midline intact  Eyes an 8 Carney to remove a fresh clot from the right inferior meatus.  Oozing at right Kiesselbach's plexus controlled with silver nitrate.  The remainder of the nasal exam was negative for mass or tumor the nasopharynx is clear eustachian tubes are patent there is no nasopharyngeal mass.  I then turned my attention to the left side and used suction and Blakesley to remove dried clots.  No active bleeding on the left.  No purulence or polyposis bilaterally.  NasoPore soaked in saline was placed into the right nares.  Hemostasis is adequate he tolerated the procedure well.    The patient tolerated the procedure well      Impression and Plan- Eduar Isaacs is a 69 year old male with:   Bilateral anterior epistaxis, currently right, controlled with cautery and packing  Chronic anticoagulation      Nasal saline, Ocean spray at the bedside.  I told can to spray both nostrils with 2 sprays every hour while he is awake no nose blowing.  Follow-up with Sondra or Evonne in 1 week for nasopore removal.  Keep packing in place and irrigate with packing in place.     Restart plavix in 3 days, sooner if necessary per Dr. Zohreh Aj D.O.  Otolaryngology/Head and Neck Surgery  Allergy

## 2024-10-30 NOTE — PROGRESS NOTES
[facility]    Medicine Progress Note - Hospitalist Service    Date of Admission:  10/22/2024    Assessment & Plan   Right lower extremity cellulitis:  Chronic venous stasis and chronic lower extremity wound.  Sepsis  Elevated CRP   Elevated procal    Component      Latest Ref Rng 10/26/2024  6:49 AM 10/27/2024  6:43 AM 10/28/2024  5:43 AM 10/29/2024  4:38 AM   WBC      4.0 - 11.0 10e3/uL 11.4 (H)  7.3  6.6  7.2       Component      Latest Ref Rng 10/25/2024  5:31 AM 10/26/2024  6:49 AM 10/27/2024  6:43 AM 10/28/2024  5:43 AM 10/29/2024  4:38 AM   CRP Inflammation      <5.00 mg/L 402.69 (H)  280.20 (H)  238.08 (H)  272.53 (H)  324.55 (H)      Component      Latest Ref Rng 10/30/2024  4:40 AM   CRP Inflammation      <5.00 mg/L 348.30 (H)      Component      Latest Ref Rng 10/28/2024  5:43 AM 10/29/2024  4:38 AM 10/30/2024  4:40 AM   Procalcitonin      <0.50 ng/mL 4.79 (H)  3.85 (H)  3.37 (H)          ON admission Tmax 102.1, WBC 21.0,  Normal lactic acid    Ultrasound of right lower ext showing No evidence of right lower extremity deep venous thrombosis. Did have subcutaneous edema consistent with cellulitis. Due to his allergies he was started on Aztreonam and  and zyvox.    -10/24: Patient is on Zyvox and clindamycin empirically, will continue.  Blood cultures pending.  Fever curve stable as last 24 hours.  .  -10/25: Patient continues on Zyvox, clindamycin empirically.  Blood cultures no growth to date x 72 hours.  CRP remains elevated above 400, WBC slightly downtrending from 22 to 18.  Fever curve remains stable.  Will add aztreonam empirically for gram-negative coverage.  -10/26: Patient was on Zyvox, aztreonam, clinda stopped on 10/26.  CRP finally downtrending, 280 today.  WBC improving 17.9 to 11.4.  Fever curve stable.  Appearance of legs slowly improving.  Blood culture no growth to date.  -10/27 discussed with pharmacy will continue regimen.   On 10/28 he has had low grad fevers,  cultures so far negative   Discussed with pharmacy his wbc has been normal now, CRP is up little but the erythema appears to be improving in his right leg and he stated the pain is better. Will hold off on further adjustment of antibiotics and will discuss with ID tomorrow.  10/29 Discussed with Dr Yolanda FORTUNE recs for ct imaging to rule out abscess in right leg. Ct was done no air or abscess seen  Antibiotics were adjusted to remove aztreonam as gram negative coverage likely not adding anything and clindamycin will have more gram positive coverage. Discussed with Pharmacy   CRP did worsen   10/30 erythema right thigh has improve.  Hemodynamically stable. Low grade fever. Procal down trending.           Addendum  Updated patient and family about plan and care     Status post renal transplant  KAYLAN on CKD  stage IV       Component      Latest Ref Rng 10/27/2024  6:43 AM 10/28/2024  5:43 AM 10/29/2024  4:38 AM 10/30/2024  4:40 AM   Creatinine      0.67 - 1.17 mg/dL 2.84 (H)  2.32 (H)  2.46 (H)  2.61 (H)       Renal us done on 10/24 No mass or hydronephrosis in the transplanted kidney.   On CellCept and low-dose prednisone chronically, which will be continued.  GFR appears to be in the 20s chronically, this is stable currently.  -10/24: Creatinine increasing, 4.10 today.  Urine output is still adequate, over 1000 cc overnight. Avoiding nephrotoxic substances/medications.  Spoke with Dr. Mclaughlin, nephrology at [facility].  Recommended ultrasound of the transplanted kidney, CK level.  No current need to transfer to higher level of care, but will continue to monitor.  Patient had transplant approximately 24 years ago at [facility], however he has recently transferred his care to the [place].  -10/25: Creatinine stable at 3.87.  Urine output adequate.  -10/26: Creatinine improving, 3.34 today.  Urine output adequate.  Continue holding home diuretics.  -10/27  Daily labs  Avoid nephrotic  meds  Out patient follow up with nephrology  -on 10/28 restart home lasix and diamox   -10/30 making urine     Elevated troponin chronic  Component      Latest Ref Rng 10/28/2024  3:11 PM 10/28/2024  6:25 PM 10/28/2024  8:25 PM   Troponin T, High Sensitivity      <=22 ng/L 96 (H)  95 (H)  94 (H)    No chest pain  EKG no ST elevation or depressions  Will repeat trop  Suspect  demand 2nd from HTN and infection    Epistaxis  resolved   Resolved with pressure  Ordered prn nasal saline  Hold heparin today  If has further bleeding may need Rhino rocket  Will avoid using afrin     On 10/30 I did consult with ENT and he did have cautery and packing   ENT recs for   Nasal saline, Ocean spray at the bedside.  I told can to spray both nostrils with 2 sprays every hour while he is awake no nose blowing.  Follow-up with Sondra or Evonne in 1 week for nasopore removal.  Keep packing in place and irrigate with packing in place.     Hyponatremia POA resolved     Insulin-dependent diabetes mellitus type 2:   -On home lantus 10 hs  -Sliding scale insulin      Essential hypertension:   -home lasix 120 mg at HS  -holding home diamox   -home imdur  -started amlodipine after discussing with transplant nephology Dr Plaza at the [facility]  -did move to ICU for nitroglycerin drip as BP was not improving and patient developed epistasis  -prn hydralazine   -prn coreg    Hyperlipemia  On statin      Obstructive sleep apnea:   Pulmonary hypertension.  -home CPAP   -home diamox    Hypothyroidism:   -home synthroid           Diet: Consistent Carbohydrate Diet Moderate Consistent Carb (60 g CHO per Meal) Diet    DVT Prophylaxis: Pneumatic Compression Devices  Manley Catheter: Not present  Lines: None     Cardiac Monitoring: ACTIVE order. Indication: ICU  Code Status: Full Code      Clinically Significant Risk Factors         # Hyponatremia: Lowest Na = 133 mmol/L in last 2 days, will monitor as appropriate       # Hypoalbuminemia: Lowest albumin = 3  "g/dL at 10/30/2024  4:40 AM, will monitor as appropriate   # Thrombocytopenia: Lowest platelets = 121 in last 2 days, will monitor for bleeding   # Hypertension: Noted on problem list  # Chronic heart failure with preserved ejection fraction: heart failure noted on problem list and last echo with EF >50%          # Severe Obesity: Estimated body mass index is 50.53 kg/m  as calculated from the following:    Height as of this encounter: 1.676 m (5' 6\").    Weight as of this encounter: 142 kg (313 lb 0.9 oz).             Disposition Plan     Medically Ready for Discharge: Anticipated in 2-4 Days             Ivy Bruner DO  Hospitalist Service  [facility]  Securely message with [employer] (more info)  Text page via [employer] Paging/Directory   ______________________________________________________________________    Interval History   Patient was seen this morning for medical rounds.  Patient did have a nosebleed I did discuss his care with ENT.  Also he denies chest pain shortness of breath no abdominal pain.  She did have low-grade fever received Tylenol.  Discussed with him regarding plan of care.    Physical Exam   Vital Signs: Temp: 100.1  F (37.8  C) Temp src: Tympanic BP: 125/70 Pulse: 97   Resp: 18 SpO2: (!) 90 % O2 Device: None (Room air) Oxygen Delivery: 1 LPM  Weight: 313 lbs .85 oz    Physical Exam  Constitutional:       Appearance: He is obese.   HENT:      Right Ear: External ear normal.      Left Ear: External ear normal.      Nose: Nose normal.      Mouth/Throat:      Pharynx: Oropharynx is clear.   Eyes:      Conjunctiva/sclera: Conjunctivae normal.   Cardiovascular:      Rate and Rhythm: Normal rate.   Pulmonary:      Effort: Pulmonary effort is normal.   Abdominal:      General: Abdomen is flat.   Musculoskeletal:         General: Swelling present.   Skin:     Coloration: Skin is not jaundiced.      Findings: Erythema present.   Neurological:      Mental Status: He is alert. Mental " status is at baseline.         Medical Decision Making       65 MINUTES SPENT BY ME on the date of service doing chart review, history, exam, documentation & further activities per the note.      Data     I have personally reviewed the following data over the past 24 hrs:    6.1  \   10.6 (L)   / 121 (L)     133 (L) 99 64.5 (H) /  181 (H)   4.2 18 (L) 2.61 (H) \     ALT: 28 AST: 26 AP: 70 TBILI: 0.3   ALB: 3.0 (L) TOT PROTEIN: 6.1 (L) LIPASE: N/A     Procal: 3.37 (H) CRP: 348.30 (H) Lactic Acid: N/A         Imaging results reviewed over the past 24 hrs:   No results found for this or any previous visit (from the past 24 hours).

## 2024-10-30 NOTE — PROGRESS NOTES
10/30/24 1031   Appointment Info   Signing Clinician's Name / Credentials (PT) Tammi Nichols DPT   Living Environment   People in Home alone   Current Living Arrangements house   Home Accessibility no concerns   Transportation Anticipated car, drives self   Living Environment Comments Ramp to enter, no steps inside   Self-Care   Usual Activity Tolerance good   Current Activity Tolerance poor   Fall history within last six months no   Activity/Exercise/Self-Care Comment Pt reports he is independent with ADLs at baseline.  Pt ambulates without AD but states he does have access to a FWW or 4WW if needed   General Information   Onset of Illness/Injury or Date of Surgery 10/28/24   Referring Physician Dr. Norton   Patient/Family Therapy Goals Statement (PT) to improve pain - go home   Pertinent History of Current Problem (include personal factors and/or comorbidities that impact the POC) Pt admitted with R LE cellulitis   Cognition   Affect/Mental Status (Cognition) WFL   Orientation Status (Cognition) oriented x 3   Follows Commands (Cognition) WFL   Pain Assessment   Patient Currently in Pain Yes, see Vital Sign flowsheet  (4/10 at rest, increased significantly with activity)   Integumentary/Edema   Integumentary/Edema Comments significant redness noted to R LE   Posture    Posture Forward head position   Range of Motion (ROM)   Range of Motion ROM is WFL   Strength (Manual Muscle Testing)   Strength (Manual Muscle Testing) Deficits observed during functional mobility   Strength Comments unable to MMT R LE due to pain,   Bed Mobility   Bed Mobility supine-sit   Supine-Sit Triplett (Bed Mobility) supervision   Bed Mobility Limitations decreased ability to use legs for bridging/pushing   Transfers   Transfers sit-stand transfer   Sit-Stand Transfer   Sit-Stand Triplett (Transfers) contact guard   Assistive Device (Sit-Stand Transfers) walker, front-wheeled   Comment, (Sit-Stand Transfer) significant increase  in pain   Gait/Stairs (Locomotion)   [place] Level (Gait) contact guard   Assistive Device (Gait) walker, front-wheeled   Distance in Feet (Gait) 3' bed>chair   Balance   Balance Comments mild impairment due to pain and poor tolerance   Clinical Impression   Criteria for Skilled Therapeutic Intervention Yes, treatment indicated   PT Diagnosis (PT) gait disturbance   Influenced by the following impairments pain, decreased activity tolerance, decreased balance   Functional limitations due to impairments decreased tolerance for functional mobility and ADLs   Clinical Presentation (PT Evaluation Complexity) evolving   Clinical Presentation Rationale clinical judgement   Clinical Decision Making (Complexity) moderate complexity   Planned Therapy Interventions (PT) balance training;bed mobility training;gait training;neuromuscular re-education;patient/family education;stair training;strengthening;transfer training;progressive activity/exercise;risk factor education   Risk & Benefits of therapy have been explained evaluation/treatment results reviewed;care plan/treatment goals reviewed;risks/benefits reviewed;participants included;patient   Clinical Impression Comments PT evaluation completed.  Pt lives at home alone and was independent at baseline.  Pt's activity largely limited by pain at this time, only able to tolerate bed>chair transfer at this time.  Discharge disposition undetermined at this time.  Anticipate if pt's pain improves he will be able to return home but will need to continue to assess in coming days.  Will see during acute care stay to advance activity and continue to assist with discharge planning.   PT Total Evaluation Time   PT Trace Moderate Complexity Minutes (32286) 17   Physical Therapy Goals   PT Frequency 6x/week   PT Predicted Duration/Target Date for Goal Attainment 11/13/24   PT Goals Bed Mobility;Transfers;Gait   PT: Bed Mobility Modified independent;Supine to/from sit   PT: Transfers  Modified independent;Sit to/from stand;Bed to/from chair;Assistive device   PT: Gait Modified independent;Rolling walker;100 feet   PT Discharge Planning   PT Plan Progress mobility as tolerated   PT Discharge Recommendation (DC Rec) home;home with home care physical therapy;Transitional Care Facility   PT Rationale for DC Rec PT evaluation completed. Pt lives at home alone and was independent at baseline. Pt's activity largely limited by pain at this time, only able to tolerate bed>chair transfer at this time. Discharge disposition undetermined at this time. Anticipate if pt's pain improves he will be able to return home but will need to continue to assess in coming days. Will see during acute care stay to advance activity and continue to assist with discharge planning.   PT Brief overview of current status sup>sit SBA, sit>stand CGA, bed>chair with FWW CGA   Physical Therapy Time and Intention   Total Session Time (sum of timed and untimed services) 17

## 2024-10-31 ENCOUNTER — APPOINTMENT (OUTPATIENT)
Dept: PHYSICAL THERAPY | Facility: HOSPITAL | Age: 69
DRG: 872 | End: 2024-10-31
Payer: COMMERCIAL

## 2024-10-31 LAB
ALBUMIN SERPL BCG-MCNC: 2.9 G/DL (ref 3.5–5.2)
ALP SERPL-CCNC: 70 U/L (ref 40–150)
ALT SERPL W P-5'-P-CCNC: 32 U/L (ref 0–70)
ANION GAP SERPL CALCULATED.3IONS-SCNC: 18 MMOL/L (ref 7–15)
AST SERPL W P-5'-P-CCNC: 31 U/L (ref 0–45)
BILIRUB SERPL-MCNC: 0.2 MG/DL
BUN SERPL-MCNC: 70.2 MG/DL (ref 8–23)
CALCIUM SERPL-MCNC: 9.3 MG/DL (ref 8.8–10.4)
CHLORIDE SERPL-SCNC: 97 MMOL/L (ref 98–107)
CREAT SERPL-MCNC: 2.94 MG/DL (ref 0.67–1.17)
CRP SERPL-MCNC: 307.49 MG/L
EGFRCR SERPLBLD CKD-EPI 2021: 23 ML/MIN/1.73M2
ERYTHROCYTE [DISTWIDTH] IN BLOOD BY AUTOMATED COUNT: 14 % (ref 10–15)
GLUCOSE BLDC GLUCOMTR-MCNC: 130 MG/DL (ref 70–99)
GLUCOSE BLDC GLUCOMTR-MCNC: 145 MG/DL (ref 70–99)
GLUCOSE BLDC GLUCOMTR-MCNC: 148 MG/DL (ref 70–99)
GLUCOSE BLDC GLUCOMTR-MCNC: 153 MG/DL (ref 70–99)
GLUCOSE BLDC GLUCOMTR-MCNC: 189 MG/DL (ref 70–99)
GLUCOSE SERPL-MCNC: 126 MG/DL (ref 70–99)
HCO3 SERPL-SCNC: 19 MMOL/L (ref 22–29)
HCT VFR BLD AUTO: 34.1 % (ref 40–53)
HGB BLD-MCNC: 10.8 G/DL (ref 13.3–17.7)
MAGNESIUM SERPL-MCNC: 1.8 MG/DL (ref 1.7–2.3)
MCH RBC QN AUTO: 30.3 PG (ref 26.5–33)
MCHC RBC AUTO-ENTMCNC: 31.7 G/DL (ref 31.5–36.5)
MCV RBC AUTO: 96 FL (ref 78–100)
PLATELET # BLD AUTO: 132 10E3/UL (ref 150–450)
POTASSIUM SERPL-SCNC: 4.1 MMOL/L (ref 3.4–5.3)
PROT SERPL-MCNC: 6.1 G/DL (ref 6.4–8.3)
RBC # BLD AUTO: 3.56 10E6/UL (ref 4.4–5.9)
SODIUM SERPL-SCNC: 134 MMOL/L (ref 135–145)
WBC # BLD AUTO: 7.2 10E3/UL (ref 4–11)

## 2024-10-31 PROCEDURE — 82947 ASSAY GLUCOSE BLOOD QUANT: CPT | Performed by: INTERNAL MEDICINE

## 2024-10-31 PROCEDURE — 200N000001 HC R&B ICU

## 2024-10-31 PROCEDURE — 250N000013 HC RX MED GY IP 250 OP 250 PS 637: Performed by: INTERNAL MEDICINE

## 2024-10-31 PROCEDURE — 250N000013 HC RX MED GY IP 250 OP 250 PS 637: Performed by: HOSPITALIST

## 2024-10-31 PROCEDURE — 99233 SBSQ HOSP IP/OBS HIGH 50: CPT | Performed by: HOSPITALIST

## 2024-10-31 PROCEDURE — 97110 THERAPEUTIC EXERCISES: CPT | Mod: GP

## 2024-10-31 PROCEDURE — 97530 THERAPEUTIC ACTIVITIES: CPT | Mod: GP

## 2024-10-31 PROCEDURE — 250N000011 HC RX IP 250 OP 636: Performed by: INTERNAL MEDICINE

## 2024-10-31 PROCEDURE — 250N000011 HC RX IP 250 OP 636: Performed by: HOSPITALIST

## 2024-10-31 PROCEDURE — 250N000012 HC RX MED GY IP 250 OP 636 PS 637: Performed by: INTERNAL MEDICINE

## 2024-10-31 PROCEDURE — 86140 C-REACTIVE PROTEIN: CPT | Performed by: INTERNAL MEDICINE

## 2024-10-31 PROCEDURE — 85018 HEMOGLOBIN: CPT | Performed by: INTERNAL MEDICINE

## 2024-10-31 PROCEDURE — 83735 ASSAY OF MAGNESIUM: CPT | Performed by: HOSPITALIST

## 2024-10-31 PROCEDURE — 36415 COLL VENOUS BLD VENIPUNCTURE: CPT | Performed by: INTERNAL MEDICINE

## 2024-10-31 RX ORDER — CLONIDINE HYDROCHLORIDE 0.1 MG/1
0.1 TABLET ORAL 3 TIMES DAILY
Status: DISCONTINUED | OUTPATIENT
Start: 2024-10-31 | End: 2024-11-05 | Stop reason: HOSPADM

## 2024-10-31 RX ORDER — AMLODIPINE BESYLATE 5 MG/1
10 TABLET ORAL DAILY
Status: DISCONTINUED | OUTPATIENT
Start: 2024-11-01 | End: 2024-10-31

## 2024-10-31 RX ORDER — LIDOCAINE 4 G/G
2 PATCH TOPICAL
Status: DISCONTINUED | OUTPATIENT
Start: 2024-10-31 | End: 2024-11-05 | Stop reason: HOSPADM

## 2024-10-31 RX ORDER — FUROSEMIDE 40 MG/1
80 TABLET ORAL AT BEDTIME
Status: DISCONTINUED | OUTPATIENT
Start: 2024-10-31 | End: 2024-11-05 | Stop reason: HOSPADM

## 2024-10-31 RX ORDER — ROSUVASTATIN CALCIUM 10 MG/1
40 TABLET, COATED ORAL AT BEDTIME
Status: DISCONTINUED | OUTPATIENT
Start: 2024-10-31 | End: 2024-11-05 | Stop reason: HOSPADM

## 2024-10-31 RX ORDER — AMLODIPINE BESYLATE 10 MG/1
10 TABLET ORAL DAILY
Status: DISCONTINUED | OUTPATIENT
Start: 2024-10-31 | End: 2024-11-05 | Stop reason: HOSPADM

## 2024-10-31 RX ORDER — INSULIN LISPRO 100 [IU]/ML
1-5 INJECTION, SOLUTION INTRAVENOUS; SUBCUTANEOUS AT BEDTIME
Status: DISCONTINUED | OUTPATIENT
Start: 2024-10-31 | End: 2024-11-05 | Stop reason: HOSPADM

## 2024-10-31 RX ADMIN — LINEZOLID 600 MG: 600 TABLET, FILM COATED ORAL at 21:11

## 2024-10-31 RX ADMIN — CLONIDINE HYDROCHLORIDE 0.1 MG: 0.1 TABLET ORAL at 13:52

## 2024-10-31 RX ADMIN — CLONIDINE HYDROCHLORIDE 0.1 MG: 0.1 TABLET ORAL at 21:10

## 2024-10-31 RX ADMIN — CLINDAMYCIN PHOSPHATE 900 MG: 900 INJECTION, SOLUTION INTRAVENOUS at 05:14

## 2024-10-31 RX ADMIN — AMLODIPINE BESYLATE 10 MG: 10 TABLET ORAL at 09:42

## 2024-10-31 RX ADMIN — CLINDAMYCIN PHOSPHATE 900 MG: 900 INJECTION, SOLUTION INTRAVENOUS at 13:52

## 2024-10-31 RX ADMIN — HYDROMORPHONE HYDROCHLORIDE 1 MG: 1 INJECTION, SOLUTION INTRAMUSCULAR; INTRAVENOUS; SUBCUTANEOUS at 12:12

## 2024-10-31 RX ADMIN — LINEZOLID 600 MG: 600 TABLET, FILM COATED ORAL at 09:41

## 2024-10-31 RX ADMIN — INSULIN LISPRO 1 UNITS: 100 INJECTION, SOLUTION INTRAVENOUS; SUBCUTANEOUS at 18:06

## 2024-10-31 RX ADMIN — PREDNISONE 10 MG: 10 TABLET ORAL at 09:42

## 2024-10-31 RX ADMIN — INSULIN LISPRO 1 UNITS: 100 INJECTION, SOLUTION INTRAVENOUS; SUBCUTANEOUS at 10:30

## 2024-10-31 RX ADMIN — HYDROMORPHONE HYDROCHLORIDE 1 MG: 1 INJECTION, SOLUTION INTRAMUSCULAR; INTRAVENOUS; SUBCUTANEOUS at 17:45

## 2024-10-31 RX ADMIN — FUROSEMIDE 80 MG: 40 TABLET ORAL at 21:10

## 2024-10-31 RX ADMIN — CLINDAMYCIN PHOSPHATE 900 MG: 900 INJECTION, SOLUTION INTRAVENOUS at 21:11

## 2024-10-31 RX ADMIN — Medication 250 MG: at 09:42

## 2024-10-31 RX ADMIN — Medication 250 MG: at 21:10

## 2024-10-31 RX ADMIN — GABAPENTIN 300 MG: 300 CAPSULE ORAL at 21:11

## 2024-10-31 RX ADMIN — ONDANSETRON 4 MG: 2 INJECTION INTRAMUSCULAR; INTRAVENOUS at 09:55

## 2024-10-31 RX ADMIN — ACETAMINOPHEN 975 MG: 325 TABLET ORAL at 17:44

## 2024-10-31 RX ADMIN — EZETIMIBE 10 MG: 10 TABLET ORAL at 09:43

## 2024-10-31 RX ADMIN — MYCOPHENOLATE MOFETIL 1000 MG: 500 TABLET ORAL at 09:41

## 2024-10-31 RX ADMIN — HYDROMORPHONE HYDROCHLORIDE 1 MG: 1 INJECTION, SOLUTION INTRAMUSCULAR; INTRAVENOUS; SUBCUTANEOUS at 09:32

## 2024-10-31 RX ADMIN — HYDROMORPHONE HYDROCHLORIDE 1 MG: 1 INJECTION, SOLUTION INTRAMUSCULAR; INTRAVENOUS; SUBCUTANEOUS at 05:21

## 2024-10-31 RX ADMIN — HYDROMORPHONE HYDROCHLORIDE 1 MG: 1 INJECTION, SOLUTION INTRAMUSCULAR; INTRAVENOUS; SUBCUTANEOUS at 21:58

## 2024-10-31 RX ADMIN — ACETAMINOPHEN 975 MG: 325 TABLET ORAL at 03:13

## 2024-10-31 RX ADMIN — LEVOTHYROXINE SODIUM 75 MCG: 75 TABLET ORAL at 09:43

## 2024-10-31 RX ADMIN — GABAPENTIN 300 MG: 300 CAPSULE ORAL at 09:41

## 2024-10-31 RX ADMIN — Medication 125 MCG: at 09:42

## 2024-10-31 RX ADMIN — MYCOPHENOLATE MOFETIL 1000 MG: 500 TABLET ORAL at 21:10

## 2024-10-31 RX ADMIN — CALCIUM CARBONATE (ANTACID) CHEW TAB 500 MG 2000 MG: 500 CHEW TAB at 09:42

## 2024-10-31 RX ADMIN — ISOSORBIDE MONONITRATE 60 MG: 30 TABLET, EXTENDED RELEASE ORAL at 09:42

## 2024-10-31 RX ADMIN — ROSUVASTATIN CALCIUM 40 MG: 10 TABLET, FILM COATED ORAL at 21:10

## 2024-10-31 NOTE — PLAN OF CARE
Plan of Care Reviewed With: Patient and son    Overall Patient Progress: Progressing    Pt is A&O, vs and assessments as charted, rates pain 5/10 Dilaudid given with adequate relief. Placed on 1 LPM o2 via oxymask at end of shift. Bgs as charted. Nasal packing remains in place with minimal bleeding noted. Voids in urinal. PIV SL. Bed is locked and low, call light within reach, pt makes needs known.       Face to face report given with opportunity to observe patient.    Report given to LILIANA Molina RN   10/31/2024  7:12 AM

## 2024-10-31 NOTE — PROGRESS NOTES
"   10/31/24 0900   Appointment Info   Signing Clinician's Name / Credentials (PT) Sd Ramos DPT   Rehab Comments (PT) Pt was up in recliner with his son present/visiting.  Pt declined to work on amb stating he is worried he will be breathing hard and have to blow his nose restarting his nosebleed again.  Pt was agreeable to seated LE exercises.   Interventions   Interventions Quick Adds Therapeutic Activity;Therapeutic Procedure   Therapeutic Procedure/Exercise   Ther. Procedure: strength, endurance, ROM, flexibillity Minutes (26952) 16   Symptoms Noted During/After Treatment none   Treatment Detail/Skilled Intervention Seated in recliner pt completed LE exercises: Ankle PF/DF, LAQ, marching 2 x 10 reps each LLE/RLE with min cues for greater AROM.  Also hip ADD pillow squeeze, but only tolerated 6 x 5\" stating R foot starting to get cold.  Resting SpO2 = 96%, HR = 89.  With activity SpO2 = 93%, HR = 97   Therapeutic Activity   Therapeutic Activities: dynamic activities to improve functional performance Minutes (37500) 12   Symptoms Noted During/After Treatment Fatigue   Treatment Detail/Skilled Intervention A few minutes after PT initially ended session, pt had to use restroom.  Pt moved sit->stand from recliner up to FWW with CGA/Miguel for brief steadying.  Pt then amb approx 20 ft to bathroom CGA with FWW, but step-to pattern leading with RLE with short steps and mod antalgic on RLE.  PT encouraged pt to weight bear on FWW to help reduce RLE pain.  Pt moved sit<->stand on toilet CGA/Miguel with grab bar.  Pt did have a BM and stated his son helped complete pericares.  Pt amb 20 ft back to his recliner CGA with FWW with same gait.  After changing his gown, pt moved sit<->stand one more time from recliner up to FWW with CGA to change chux pad.  Ended visit with call button in easy reach and RN present working with pt.  After amb, SpO2 = 96%, HR = 103.   PT Discharge Planning   PT Plan Progress mobility as " tolerated   PT Discharge Recommendation (DC Rec) home;home with home care physical therapy;Transitional Care Facility   PT Rationale for DC Rec Pt still having difficulty with activity d/t pain and decreased activity tolerance.  Currently more appropriate for short term rehab since he lives alone, but will continue to monitor needs throughout hospitalization.   PT Brief overview of current status Sit<->stand = CGA/Miguel; amb x 20 ft CGA with FWW.   Physical Therapy Time and Intention   Timed Code Treatment Minutes 28   Total Session Time (sum of timed and untimed services) 28   Psychosocial Support   Trust Relationship/Rapport care explained;choices provided;emotional support provided;empathic listening provided;questions answered;questions encouraged;reassurance provided;thoughts/feelings acknowledged

## 2024-10-31 NOTE — PROGRESS NOTES
[facility]    Medicine Progress Note - Hospitalist Service    Date of Admission:  10/22/2024    Assessment & Plan   Right lower extremity cellulitis:  Sepsis  Elevated CRP   Elevated procal    Chronic venous stasis and chronic lower extremity wound.  Component      Latest Ref Rng 10/26/2024  6:49 AM 10/27/2024  6:43 AM 10/28/2024  5:43 AM 10/29/2024  4:38 AM   WBC      4.0 - 11.0 10e3/uL 11.4 (H)  7.3  6.6  7.2       Component      Latest Ref Rng 10/25/2024  5:31 AM 10/26/2024  6:49 AM 10/27/2024  6:43 AM 10/28/2024  5:43 AM 10/29/2024  4:38 AM   CRP Inflammation      <5.00 mg/L 402.69 (H)  280.20 (H)  238.08 (H)  272.53 (H)  324.55 (H)      Component      Latest Ref Rng 10/30/2024  4:40 AM   CRP Inflammation      <5.00 mg/L 348.30 (H)      Component      Latest Ref Rng 10/28/2024  5:43 AM 10/29/2024  4:38 AM 10/30/2024  4:40 AM   Procalcitonin      <0.50 ng/mL 4.79 (H)  3.85 (H)  3.37 (H)       ON admission Tmax 102.1, WBC 21.0,  Normal lactic acid    Ultrasound of right lower ext showing No evidence of right lower extremity deep venous thrombosis. Did have subcutaneous edema consistent with cellulitis. Due to his allergies he was started on Aztreonam and  and zyvox.    -10/24: Patient is on Zyvox and clindamycin empirically, will continue.  Blood cultures pending.  Fever curve stable as last 24 hours.  .  -10/25: Patient continues on Zyvox, clindamycin empirically.  Blood cultures no growth to date x 72 hours.  CRP remains elevated above 400, WBC slightly downtrending from 22 to 18.  Fever curve remains stable.  Will add aztreonam empirically for gram-negative coverage.  -10/26: Patient was on Zyvox, aztreonam, clinda stopped on 10/26.  CRP finally downtrending, 280 today.  WBC improving 17.9 to 11.4.  Fever curve stable.  Appearance of legs slowly improving.  Blood culture no growth to date.  -10/27 discussed with pharmacy will continue regimen.   On 10/28 he has had low grad fevers, cultures  so far negative   Discussed with pharmacy his wbc has been normal now, CRP is up little but the erythema appears to be improving in his right leg and he stated the pain is better. Will hold off on further adjustment of antibiotics and will discuss with ID tomorrow.  10/29 Discussed with Dr Yolanda FORTUNE recs for ct imaging to rule out abscess in right leg. Ct was done no air or abscess seen  Antibiotics were adjusted to remove aztreonam as gram negative coverage likely not adding anything and clindamycin will have more gram positive coverage. Discussed with Pharmacy   CRP did worsen   10/30 erythema right thigh has improve.  Hemodynamically stable. Low grade fever. Procal down trending.   10/31 much improvement in pain, he is able to bend his leg and erythema is improving   Likely will stop Zyvox   and just continue  clindamycin as strep infection likely main source            Status post renal transplant 24 years ago   KAYLAN on CKD  stage IV       Component      Latest Ref Rng 10/28/2024  5:43 AM 10/29/2024  4:38 AM 10/30/2024  4:40 AM 10/31/2024  4:35 AM   Creatinine      0.67 - 1.17 mg/dL 2.32 (H)  2.46 (H)  2.61 (H)  2.94 (H)       Renal us done on 10/24 No mass or hydronephrosis in the transplanted kidney.   On CellCept and low-dose prednisone chronically, which will be continued.  GFR appears to be in the 20s chronically, this is stable currently.  -10/24: Creatinine increasing, 4.10 today.  Urine output is still adequate, over 1000 cc overnight. Avoiding nephrotoxic substances/medications.  Spoke with Dr. Mclaughlin, nephrology at [facility].  Recommended ultrasound of the transplanted kidney, CK level.  No current need to transfer to higher level of care, but will continue to monitor.  Patient had transplant approximately 24 years ago at [facility], however he has recently transferred his care to the [place].  -10/25: Creatinine stable at 3.87.  Urine output adequate.  -10/26:  Creatinine improving, 3.34 today.  Urine output adequate.  Continue holding home diuretics.  -10/27  Daily labs  Avoid nephrotic meds  Out patient follow up with nephrology  -on 10/28 restart home lasix and diamox   -10/30 making urine   10/31 cr worsened holding diuretics today     Elevated troponin chronic  Component      Latest Ref Rng 10/28/2024  3:11 PM 10/28/2024  6:25 PM 10/28/2024  8:25 PM   Troponin T, High Sensitivity      <=22 ng/L 96 (H)  95 (H)  94 (H)    No chest pain  EKG no ST elevation or depressions    Monitor  trop  Suspect  demand 2nd from HTN and infection    Epistaxis  Resolved with pressure  Ordered prn nasal saline  Holding  heparin today  If has further bleeding may need Rhino rocket  Will avoid using afrin  due to htn     On 10/30 I did consult with ENT and he did have cautery and packing right nostril   ENT recs for   Nasal saline, Ocean spray at the bedside.  I told can to spray both nostrils with 2 sprays every hour while he is awake no nose blowing.  Follow-up with Sondra or Evonne in 1 week for nasopore removal.  Keep packing in place and irrigate with packing in place.     10/31 epistaxis left nostril  I did discuss with  ENT and at this time recs for decrease him clearing throat, stop putting anything inside his nose other than nasal saline or ayr nasal gel. If he continues to have bleeding may need further interventions but may not be cleared by anesthesia here.     Hyponatremia POA   resolved     Insulin-dependent diabetes mellitus type 2:   -On home lantus 10 hs  -Sliding scale insulin      Essential hypertension:   -home lasix 120 mg at HS being held due to CR elevation   -holding home diamox due to elevated cr   -home imdur  -On  amlodipine and clonidine after discussing with transplant nephology Dr Plaza at the [facility]  -Did move to ICU for nitroglycerin drip as BP was not improving and patient developed epistasis. Off nitroglycerin drip   -if need better pressure control can  "use coreg   -prn hydralazine   -prn labetalol     Hyperlipemia  On statin      Obstructive sleep apnea:   Pulmonary hypertension.  -home CPAP   -home diamox held  on 10/31 due to worsening cr     Hypothyroidism:   -home synthroid           Diet: Consistent Carbohydrate Diet Moderate Consistent Carb (60 g CHO per Meal) Diet    DVT Prophylaxis: Pneumatic Compression Devices  Manley Catheter: Not present  Lines: None     Cardiac Monitoring: ACTIVE order. Indication: ICU  Code Status: Full Code      Clinically Significant Risk Factors         # Hyponatremia: Lowest Na = 133 mmol/L in last 2 days, will monitor as appropriate  # Hypochloremia: Lowest Cl = 97 mmol/L in last 2 days, will monitor as appropriate      # Hypoalbuminemia: Lowest albumin = 2.9 g/dL at 10/31/2024  4:35 AM, will monitor as appropriate   # Thrombocytopenia: Lowest platelets = 121 in last 2 days, will monitor for bleeding   # Hypertension: Noted on problem list  # Chronic heart failure with preserved ejection fraction: heart failure noted on problem list and last echo with EF >50%          # Severe Obesity: Estimated body mass index is 51.17 kg/m  as calculated from the following:    Height as of this encounter: 1.676 m (5' 6\").    Weight as of this encounter: 143.8 kg (317 lb 0.3 oz).             Disposition Plan     Medically Ready for Discharge: Anticipated in 2-4 Days             Ivy Bruner DO  Hospitalist Service  [facility]  Securely message with [phone] (more info)  Text page via [employer] Paging/Directory   ______________________________________________________________________    Interval History   Patient was seen this morning for medical rounds.  Overnight had nose bleed on left. ENT notified. Discuss with him regarding avoid clearing throat and using only nasal saline spray. Discussed with him about holding asa and Plavix. We are holding his dieretics due to elevated creatine today. I did reach out to nephrology team at [facility] regarding bp meds recs for clonidine which was ordered    Physical Exam   Vital Signs: Temp: 99.6  F (37.6  C) Temp src: Tympanic BP: 164/76 Pulse: 91   Resp: 18 SpO2: 99 % O2 Device: None (Room air) Oxygen Delivery: 1 LPM  Weight: 317 lbs .34 oz    Physical Exam  Constitutional:       Appearance: He is obese.   HENT:      Right Ear: External ear normal.      Left Ear: External ear normal.      Nose: Nose normal.      Mouth/Throat:      Pharynx: Oropharynx is clear.   Eyes:      Conjunctiva/sclera: Conjunctivae normal.   Cardiovascular:      Rate and Rhythm: Normal rate.   Pulmonary:      Effort: Pulmonary effort is normal.   Abdominal:      General: Abdomen is flat.   Musculoskeletal:         General: Swelling present.   Skin:     Coloration: Skin is not jaundiced.      Findings: Erythema present.   Neurological:      Mental Status: He is alert. Mental status is at baseline.         Medical Decision Making       65 MINUTES SPENT BY ME on the date of service doing chart review, history, exam, documentation & further activities per the note.      Data     I have personally reviewed the following data over the past 24 hrs:    7.2  \   10.8 (L)   / 132 (L)     134 (L) 97 (L) 70.2 (H) /  145 (H)   4.1 19 (L) 2.94 (H) \     ALT: 32 AST: 31 AP: 70 TBILI: 0.2   ALB: 2.9 (L) TOT PROTEIN: 6.1 (L) LIPASE: N/A     Procal: N/A CRP: 307.49 (H) Lactic Acid: N/A         Imaging results reviewed over the past 24 hrs:   No results found for this or any previous visit (from the past 24 hours).

## 2024-10-31 NOTE — PHARMACY-MEDICATION REGIMEN REVIEW
Pharmacy Antimicrobial Stewardship Assessment     Current Antimicrobial Therapy:  Anti-infectives (From now, onward)      Start     Dose/Rate Route Frequency Ordered Stop    10/29/24 1330  clindamycin (CLEOCIN) 900 mg in 50 mL D5W intermittent infusion         900 mg  100 mL/hr over 30 Minutes Intravenous EVERY 8 HOURS 10/29/24 1255      10/22/24 2005  linezolid (ZYVOX) tablet 600 mg         600 mg Oral EVERY 12 HOURS SCHEDULED 10/22/24 2001              Indication: Skin and soft tissue infection    Days of Therapy:   Day 10 of linezolid  Day 3 of clindamycin (re-started 10/29/24)     Pertinent Labs:    Recent Labs   Lab Test 10/31/24  0435 10/30/24  0440 10/29/24  0438 10/28/24  0543 10/27/24  0643 10/26/24  0649 10/25/24  0531   WBC 7.2 6.1 7.2 6.6 7.3 11.4* 17.9*       Recent Labs   Lab Test 10/31/24  0435 10/30/24  0440 10/29/24  0438 10/28/24  1511 10/28/24  0543 10/27/24  0643 10/26/24  0649 10/25/24  0531 10/24/24  0622 10/22/24  2020   LACT  --   --   --  1.2  --   --   --   --   --  1.0   CRPI 307.49* 348.30* 324.55*  --  272.53* 238.08* 280.20* 402.69* 409.05*  --    PCAL  --  3.37* 3.85*  --  4.79*  --   --   --   --   --         Temperature:  Temp (24hrs), Av.5  F (38.1  C), Min:99.1  F (37.3  C), Max:101.2  F (38.4  C)      Culture Results:   30-Day Micro Results       Collected Updated Procedure Result Status      10/27/2024 1249 10/28/2024 2012 MRSA MSSA PCR, Nasal Swab [[id number]]    Swab from Nares, Bilateral    Final result Component Value   MRSA Target DNA Negative   SA Target DNA Negative            10/22/2024 2020 10/28/2024 0643 Blood Culture Arm, Left [[id number]]   Blood from Arm, Left    Final result Component Value   Culture No Growth                        Recent Antibiotics:  Aztreonam 1 gram given once on 10/25/24. Good gram negative coverage.    Recommendations/Interventions:  1. Discontinue linezolid given negative MRSA nasal swab  2. Continue clindamycin 900 mg every 8 hours  per [place] guide and added gram positive coverage.        Veena Griffin  October 31, 2024

## 2024-10-31 NOTE — PROVIDER NOTIFICATION
MD notified: Increased ectopy the last hour or so. Two short PSVT runs in the 140s, and now some occasional PACs.     Magnesium add on to AM labs ordered.

## 2024-10-31 NOTE — PLAN OF CARE
This writer called Sondra from ENT due to patient complaining of increased post-nasal drip and bleeding to left nostril.    Per Sondra, continue with ocean nasal spray and trial gargling with ice water.    This writer educated patient on previous things and reminded patient to not blow his nose.

## 2024-11-01 ENCOUNTER — APPOINTMENT (OUTPATIENT)
Dept: PHYSICAL THERAPY | Facility: HOSPITAL | Age: 69
DRG: 872 | End: 2024-11-01
Payer: COMMERCIAL

## 2024-11-01 ENCOUNTER — APPOINTMENT (OUTPATIENT)
Dept: OCCUPATIONAL THERAPY | Facility: HOSPITAL | Age: 69
DRG: 872 | End: 2024-11-01
Attending: HOSPITALIST
Payer: COMMERCIAL

## 2024-11-01 LAB
ALBUMIN SERPL BCG-MCNC: 2.9 G/DL (ref 3.5–5.2)
ALP SERPL-CCNC: 76 U/L (ref 40–150)
ALT SERPL W P-5'-P-CCNC: 32 U/L (ref 0–70)
ANION GAP SERPL CALCULATED.3IONS-SCNC: 17 MMOL/L (ref 7–15)
AST SERPL W P-5'-P-CCNC: 30 U/L (ref 0–45)
BILIRUB SERPL-MCNC: 0.3 MG/DL
BUN SERPL-MCNC: 74.7 MG/DL (ref 8–23)
CALCIUM SERPL-MCNC: 9.3 MG/DL (ref 8.8–10.4)
CHLORIDE SERPL-SCNC: 95 MMOL/L (ref 98–107)
CREAT SERPL-MCNC: 2.87 MG/DL (ref 0.67–1.17)
CRP SERPL-MCNC: 305.37 MG/L
EGFRCR SERPLBLD CKD-EPI 2021: 23 ML/MIN/1.73M2
ERYTHROCYTE [DISTWIDTH] IN BLOOD BY AUTOMATED COUNT: 13.8 % (ref 10–15)
GLUCOSE BLDC GLUCOMTR-MCNC: 131 MG/DL (ref 70–99)
GLUCOSE BLDC GLUCOMTR-MCNC: 140 MG/DL (ref 70–99)
GLUCOSE BLDC GLUCOMTR-MCNC: 168 MG/DL (ref 70–99)
GLUCOSE BLDC GLUCOMTR-MCNC: 179 MG/DL (ref 70–99)
GLUCOSE SERPL-MCNC: 139 MG/DL (ref 70–99)
HCO3 SERPL-SCNC: 18 MMOL/L (ref 22–29)
HCT VFR BLD AUTO: 33.1 % (ref 40–53)
HGB BLD-MCNC: 10.8 G/DL (ref 13.3–17.7)
MCH RBC QN AUTO: 30.4 PG (ref 26.5–33)
MCHC RBC AUTO-ENTMCNC: 32.6 G/DL (ref 31.5–36.5)
MCV RBC AUTO: 93 FL (ref 78–100)
PLATELET # BLD AUTO: 137 10E3/UL (ref 150–450)
POTASSIUM SERPL-SCNC: 4.2 MMOL/L (ref 3.4–5.3)
PROT SERPL-MCNC: 6.4 G/DL (ref 6.4–8.3)
RBC # BLD AUTO: 3.55 10E6/UL (ref 4.4–5.9)
SODIUM SERPL-SCNC: 130 MMOL/L (ref 135–145)
WBC # BLD AUTO: 7.7 10E3/UL (ref 4–11)

## 2024-11-01 PROCEDURE — 250N000013 HC RX MED GY IP 250 OP 250 PS 637: Performed by: INTERNAL MEDICINE

## 2024-11-01 PROCEDURE — 99233 SBSQ HOSP IP/OBS HIGH 50: CPT | Performed by: HOSPITALIST

## 2024-11-01 PROCEDURE — 36415 COLL VENOUS BLD VENIPUNCTURE: CPT | Performed by: INTERNAL MEDICINE

## 2024-11-01 PROCEDURE — 86140 C-REACTIVE PROTEIN: CPT | Performed by: INTERNAL MEDICINE

## 2024-11-01 PROCEDURE — 120N000001 HC R&B MED SURG/OB

## 2024-11-01 PROCEDURE — 250N000013 HC RX MED GY IP 250 OP 250 PS 637: Performed by: HOSPITALIST

## 2024-11-01 PROCEDURE — 250N000011 HC RX IP 250 OP 636: Performed by: HOSPITALIST

## 2024-11-01 PROCEDURE — 97165 OT EVAL LOW COMPLEX 30 MIN: CPT | Mod: GO

## 2024-11-01 PROCEDURE — 97530 THERAPEUTIC ACTIVITIES: CPT | Mod: GP

## 2024-11-01 PROCEDURE — 250N000012 HC RX MED GY IP 250 OP 636 PS 637: Performed by: INTERNAL MEDICINE

## 2024-11-01 PROCEDURE — 80053 COMPREHEN METABOLIC PANEL: CPT | Performed by: INTERNAL MEDICINE

## 2024-11-01 PROCEDURE — 97530 THERAPEUTIC ACTIVITIES: CPT | Mod: GO

## 2024-11-01 PROCEDURE — 85018 HEMOGLOBIN: CPT | Performed by: INTERNAL MEDICINE

## 2024-11-01 RX ORDER — CLINDAMYCIN HYDROCHLORIDE 150 MG/1
450 CAPSULE ORAL EVERY 6 HOURS SCHEDULED
Status: DISCONTINUED | OUTPATIENT
Start: 2024-11-03 | End: 2024-11-05 | Stop reason: HOSPADM

## 2024-11-01 RX ORDER — CLINDAMYCIN PHOSPHATE 900 MG/50ML
900 INJECTION, SOLUTION INTRAVENOUS EVERY 8 HOURS
Status: COMPLETED | OUTPATIENT
Start: 2024-11-01 | End: 2024-11-02

## 2024-11-01 RX ORDER — CLOPIDOGREL BISULFATE 75 MG/1
75 TABLET ORAL DAILY
Status: DISCONTINUED | OUTPATIENT
Start: 2024-11-02 | End: 2024-11-02

## 2024-11-01 RX ORDER — CLINDAMYCIN HYDROCHLORIDE 150 MG/1
450 CAPSULE ORAL EVERY 6 HOURS SCHEDULED
Status: DISCONTINUED | OUTPATIENT
Start: 2024-11-01 | End: 2024-11-01

## 2024-11-01 RX ORDER — OXYCODONE HYDROCHLORIDE 5 MG/1
5 TABLET ORAL EVERY 6 HOURS PRN
Status: DISCONTINUED | OUTPATIENT
Start: 2024-11-01 | End: 2024-11-05 | Stop reason: HOSPADM

## 2024-11-01 RX ORDER — ACETAMINOPHEN 325 MG/1
975 TABLET ORAL 3 TIMES DAILY
Status: DISCONTINUED | OUTPATIENT
Start: 2024-11-01 | End: 2024-11-05 | Stop reason: HOSPADM

## 2024-11-01 RX ADMIN — HYDROMORPHONE HYDROCHLORIDE 1 MG: 1 INJECTION, SOLUTION INTRAMUSCULAR; INTRAVENOUS; SUBCUTANEOUS at 13:03

## 2024-11-01 RX ADMIN — Medication 250 MG: at 10:24

## 2024-11-01 RX ADMIN — Medication 250 MG: at 20:51

## 2024-11-01 RX ADMIN — ALLOPURINOL 300 MG: 300 TABLET ORAL at 10:23

## 2024-11-01 RX ADMIN — HYDROMORPHONE HYDROCHLORIDE 1 MG: 1 INJECTION, SOLUTION INTRAMUSCULAR; INTRAVENOUS; SUBCUTANEOUS at 05:43

## 2024-11-01 RX ADMIN — SPIRONOLACTONE 25 MG: 25 TABLET ORAL at 10:22

## 2024-11-01 RX ADMIN — CLINDAMYCIN PHOSPHATE 900 MG: 900 INJECTION, SOLUTION INTRAVENOUS at 05:03

## 2024-11-01 RX ADMIN — ACETAMINOPHEN 975 MG: 325 TABLET, FILM COATED ORAL at 10:29

## 2024-11-01 RX ADMIN — LEVOTHYROXINE SODIUM 75 MCG: 75 TABLET ORAL at 10:24

## 2024-11-01 RX ADMIN — ASPIRIN 81 MG: 81 TABLET, COATED ORAL at 20:51

## 2024-11-01 RX ADMIN — Medication 125 MCG: at 10:23

## 2024-11-01 RX ADMIN — CLONIDINE HYDROCHLORIDE 0.1 MG: 0.1 TABLET ORAL at 10:23

## 2024-11-01 RX ADMIN — MYCOPHENOLATE MOFETIL 1000 MG: 500 TABLET ORAL at 10:23

## 2024-11-01 RX ADMIN — CLINDAMYCIN PHOSPHATE 900 MG: 900 INJECTION, SOLUTION INTRAVENOUS at 13:12

## 2024-11-01 RX ADMIN — ACETAMINOPHEN 975 MG: 325 TABLET, FILM COATED ORAL at 20:51

## 2024-11-01 RX ADMIN — CLONIDINE HYDROCHLORIDE 0.1 MG: 0.1 TABLET ORAL at 14:07

## 2024-11-01 RX ADMIN — OXYCODONE HYDROCHLORIDE 5 MG: 5 TABLET ORAL at 10:29

## 2024-11-01 RX ADMIN — MYCOPHENOLATE MOFETIL 1000 MG: 500 TABLET ORAL at 20:51

## 2024-11-01 RX ADMIN — FUROSEMIDE 80 MG: 40 TABLET ORAL at 20:51

## 2024-11-01 RX ADMIN — GABAPENTIN 300 MG: 300 CAPSULE ORAL at 10:22

## 2024-11-01 RX ADMIN — ROSUVASTATIN CALCIUM 40 MG: 10 TABLET, FILM COATED ORAL at 20:51

## 2024-11-01 RX ADMIN — ACETAMINOPHEN 975 MG: 325 TABLET, FILM COATED ORAL at 14:08

## 2024-11-01 RX ADMIN — HYDROMORPHONE HYDROCHLORIDE 1 MG: 1 INJECTION, SOLUTION INTRAMUSCULAR; INTRAVENOUS; SUBCUTANEOUS at 08:37

## 2024-11-01 RX ADMIN — HYDROMORPHONE HYDROCHLORIDE 1 MG: 2 TABLET ORAL at 20:51

## 2024-11-01 RX ADMIN — CLONIDINE HYDROCHLORIDE 0.1 MG: 0.1 TABLET ORAL at 20:51

## 2024-11-01 RX ADMIN — CALCIUM CARBONATE (ANTACID) CHEW TAB 500 MG 2000 MG: 500 CHEW TAB at 10:24

## 2024-11-01 RX ADMIN — PREDNISONE 10 MG: 10 TABLET ORAL at 10:24

## 2024-11-01 RX ADMIN — CLINDAMYCIN PHOSPHATE 900 MG: 900 INJECTION, SOLUTION INTRAVENOUS at 21:00

## 2024-11-01 RX ADMIN — GABAPENTIN 300 MG: 300 CAPSULE ORAL at 20:51

## 2024-11-01 RX ADMIN — ISOSORBIDE MONONITRATE 60 MG: 30 TABLET, EXTENDED RELEASE ORAL at 10:23

## 2024-11-01 RX ADMIN — AMLODIPINE BESYLATE 10 MG: 10 TABLET ORAL at 10:23

## 2024-11-01 RX ADMIN — HYDROMORPHONE HYDROCHLORIDE 1 MG: 2 TABLET ORAL at 12:07

## 2024-11-01 RX ADMIN — INSULIN LISPRO 1 UNITS: 100 INJECTION, SOLUTION INTRAVENOUS; SUBCUTANEOUS at 17:30

## 2024-11-01 RX ADMIN — EZETIMIBE 10 MG: 10 TABLET ORAL at 10:22

## 2024-11-01 ASSESSMENT — ACTIVITIES OF DAILY LIVING (ADL)
ADLS_ACUITY_SCORE: 0
PREVIOUS_RESPONSIBILITIES: MEAL PREP;HOUSEKEEPING;LAUNDRY;SHOPPING;YARDWORK;MEDICATION MANAGEMENT;DRIVING
ADLS_ACUITY_SCORE: 0

## 2024-11-01 NOTE — PROGRESS NOTES
11/01/24 1600   Appointment Info   Signing Clinician's Name / Credentials (PT) Sd Ramos DPT   Rehab Comments (PT) Attemptd to see pt around 11:30 AM, but OT was working with pt.  Attempted to see pt arround 1:00 PM, but RN stated pt just got back into bed d/t having a lot of pain.  Approached to see pt approx 14:45 and he was sleeping, but his son, sister and brother in law were present and they encouraged PT to wake pt stating he needed to get up and walk.  Pt easily roused with verbal stiimulus and was agreeable to treatment.   Interventions   Interventions Quick Adds Therapeutic Activity   Therapeutic Activity   Therapeutic Activities: dynamic activities to improve functional performance Minutes (27085) 16   Symptoms Noted During/After Treatment Fatigue;Increased pain   Treatment Detail/Skilled Intervention Pt moved sup->sit with SBA and mod/much effort.  Pt stated R heel had been hurting with pressure and although not TTP currently there was at least 2+ pitting edema even on plantar surface of R heel; pt stated usually wears shoes at home.  Pt was dependent for donning his regular socks, but unfortunately his R shoe would not fit d/t R foot edema so PT then dependently changed pt back to his non-skid socsk.  Pt moved sit->stand from bed up to FWW CGA using mild forward rocking technique and min cues for proper hand placement.  Pt amb approx 30 ft total CGA with FWW exhibiting mild/mod R antalgic gait with min partial step through pattern with LLE.  Pace was slow overall, but with consistent stability.  Pt turned around and moved standi->sit with CGA.  Pt moved sit->sup with SBA and mod effort.  Ended visit with pt supine in bed, call button in easy reach and his son was helping him use the urinal.  Pt on room air thorughout.  Restin SpO2 = 95%, HR = 87.  With amb SpO2 = 92%, HR = 102.   PT Discharge Planning   PT Plan Progress mobility as tolerated   PT Discharge Recommendation (DC Rec) home;home with  home care physical therapy;Transitional Care Facility   PT Rationale for DC Rec Pt still having difficulty with activity d/t pain and decreased activity tolerance.  Currently more appropriate for short term rehab since he lives alone, but will continue to monitor needs throughout hospitalization.   PT Brief overview of current status Sit<->stand = CGA/Miguel; amb x 30 ft CGA with FWW.  Sit<->sup = SBA   Psychosocial Support   Trust Relationship/Rapport choices provided;care explained;emotional support provided;empathic listening provided;questions answered;questions encouraged;reassurance provided;thoughts/feelings acknowledged

## 2024-11-01 NOTE — PROGRESS NOTES
11/01/24 1200   Appointment Info   Signing Clinician's Name / Credentials (OT) Bailee Henrry, OTR/L   Living Environment   People in Home alone   Current Living Arrangements house   Home Accessibility no concerns   Transportation Anticipated car, drives self   Living Environment Comments BR has grab bars in the walk in shower.   Self-Care   Usual Activity Tolerance good   Current Activity Tolerance poor   Equipment Currently Used at Home grab bar, tub/shower   Fall history within last six months no   Activity/Exercise/Self-Care Comment Pt reports he is independent with ADLs at baseline and ambulates without AD   Instrumental Activities of Daily Living (IADL)   Previous Responsibilities meal prep;housekeeping;laundry;shopping;yardwork;medication management;driving   IADL Comments Son assisted with cutting grass as lawn is on a hill but pt cut the flat part of the yard   General Information   Onset of Illness/Injury or Date of Surgery 10/28/24   Referring Physician Dr. Bruner   Patient/Family Therapy Goal Statement (OT) Pt appeared open to STR   Additional Occupational Profile Info/Pertinent History of Current Problem Pt admitted with R LE cellulitis   Cognitive Status Examination   Orientation Status orientation to person, place and time   Affect/Mental Status (Cognitive) WFL   Follows Commands WFL   Pain Assessment   Patient Currently in Pain Yes, see Vital Sign flowsheet  (7-8 burning pain in R LE)   Range of Motion Comprehensive   Comment, General Range of Motion WFL   Strength Comprehensive (MMT)   Comment, General Manual Muscle Testing (MMT) Assessment generalized weakness noted   Transfers   Transfers sit-stand transfer   Sit-Stand Transfer   Sit-Stand [place] (Transfers) minimum assist (75% patient effort)   Assistive Device (Sit-Stand Transfers) walker, front-wheeled   Balance   Balance Comments Rox ambulating in room using FWW. SpO2 on RA after this was 91%, . With rest, SpO2 improved to 94%,  -115   Activities of Daily Living   BADL Assessment/Intervention toileting;other (see comments)   Additional Documentation   (unable to tolerate any further activity d/t poor activity tolerance)   Toileting   Comment, (Toileting) Son assisted with toileting care   Clinical Impression   Criteria for Skilled Therapeutic Interventions Met (OT) Yes, treatment indicated   OT Diagnosis deconditioning   Influenced by the following impairments decreased strength, decreased endurance, pain   Assessment of Occupational Performance 1-3 Performance Deficits   Identified Performance Deficits ADLs, IADLs   Planned Therapy Interventions (OT) ADL retraining;IADL retraining;strengthening;progressive activity/exercise;risk factor education   Clinical Decision Making Complexity (OT) problem focused assessment/low complexity   Risk & Benefits of therapy have been explained evaluation/treatment results reviewed;care plan/treatment goals reviewed;risks/benefits reviewed;current/potential barriers reviewed;participants voiced agreement with care plan;participants included;patient;son   Clinical Impression Comments OT evaluation completed. Pt presents with significant difficulties completing ADLs and is not safe to return home alone at this time. Recommend SNF for STR.   OT Total Evaluation Time   OT Eval, Low Complexity Minutes (33622) 12   OT Goals   Therapy Frequency (OT) 5 times/week   OT Predicted Duration/Target Date for Goal Attainment 11/08/24   OT Goals Hygiene/Grooming;Lower Body Dressing;Toilet Transfer/Toileting;Aerobic Activity   OT: Hygiene/Grooming modified independent   OT: Lower Body Dressing Modified independent   OT: Toilet Transfer/Toileting Modified independent   OT: Perform aerobic activity with stable cardiovascular response 15 minutes   Interventions   Interventions Quick Adds Therapeutic Activity   Therapeutic Activities   Therapeutic Activity Minutes (62555) 8   Treatment Detail/Skilled Intervention  Education provided on the importance of working with therapy for strengthening and conditioning, as well as d/c options and recommendations. Pt not disagreeable to STR.   OT Discharge Planning   OT Plan Progress strength, activity tolerance, and ADLs as able   OT Discharge Recommendation (DC Rec) Transitional Care Facility   OT Rationale for DC Rec OT evaluation completed. Pt presents with significant difficulties completing ADLs and is not safe to return home alone at this time. Recommend SNF for STR.   OT Brief overview of current status Assist from son for toileting tasks, Rox ambulating from the BR to the chair using FWW, Rox stand to sit, SpO2 91%, , poor activity tolerance and pt unable to participate in any further ADLs   Total Session Time   Timed Code Treatment Minutes 8   Total Session Time (sum of timed and untimed services) 20

## 2024-11-01 NOTE — PLAN OF CARE
Plan of Care Reviewed With: Patient, daughter, and son     Overall Patient Progress: Progressing     Pt is A&O, vs and assessments as charted, rates pain 7-8/10 Dilaudid given x2 with adequate relief. Switching between CPAP and oxymask @ 1 LPM o2 overnight. Bgs as charted with no bedtime humalog given. Nasal packing remains in place with minimal bleeding noted.  Redness remains wiithin borders. Voids in urinal with sons help over night. PIV SL. Bed is locked and low, call light within reach, pt makes needs known.      Face to face report given with opportunity to observe patient.    Report given to LILIANA Molina RN   11/1/2024  7:15 AM

## 2024-11-01 NOTE — PLAN OF CARE
Goal Outcome Evaluation:       Pt A&O, makes needs known. VSS, afebrile. Med with IV dilaudid, PO oxy and dilaudid for pain to RLE. Scheduled Tylenol started this shift. Packing to R) nostril remains. Zero post-nasal drip this shift. LS clear. HRR. BS active. BG as charted. Good appetite. Cellulitis to R) side from flank to foot, not advancing past marked borders. Edematous to R) side. Assist of 1 with walker to bathroom. Elevating RLE with pillows in bed. IV SL. IV Cleocin given this shift. Call light within reach.     Face to face report given with opportunity to observe patient.    Report given to LILIANA Godinez RN   11/1/2024  7:28 PM

## 2024-11-01 NOTE — PROGRESS NOTES
[facility]    Medicine Progress Note - Hospitalist Service    Date of Admission:  10/22/2024    Assessment & Plan   Right lower extremity cellulitis:  Sepsis  Elevated CRP   Elevated procal    Chronic venous stasis and chronic lower extremity wound.  Component      Latest Ref Rng 10/26/2024  6:49 AM 10/27/2024  6:43 AM 10/28/2024  5:43 AM 10/29/2024  4:38 AM   WBC      4.0 - 11.0 10e3/uL 11.4 (H)  7.3  6.6  7.2             Component      Latest Ref Rng 10/30/2024  4:40 AM 10/31/2024  4:35 AM 11/1/2024  4:44 AM   CRP Inflammation      <5.00 mg/L 348.30 (H)  307.49 (H)  305.37 (H)         Component      Latest Ref Rng 10/28/2024  5:43 AM 10/29/2024  4:38 AM 10/30/2024  4:40 AM   Procalcitonin      <0.50 ng/mL 4.79 (H)  3.85 (H)  3.37 (H)       ON admission Tmax 102.1, WBC 21.0,  Normal lactic acid    Ultrasound of right lower ext showing No evidence of right lower extremity deep venous thrombosis. Did have subcutaneous edema consistent with cellulitis. Due to his allergies he was started on Aztreonam and  and zyvox.    -10/24: Patient is on Zyvox and clindamycin empirically, will continue.  Blood cultures pending.  Fever curve stable as last 24 hours.  .  -10/25: Patient continues on Zyvox, clindamycin empirically.  Blood cultures no growth to date x 72 hours.  CRP remains elevated above 400, WBC slightly downtrending from 22 to 18.  Fever curve remains stable.  Will add aztreonam empirically for gram-negative coverage.  -10/26: Patient was on Zyvox, aztreonam, clinda stopped on 10/26.  CRP finally downtrending, 280 today.  WBC improving 17.9 to 11.4.  Fever curve stable.  Appearance of legs slowly improving.  Blood culture no growth to date.  -10/27 discussed with pharmacy will continue regimen.   On 10/28 he has had low grad fevers, cultures so far negative   Discussed with pharmacy his wbc has been normal now, CRP is up little but the erythema appears to be improving in his right leg and he  stated the pain is better. Will hold off on further adjustment of antibiotics and will discuss with ID tomorrow.  10/29 Discussed with Dr Yolanda FORTUNE recs for ct imaging to rule out abscess in right leg. Ct was done no air or abscess seen  Antibiotics were adjusted to remove aztreonam as gram negative coverage likely not adding anything and clindamycin will have more gram positive coverage. Discussed with Pharmacy   CRP did worsen   10/30 erythema right thigh has improve.  Hemodynamically stable. Low grade fever. Procal down trending.   10/31 much improvement in pain, he is able to bend his leg and erythema is improving   Likely will stop Zyvox   and just continue  clindamycin as strep infection likely main source  11/1 patient afebrile has been on zyvox for 10 days, stopped zyvox as MRSA negative. Will continue  clindamycin for now  Discussed with pharmacy ok to move out of ICU          Status post renal transplant 24 years ago   KAYLAN on CKD  stage IV       Component      Latest Ref Rng 10/28/2024  5:43 AM 10/29/2024  4:38 AM 10/30/2024  4:40 AM 10/31/2024  4:35 AM   Creatinine      0.67 - 1.17 mg/dL 2.32 (H)  2.46 (H)  2.61 (H)  2.94 (H)       Renal us done on 10/24 No mass or hydronephrosis in the transplanted kidney.   On CellCept and low-dose prednisone chronically, which will be continued.  GFR appears to be in the 20s chronically, this is stable currently.  -10/24: Creatinine increasing, 4.10 today.  Urine output is still adequate, over 1000 cc overnight. Avoiding nephrotoxic substances/medications.  Spoke with Dr. Mclaughlin, nephrology at [facility].  Recommended ultrasound of the transplanted kidney, CK level.  No current need to transfer to higher level of care, but will continue to monitor.  Patient had transplant approximately 24 years ago at [facility], however he has recently transferred his care to the [facility].  -10/25: Creatinine stable at 3.87.  Urine output  adequate.  -10/26: Creatinine improving, 3.34 today.  Urine output adequate.  Continue holding home diuretics.  -10/27  Daily labs  Avoid nephrotic meds  Out patient follow up with nephrology  -on 10/28 restart home lasix and diamox   -10/30 making urine   10/31 cr worsened holding diuretics today  11/1 CR Plateau will restarted home aldactone, holding diamox still  Decreased lasix to 80 mg    Elevated troponin chronic  Component      Latest Ref Rng 10/28/2024  3:11 PM 10/28/2024  6:25 PM 10/28/2024  8:25 PM   Troponin T, High Sensitivity      <=22 ng/L 96 (H)  95 (H)  94 (H)    No chest pain  EKG no ST elevation or depressions    Monitor  trop  Suspect  demand 2nd from HTN and infection    CAD   Hx of coronary  stents  No stents in last year.   Held asa and Plavix due to epistaxis  Restart asa on 11/1    Epistaxis  Resolved with pressure  Ordered prn nasal saline  Holding  heparin today  If has further bleeding may need Rhino rocket  Will avoid using afrin  due to htn     On 10/30 I did consult with ENT and he did have cautery and packing right nostril   ENT recs for   Nasal saline, Ocean spray at the bedside.  I told can to spray both nostrils with 2 sprays every hour while he is awake no nose blowing.  Follow-up with Sondra or Evonne in 1 week for nasopore removal.  Keep packing in place and irrigate with packing in place.     10/31 epistaxis left nostril  I did discuss with  ENT and at this time recs for decrease him clearing throat, stop putting anything inside his nose other than nasal saline or ayr nasal gel. If he continues to have bleeding may need further interventions but may not be cleared by anesthesia here.     11/1 no bleeding  Asa to be restarted tonight    Hyponatremia POA   resolved     Insulin-dependent diabetes mellitus type 2:   -On home lantus 10 hs  -Sliding scale insulin      Essential hypertension:   -home lasix 120 mg at HS being held due to CR elevation   -holding home diamox due to elevated cr  "  -home imdur  -On  amlodipine and clonidine after discussing with transplant nephology Dr Plaza at the [facility]  -Did move to ICU for nitroglycerin drip as BP was not improving and patient developed epistasis. Off nitroglycerin drip   -if need better pressure control can use coreg   -prn hydralazine   -prn labetalol     Hyperlipemia  On statin      Obstructive sleep apnea:   Pulmonary hypertension.  -home CPAP   -home diamox held  on 10/31 due to worsening cr     Hypothyroidism:   -home synthroid           Diet: Consistent Carbohydrate Diet Moderate Consistent Carb (60 g CHO per Meal) Diet    DVT Prophylaxis: Pneumatic Compression Devices  Manley Catheter: Not present  Lines: None     Cardiac Monitoring: None  Code Status: Full Code      Clinically Significant Risk Factors         # Hyponatremia: Lowest Na = 130 mmol/L in last 2 days, will monitor as appropriate  # Hypochloremia: Lowest Cl = 95 mmol/L in last 2 days, will monitor as appropriate      # Hypoalbuminemia: Lowest albumin = 2.9 g/dL at 11/1/2024  4:44 AM, will monitor as appropriate     # Hypertension: Noted on problem list  # Chronic heart failure with preserved ejection fraction: heart failure noted on problem list and last echo with EF >50%          # Severe Obesity: Estimated body mass index is 51.63 kg/m  as calculated from the following:    Height as of this encounter: 1.676 m (5' 6\").    Weight as of this encounter: 145.1 kg (319 lb 14.2 oz).             Disposition Plan     Medically Ready for Discharge: Anticipated in 2-4 Days             Ivy Bruner DO  Hospitalist Service  [facility]  Securely message with [email or web address] (more info)  Text page via [employer] Paging/Directory   ______________________________________________________________________    Interval History   Patient was seen this morning for medical rounds. No chest pain or shortness of breath.   No bleeding overnight, pain is mostly controlled. Plan on PT and OT today, " will move out of icu     Physical Exam   Vital Signs: Temp: 99.4  F (37.4  C) Temp src: Tympanic BP: 146/79 Pulse: 82   Resp: 18 SpO2: 98 % O2 Device: None (Room air)    Weight: 319 lbs 14.2 oz    Physical Exam  Constitutional:       Appearance: He is obese.   HENT:      Right Ear: External ear normal.      Left Ear: External ear normal.      Nose: Nose normal.      Mouth/Throat:      Pharynx: Oropharynx is clear.   Eyes:      Conjunctiva/sclera: Conjunctivae normal.   Cardiovascular:      Rate and Rhythm: Normal rate.   Pulmonary:      Effort: Pulmonary effort is normal.   Abdominal:      General: Abdomen is flat.   Musculoskeletal:         General: Swelling present.   Skin:     Coloration: Skin is not jaundiced.      Findings: Erythema present.   Neurological:      Mental Status: He is alert. Mental status is at baseline.         Medical Decision Making       65 MINUTES SPENT BY ME on the date of service doing chart review, history, exam, documentation & further activities per the note.      Data     I have personally reviewed the following data over the past 24 hrs:    7.7  \   10.8 (L)   / 137 (L)     130 (L) 95 (L) 74.7 (H) /  131 (H)   4.2 18 (L) 2.87 (H) \     ALT: 32 AST: 30 AP: 76 TBILI: 0.3   ALB: 2.9 (L) TOT PROTEIN: 6.4 LIPASE: N/A     Procal: N/A CRP: 305.37 (H) Lactic Acid: N/A         Imaging results reviewed over the past 24 hrs:   No results found for this or any previous visit (from the past 24 hours).

## 2024-11-01 NOTE — PROVIDER NOTIFICATION
Provider notified: Patient has gotten tylenol, dilaudid, and oxycodone for that right leg pain. Pt stated his pain as not decreased at all and is still an 8/10.     One time does of 1mg IV dilaudid ordered by MD.

## 2024-11-01 NOTE — PLAN OF CARE
Goal Outcome Evaluation:       Plan of Care Reviewed With: patient and family    Overall Patient Progress: not progressing    Pt A&O, makes needs known. VSS, temp max 100.9. Med with dilaudid throughout the day for severe pain to RLE. Tylenol given for fever. Packing to R) nostril remains. Post-nasal drip per patient, bloody drainage. Patient educated on continuing saline nasal spray and to avoid blowing nose or excessively clearing throat. LS clear. HRR. BS active, patient did have a bowel movement this shift. BG as charted. IV zofran given x1 for nausea. Poor appetite. Cellulitis to R) side from flank to foot, not advancing past marked borders. Edematous to R) side. Good doppler pulse to R) foot. Assist of 1 with walker to bathroom. Elevating RLE with pillows in bed. IV SL. IV Cleocin given this shift. Call light within reach.     Face to face report given with opportunity to observe patient.    Report given to LILIANA Rios RN   10/31/2024  7:44 PM

## 2024-11-01 NOTE — PHARMACY-MEDICATION REGIMEN REVIEW
Pharmacy Antimicrobial Stewardship Assessment     Current Antimicrobial Therapy:  Anti-infectives (From now, onward)      Start     Dose/Rate Route Frequency Ordered Stop    24 0300  clindamycin (CLEOCIN) capsule 450 mg         450 mg Oral EVERY 6 HOURS SCHEDULED 24 0942      24 1300  clindamycin (CLEOCIN) 900 mg in 50 mL D5W intermittent infusion         900 mg  100 mL/hr over 30 Minutes Intravenous EVERY 8 HOURS 24 0943 24 0459            Indication: Skin and soft tissue infection    Days of Therapy:   Clindamycin: Day 4 of treatment for skin and soft tissue infections. Medication was given for 5 days from 10/22/24-10/26/24 and then re-started on 10/29/24.    Pertinent Labs:    Recent Labs   Lab Test 24  0444 10/31/24  0435 10/30/24  0440 10/29/24  0438 10/28/24  0543 10/27/24  0643 10/26/24  0649   WBC 7.7 7.2 6.1 7.2 6.6 7.3 11.4*       Recent Labs   Lab Test 24  0444 10/31/24  0435 10/30/24  0440 10/29/24  0438 10/28/24  1511 10/28/24  0543 10/27/24  0643 10/26/24  0649 10/25/24  0531 10/24/24  0622 10/22/24  2020   LACT  --   --   --   --  1.2  --   --   --   --   --  1.0   CRPI 305.37* 307.49* 348.30* 324.55*  --  272.53* 238.08* 280.20* 402.69*   < >  --    PCAL  --   --  3.37* 3.85*  --  4.79*  --   --   --   --   --     < > = values in this interval not displayed.        Temperature:  Temp (24hrs), Av.1  F (37.8  C), Min:99.4  F (37.4  C), Max:100.9  F (38.3  C)      Culture Results:   30-Day Micro Results       Collected Updated Procedure Result Status      10/27/2024 1249 10/28/2024 2012 MRSA MSSA PCR, Nasal Swab [[id number]]    Swab from Nares, Bilateral    Final result Component Value   MRSA Target DNA Negative   SA Target DNA Negative            10/22/2024 2020 10/28/2024 0643 Blood Culture Arm, Left [[id number]]   Blood from Arm, Left    Final result Component Value   Culture No Growth                        Recent Antibiotics:  Aztreonam for  SSTI  Linezolid for SSTI with extra coverage for MRSA    Recommendations/Interventions:  1. Continue clindamycin for gram positive coverage for SSTI      Veena Griffin  November 1, 2024

## 2024-11-01 NOTE — PROGRESS NOTES
Spoke with Duane and son, Adolph in regards to short term rehab.  Duane agreeable to this.  Writer encouraged him to work with PT/OT as much as possible.  Did advised his insurance requires prior authorization in order to have coverage.    Pt/family was provided with the Medicare Compare list for Skilled Nursing Facilities.  Discussed associated Medicare star ratings to assist with choice for referrals/discharge planning.    Education was given to pt/family that star ratings are updated/maintained by Medicare and can be reviewed by visiting www.medicare.gov.    Patient/family choices for vendor in priority are:   First Choice : [facility]; no beds    Second Choice: [facility]; referral sent     Third Choice: [facility] or [facility]

## 2024-11-02 ENCOUNTER — APPOINTMENT (OUTPATIENT)
Dept: GENERAL RADIOLOGY | Facility: HOSPITAL | Age: 69
DRG: 872 | End: 2024-11-02
Attending: HOSPITALIST
Payer: COMMERCIAL

## 2024-11-02 ENCOUNTER — APPOINTMENT (OUTPATIENT)
Dept: PHYSICAL THERAPY | Facility: HOSPITAL | Age: 69
DRG: 872 | End: 2024-11-02
Payer: COMMERCIAL

## 2024-11-02 LAB
ALBUMIN SERPL BCG-MCNC: 2.6 G/DL (ref 3.5–5.2)
ALP SERPL-CCNC: 72 U/L (ref 40–150)
ALT SERPL W P-5'-P-CCNC: 36 U/L (ref 0–70)
ANION GAP SERPL CALCULATED.3IONS-SCNC: 18 MMOL/L (ref 7–15)
AST SERPL W P-5'-P-CCNC: 38 U/L (ref 0–45)
BILIRUB SERPL-MCNC: 0.3 MG/DL
BUN SERPL-MCNC: 79.5 MG/DL (ref 8–23)
CALCIUM SERPL-MCNC: 8.9 MG/DL (ref 8.8–10.4)
CHLORIDE SERPL-SCNC: 96 MMOL/L (ref 98–107)
CREAT SERPL-MCNC: 3.07 MG/DL (ref 0.67–1.17)
CRP SERPL-MCNC: 251.41 MG/L
EGFRCR SERPLBLD CKD-EPI 2021: 21 ML/MIN/1.73M2
ERYTHROCYTE [DISTWIDTH] IN BLOOD BY AUTOMATED COUNT: 13.7 % (ref 10–15)
GLUCOSE BLDC GLUCOMTR-MCNC: 125 MG/DL (ref 70–99)
GLUCOSE BLDC GLUCOMTR-MCNC: 151 MG/DL (ref 70–99)
GLUCOSE BLDC GLUCOMTR-MCNC: 173 MG/DL (ref 70–99)
GLUCOSE BLDC GLUCOMTR-MCNC: 204 MG/DL (ref 70–99)
GLUCOSE SERPL-MCNC: 137 MG/DL (ref 70–99)
HCO3 SERPL-SCNC: 15 MMOL/L (ref 22–29)
HCT VFR BLD AUTO: 30.8 % (ref 40–53)
HGB BLD-MCNC: 10.2 G/DL (ref 13.3–17.7)
MCH RBC QN AUTO: 30.9 PG (ref 26.5–33)
MCHC RBC AUTO-ENTMCNC: 33.1 G/DL (ref 31.5–36.5)
MCV RBC AUTO: 93 FL (ref 78–100)
PLATELET # BLD AUTO: 153 10E3/UL (ref 150–450)
POTASSIUM SERPL-SCNC: 4.2 MMOL/L (ref 3.4–5.3)
PROT SERPL-MCNC: 6.3 G/DL (ref 6.4–8.3)
RBC # BLD AUTO: 3.3 10E6/UL (ref 4.4–5.9)
SODIUM SERPL-SCNC: 129 MMOL/L (ref 135–145)
WBC # BLD AUTO: 7.2 10E3/UL (ref 4–11)

## 2024-11-02 PROCEDURE — 97110 THERAPEUTIC EXERCISES: CPT | Mod: GP

## 2024-11-02 PROCEDURE — 85027 COMPLETE CBC AUTOMATED: CPT | Performed by: INTERNAL MEDICINE

## 2024-11-02 PROCEDURE — 250N000013 HC RX MED GY IP 250 OP 250 PS 637: Performed by: INTERNAL MEDICINE

## 2024-11-02 PROCEDURE — 99233 SBSQ HOSP IP/OBS HIGH 50: CPT | Performed by: HOSPITALIST

## 2024-11-02 PROCEDURE — 94640 AIRWAY INHALATION TREATMENT: CPT

## 2024-11-02 PROCEDURE — 999N000157 HC STATISTIC RCP TIME EA 10 MIN

## 2024-11-02 PROCEDURE — 250N000013 HC RX MED GY IP 250 OP 250 PS 637: Performed by: HOSPITALIST

## 2024-11-02 PROCEDURE — 86140 C-REACTIVE PROTEIN: CPT | Performed by: INTERNAL MEDICINE

## 2024-11-02 PROCEDURE — 36415 COLL VENOUS BLD VENIPUNCTURE: CPT | Performed by: INTERNAL MEDICINE

## 2024-11-02 PROCEDURE — 999N000065 XR CHEST 1 VIEW

## 2024-11-02 PROCEDURE — 250N000009 HC RX 250: Performed by: HOSPITALIST

## 2024-11-02 PROCEDURE — 250N000012 HC RX MED GY IP 250 OP 636 PS 637: Performed by: INTERNAL MEDICINE

## 2024-11-02 PROCEDURE — 120N000001 HC R&B MED SURG/OB

## 2024-11-02 PROCEDURE — 94640 AIRWAY INHALATION TREATMENT: CPT | Mod: 76

## 2024-11-02 PROCEDURE — 250N000011 HC RX IP 250 OP 636: Performed by: INTERNAL MEDICINE

## 2024-11-02 PROCEDURE — 84155 ASSAY OF PROTEIN SERUM: CPT | Performed by: INTERNAL MEDICINE

## 2024-11-02 PROCEDURE — 250N000011 HC RX IP 250 OP 636: Performed by: HOSPITALIST

## 2024-11-02 RX ORDER — HYDROXYZINE HYDROCHLORIDE 25 MG/1
25 TABLET, FILM COATED ORAL EVERY 6 HOURS PRN
Status: DISCONTINUED | OUTPATIENT
Start: 2024-11-02 | End: 2024-11-05 | Stop reason: HOSPADM

## 2024-11-02 RX ORDER — LORAZEPAM 2 MG/ML
0.5 INJECTION INTRAMUSCULAR EVERY 4 HOURS PRN
Status: DISCONTINUED | OUTPATIENT
Start: 2024-11-02 | End: 2024-11-04

## 2024-11-02 RX ORDER — IPRATROPIUM BROMIDE AND ALBUTEROL SULFATE 2.5; .5 MG/3ML; MG/3ML
SOLUTION RESPIRATORY (INHALATION)
Status: COMPLETED
Start: 2024-11-02 | End: 2024-11-02

## 2024-11-02 RX ORDER — IPRATROPIUM BROMIDE AND ALBUTEROL SULFATE 2.5; .5 MG/3ML; MG/3ML
3 SOLUTION RESPIRATORY (INHALATION) ONCE
Status: COMPLETED | OUTPATIENT
Start: 2024-11-02 | End: 2024-11-02

## 2024-11-02 RX ORDER — GUAIFENESIN 600 MG/1
600 TABLET, EXTENDED RELEASE ORAL 2 TIMES DAILY
Status: DISCONTINUED | OUTPATIENT
Start: 2024-11-02 | End: 2024-11-05 | Stop reason: HOSPADM

## 2024-11-02 RX ORDER — CLOPIDOGREL BISULFATE 75 MG/1
75 TABLET ORAL DAILY
Status: DISCONTINUED | OUTPATIENT
Start: 2024-11-03 | End: 2024-11-05 | Stop reason: HOSPADM

## 2024-11-02 RX ADMIN — LEVOTHYROXINE SODIUM 75 MCG: 75 TABLET ORAL at 09:11

## 2024-11-02 RX ADMIN — ALLOPURINOL 300 MG: 300 TABLET ORAL at 09:11

## 2024-11-02 RX ADMIN — IPRATROPIUM BROMIDE AND ALBUTEROL SULFATE 3 ML: .5; 3 SOLUTION RESPIRATORY (INHALATION) at 11:30

## 2024-11-02 RX ADMIN — ALBUTEROL SULFATE 2 PUFF: 90 AEROSOL, METERED RESPIRATORY (INHALATION) at 21:37

## 2024-11-02 RX ADMIN — CLONIDINE HYDROCHLORIDE 0.1 MG: 0.1 TABLET ORAL at 09:10

## 2024-11-02 RX ADMIN — LORAZEPAM 0.5 MG: 2 INJECTION INTRAMUSCULAR; INTRAVENOUS at 22:39

## 2024-11-02 RX ADMIN — OXYCODONE HYDROCHLORIDE 5 MG: 5 TABLET ORAL at 00:02

## 2024-11-02 RX ADMIN — MYCOPHENOLATE MOFETIL 1000 MG: 500 TABLET ORAL at 09:11

## 2024-11-02 RX ADMIN — ACETAZOLAMIDE 500 MG: 500 CAPSULE, EXTENDED RELEASE ORAL at 09:11

## 2024-11-02 RX ADMIN — Medication 250 MG: at 21:21

## 2024-11-02 RX ADMIN — CALCIUM CARBONATE (ANTACID) CHEW TAB 500 MG 2000 MG: 500 CHEW TAB at 09:11

## 2024-11-02 RX ADMIN — GABAPENTIN 300 MG: 300 CAPSULE ORAL at 09:11

## 2024-11-02 RX ADMIN — ROSUVASTATIN CALCIUM 40 MG: 10 TABLET, FILM COATED ORAL at 21:21

## 2024-11-02 RX ADMIN — FUROSEMIDE 80 MG: 40 TABLET ORAL at 21:21

## 2024-11-02 RX ADMIN — ACETAMINOPHEN 975 MG: 325 TABLET, FILM COATED ORAL at 21:20

## 2024-11-02 RX ADMIN — IPRATROPIUM BROMIDE AND ALBUTEROL SULFATE 3 ML: 2.5; .5 SOLUTION RESPIRATORY (INHALATION) at 11:30

## 2024-11-02 RX ADMIN — CLINDAMYCIN PHOSPHATE 900 MG: 900 INJECTION, SOLUTION INTRAVENOUS at 21:27

## 2024-11-02 RX ADMIN — MYCOPHENOLATE MOFETIL 1000 MG: 500 TABLET ORAL at 21:20

## 2024-11-02 RX ADMIN — INSULIN LISPRO 1 UNITS: 100 INJECTION, SOLUTION INTRAVENOUS; SUBCUTANEOUS at 18:03

## 2024-11-02 RX ADMIN — ISOSORBIDE MONONITRATE 60 MG: 30 TABLET, EXTENDED RELEASE ORAL at 09:10

## 2024-11-02 RX ADMIN — PREDNISONE 10 MG: 10 TABLET ORAL at 09:11

## 2024-11-02 RX ADMIN — CLINDAMYCIN PHOSPHATE 900 MG: 900 INJECTION, SOLUTION INTRAVENOUS at 04:47

## 2024-11-02 RX ADMIN — EZETIMIBE 10 MG: 10 TABLET ORAL at 09:11

## 2024-11-02 RX ADMIN — ACETAMINOPHEN 975 MG: 325 TABLET, FILM COATED ORAL at 09:11

## 2024-11-02 RX ADMIN — Medication 250 MG: at 09:11

## 2024-11-02 RX ADMIN — HYDROXYZINE HYDROCHLORIDE 25 MG: 25 TABLET ORAL at 17:38

## 2024-11-02 RX ADMIN — ACETAMINOPHEN 975 MG: 325 TABLET, FILM COATED ORAL at 13:19

## 2024-11-02 RX ADMIN — CLONIDINE HYDROCHLORIDE 0.1 MG: 0.1 TABLET ORAL at 21:21

## 2024-11-02 RX ADMIN — OXYCODONE HYDROCHLORIDE 5 MG: 5 TABLET ORAL at 12:32

## 2024-11-02 RX ADMIN — AMLODIPINE BESYLATE 10 MG: 10 TABLET ORAL at 09:15

## 2024-11-02 RX ADMIN — CLINDAMYCIN PHOSPHATE 900 MG: 900 INJECTION, SOLUTION INTRAVENOUS at 13:19

## 2024-11-02 RX ADMIN — INSULIN LISPRO 2 UNITS: 100 INJECTION, SOLUTION INTRAVENOUS; SUBCUTANEOUS at 12:20

## 2024-11-02 RX ADMIN — Medication 125 MCG: at 09:10

## 2024-11-02 RX ADMIN — CLONIDINE HYDROCHLORIDE 0.1 MG: 0.1 TABLET ORAL at 13:20

## 2024-11-02 RX ADMIN — GABAPENTIN 300 MG: 300 CAPSULE ORAL at 21:21

## 2024-11-02 RX ADMIN — GUAIFENESIN 600 MG: 600 TABLET, EXTENDED RELEASE ORAL at 21:25

## 2024-11-02 RX ADMIN — GUAIFENESIN 600 MG: 600 TABLET, EXTENDED RELEASE ORAL at 12:33

## 2024-11-02 NOTE — PHARMACY-MEDICATION REGIMEN REVIEW
Pharmacy Antimicrobial Stewardship Assessment     Current Antimicrobial Therapy:  Anti-infectives (From now, onward)      Start     Dose/Rate Route Frequency Ordered Stop    24 0300  clindamycin (CLEOCIN) capsule 450 mg         450 mg Oral EVERY 6 HOURS SCHEDULED 24 0942      24 1300  clindamycin (CLEOCIN) 900 mg in 50 mL D5W intermittent infusion         900 mg  100 mL/hr over 30 Minutes Intravenous EVERY 8 HOURS 24 0943 24 0459            Indication: SSTI    Days of Therapy: Day 5 Clindamycin, Day 12 Antibiotics     Pertinent Labs:    Recent Labs   Lab Test 24  0534 24  0444 10/31/24  0435 10/30/24  0440 10/29/24  0438 10/28/24  0543 10/27/24  0643   WBC 7.2 7.7 7.2 6.1 7.2 6.6 7.3       Recent Labs   Lab Test 24  0534 24  0444 10/31/24  0435 10/30/24  0440 10/29/24  0438 10/28/24  1511 10/28/24  0543 10/27/24  0643 10/26/24  0649 10/24/24  0622 10/22/24  2020   LACT  --   --   --   --   --  1.2  --   --   --   --  1.0   CRPI 251.41* 305.37* 307.49* 348.30* 324.55*  --  272.53* 238.08* 280.20*   < >  --    PCAL  --   --   --  3.37* 3.85*  --  4.79*  --   --   --   --     < > = values in this interval not displayed.        Temperature:  Temp (24hrs), Av.6  F (37  C), Min:97.8  F (36.6  C), Max:99.6  F (37.6  C)      Culture Results:   30-Day Micro Results       Collected Updated Procedure Result Status      10/27/2024 1249 10/28/2024 2012 MRSA MSSA PCR, Nasal Swab [[id number]]    Swab from Nares, Bilateral    Final result Component Value   MRSA Target DNA Negative   SA Target DNA Negative            10/22/2024 2020 10/28/2024 0643 Blood Culture Arm, Left [[id number]]   Blood from Arm, Left    Final result Component Value   Culture No Growth                     Recommendations/Interventions:  1. Per Dose Adjustment for Weight, could go up to 600 mg QID. However given history of C. Diff, will continue with 450 mg dose.  2. Consider Keflex + Bactrim per  [facility] Guide as these have lower risk of C. Diff than Clindamycin      Daniel Car, [facility]  November 2, 2024

## 2024-11-02 NOTE — PROGRESS NOTES
[facility]    Medicine Progress Note - Hospitalist Service    Date of Admission:  10/22/2024    Assessment & Plan   Right lower extremity cellulitis: improving   Sepsis resolved   Elevated CRP   Elevated procal    Chronic venous stasis and chronic lower extremity wound.  Component      Latest Ref Rng 10/31/2024  4:35 AM 11/1/2024  4:44 AM 11/2/2024  5:34 AM   WBC      4.0 - 11.0 10e3/uL 7.2  7.7  7.2          Component      Latest Ref Rng 10/31/2024  4:35 AM 11/1/2024  4:44 AM 11/2/2024  5:34 AM   CRP Inflammation      <5.00 mg/L 307.49 (H)  305.37 (H)  251.41 (H)         Component      Latest Ref Rng 10/28/2024  5:43 AM 10/29/2024  4:38 AM 10/30/2024  4:40 AM   Procalcitonin      <0.50 ng/mL 4.79 (H)  3.85 (H)  3.37 (H)       ON admission Tmax 102.1, WBC 21.0,  Normal lactic acid    Ultrasound of right lower ext showing No evidence of right lower extremity deep venous thrombosis. Did have subcutaneous edema consistent with cellulitis. Due to his allergies he was started on Aztreonam and  and zyvox.    -10/24: Patient is on Zyvox and clindamycin empirically, will continue.  Blood cultures pending.  Fever curve stable as last 24 hours.  .  -10/25: Patient continues on Zyvox, clindamycin empirically.  Blood cultures no growth to date x 72 hours.  CRP remains elevated above 400, WBC slightly downtrending from 22 to 18.  Fever curve remains stable.  Will add aztreonam empirically for gram-negative coverage.  -10/26: Patient was on Zyvox, aztreonam, clinda stopped on 10/26.  CRP finally downtrending, 280 today.  WBC improving 17.9 to 11.4.  Fever curve stable.  Appearance of legs slowly improving.  Blood culture no growth to date.  -10/27 discussed with pharmacy will continue regimen.   On 10/28 he has had low grad fevers, cultures so far negative   Discussed with pharmacy his wbc has been normal now, CRP is up little but the erythema appears to be improving in his right leg and he stated the pain is  better. Will hold off on further adjustment of antibiotics and will discuss with ID tomorrow.  10/29 Discussed with Dr Yolanda FORTUNE recs for ct imaging to rule out abscess in right leg. Ct was done no air or abscess seen  Antibiotics were adjusted to remove aztreonam as gram negative coverage likely not adding anything and clindamycin will have more gram positive coverage. Discussed with Pharmacy   CRP did worsen   10/30 erythema right thigh has improve.  Hemodynamically stable. Low grade fever. Procal down trending.   10/31 much improvement in pain, he is able to bend his leg and erythema is improving   Likely will stop Zyvox   and just continue  clindamycin as strep infection likely main source  11/1 patient afebrile has been on zyvox for 10 days, stopped zyvox as MRSA negative. Will continue  clindamycin for now  Discussed with pharmacy   ok to move out of ICU  11/2 crp improving. Still has pain but using oral pain meds now.   normal wbc, no fevers.          Status post renal transplant 24 years ago 2nd from [facility] nephropathy   KAYLAN on CKD  stage IV  baseline Cr 2.6      Component      Latest Ref Rng 10/30/2024  4:40 AM 10/31/2024  4:35 AM 11/1/2024  4:44 AM 11/2/2024  5:34 AM   Creatinine      0.67 - 1.17 mg/dL 2.61 (H)  2.94 (H)  2.87 (H)  3.07 (H)       Renal us done on 10/24 No mass or hydronephrosis in the transplanted kidney.   On CellCept and low-dose prednisone chronically, which will be continued.  GFR appears to be in the 20s chronically, this is stable currently.  -10/24: Creatinine increasing, 4.10 today.  Urine output is still adequate, over 1000 cc overnight. Avoiding nephrotoxic substances/medications.  Spoke with Dr. Mclaughlin, nephrology at [facility].  Recommended ultrasound of the transplanted kidney, CK level.  No current need to transfer to higher level of care, but will continue to monitor.  Patient had transplant approximately 24 years ago at [facility], however  he has recently transferred his care to the [facility].  -10/25: Creatinine stable at 3.87.  Urine output adequate.  -10/26: Creatinine improving, 3.34 today.  Urine output adequate.  Continue holding home diuretics.  -10/27  Daily labs  Avoid nephrotic meds  Out patient follow up with nephrology  -on 10/28 restart home lasix and diamox   -10/30 making urine   10/31 cr worsened holding diuretics today  11/1 [facility] will restart home aldactone, holding diamox still  Decreased lasix to 80 mg  11/2 home diamox restarted  Discussed with patient regarding ESRD and dialysis. Per son patient has had prior conversations with his nephrology team and may need another transplant.  I did attempt to call Dr Cortez at [facility] but she is not there today.     Elevated troponin chronic  Component      Latest Ref Rng 10/28/2024  3:11 PM 10/28/2024  6:25 PM 10/28/2024  8:25 PM   Troponin T, High Sensitivity      <=22 ng/L 96 (H)  95 (H)  94 (H)    No chest pain  EKG no ST elevation or depressions    Monitor  trop  Suspect  demand 2nd from HTN and infection    CAD   Hx of coronary  stents  No stents in last year.   Held asa and Plavix due to epistaxis  Restart asa on 11/1  Restarted Plavix 11/3     Nasal congestion   Otitis media with effusion not signs of infection   Ordered mucinex   Nasal saline    Epistaxis  Resolved with pressure  Ordered prn nasal saline  Holding  heparin today  If has further bleeding may need Rhino rocket  Will avoid using afrin  due to htn     On 10/30 I did consult with ENT and he did have cautery and packing right nostril   ENT recs for   Nasal saline, Ocean spray at the bedside.  I told can to spray both nostrils with 2 sprays every hour while he is awake no nose blowing.  Follow-up with Sondra or Evonne in 1 week for nasopore removal.  Keep packing in place and irrigate with packing in place.     10/31 epistaxis left nostril  I did discuss with  ENT and at this time recs for decrease him clearing throat,  stop putting anything inside his nose other than nasal saline or ayr nasal gel. If he continues to have bleeding may need further interventions but may not be cleared by anesthesia here.     11/1 no bleeding  Asa to be restarted      11/2 did have bleeding from left nostril after coughing. Discussed with him regarding using nasal saline. Hgb slightly down but stable 10-11 range. DAPT being held indefinitely now.      Hyponatremia POA       Monitor     Insulin-dependent diabetes mellitus type 2:   -On home lantus 10 hs  -Sliding scale insulin      Essential hypertension:   -home lasix  changed to 80mg hs  -holding home diamox restarted   -home imdur  -On  amlodipine and clonidine after discussing with transplant nephology Dr Plaza at [facility]  -Did move to ICU for nitroglycerin drip as BP was not improving and patient developed epistasis. Off nitroglycerin drip   -if need better pressure control can use coreg   -prn hydralazine   -prn labetalol     Hyperlipemia  On statin      Obstructive sleep apnea:   Pulmonary hypertension.  -home CPAP   -home diamox restarted  on 11/2  -patient did complain of shortness of breaht 11/2 he did not use CPAP last night due to dry nose. I did get chest xray  no acute findings  IS ordered    Hypothyroidism:   -home synthroid     Anxiety  Prn atarax    Obesity   Diet and exercise  May benefit from GLP1 agonist, will defer to his PCP team         Diet: Consistent Carbohydrate Diet Moderate Consistent Carb (60 g CHO per Meal) Diet    DVT Prophylaxis: Pneumatic Compression Devices  Manley Catheter: Not present  Lines: None     Cardiac Monitoring: None  Code Status: Full Code      Clinically Significant Risk Factors         # Hyponatremia: Lowest Na = 129 mmol/L in last 2 days, will monitor as appropriate  # Hypochloremia: Lowest Cl = 95 mmol/L in last 2 days, will monitor as appropriate      # Hypoalbuminemia: Lowest albumin = 2.6 g/dL at 11/2/2024  5:34 AM, will monitor as  "appropriate     # Hypertension: Noted on problem list  # Chronic heart failure with preserved ejection fraction: heart failure noted on problem list and last echo with EF >50%          # Severe Obesity: Estimated body mass index is 51.63 kg/m  as calculated from the following:    Height as of this encounter: 1.676 m (5' 6\").    Weight as of this encounter: 145.1 kg (319 lb 14.2 oz).             Disposition Plan     Medically Ready for Discharge: Anticipated in 2-4 Days             Ivy Bruner DO  Hospitalist Service  [facility]  Securely message with [employer] (more info)  Text page via [facility] Paging/Directory   ______________________________________________________________________    Interval History   Patient was seen this morning for medical rounds . No chest pain.  Has some shortness of breath.   Congested. Discussed with him about using nasal saline, which he has been. Also ordered mucinex  and chest xray.      Physical Exam   Vital Signs: Temp: 97.6  F (36.4  C) Temp src: Tympanic BP: 133/59 Pulse: 83   Resp: 18 SpO2: 94 % O2 Device: None (Room air)    Weight: 319 lbs 14.2 oz  Physical Exam  Constitutional:       Appearance: He is obese.   HENT:      Right Ear: External ear normal.      Left Ear: External ear normal.      Ears:      Comments: Behind left TM has some fluid , no erythema   Right TM normal appearing      Nose: Nose normal.      Mouth/Throat:      Pharynx: Oropharynx is clear.   Eyes:      Conjunctiva/sclera: Conjunctivae normal.   Cardiovascular:      Rate and Rhythm: Normal rate.   Pulmonary:      Effort: Pulmonary effort is normal.      Comments: Right lower lower lobe fine crackles   Abdominal:      General: Abdomen is flat.   Musculoskeletal:         General: Swelling present.   Skin:     Coloration: Skin is not jaundiced.      Findings: Erythema present.   Neurological:      Mental Status: He is alert. Mental status is at baseline.         Medical Decision Making       65 " MINUTES SPENT BY ME on the date of service doing chart review, history, exam, documentation & further activities per the note.      Data     I have personally reviewed the following data over the past 24 hrs:    7.2  \   10.2 (L)   / 153     129 (L) 96 (L) 79.5 (H) /  204 (H)   4.2 15 (L) 3.07 (H) \     ALT: 36 AST: 38 AP: 72 TBILI: 0.3   ALB: 2.6 (L) TOT PROTEIN: 6.3 (L) LIPASE: N/A     Procal: N/A CRP: 251.41 (H) Lactic Acid: N/A         Imaging results reviewed over the past 24 hrs:   Recent Results (from the past 24 hours)   XR Chest 1 View    Narrative    PROCEDURE:  XR CHEST 1 VIEW    HISTORY:  shortness of breath.     COMPARISON:  4/28/2024    FINDINGS:   The cardiac silhouette is normal in size. The pulmonary vasculature is  normal.  The lungs are clear. No pleural effusion or pneumothorax. Old  healed left-sided rib fractures are noted.      Impression    IMPRESSION:  No acute cardiopulmonary disease.      AMBIKA HERRERA MD         SYSTEM ID:  [id number]

## 2024-11-02 NOTE — PLAN OF CARE
Patient alert and oriented, following commands, afebrile. 's to 150's. HR 80's to 90's. Remains on RA, lung sounds clear. Up to the bathroom or voiding via urinal. 1BM this shift. Good urine output after diuretics. Up with Ax1 gaitbelt and walker. Continues to have pain in RLE, PRN diluadid and oxycodone given over night with improvement per patient. IV remains intact and is saline locked. Rest of assessments as charted.     Face to face report given with opportunity to observe patient.    Report given to Lisha Wang RN   11/2/2024  10:39 AM

## 2024-11-02 NOTE — PROGRESS NOTES
11/02/24 1200   Appointment Info   Signing Clinician's Name / Credentials (PT) Elba Ramos DPT   Therapeutic Procedure/Exercise   Ther. Procedure: strength, endurance, ROM, flexibillity Minutes (99662) 10   Symptoms Noted During/After Treatment none   Treatment Detail/Skilled Intervention Pt resting in bed when approached for PT. Declined ambulation due to report that he just got into bed and had very difficult time ambulating to bathroom today. Agreed to supine ther ex x15-20 each including ankle pumps, SAQ, hip abduction/adduction, quad sets, isometric adduction   PT Discharge Planning   PT Plan Progress mobility as tolerated   PT Discharge Recommendation (DC Rec) home;home with home care physical therapy;Transitional Care Facility   PT Rationale for DC Rec Pt still having difficulty with activity d/t pain and decreased activity tolerance.  Currently more appropriate for short term rehab since he lives alone, but will continue to monitor needs throughout hospitalization.   PT Brief overview of current status Sit<->stand = CGA/Miguel; amb x 30 ft CGA with FWW.  Sit<->sup = SBA

## 2024-11-02 NOTE — PLAN OF CARE
"Goal Outcome Evaluation:      Plan of Care Reviewed With: patient    Overall Patient Progress: improvingOverall Patient Progress: improving    /59 (BP Location: Left arm, Patient Position: Semi-Darden's, Cuff Size: Adult Large)   Pulse 83   Temp 97.6  F (36.4  C) (Tympanic)   Resp 18   Ht 1.676 m (5' 6\")   Wt 145.1 kg (319 lb 14.2 oz)   SpO2 94%   BMI 51.63 kg/m      Outcome Evaluation: Pt reporting pain 8/10 to right leg/radiating to back.  Gave PRN oxycodone at approximately 1230.  Erythema to leg receding from markings, with pitting edema present BLE, +4 to right foot, +3 to right leg, and +3 to left foot/leg. Pt pleasant and cooperative.  Will continue to monitor.      "

## 2024-11-02 NOTE — PROGRESS NOTES
"/58   Pulse 83   Temp 97.9  F (36.6  C) (Tympanic)   Resp 22   Ht 1.676 m (5' 6\")   Wt 145.1 kg (319 lb 14.2 oz)   SpO2 94%   BMI 51.63 kg/m      Resident had left nostril bleeding, with steady trickle down throat, and spit up blood in a cup. All VSS. Applied pressure and gauze inside nostril.  MD made aware.  Bleeding subsided after 15 minutes.  Resident is in bed resting/sleeping.  Will continue to monitor.  "

## 2024-11-03 LAB
GLUCOSE BLDC GLUCOMTR-MCNC: 117 MG/DL (ref 70–99)
GLUCOSE BLDC GLUCOMTR-MCNC: 148 MG/DL (ref 70–99)
GLUCOSE BLDC GLUCOMTR-MCNC: 183 MG/DL (ref 70–99)
GLUCOSE BLDC GLUCOMTR-MCNC: 195 MG/DL (ref 70–99)

## 2024-11-03 PROCEDURE — 250N000013 HC RX MED GY IP 250 OP 250 PS 637: Performed by: HOSPITALIST

## 2024-11-03 PROCEDURE — 250N000012 HC RX MED GY IP 250 OP 636 PS 637: Performed by: INTERNAL MEDICINE

## 2024-11-03 PROCEDURE — 250N000013 HC RX MED GY IP 250 OP 250 PS 637: Performed by: INTERNAL MEDICINE

## 2024-11-03 PROCEDURE — 99233 SBSQ HOSP IP/OBS HIGH 50: CPT | Performed by: HOSPITALIST

## 2024-11-03 PROCEDURE — 999N000157 HC STATISTIC RCP TIME EA 10 MIN

## 2024-11-03 PROCEDURE — 120N000001 HC R&B MED SURG/OB

## 2024-11-03 RX ORDER — SALIVA STIMULANT COMB. NO.3
2 SPRAY, NON-AEROSOL (ML) MUCOUS MEMBRANE 4 TIMES DAILY PRN
Status: DISCONTINUED | OUTPATIENT
Start: 2024-11-03 | End: 2024-11-05 | Stop reason: HOSPADM

## 2024-11-03 RX ADMIN — HYDROXYZINE HYDROCHLORIDE 25 MG: 25 TABLET ORAL at 03:45

## 2024-11-03 RX ADMIN — ISOSORBIDE MONONITRATE 60 MG: 30 TABLET, EXTENDED RELEASE ORAL at 09:53

## 2024-11-03 RX ADMIN — GUAIFENESIN 600 MG: 600 TABLET, EXTENDED RELEASE ORAL at 09:54

## 2024-11-03 RX ADMIN — CLONIDINE HYDROCHLORIDE 0.1 MG: 0.1 TABLET ORAL at 14:42

## 2024-11-03 RX ADMIN — CLINDAMYCIN HYDROCHLORIDE 450 MG: 150 CAPSULE ORAL at 03:39

## 2024-11-03 RX ADMIN — ACETAMINOPHEN 975 MG: 325 TABLET, FILM COATED ORAL at 09:53

## 2024-11-03 RX ADMIN — Medication 250 MG: at 21:33

## 2024-11-03 RX ADMIN — MYCOPHENOLATE MOFETIL 1000 MG: 500 TABLET ORAL at 09:54

## 2024-11-03 RX ADMIN — GABAPENTIN 300 MG: 300 CAPSULE ORAL at 09:53

## 2024-11-03 RX ADMIN — GABAPENTIN 300 MG: 300 CAPSULE ORAL at 21:33

## 2024-11-03 RX ADMIN — AMLODIPINE BESYLATE 10 MG: 10 TABLET ORAL at 09:53

## 2024-11-03 RX ADMIN — INSULIN LISPRO 1 UNITS: 100 INJECTION, SOLUTION INTRAVENOUS; SUBCUTANEOUS at 11:15

## 2024-11-03 RX ADMIN — LEVOTHYROXINE SODIUM 75 MCG: 75 TABLET ORAL at 09:53

## 2024-11-03 RX ADMIN — HYDROXYZINE HYDROCHLORIDE 25 MG: 25 TABLET ORAL at 09:52

## 2024-11-03 RX ADMIN — CLONIDINE HYDROCHLORIDE 0.1 MG: 0.1 TABLET ORAL at 09:54

## 2024-11-03 RX ADMIN — ALLOPURINOL 300 MG: 300 TABLET ORAL at 09:54

## 2024-11-03 RX ADMIN — CLINDAMYCIN HYDROCHLORIDE 450 MG: 150 CAPSULE ORAL at 14:42

## 2024-11-03 RX ADMIN — ACETAMINOPHEN 975 MG: 325 TABLET, FILM COATED ORAL at 21:33

## 2024-11-03 RX ADMIN — CLINDAMYCIN HYDROCHLORIDE 450 MG: 150 CAPSULE ORAL at 09:53

## 2024-11-03 RX ADMIN — INSULIN LISPRO 1 UNITS: 100 INJECTION, SOLUTION INTRAVENOUS; SUBCUTANEOUS at 17:38

## 2024-11-03 RX ADMIN — FUROSEMIDE 80 MG: 40 TABLET ORAL at 21:33

## 2024-11-03 RX ADMIN — GUAIFENESIN 600 MG: 600 TABLET, EXTENDED RELEASE ORAL at 21:34

## 2024-11-03 RX ADMIN — PREDNISONE 10 MG: 10 TABLET ORAL at 09:53

## 2024-11-03 RX ADMIN — OXYCODONE HYDROCHLORIDE 5 MG: 5 TABLET ORAL at 10:08

## 2024-11-03 RX ADMIN — CALCIUM CARBONATE (ANTACID) CHEW TAB 500 MG 2000 MG: 500 CHEW TAB at 09:54

## 2024-11-03 RX ADMIN — OXYCODONE HYDROCHLORIDE 5 MG: 5 TABLET ORAL at 16:29

## 2024-11-03 RX ADMIN — EZETIMIBE 10 MG: 10 TABLET ORAL at 09:53

## 2024-11-03 RX ADMIN — Medication 250 MG: at 09:53

## 2024-11-03 RX ADMIN — CLONIDINE HYDROCHLORIDE 0.1 MG: 0.1 TABLET ORAL at 21:34

## 2024-11-03 RX ADMIN — ACETAMINOPHEN 975 MG: 325 TABLET, FILM COATED ORAL at 14:42

## 2024-11-03 RX ADMIN — SALINE NASAL SPRAY 2 SPRAY: 1.5 SOLUTION NASAL at 10:04

## 2024-11-03 RX ADMIN — ACETAZOLAMIDE 500 MG: 500 CAPSULE, EXTENDED RELEASE ORAL at 09:53

## 2024-11-03 RX ADMIN — CLINDAMYCIN HYDROCHLORIDE 450 MG: 150 CAPSULE ORAL at 21:34

## 2024-11-03 RX ADMIN — Medication 125 MCG: at 09:53

## 2024-11-03 RX ADMIN — MYCOPHENOLATE MOFETIL 1000 MG: 500 TABLET ORAL at 21:33

## 2024-11-03 RX ADMIN — ROSUVASTATIN CALCIUM 40 MG: 10 TABLET, FILM COATED ORAL at 21:33

## 2024-11-03 NOTE — PHARMACY-MEDICATION REGIMEN REVIEW
Pharmacy Antimicrobial Stewardship Assessment     Current Antimicrobial Therapy:  Anti-infectives (From now, onward)      Start     Dose/Rate Route Frequency Ordered Stop    24 0300  clindamycin (CLEOCIN) capsule 450 mg         450 mg Oral EVERY 6 HOURS SCHEDULED 24 0942            Indication: SSTI     Days of Therapy: Day 6 Clindamycin, Day 13 Antibiotics     Pertinent Labs:    Recent Labs   Lab Test 24  0534 24  0444 10/31/24  0435 10/30/24  0440 10/29/24  0438 10/28/24  0543 10/27/24  0643   WBC 7.2 7.7 7.2 6.1 7.2 6.6 7.3       Recent Labs   Lab Test 24  0534 24  0444 10/31/24  0435 10/30/24  0440 10/29/24  0438 10/28/24  1511 10/28/24  0543 10/27/24  0643 10/26/24  0649 10/24/24  0622 10/22/24  2020   LACT  --   --   --   --   --  1.2  --   --   --   --  1.0   CRPI 251.41* 305.37* 307.49* 348.30* 324.55*  --  272.53* 238.08* 280.20*   < >  --    PCAL  --   --   --  3.37* 3.85*  --  4.79*  --   --   --   --     < > = values in this interval not displayed.        Temperature:  Temp (24hrs), Av.4  F (36.9  C), Min:97.6  F (36.4  C), Max:99.6  F (37.6  C)      Culture Results:   30-Day Micro Results       Collected Updated Procedure Result Status      10/27/2024 1249 10/28/2024 2012 MRSA MSSA PCR, Nasal Swab [[id number]]    Swab from Nares, Bilateral    Final result Component Value   MRSA Target DNA Negative   SA Target DNA Negative            10/22/2024 2020 10/28/2024 0643 Blood Culture Arm, Left [[id number]]   Blood from Arm, Left    Final result Component Value   Culture No Growth                       Recommendations/Interventions:  1. Per Dose Adjustment for Weight, could go up to 600 mg QID. However given history of C. Diff, will continue with 450 mg dose.  2. Consider Keflex + Bactrim per [place] Guide as these have lower risk of C. Diff than Clindamycin      Daniel Car, [facility]  November 3, 2024

## 2024-11-03 NOTE — PROGRESS NOTES
[facility]    Medicine Progress Note - Hospitalist Service    Date of Admission:  10/22/2024    Assessment & Plan   Hospital course  Patient is a 69-year-old male who has established diagnosis of status post renal transplant 24 years ago secondary from IgA nephropathy, obesity, insulin-dependent type 2 diabetes, essential hypertension, hyperlipidemia, struct of sleep apnea, pulmonary hypertension, hypothyroidism, anxiety,.  Patient was admitted to the hospital on 10/22 with right leg cellulitis on chronic venous stasis of the lower extremity and immunosuppression secondary to CellCept and chronic prednisone.  Patient is also has had accelerated hypertension.  He does follow with nephrology Dr. Hernandez at [facility] and was seen transplant team at the [facility] but was in the process of changing providers.  Due to patient's allergy list he initially was on linezolid and clindamycin but subsequently was changed to aztreonam.  He did have acute on chronic kidney disease stage IV on admission and required to hold his diuretics which she is on Lasix Diamox and Aldactone.  Patient did have improvement of his creatinine from 4 down to his baseline 2.6 and he was restarted on his diuretics.  Also patient did have right leg swelling and has been instructed to elevate his leg which he has been attempting to do.  Also patient did have worsening cellulitis thought to be secondary from  discontinuation of clindamycin but it was restarted on 10/29 with improvement of his CRP, pain and swelling and redness.  He did have erythema extending down to his right flank area but that has much improved since restart of clindamycin.  He is also did receive 9 days of linezolid but it was discontinued on 10/31/2024.  Patient also did have a CT scan done of his right lower extremity after discussion with infectious disease and there was no abscess seen no osteomyelitis except extensive cellulitis.  Patient has been  receiving Dilaudid and oxycodone for pain schedule on Tylenol.  He has worked with physical therapy but his progress has been limited due to critical illness myopathy and weakness.  Patient was recommended to have rehab stay which case management has been working on placement.  Also patient has been seen by ear nose throat doctor Dr. Aj and has had right nasal cauterization secondary from epistaxis.  He currently does have packing in his right nose with plan to be removed on 11 4 with ENT.  Patient did require ICU stay for nitroglycerin drip secondary to accelerated hypertension and his diuretics being held.  Since nitroglycerin drip he was started on amlodipine 10 mg daily, clonidine 0.1 mg 3 times daily, his home Imdur 60 mg, his home Lasix decreased dose to 80 mg, his home Diamox, and Aldactone.  Patient also was discussed with his transplant team Dr. Rodriguez at the [facility] regarding recommendations for antihypertensive medications in the setting of renal transplant.  I also did discuss with patient regarding possible need for dialysis at some point based on his trajectory of his creatinine and GFR over the years.  Patient and son did state that they are supposed to [facility] at some point for possible renal transplant but he needs to lose weight and to be a better surgical candidate.  I have had numerous discussion with patient regarding physical therapy, continued antibiotic use which she is on oral clindamycin for total of 14 days with the last dose being 11/12/2024, also discussing with him regarding avoiding any nasal irritation as epistaxis has been a big issue and we have had to hold his aspirin and Plavix indefinitely now..    Right lower extremity cellulitis present on admission: improving   Sepsis on admission resolved   Elevated CRP improving   Elevated procal improving  Chronic venous stasis and chronic lower extremity wound.  Component      Latest Ref Rng  10/31/2024  4:35 AM 11/1/2024  4:44 AM 11/2/2024  5:34 AM   WBC      4.0 - 11.0 10e3/uL 7.2  7.7  7.2          Component      Latest Ref Rng 10/31/2024  4:35 AM 11/1/2024  4:44 AM 11/2/2024  5:34 AM   CRP Inflammation      <5.00 mg/L 307.49 (H)  305.37 (H)  251.41 (H)         Component      Latest Ref Rng 10/28/2024  5:43 AM 10/29/2024  4:38 AM 10/30/2024  4:40 AM   Procalcitonin      <0.50 ng/mL 4.79 (H)  3.85 (H)  3.37 (H)       ON admission Tmax 102.1, WBC 21.0,  Normal lactic acid    Ultrasound of right lower ext showing No evidence of right lower extremity deep venous thrombosis. Did have subcutaneous edema consistent with cellulitis. Due to his allergies he was started on Aztreonam and  and zyvox.    -10/24: Patient is on Zyvox and clindamycin empirically, will continue.  Blood cultures pending.  Fever curve stable as last 24 hours.  .  -10/25: Patient continues on Zyvox, clindamycin empirically.  Blood cultures no growth to date x 72 hours.  CRP remains elevated above 400, WBC slightly downtrending from 22 to 18.  Fever curve remains stable.  Will add aztreonam empirically for gram-negative coverage.  -10/26: Patient was on Zyvox, aztreonam, clinda stopped on 10/26.  CRP finally downtrending, 280 today.  WBC improving 17.9 to 11.4.  Fever curve stable.  Appearance of legs slowly improving.  Blood culture no growth to date.  -10/27 discussed with pharmacy will continue regimen.   On 10/28 he has had low grad fevers, cultures so far negative   Discussed with pharmacy his wbc has been normal now, CRP is up little but the erythema appears to be improving in his right leg and he stated the pain is better. Will hold off on further adjustment of antibiotics and will discuss with ID tomorrow.  10/29 Discussed with Dr Yolanda FORTUNE recs for ct imaging to rule out abscess in right leg. Ct was done no air or abscess seen  Antibiotics were adjusted to remove aztreonam as gram negative coverage likely not adding anything  and clindamycin will have more gram positive coverage. Discussed with Pharmacy   CRP did worsen   10/30 erythema right thigh has improve.  Hemodynamically stable. Low grade fever. Procal down trending.   10/31 much improvement in pain, he is able to bend his leg and erythema is improving   Likely will stop Zyvox   and just continue  clindamycin as strep infection likely main source  11/1 patient afebrile has been on zyvox for 10 days, stopped zyvox as MRSA negative. Will continue  clindamycin for now  Discussed with pharmacy   ok to move out of ICU  11/2 crp improving. Still has pain but using oral pain meds now.   normal wbc, no fevers.  11/3 improving erythema, he is using oral pain medications now            Status post renal transplant 24 years ago 2nd from IGA nephropathy   KAYLAN on CKD  stage IV  baseline Cr 2.6      Component      Latest Ref Rng 10/30/2024  4:40 AM 10/31/2024  4:35 AM 11/1/2024  4:44 AM 11/2/2024  5:34 AM   Creatinine      0.67 - 1.17 mg/dL 2.61 (H)  2.94 (H)  2.87 (H)  3.07 (H)       Renal us done on 10/24 No mass or hydronephrosis in the transplanted kidney.   On CellCept and low-dose prednisone chronically, which will be continued.  GFR appears to be in the 20s chronically, this is stable currently.  -10/24: Creatinine increasing, 4.10 today.  Urine output is still adequate, over 1000 cc overnight. Avoiding nephrotoxic substances/medications.  Spoke with Dr. Mclaughlin, nephrology at [facility].  Recommended ultrasound of the transplanted kidney, CK level.  No current need to transfer to higher level of care, but will continue to monitor.  Patient had transplant approximately 24 years ago at [facility], however he has recently transferred his care to the [facility].  -10/25: Creatinine stable at 3.87.  Urine output adequate.  -10/26: Creatinine improving, 3.34 today.  Urine output adequate.  Continue holding home diuretics.  -10/27  Daily labs  Avoid  nephrotic meds  Out patient follow up with nephrology  -on 10/28 restart home lasix and diamox   -10/30 making urine   10/31 cr worsened holding diuretics today  11/1 CR Plateau will restart home aldactone, holding diamox still  Decreased lasix to 80 mg  11/2 home diamox restarted  Discussed with patient regarding ESRD and dialysis. Per son patient has had prior conversations with his nephrology team and may need another transplant.  I did attempt to call Dr Cortez at [facility] but she is not there today.   11/3 we will check labs tomorrow    Elevated troponin chronic  Component      Latest Ref Rng 10/28/2024  3:11 PM 10/28/2024  6:25 PM 10/28/2024  8:25 PM   Troponin T, High Sensitivity      <=22 ng/L 96 (H)  95 (H)  94 (H)    No chest pain  EKG no ST elevation or depressions    Monitor  trop  Suspect  demand 2nd from HTN and infection    CAD   Hx of coronary  stents  No stents in last year.   Held asa and Plavix due to epistaxis  Restart asa on 11/1  Restarted Plavix 11/3     Nasal congestion   Otitis media with effusion not signs of infection   Ordered mucinex   Nasal saline    Epistaxis  Resolved with pressure  Ordered prn nasal saline  Holding  heparin today  If has further bleeding may need Rhino rocket  Will avoid using afrin  due to htn     On 10/30 I did consult with ENT and he did have cautery and packing right nostril   ENT recs for   Nasal saline, Ocean spray at the bedside.  I told can to spray both nostrils with 2 sprays every hour while he is awake no nose blowing.  Follow-up with Sondra or Evonne in 1 week for nasopore removal.  Keep packing in place and irrigate with packing in place.     10/31 epistaxis left nostril  I did discuss with  ENT and at this time recs for decrease him clearing throat, stop putting anything inside his nose other than nasal saline or ayr nasal gel. If he continues to have bleeding may need further interventions but may not be cleared by anesthesia here.     11/1 no bleeding  Asa  to be restarted      11/2 did have bleeding from left nostril after coughing. Discussed with him regarding using nasal saline. Hgb slightly down but stable 10-11 range. DAPT being held indefinitely now.      11/3 no bleeding reported today  I did reach out to Dr. Aj regarding removing packing his right nostril but we will await follow-up tomorrow    Hyponatremia POA       Monitor     Insulin-dependent diabetes mellitus type 2:   -On home lantus 10 hs  -Sliding scale insulin      Essential hypertension:   -home lasix  changed to 80mg hs  -holding home diamox restarted   -home imdur  -On  amlodipine and clonidine after discussing with transplant nephology Dr Plaza at the [facility]  -Did move to ICU for nitroglycerin drip as BP was not improving and patient developed epistasis. Off nitroglycerin drip   -if need better pressure control can use coreg   -prn hydralazine   -prn labetalol     Hyperlipemia  On statin      Obstructive sleep apnea:   Pulmonary hypertension.  -home CPAP   -home diamox restarted  on 11/2  -patient did complain of shortness of breaht 11/2 he did not use CPAP last night due to dry nose. I did get chest xray  no acute findings  IS ordered    Hypothyroidism:   -home synthroid     Anxiety  Prn atarax    Obesity   Diet and exercise  May benefit from GLP1 agonist, will defer to his PCP team           Diet: Consistent Carbohydrate Diet Moderate Consistent Carb (60 g CHO per Meal) Diet    DVT Prophylaxis: Pneumatic Compression Devices  Manley Catheter: Not present  Lines: None     Cardiac Monitoring: None  Code Status: Full Code      Clinically Significant Risk Factors         # Hyponatremia: Lowest Na = 129 mmol/L in last 2 days, will monitor as appropriate  # Hypochloremia: Lowest Cl = 96 mmol/L in last 2 days, will monitor as appropriate      # Hypoalbuminemia: Lowest albumin = 2.6 g/dL at 11/2/2024  5:34 AM, will monitor as appropriate     # Hypertension: Noted on problem list  # Chronic  "heart failure with preserved ejection fraction: heart failure noted on problem list and last echo with EF >50%          # Severe Obesity: Estimated body mass index is 51.63 kg/m  as calculated from the following:    Height as of this encounter: 1.676 m (5' 6\").    Weight as of this encounter: 145.1 kg (319 lb 14.2 oz).             Disposition Plan     Medically Ready for Discharge: Anticipated in 2-4 Days             Ivy Bruner DO  Hospitalist Service  [facility]  Securely message with [employer] (more info)  Text page via [facility] Paging/Directory   ______________________________________________________________________    Interval History   Patient was seen this morning for medical rounds.  Patient overnight did use his CPAP but RT was working with him and attempting to get a better seal.  Also did discuss with patient and his son regarding having the right nasal packing removed.  I did reach out to ENT and will have him see him tomorrow for that.  Patient overall does feel anxious and feels short of breath when the packing is present in his nostril.  Also did discuss with nursing staff as we do not have physical therapy today we will try to get him up moving around.  Patient overall does feel a little better today.  No chest pain or shortness of breath.    Physical Exam   Vital Signs: Temp: 99  F (37.2  C) Temp src: Tympanic BP: 135/52     Resp: 20 SpO2: 94 % O2 Device: None (Room air)    Weight: 319 lbs 14.2 oz    Physical Exam  Constitutional:       Appearance: He is obese.   HENT:      Right Ear: External ear normal.      Left Ear: External ear normal.      Nose: Nose normal.      Mouth/Throat:      Pharynx: Oropharynx is clear.   Eyes:      Conjunctiva/sclera: Conjunctivae normal.   Cardiovascular:      Rate and Rhythm: Normal rate.   Pulmonary:      Effort: Pulmonary effort is normal.   Abdominal:      General: Abdomen is flat.   Musculoskeletal:         General: Swelling present.   Skin:     " Coloration: Skin is not jaundiced.      Findings: Erythema present.   Neurological:      Mental Status: He is alert. Mental status is at baseline.         Medical Decision Making       65 MINUTES SPENT BY ME on the date of service doing chart review, history, exam, documentation & further activities per the note.      Data         Imaging results reviewed over the past 24 hrs:   No results found for this or any previous visit (from the past 24 hours).

## 2024-11-03 NOTE — PLAN OF CARE
Patient alert and oriented, following commands, afebrile. Patient anxious throughout shift about being short of breath. Patient stating its hard to breath with the nasal packing in. PRN atarax minimally effective per patient, provider notified and ordered PRN IV ativan. Ativan was effective per patient. Continues to be hypertensive but otherwise VSS on RA. Voiding via urinal, no BM. Reports pain 3-4/10 throughout shift in right lower extremity, refused any pain medications. IV remains intact. Rest of assessments as charted.     Face to face report given with opportunity to observe patient.    Report given to Jaki Wang RN   11/3/2024  7:30 AM

## 2024-11-04 ENCOUNTER — APPOINTMENT (OUTPATIENT)
Dept: PHYSICAL THERAPY | Facility: HOSPITAL | Age: 69
DRG: 872 | End: 2024-11-04
Payer: COMMERCIAL

## 2024-11-04 ENCOUNTER — APPOINTMENT (OUTPATIENT)
Dept: OCCUPATIONAL THERAPY | Facility: HOSPITAL | Age: 69
DRG: 872 | End: 2024-11-04
Payer: COMMERCIAL

## 2024-11-04 PROBLEM — R04.0 EPISTAXIS: Status: ACTIVE | Noted: 2024-11-04

## 2024-11-04 LAB
ANION GAP SERPL CALCULATED.3IONS-SCNC: 17 MMOL/L (ref 7–15)
BASOPHILS # BLD AUTO: 0 10E3/UL (ref 0–0.2)
BASOPHILS NFR BLD AUTO: 1 %
BUN SERPL-MCNC: 73.3 MG/DL (ref 8–23)
CALCIUM SERPL-MCNC: 9 MG/DL (ref 8.8–10.4)
CHLORIDE SERPL-SCNC: 98 MMOL/L (ref 98–107)
CREAT SERPL-MCNC: 2.71 MG/DL (ref 0.67–1.17)
CRP SERPL-MCNC: 135.6 MG/L
EGFRCR SERPLBLD CKD-EPI 2021: 25 ML/MIN/1.73M2
EOSINOPHIL # BLD AUTO: 0.1 10E3/UL (ref 0–0.7)
EOSINOPHIL NFR BLD AUTO: 1 %
ERYTHROCYTE [DISTWIDTH] IN BLOOD BY AUTOMATED COUNT: 13.9 % (ref 10–15)
GLUCOSE BLDC GLUCOMTR-MCNC: 125 MG/DL (ref 70–99)
GLUCOSE BLDC GLUCOMTR-MCNC: 144 MG/DL (ref 70–99)
GLUCOSE BLDC GLUCOMTR-MCNC: 165 MG/DL (ref 70–99)
GLUCOSE BLDC GLUCOMTR-MCNC: 172 MG/DL (ref 70–99)
GLUCOSE SERPL-MCNC: 131 MG/DL (ref 70–99)
HCO3 SERPL-SCNC: 20 MMOL/L (ref 22–29)
HCT VFR BLD AUTO: 32.7 % (ref 40–53)
HGB BLD-MCNC: 10.6 G/DL (ref 13.3–17.7)
IMM GRANULOCYTES # BLD: 0.1 10E3/UL
IMM GRANULOCYTES NFR BLD: 2 %
LYMPHOCYTES # BLD AUTO: 1.4 10E3/UL (ref 0.8–5.3)
LYMPHOCYTES NFR BLD AUTO: 19 %
MCH RBC QN AUTO: 30.5 PG (ref 26.5–33)
MCHC RBC AUTO-ENTMCNC: 32.4 G/DL (ref 31.5–36.5)
MCV RBC AUTO: 94 FL (ref 78–100)
MONOCYTES # BLD AUTO: 0.9 10E3/UL (ref 0–1.3)
MONOCYTES NFR BLD AUTO: 12 %
NEUTROPHILS # BLD AUTO: 4.9 10E3/UL (ref 1.6–8.3)
NEUTROPHILS NFR BLD AUTO: 66 %
NRBC # BLD AUTO: 0 10E3/UL
NRBC BLD AUTO-RTO: 0 /100
PLATELET # BLD AUTO: 192 10E3/UL (ref 150–450)
POTASSIUM SERPL-SCNC: 4.1 MMOL/L (ref 3.4–5.3)
PROCALCITONIN SERPL IA-MCNC: 1.34 NG/ML
RBC # BLD AUTO: 3.47 10E6/UL (ref 4.4–5.9)
SODIUM SERPL-SCNC: 135 MMOL/L (ref 135–145)
WBC # BLD AUTO: 7.5 10E3/UL (ref 4–11)

## 2024-11-04 PROCEDURE — 250N000013 HC RX MED GY IP 250 OP 250 PS 637: Performed by: NURSE PRACTITIONER

## 2024-11-04 PROCEDURE — 99232 SBSQ HOSP IP/OBS MODERATE 35: CPT | Performed by: NURSE PRACTITIONER

## 2024-11-04 PROCEDURE — 250N000012 HC RX MED GY IP 250 OP 636 PS 637: Performed by: INTERNAL MEDICINE

## 2024-11-04 PROCEDURE — 84145 PROCALCITONIN (PCT): CPT | Performed by: HOSPITALIST

## 2024-11-04 PROCEDURE — 250N000013 HC RX MED GY IP 250 OP 250 PS 637: Performed by: INTERNAL MEDICINE

## 2024-11-04 PROCEDURE — 36415 COLL VENOUS BLD VENIPUNCTURE: CPT | Performed by: HOSPITALIST

## 2024-11-04 PROCEDURE — 120N000001 HC R&B MED SURG/OB

## 2024-11-04 PROCEDURE — 250N000013 HC RX MED GY IP 250 OP 250 PS 637: Performed by: HOSPITALIST

## 2024-11-04 PROCEDURE — 86140 C-REACTIVE PROTEIN: CPT | Performed by: HOSPITALIST

## 2024-11-04 PROCEDURE — 80048 BASIC METABOLIC PNL TOTAL CA: CPT | Performed by: HOSPITALIST

## 2024-11-04 PROCEDURE — 85004 AUTOMATED DIFF WBC COUNT: CPT | Performed by: HOSPITALIST

## 2024-11-04 PROCEDURE — 97530 THERAPEUTIC ACTIVITIES: CPT | Mod: GO

## 2024-11-04 RX ADMIN — Medication 250 MG: at 09:30

## 2024-11-04 RX ADMIN — Medication 250 MG: at 22:46

## 2024-11-04 RX ADMIN — GABAPENTIN 300 MG: 300 CAPSULE ORAL at 09:30

## 2024-11-04 RX ADMIN — CLINDAMYCIN HYDROCHLORIDE 450 MG: 150 CAPSULE ORAL at 09:30

## 2024-11-04 RX ADMIN — CLONIDINE HYDROCHLORIDE 0.1 MG: 0.1 TABLET ORAL at 15:18

## 2024-11-04 RX ADMIN — GUAIFENESIN 600 MG: 600 TABLET, EXTENDED RELEASE ORAL at 22:46

## 2024-11-04 RX ADMIN — ACETAMINOPHEN 975 MG: 325 TABLET, FILM COATED ORAL at 22:46

## 2024-11-04 RX ADMIN — EZETIMIBE 10 MG: 10 TABLET ORAL at 09:33

## 2024-11-04 RX ADMIN — CLINDAMYCIN HYDROCHLORIDE 450 MG: 150 CAPSULE ORAL at 22:47

## 2024-11-04 RX ADMIN — SPIRONOLACTONE 25 MG: 25 TABLET ORAL at 09:34

## 2024-11-04 RX ADMIN — CLINDAMYCIN HYDROCHLORIDE 450 MG: 150 CAPSULE ORAL at 03:24

## 2024-11-04 RX ADMIN — ISOSORBIDE MONONITRATE 60 MG: 30 TABLET, EXTENDED RELEASE ORAL at 09:30

## 2024-11-04 RX ADMIN — PREDNISONE 10 MG: 10 TABLET ORAL at 09:33

## 2024-11-04 RX ADMIN — ACETAMINOPHEN 975 MG: 325 TABLET, FILM COATED ORAL at 15:18

## 2024-11-04 RX ADMIN — Medication 1 SPRAY: at 19:46

## 2024-11-04 RX ADMIN — ACETAZOLAMIDE 500 MG: 500 CAPSULE, EXTENDED RELEASE ORAL at 09:33

## 2024-11-04 RX ADMIN — CLINDAMYCIN HYDROCHLORIDE 450 MG: 150 CAPSULE ORAL at 15:18

## 2024-11-04 RX ADMIN — MYCOPHENOLATE MOFETIL 1000 MG: 500 TABLET ORAL at 22:46

## 2024-11-04 RX ADMIN — OXYCODONE HYDROCHLORIDE 5 MG: 5 TABLET ORAL at 15:18

## 2024-11-04 RX ADMIN — Medication 125 MCG: at 09:33

## 2024-11-04 RX ADMIN — FUROSEMIDE 80 MG: 40 TABLET ORAL at 22:46

## 2024-11-04 RX ADMIN — INSULIN LISPRO 1 UNITS: 100 INJECTION, SOLUTION INTRAVENOUS; SUBCUTANEOUS at 11:09

## 2024-11-04 RX ADMIN — ACETAMINOPHEN 975 MG: 325 TABLET, FILM COATED ORAL at 09:33

## 2024-11-04 RX ADMIN — CLONIDINE HYDROCHLORIDE 0.1 MG: 0.1 TABLET ORAL at 09:33

## 2024-11-04 RX ADMIN — LEVOTHYROXINE SODIUM 75 MCG: 75 TABLET ORAL at 09:33

## 2024-11-04 RX ADMIN — INSULIN LISPRO 1 UNITS: 100 INJECTION, SOLUTION INTRAVENOUS; SUBCUTANEOUS at 17:15

## 2024-11-04 RX ADMIN — CALCIUM CARBONATE (ANTACID) CHEW TAB 500 MG 2000 MG: 500 CHEW TAB at 09:34

## 2024-11-04 RX ADMIN — MYCOPHENOLATE MOFETIL 1000 MG: 500 TABLET ORAL at 09:33

## 2024-11-04 RX ADMIN — SALINE NASAL SPRAY 2 SPRAY: 1.5 SOLUTION NASAL at 09:34

## 2024-11-04 RX ADMIN — ALLOPURINOL 300 MG: 300 TABLET ORAL at 09:33

## 2024-11-04 RX ADMIN — GUAIFENESIN 600 MG: 600 TABLET, EXTENDED RELEASE ORAL at 09:33

## 2024-11-04 RX ADMIN — ROSUVASTATIN CALCIUM 40 MG: 10 TABLET, FILM COATED ORAL at 22:47

## 2024-11-04 RX ADMIN — CLONIDINE HYDROCHLORIDE 0.1 MG: 0.1 TABLET ORAL at 22:47

## 2024-11-04 RX ADMIN — OXYCODONE HYDROCHLORIDE 5 MG: 5 TABLET ORAL at 09:29

## 2024-11-04 RX ADMIN — AMLODIPINE BESYLATE 10 MG: 10 TABLET ORAL at 09:33

## 2024-11-04 RX ADMIN — GABAPENTIN 300 MG: 300 CAPSULE ORAL at 22:47

## 2024-11-04 NOTE — PROGRESS NOTES
11/04/24 1400   Appointment Info   Signing Clinician's Name / Credentials (OT) Bailee Sales OTR/L   Interventions   Interventions Quick Adds Therapeutic Activity   Therapeutic Activities   Therapeutic Activity Minutes (61951) 23   Symptoms noted during/after treatment fatigue   Treatment Detail/Skilled Intervention Pt resting in bed upon OT arrival. He c/o not being able to breathe well out of his nose but with encouragement and education in the risks/benefits, pt was agreeable to getting to the chair. Pt stretching in bed and performing BUE exercises x 10 reps each including shoulder flexion and bicep curls. Pt then transferred supine to sit with Rox. Pt transferred bed to chair a couple ft away using FWW with CGA. Pt needed verbal cues for safety when sitting in the chair, too. Pt required a rest break d/t fatigue. Pt then engaged in forearm pro/sup and fist pumps x 10 reps each. Pt was left resting in the chair, call light within reach and clip alert attached. RN and nursing aide entering.   OT Discharge Planning   OT Plan Progress strength, activity tolerance, and ADLs as able   OT Discharge Recommendation (DC Rec) Transitional Care Facility   OT Rationale for DC Rec Pt continues to present with significant difficulties completing ADLs and is not safe to return home alone at this time. Recommend SNF for STR.   OT Brief overview of current status Pt resting in bed upon OT arrival. He c/o not being able to breathe well out of his nose but with encouragement and education in the risks/benefits, pt was agreeable to getting to the chair. Pt stretching in bed and performing BUE exercises x 10 reps each including shoulder flexion and bicep curls. Pt then transferred supine to sit with Rox. Pt transferred bed to chair a couple ft away using FWW with CGA. Pt needed verbal cues for safety when sitting in the chair, too. Pt required a rest break d/t fatigue. Pt then engaged in forearm pro/sup and fist pumps x 10 reps  each. Pt was left resting in the chair, call light within reach and clip alert attached. RN and nursing aide entering.   Total Session Time   Timed Code Treatment Minutes 23   Total Session Time (sum of timed and untimed services) 23

## 2024-11-04 NOTE — PLAN OF CARE
"Plan of Care Reviewed With:     Overall Patient Progress:    /69 (BP Location: Left arm, Patient Position: Semi-Darden's, Cuff Size: Adult Small)   Pulse 90   Temp 99.3  F (37.4  C) (Tympanic)   Resp 18   Ht 1.676 m (5' 6\")   Wt 145.1 kg (319 lb 14.2 oz)   SpO2 93%   BMI 51.63 kg/m      A&Ox4, patients pain is well controled, complained about difficulty and nose irritation due to rhino rocket that was placed in the nose on the 31 st. Patient was informed that it typically stays in that nose for 7 days. Patient use ocean spray to aleve discomfort. Patient walked in the halls twice today and has been up most of the day. Patient is looking for placement.     Face to face report given with opportunity to observe patient.    Report given to LILIANA Kumar RN   11/4/2024  7:24 PM     "

## 2024-11-04 NOTE — PLAN OF CARE
"Goal Outcome Evaluation:      Plan of Care Reviewed With: patient, child    Overall Patient Progress: improvingOverall Patient Progress: improving    Significant events last 24 hours: increased anxiety-hydroxyzine utilized today; nasal packing to remain in place until Monday; ambulated in hallway     Neuro: A/Ox4; chronic neuropathy in BLE  CV: BP and HR within parameters; PTA blood pressure medications re-started and diuretics re-started (on hold for increased creatinine)  Resp: reports SOB even at rest; on SOB noticed at rest; RIVERA; LCTA; sats stable on RA  GI: BM x1 today; tolerating diet; no nausea  : voiding in urinal; ongoing diuresis   Skin: honey BLE; redness outline on RLE-redness receding from marked borders; generalized edema  Lines/Drains: PIV to right FA SL  Mobility: turns self in bed; needs encouragement for activity; did get up to chair for meals x2 today and did walk in the hallway briefly with Ax1, GB and FWW     Safety:   activity supervised, clutter free environment maintained, increased rounding and observation, nonskid shoes/slippers when out of bed, patient and family education, room near nurse's station, room organization consistent, safety round/check completed, supervised activity; call light within reach; hourly rounding throughout the shift.      Pain: PRN oxycodone x2 today for RLE pain; encouraged elevated RLE-patient sometimes agreeable     Individualized Care Needs: encourage OOB activity; PRN anxiety meds; BG checks with meals; fan for SOB feeling; nasal spray in both nostrils-including right nare with packing; avoid coughing/sniffing aggressively    Expected Discharge Disposition: needs insurance prior auth for placement to STR; referral sent and pending to [facility]    Report Given: Herbert, RN      /68 (BP Location: Left arm, Patient Position: Semi-Darden's, Cuff Size: Adult Large)   Pulse 83   Temp 99  F (37.2  C) (Tympanic)   Resp 20   Ht 1.676 m (5' 6\")   Wt " 145.1 kg (319 lb 14.2 oz)   SpO2 97%   BMI 51.63 kg/m

## 2024-11-04 NOTE — PLAN OF CARE
Patient alert and oriented, following commands, afebrile. VSS on RA. Voiding via urinal, no BM. Rated pain 4/10 throughout shift in RLE, Denied any pain medications. IV remains intact. Rest of assessments as charted.    Face to face report given with opportunity to observe patient.    Report given to Sharif Wang RN   11/4/2024  7:00 AM

## 2024-11-04 NOTE — PHARMACY-MEDICATION REGIMEN REVIEW
Pharmacy Antimicrobial Stewardship Assessment     Current Antimicrobial Therapy:  Anti-infectives (From now, onward)      Start     Dose/Rate Route Frequency Ordered Stop    24 0300  clindamycin (CLEOCIN) capsule 450 mg         450 mg Oral EVERY 6 HOURS SCHEDULED 24 0942              Indication: skin and soft tissue infection     Days of Therapy: Day 14 of antibiotics     Pertinent Labs:    Recent Labs   Lab Test 24  0534 244 10/31/24  0435 10/30/24  0440 10/29/24  0438 10/28/24  0543   WBC 7.5 7.2 7.7 7.2 6.1 7.2 6.6       Recent Labs   Lab Test 2434 244 10/31/24  0435 10/30/24  0440 10/29/24  0438 10/28/24  1511 10/28/24  0543 10/27/24  0643 10/24/24  0622 10/22/24  2020   LACT  --   --   --   --   --   --  1.2  --   --   --  1.0   CRPI 135.60* 251.41* 305.37* 307.49* 348.30* 324.55*  --  272.53* 238.08*   < >  --    PCAL 1.34*  --   --   --  3.37* 3.85*  --  4.79*  --   --   --     < > = values in this interval not displayed.        Temperature:  Temp (24hrs), Av.4  F (37.4  C), Min:99  F (37.2  C), Max:100.5  F (38.1  C)      Culture Results:   30-Day Micro Results       Collected Updated Procedure Result Status      10/27/2024 1249 10/28/2024 2012 MRSA MSSA PCR, Nasal Swab [[id number]]    Swab from Nares, Bilateral    Final result Component Value   MRSA Target DNA Negative   SA Target DNA Negative            10/22/2024 2020 10/28/2024 0643 Blood Culture Arm, Left [[id number]]   Blood from Arm, Left    Final result Component Value   Culture No Growth                        Recent Antibiotics:  Aztreonam (1 gram) for SSTI on 10/25/24  Linezolid (600 mg) for SSTI on 10/22/24    Recommendations/Interventions:  1. Continue clindamycin 450 mg every 6 hours PO as patient is improving.      Veena Griffin  2024

## 2024-11-04 NOTE — PROGRESS NOTES
"   11/04/24 1300   Appointment Info   Signing Clinician's Name / Credentials (PT) Miranda Hare PT   Appointment Canceled Reason (PT) Family declined   Rehab Comments (PT) Pt refused PT this date stating \"I can't breathe well today. I have lots of blood clots in my nose - I can't do a thing.\"  Pt appearing slightly agitated and increasingly SOB while therapist discussing benefits and risks.   Psychosocial Support   Trust Relationship/Rapport care explained;choices provided;thoughts/feelings acknowledged       "

## 2024-11-04 NOTE — PROGRESS NOTES
"[facility]    Hospitalist Progress Note    Date of Service (when I saw the patient): 11/04/2024    Assessment & Plan       Cellulitis of right lower leg: Improving, on Clindamycin for total 14 day course-tolerating well. PT/OT consulted regarding mobility.       Sepsis without acute organ dysfunction (H): Resolved. Due to cellulitis.       Epistaxis: Recurrent, seen by ENT 10/30 and bilateral nose was assessed. Right nare plug in place       Chronic obstructive pulmonary disease, unspecified COPD type (H)    Pulmonary hypertension (H)    Type 2 diabetes mellitus with diabetic neuropathy, with long-term current use of insulin (H)    Chronic systolic heart failure (H)    Immunosuppressed status (H)    Kidney replaced by transplant       # Hypertension: Noted on problem list  # Chronic heart failure with preserved ejection fraction: heart failure noted on problem list and last echo with EF >50%     # Severe Obesity: Estimated body mass index is 51.63 kg/m  as calculated from the following:    Height as of this encounter: 1.676 m (5' 6\").    Weight as of this encounter: 145.1 kg (319 lb 14.2 oz).          DVT Prophylaxis: Low Risk/Ambulatory with no VTE prophylaxis indicated  Code Status: Full Code    Disposition: Expected discharge in 1-2 days once placement found.    Nicolette Cooper, CNP    Interval History   Denies chest pain, abdominal pain.     -Data reviewed today: I reviewed all new labs and imaging results over the last 24 hours.     Physical Exam   Temp: (!) 100.5  F (38.1  C) Temp src: Tympanic BP: 149/58 Pulse: 90   Resp: 19 SpO2: 96 % O2 Device: None (Room air)    Vitals:    10/30/24 0500 10/31/24 0500 11/01/24 0546   Weight: 142 kg (313 lb 0.9 oz) 143.8 kg (317 lb 0.3 oz) 145.1 kg (319 lb 14.2 oz)     Vital Signs with Ranges  Temp:  [99  F (37.2  C)-100.5  F (38.1  C)] 100.5  F (38.1  C)  Pulse:  [84-90] 90  Resp:  [18-20] 19  BP: (127-156)/(52-82) 149/58  SpO2:  [94 %-97 %] 96 %  I/O last 3 " completed shifts:  In: -   Out: 2400 [Urine:2400]    Peripheral IV 10/26/24 Anterior;Right Lower forearm (Active)   Site Assessment WDL except 11/03/24 2134   Line Status Saline locked 11/03/24 2134   Dressing Transparent 11/03/24 2134   Dressing Status old drainage 11/03/24 2134   Line Intervention Flushed 11/03/24 2134   Phlebitis Scale 0-->no symptoms 11/03/24 0827   Infiltration? no 11/03/24 0827   Number of days: 9       Rash 10/22/24 2100 Right lower leg other (see comments) (Active)   Distribution regional 11/03/24 0958   Color red 11/03/24 0958   Configuration/Shape asymmetric 11/03/24 0958   Borders indistinct;irregular 11/03/24 0958   Characteristics burning;edema;firm;pain/discomfort 10/28/24 0301   Care, Rash open to air 10/28/24 0301   Number of days: 13     Line/device assessment(s) completed for medical necessity    Constitutional: Awake,alert, no acute distress  Respiratory: Clear bilaterally but diminished, no crackles, rhonchi or wheezes   Cardiovascular: HRR, no murmurs, rubs, thrills.  GI: Soft,nontender, bowel sounds are hypoactve  Skin/Integumen: No rashes, open areas or unusual bruising  Other:      Medications   Current Facility-Administered Medications   Medication Dose Route Frequency Provider Last Rate Last Admin     Current Facility-Administered Medications   Medication Dose Route Frequency Provider Last Rate Last Admin    acetaminophen (TYLENOL) tablet 975 mg  975 mg Oral TID Ivy Bruner, DO   975 mg at 11/04/24 0933    acetaZOLAMIDE (DIAMOX SEQUEL) 12 hr capsule 500 mg  500 mg Oral QAM Ivy Bruner, DO   500 mg at 11/04/24 0933    allopurinol (ZYLOPRIM) tablet 300 mg  300 mg Oral QAM Ivy Brunerivich, DO   300 mg at 11/04/24 0933    amLODIPine (NORVASC) tablet 10 mg  10 mg Oral Daily Ivy Bruner, DO   10 mg at 11/04/24 0933    [Held by provider] aspirin EC tablet 81 mg  81 mg Oral At Bedtime Ivy Bruner  Jensen, DO   81 mg at 11/01/24 2051    calcium carbonate (TUMS) chewable tablet 2,000 mg  2,000 mg Oral Thee Varghese MD   2,000 mg at 11/04/24 0934    cholecalciferol (VITAMIN D3) capsule 125 mcg  125 mcg Oral BERE Thee Travis MD   125 mcg at 11/04/24 0933    clindamycin (CLEOCIN) capsule 450 mg  450 mg Oral Q6H [facility] Ivy Bruner DO   450 mg at 11/04/24 0930    cloNIDine (CATAPRES) tablet 0.1 mg  0.1 mg Oral TID Ivy Bruner DO   0.1 mg at 11/04/24 0933    [Held by provider] clopidogrel (PLAVIX) tablet 75 mg  75 mg Oral Daily Ivy Bruner DO        ezetimibe (ZETIA) tablet 10 mg  10 mg Oral BERE Thee Travis MD   10 mg at 11/04/24 0933    Fluticasone-Umeclidin-Vilant (TRELEGY ELLIPTA) 100-62.5-25 MCG/ACT oral inhaler 1 puff  1 puff Inhalation Daily Thee Travis MD   1 puff at 11/04/24 0940    furosemide (LASIX) tablet 80 mg  80 mg Oral At Bedtime Ivy Bruner, DO   80 mg at 11/03/24 2133    gabapentin (NEURONTIN) capsule 300 mg  300 mg Oral BID Thee Travis MD   300 mg at 11/04/24 0930    guaiFENesin (MUCINEX) 12 hr tablet 600 mg  600 mg Oral BID Ivy Bruner, DO   600 mg at 11/04/24 0933    insulin glargine (LANTUS PEN) injection 10 Units  10 Units Subcutaneous At Bedtime Thee Travis MD   10 Units at 11/03/24 2141    insulin lispro (HumaLOG KWIKPEN) injection (RAPID ACTING) 1-5 Units  1-5 Units Subcutaneous At Bedtime Ivy Bruner DO        insulin lispro (HumaLOG KWIKPEN) injection (RAPID ACTING) 1-7 Units  1-7 Units Subcutaneous TID AC Thee Travis MD   1 Units at 11/03/24 1738    isosorbide mononitrate (IMDUR) 24 hr tablet 60 mg  60 mg Oral Thee Varghese MD   60 mg at 11/04/24 0930    levothyroxine (SYNTHROID/LEVOTHROID) tablet 75 mcg  75 mcg Oral Thee Varghese MD   75 mcg at 11/04/24 0933    Lidocaine (LIDOCARE) 4 % Patch 2 patch  2 patch Transdermal Q24h Ivy Bruner  Jensen, DO        mycophenolate (GENERIC EQUIVALENT) tablet 1,000 mg  1,000 mg Oral BID Thee Travis MD   1,000 mg at 11/04/24 0933    predniSONE (DELTASONE) tablet 10 mg  10 mg Oral QAM Thee Travis MD   10 mg at 11/04/24 0933    rosuvastatin (CRESTOR) tablet 40 mg  40 mg Oral At Bedtime Ivy Bruner, DO   40 mg at 11/03/24 2133    saccharomyces boulardii (FLORASTOR) capsule 250 mg  250 mg Oral BID Fletcher Norton MD   250 mg at 11/04/24 0930    sodium chloride (PF) 0.9% PF flush 3 mL  3 mL Intracatheter Q8H Thee Travis MD   3 mL at 11/04/24 0324    spironolactone (ALDACTONE) tablet 25 mg  25 mg Oral QAM Nicolette Cooper NP   25 mg at 11/04/24 0934       Data   Recent Labs   Lab 11/04/24  0942 11/04/24  0621 11/03/24  2140 11/02/24  0920 11/02/24  0534 11/01/24  0501 11/01/24  0444   WBC  --  7.5  --   --  7.2  --  7.7   HGB  --  10.6*  --   --  10.2*  --  10.8*   MCV  --  94  --   --  93  --  93   PLT  --  192  --   --  153  --  137*   NA  --  135  --   --  129*  --  130*   POTASSIUM  --  4.1  --   --  4.2  --  4.2   CHLORIDE  --  98  --   --  96*  --  95*   CO2  --  20*  --   --  15*  --  18*   BUN  --  73.3*  --   --  79.5*  --  74.7*   CR  --  2.71*  --   --  3.07*  --  2.87*   ANIONGAP  --  17*  --   --  18*  --  17*   HANNAH  --  9.0  --   --  8.9  --  9.3   * 131* 195*   < > 137*   < > 139*   ALBUMIN  --   --   --   --  2.6*  --  2.9*   PROTTOTAL  --   --   --   --  6.3*  --  6.4   BILITOTAL  --   --   --   --  0.3  --  0.3   ALKPHOS  --   --   --   --  72  --  76   ALT  --   --   --   --  36  --  32   AST  --   --   --   --  38  --  30    < > = values in this interval not displayed.       No results found for this or any previous visit (from the past 24 hours).

## 2024-11-05 VITALS
BODY MASS INDEX: 50.62 KG/M2 | SYSTOLIC BLOOD PRESSURE: 126 MMHG | RESPIRATION RATE: 18 BRPM | OXYGEN SATURATION: 94 % | TEMPERATURE: 98.5 F | HEIGHT: 66 IN | DIASTOLIC BLOOD PRESSURE: 61 MMHG | HEART RATE: 92 BPM | WEIGHT: 315 LBS

## 2024-11-05 LAB
ANION GAP SERPL CALCULATED.3IONS-SCNC: 17 MMOL/L (ref 7–15)
BASOPHILS # BLD AUTO: 0.1 10E3/UL (ref 0–0.2)
BASOPHILS NFR BLD AUTO: 1 %
BUN SERPL-MCNC: 70.2 MG/DL (ref 8–23)
CALCIUM SERPL-MCNC: 8.6 MG/DL (ref 8.8–10.4)
CHLORIDE SERPL-SCNC: 101 MMOL/L (ref 98–107)
CREAT SERPL-MCNC: 2.88 MG/DL (ref 0.67–1.17)
EGFRCR SERPLBLD CKD-EPI 2021: 23 ML/MIN/1.73M2
EOSINOPHIL # BLD AUTO: 0 10E3/UL (ref 0–0.7)
EOSINOPHIL NFR BLD AUTO: 1 %
ERYTHROCYTE [DISTWIDTH] IN BLOOD BY AUTOMATED COUNT: 13.9 % (ref 10–15)
GLUCOSE BLDC GLUCOMTR-MCNC: 143 MG/DL (ref 70–99)
GLUCOSE BLDC GLUCOMTR-MCNC: 146 MG/DL (ref 70–99)
GLUCOSE SERPL-MCNC: 132 MG/DL (ref 70–99)
HCO3 SERPL-SCNC: 18 MMOL/L (ref 22–29)
HCT VFR BLD AUTO: 31.6 % (ref 40–53)
HGB BLD-MCNC: 10.4 G/DL (ref 13.3–17.7)
IMM GRANULOCYTES # BLD: 0.2 10E3/UL
IMM GRANULOCYTES NFR BLD: 3 %
LYMPHOCYTES # BLD AUTO: 1.5 10E3/UL (ref 0.8–5.3)
LYMPHOCYTES NFR BLD AUTO: 20 %
MCH RBC QN AUTO: 31 PG (ref 26.5–33)
MCHC RBC AUTO-ENTMCNC: 32.9 G/DL (ref 31.5–36.5)
MCV RBC AUTO: 94 FL (ref 78–100)
MONOCYTES # BLD AUTO: 1 10E3/UL (ref 0–1.3)
MONOCYTES NFR BLD AUTO: 14 %
NEUTROPHILS # BLD AUTO: 4.6 10E3/UL (ref 1.6–8.3)
NEUTROPHILS NFR BLD AUTO: 63 %
NRBC # BLD AUTO: 0 10E3/UL
NRBC BLD AUTO-RTO: 1 /100
PLATELET # BLD AUTO: 228 10E3/UL (ref 150–450)
POTASSIUM SERPL-SCNC: 3.9 MMOL/L (ref 3.4–5.3)
RBC # BLD AUTO: 3.36 10E6/UL (ref 4.4–5.9)
SODIUM SERPL-SCNC: 136 MMOL/L (ref 135–145)
WBC # BLD AUTO: 7.4 10E3/UL (ref 4–11)

## 2024-11-05 PROCEDURE — 80048 BASIC METABOLIC PNL TOTAL CA: CPT | Performed by: HOSPITALIST

## 2024-11-05 PROCEDURE — 250N000012 HC RX MED GY IP 250 OP 636 PS 637: Performed by: INTERNAL MEDICINE

## 2024-11-05 PROCEDURE — 36415 COLL VENOUS BLD VENIPUNCTURE: CPT | Performed by: HOSPITALIST

## 2024-11-05 PROCEDURE — 250N000013 HC RX MED GY IP 250 OP 250 PS 637: Performed by: HOSPITALIST

## 2024-11-05 PROCEDURE — 250N000013 HC RX MED GY IP 250 OP 250 PS 637: Performed by: INTERNAL MEDICINE

## 2024-11-05 PROCEDURE — 85004 AUTOMATED DIFF WBC COUNT: CPT | Performed by: HOSPITALIST

## 2024-11-05 PROCEDURE — 82947 ASSAY GLUCOSE BLOOD QUANT: CPT | Performed by: HOSPITALIST

## 2024-11-05 PROCEDURE — 99239 HOSP IP/OBS DSCHRG MGMT >30: CPT | Performed by: NURSE PRACTITIONER

## 2024-11-05 PROCEDURE — 250N000013 HC RX MED GY IP 250 OP 250 PS 637: Performed by: NURSE PRACTITIONER

## 2024-11-05 RX ORDER — FUROSEMIDE 40 MG/1
40 TABLET ORAL DAILY
Qty: 30 TABLET | Refills: 0 | Status: SHIPPED | OUTPATIENT
Start: 2024-11-05

## 2024-11-05 RX ORDER — CLOPIDOGREL BISULFATE 75 MG/1
75 TABLET ORAL EVERY MORNING
DISCHARGE
Start: 2024-11-08

## 2024-11-05 RX ORDER — HYDROXYZINE HYDROCHLORIDE 25 MG/1
25 TABLET, FILM COATED ORAL EVERY 6 HOURS PRN
Qty: 30 TABLET | Refills: 0 | Status: SHIPPED | OUTPATIENT
Start: 2024-11-05

## 2024-11-05 RX ORDER — SACCHAROMYCES BOULARDII 250 MG
250 CAPSULE ORAL 2 TIMES DAILY
Qty: 60 CAPSULE | Refills: 0 | Status: SHIPPED | OUTPATIENT
Start: 2024-11-05 | End: 2024-12-05

## 2024-11-05 RX ORDER — ISOSORBIDE MONONITRATE 30 MG/1
60 TABLET, EXTENDED RELEASE ORAL EVERY MORNING
Qty: 30 TABLET | Refills: 0 | Status: SHIPPED | OUTPATIENT
Start: 2024-11-05

## 2024-11-05 RX ORDER — AMLODIPINE BESYLATE 10 MG/1
10 TABLET ORAL DAILY
Qty: 30 TABLET | Refills: 0 | Status: SHIPPED | OUTPATIENT
Start: 2024-11-06

## 2024-11-05 RX ORDER — CLONIDINE HYDROCHLORIDE 0.1 MG/1
0.1 TABLET ORAL 3 TIMES DAILY
Qty: 90 TABLET | Refills: 0 | Status: SHIPPED | OUTPATIENT
Start: 2024-11-05

## 2024-11-05 RX ORDER — CLINDAMYCIN HYDROCHLORIDE 150 MG/1
450 CAPSULE ORAL EVERY 6 HOURS
Qty: 60 CAPSULE | Refills: 0 | Status: SHIPPED | OUTPATIENT
Start: 2024-11-05 | End: 2024-11-10

## 2024-11-05 RX ORDER — INSULIN LISPRO 100 [IU]/ML
15 INJECTION, SOLUTION INTRAVENOUS; SUBCUTANEOUS
DISCHARGE
Start: 2024-11-05

## 2024-11-05 RX ADMIN — GABAPENTIN 300 MG: 300 CAPSULE ORAL at 09:16

## 2024-11-05 RX ADMIN — Medication 125 MCG: at 09:17

## 2024-11-05 RX ADMIN — ACETAMINOPHEN 975 MG: 325 TABLET, FILM COATED ORAL at 09:16

## 2024-11-05 RX ADMIN — ACETAMINOPHEN 975 MG: 325 TABLET, FILM COATED ORAL at 13:42

## 2024-11-05 RX ADMIN — CALCIUM CARBONATE (ANTACID) CHEW TAB 500 MG 2000 MG: 500 CHEW TAB at 09:14

## 2024-11-05 RX ADMIN — GUAIFENESIN 600 MG: 600 TABLET, EXTENDED RELEASE ORAL at 09:18

## 2024-11-05 RX ADMIN — AMLODIPINE BESYLATE 10 MG: 10 TABLET ORAL at 09:15

## 2024-11-05 RX ADMIN — CLINDAMYCIN HYDROCHLORIDE 450 MG: 150 CAPSULE ORAL at 10:01

## 2024-11-05 RX ADMIN — MYCOPHENOLATE MOFETIL 1000 MG: 500 TABLET ORAL at 09:12

## 2024-11-05 RX ADMIN — SPIRONOLACTONE 25 MG: 25 TABLET ORAL at 09:13

## 2024-11-05 RX ADMIN — ALLOPURINOL 300 MG: 300 TABLET ORAL at 09:17

## 2024-11-05 RX ADMIN — PREDNISONE 10 MG: 10 TABLET ORAL at 09:17

## 2024-11-05 RX ADMIN — CLINDAMYCIN HYDROCHLORIDE 450 MG: 150 CAPSULE ORAL at 03:04

## 2024-11-05 RX ADMIN — INSULIN LISPRO 1 UNITS: 100 INJECTION, SOLUTION INTRAVENOUS; SUBCUTANEOUS at 09:59

## 2024-11-05 RX ADMIN — ALBUTEROL SULFATE 2 PUFF: 90 AEROSOL, METERED RESPIRATORY (INHALATION) at 13:38

## 2024-11-05 RX ADMIN — ISOSORBIDE MONONITRATE 60 MG: 30 TABLET, EXTENDED RELEASE ORAL at 09:13

## 2024-11-05 RX ADMIN — CLONIDINE HYDROCHLORIDE 0.1 MG: 0.1 TABLET ORAL at 13:42

## 2024-11-05 RX ADMIN — LEVOTHYROXINE SODIUM 75 MCG: 75 TABLET ORAL at 09:15

## 2024-11-05 RX ADMIN — EZETIMIBE 10 MG: 10 TABLET ORAL at 09:18

## 2024-11-05 RX ADMIN — CLONIDINE HYDROCHLORIDE 0.1 MG: 0.1 TABLET ORAL at 09:17

## 2024-11-05 RX ADMIN — ACETAZOLAMIDE 500 MG: 500 CAPSULE, EXTENDED RELEASE ORAL at 09:17

## 2024-11-05 RX ADMIN — Medication 250 MG: at 09:13

## 2024-11-05 ASSESSMENT — ACTIVITIES OF DAILY LIVING (ADL)
ADLS_ACUITY_SCORE: 0

## 2024-11-05 NOTE — DISCHARGE INSTRUCTIONS
You will have a follow up appt with Dr. Abarca to be seen within 5 days.  You will receive a call from scheduling to set up this date and time.  If you do not here from them tomorrow, call [phone] to set appt up.

## 2024-11-05 NOTE — PLAN OF CARE
Plan of Care Reviewed With: patient    Overall Patient Progress: progressing    Pt is alert and orientated, makes needs known. VS and assessments as charted. Voided in urinal and had BM in bathroom this shift. RLE elevated in bed. PT reports pain well managed this shift. CPAP worn at bedtime.    Face to face report given with opportunity to observe patient.    Report given to LILIANA Olguin RN   11/5/2024  7:31 AM

## 2024-11-05 NOTE — PROGRESS NOTES
Accepted to [facility] for admission today.  [facility]line scheduled for 1430.  Duane, sister, Michelle and care team aware.  Duane agreeable.    PAS-RR    Per DHS regulation, CTS team completed and submitted PAS-RR to [place] [employer] via the Senior [employer] Line. CTS team advised SNF and they are aware a PAS-RR has been submitted.     CTS team reviewed with pt or health care agent that they may be contacted for a follow up appointment within 10 days of hospital discharge if SNF stay is less than 30 days. Contact information for [facility] Link[employer] Line was also provided.     Pt or health care agent verbalized understanding.     PAS-RR # [id number]

## 2024-11-05 NOTE — DISCHARGE SUMMARY
[facility]    Discharge Summary  Hospitalist    Date of Admission:  10/22/2024  Date of Discharge:  11/5/2024  2:33 PM  Discharging Provider: Nicolette Cooper NP  Date of Service (when I saw the patient): 11/5/24    Discharge Diagnoses     Principal Problem:    Cellulitis of right lower leg  Active Problems:    Sepsis without acute organ dysfunction (H)    Epistaxis    Chronic obstructive pulmonary disease, unspecified COPD type (H)    Pulmonary hypertension (H)    Type 2 diabetes mellitus with diabetic neuropathy, with long-term current use of insulin (H)    Chronic systolic heart failure (H)    Immunosuppressed status (H)    Kidney replaced by transplant      History of Present Illness   From admission:Eduar Isaacs is a 69 year old male who presents with Complex past medical history including renal failure, status post renal transplant, diabetes, hypertension, congestive heart failure, hypothyroidism, anemia of chronic illness, morbid obesity, obstructive sleep apnea on CPAP, gout, pulmonary hypertension who presents to emergency room for relation of right calf pain.  Patient has chronic venous stasis and according to him his chronic wound.  Patient started to notice some discomfort in his right lower extremity about 2 days ago.  Patient has allergies to multiple antibiotics.  Since he is immunocompromised was admitted for IV antibiotics and care.     Hospital Course     Cellulitis of right lower leg: Improving, on Clindamycin for total 14 day course-tolerating well. PT/OT consulted regarding mobility.   -11/6: Significant improvement in rash and pain. He is still quite weak and requires assistance so he is discharged to [facility] for strengthening.       Sepsis without acute organ dysfunction (H): Resolved. Due to cellulitis.        Epistaxis: Recurrent, seen by ENT 10/30 and bilateral nose was assessed. Right nare plug in place.   -11/5: Will contact ENT about removing packing today as it has  "become intolerable to the patient and he has not had any bleeding x48hrs        Chronic obstructive pulmonary disease, unspecified COPD type (H)    Pulmonary hypertension (H)    Type 2 diabetes mellitus with diabetic neuropathy, with long-term current use of insulin (H)    Chronic systolic heart failure (H)    Immunosuppressed status (H)    Kidney replaced by transplant       # Hypertension: Noted on problem list     # Chronic heart failure with preserved ejection fraction: heart failure noted on problem list and last echo with EF >50%     # Severe Obesity: Estimated body mass index is 51.63 kg/m  as calculated from the following:    Height as of this encounter: 1.676 m (5' 6\").    Weight as of this encounter: 145.1 kg (319 lb 14.2 oz).     Nicolette Cooper CNP      Pending Results     Unresulted Labs Ordered in the Past 30 Days of this Admission       No orders found from 9/22/2024 to 10/23/2024.            Code Status   Full Code       Primary Care Physician   Kody Abarca           Discharge Disposition   Discharged to nursing home  Condition at discharge: Stable    Consultations This Hospital Stay   CARE MANAGEMENT / SOCIAL WORK IP CONSULT  PHYSICAL THERAPY ADULT IP CONSULT  ENT IP CONSULT  ENT IP CONSULT  OCCUPATIONAL THERAPY ADULT IP CONSULT  PHYSICAL THERAPY ADULT IP CONSULT  OCCUPATIONAL THERAPY ADULT IP CONSULT    Time Spent on this Encounter   I, Nicolette Cooper NP, personally saw the patient today and spent greater than 30 minutes discharging this patient.    Discharge Orders      General info for SNF    Length of Stay Estimate: Short Term Care: Estimated # of Days <30  Condition at Discharge: Stable  Level of care:skilled   Rehabilitation Potential: Good  Admission H&P remains valid and up-to-date: Yes  Recent Chemotherapy: N/A  Use Nursing Home Standing Orders: Yes     Follow Up and recommended labs and tests    Follow up with primary care provider in 5 days.  The following labs/tests are " recommended: BMP.     Reason for your hospital stay    Cellulitis     Daily weights    Call Provider for weight gain of more than 2 pounds per day or 5 pounds per week.     Activity - Up with nursing assistance     CPAP - Continue Home CPAP    Continue Home CPAP per home equipment settings.     Full Code     Physical Therapy Adult Consult    Evaluate and treat as clinically indicated.    Reason:  weakness     Occupational Therapy Adult Consult    Evaluate and treat as clinically indicated.    Reason:  weakness     Fall precautions     Diet    Follow this diet upon discharge: Current Diet:Orders Placed This Encounter      Consistent Carbohydrate Diet Moderate Consistent Carb (60 g CHO per Meal) Diet     Discharge Medications   Current Discharge Medication List        START taking these medications    Details   amLODIPine (NORVASC) 10 MG tablet Take 1 tablet (10 mg) by mouth daily.  Qty: 30 tablet, Refills: 0    Associated Diagnoses: Acute on chronic systolic congestive heart failure (H)      clindamycin (CLEOCIN) 150 MG capsule Take 3 capsules (450 mg) by mouth every 6 hours for 5 days.  Qty: 60 capsule, Refills: 0    Associated Diagnoses: Acute on chronic systolic congestive heart failure (H)      cloNIDine (CATAPRES) 0.1 MG tablet Take 1 tablet (0.1 mg) by mouth 3 times daily.  Qty: 90 tablet, Refills: 0    Associated Diagnoses: Acute on chronic systolic congestive heart failure (H)      !! furosemide (LASIX) 40 MG tablet Take 1 tablet (40 mg) by mouth daily.  Qty: 30 tablet, Refills: 0    Associated Diagnoses: Acute on chronic systolic congestive heart failure (H)      hydrOXYzine HCl (ATARAX) 25 MG tablet Take 1 tablet (25 mg) by mouth every 6 hours as needed for anxiety.  Qty: 30 tablet, Refills: 0    Associated Diagnoses: Acute on chronic systolic congestive heart failure (H)      saccharomyces boulardii (FLORASTOR) 250 MG capsule Take 1 capsule (250 mg) by mouth 2 times daily.  Qty: 60 capsule, Refills: 0     Associated Diagnoses: Acute on chronic systolic congestive heart failure (H)       !! - Potential duplicate medications found. Please discuss with provider.        CONTINUE these medications which have CHANGED    Details   clopidogrel (PLAVIX) 75 MG tablet Take 1 tablet (75 mg) by mouth every morning.    Associated Diagnoses: Acute on chronic systolic congestive heart failure (H)      HUMALOG KWIKPEN 100 UNIT/ML soln Inject 15 Units subcutaneously 3 times daily (before meals). -hold for BG < 120    Associated Diagnoses: Acute on chronic systolic congestive heart failure (H)      isosorbide mononitrate (IMDUR) 30 MG 24 hr tablet Take 2 tablets (60 mg) by mouth every morning.  Qty: 30 tablet, Refills: 0    Associated Diagnoses: Acute on chronic systolic congestive heart failure (H)      sarilumab (KEVZARA) 200 MG/1.14ML injection Inject 1.14 mLs (200 mg) subcutaneously every 14 days. On Wednesdays Hold while at SNF    Associated Diagnoses: Acute on chronic systolic congestive heart failure (H)      Semaglutide-Weight Management (WEGOVY) 0.25 MG/0.5ML pen Inject 0.25 mg subcutaneously once a week. On Wednesday.  Qty: 2 mL, Refills: 0    Comments: Hold while at SNF  Associated Diagnoses: Acute on chronic systolic congestive heart failure (H)           CONTINUE these medications which have NOT CHANGED    Details   acetaminophen (TYLENOL) 500 MG tablet Take three tablets (1,500 mg) by mouth 1 to 2 times daily as needed for pain. Acetaminophen should be limited to 4,000 mg per day from all sources.      acetaZOLAMIDE (DIAMOX SEQUEL) 500 MG 12 hr capsule Take 500 mg by mouth every morning      albuterol (PROAIR HFA/PROVENTIL HFA/VENTOLIN HFA) 108 (90 Base) MCG/ACT inhaler Inhale 2 puffs into the lungs every 6 hours as needed for shortness of breath or wheezing    Comments: Pharmacy may dispense brand covered by insurance (Proair, or proventil or ventolin or generic albuterol inhaler)      allopurinol (ZYLOPRIM) 300 MG  tablet Take 300 mg by mouth every morning      aspirin (ASA) 81 MG EC tablet Take 81 mg by mouth At Bedtime      blood glucose (NO BRAND SPECIFIED) test strip 1 strip by In Vitro route 4 times daily (before meals and nightly) Use to test blood sugar 4 times daily or as directed.  Qty: 100 strip, Refills: 0    Associated Diagnoses: Diabetes mellitus without complication (H)      blood glucose monitoring (SOFTCLIX) lancets 1 each by In Vitro route 4 times daily (before meals and nightly) Use to monitor blood glucose once daily as directed.  Qty: 200 each, Refills: 0    Associated Diagnoses: Diabetes mellitus without complication (H)      calcium carbonate antacid 1000 MG CHEW Take 2 tablets by mouth every morning      Cholecalciferol (VITAMIN D3) 25 MCG (1000 UT) CAPS Take 1 capsule by mouth every morning.      Continuous Glucose  (DEXCOM G7 ) SANAZ Use to read blood sugars as per 's instructions.      Continuous Glucose Sensor (DEXCOM G7 SENSOR) MISC CHANGE EVERY 10 DAYS  Qty: 9 each, Refills: 3    Comments: Please send a replace/new response with 90-Day Supply if appropriate to maximize member benefit. Requesting 1 year supply.  Associated Diagnoses: Type 2 diabetes mellitus with hyperglycemia, with long-term current use of insulin (H)      ezetimibe (ZETIA) 10 MG tablet Take 10 mg by mouth every morning      fish oil-omega-3 fatty acids 1000 MG capsule Take 1 g by mouth 2 times daily.      fluticasone (FLONASE) 50 MCG/ACT nasal spray Spray 1 spray into both nostrils daily as needed      !! furosemide (LASIX) 40 MG tablet Take 2 tablets (80 mg) by mouth at bedtime  Qty: 60 tablet, Refills: 0    Associated Diagnoses: Chronic systolic heart failure (H)      gabapentin (NEURONTIN) 300 MG capsule Take 300 mg by mouth 2 times daily      GNP ULTICARE PEN NEEDLES 32G X 6 MM miscellaneous Use 5 times daily for insulin injections as directed.      insulin glargine (LANTUS PEN) 100 UNIT/ML pen  Inject 25 Units Subcutaneous 2 times daily  Qty: 15 mL, Refills: 0    Comments: If Lantus is not covered by insurance, may substitute Basaglar or Semglee or other insulin glargine product per insurance preference at same dose and frequency.    Associated Diagnoses: Controlled type 2 diabetes mellitus with hyperglycemia, with long-term current use of insulin (H)      levothyroxine (SYNTHROID/LEVOTHROID) 75 MCG tablet Take 75 mcg by mouth every morning. Takes during the night upon waking to use restroom.      mycophenolate (GENERIC EQUIVALENT) 500 MG tablet Take 1,000 mg by mouth 2 times daily  DX. Z94.0 TRANSPLANT DATE 2000.      nitroGLYcerin (NITROSTAT) 0.4 MG sublingual tablet Place 0.4 mg under the tongue every 5 minutes as needed for chest pain      predniSONE (DELTASONE) 5 MG tablet Take 10 mg by mouth every morning      rosuvastatin (CRESTOR) 40 MG tablet Take 40 mg by mouth At Bedtime       spironolactone (ALDACTONE) 25 MG tablet Take 25 mg by mouth at bedtime.      TRELEGY ELLIPTA 100-62.5-25 MCG/INH oral inhaler Inhale 1 puff into the lungs every morning      triamcinolone (KENALOG) 0.1 % external cream Apply topically to lower legs 3 times per week at bedtime. Avoid open sores.       !! - Potential duplicate medications found. Please discuss with provider.        Allergies   Allergies   Allergen Reactions    Cefepime Other (See Comments)     pain, kidney function off.    Codeine Shortness Of Breath    Niacin Itching and Rash    Amoxicillin Itching and Rash    Prilosec [Omeprazole] Itching and Rash    Vancomycin Hives and Rash    Aldactone [Spironolactone] Diarrhea     Patient reports he was having issues with higher dose. Is currently taking 25 mg every morning without issue.    Atorvastatin Muscle Pain (Myalgia)    Ciprofloxacin Rash     Tolerated Levaquin 6/2023 without any rash or other reaction    Semaglutide Diarrhea     Data   Most Recent 3 CBC's:  Recent Labs   Lab Test 11/05/24  0629 11/04/24  0621  11/02/24  0534   WBC 7.4 7.5 7.2   HGB 10.4* 10.6* 10.2*   MCV 94 94 93    192 153      Most Recent 3 BMP's:  Recent Labs   Lab Test 11/05/24  0925 11/05/24  0629 11/05/24  0205 11/04/24  0942 11/04/24  0621 11/02/24  0920 11/02/24  0534   NA  --  136  --   --  135  --  129*   POTASSIUM  --  3.9  --   --  4.1  --  4.2   CHLORIDE  --  101  --   --  98  --  96*   CO2  --  18*  --   --  20*  --  15*   BUN  --  70.2*  --   --  73.3*  --  79.5*   CR  --  2.88*  --   --  2.71*  --  3.07*   ANIONGAP  --  17*  --   --  17*  --  18*   HANNAH  --  8.6*  --   --  9.0  --  8.9   * 132* 143*   < > 131*   < > 137*    < > = values in this interval not displayed.     Most Recent 2 LFT's:  Recent Labs   Lab Test 11/02/24  0534 11/01/24  0444   AST 38 30   ALT 36 32   ALKPHOS 72 76   BILITOTAL 0.3 0.3     Most Recent INR's and Anticoagulation Dosing History:  Anticoagulation Dose History          Latest Ref Rng & Units 6/1/2023   Recent Dosing and Labs   INR 0.9 - 1.1 1.4          Details          This result is from an external source.             Most Recent 3 Troponin's:  Recent Labs   Lab Test 05/21/21  1452   TROPI <0.015     Most Recent Cholesterol Panel:  Recent Labs   Lab Test 06/26/24  1151   TRIG 233*     Most Recent 6 Bacteria Isolates From Any Culture (See EPIC Reports for Culture Details):  Recent Labs   Lab Test 05/22/21  0652 05/22/21  0644 05/21/21  1516 05/21/21  1452   CULT No growth after 6 days No growth after 6 days No growth after 6 days No growth after 6 days  50,000 to 100,000 colonies/mL  Enterococcus faecalis  *     Most Recent TSH, T4 and A1c Labs:  Recent Labs   Lab Test 04/29/24  0536 11/13/22  0612 11/12/22  1516   TSH  --   --  4.82*   T4  --   --  1.32   A1C 6.1*   < >  --     < > = values in this interval not displayed.     Results for orders placed or performed during the hospital encounter of 10/22/24   US Lower Extremity Venous Duplex Right    Narrative    EXAMINATION: DOPPLER VENOUS  ULTRASOUND OF THE RIGHT LOWER EXTREMITY,  10/22/2024 7:21 PM     HISTORY: Pain and swelling    COMPARISON: No relevant priors available for comparison    TECHNIQUE:  Gray-scale evaluation with compression, spectral flow, and  color Doppler assessment of the deep venous system of the right leg  from groin to the calf.    FINDINGS:  The right common femoral, femoral, proximal deep femoral, and  popliteal veins are patent without thrombus. Normal Doppler waveforms.  Normal compressibility and augmentation response. The deep calf veins  are within normal limits. Distal lower extremity subcutaneous edema.      Impression    IMPRESSION:  No evidence of right lower extremity deep venous thrombosis.    YAHAIRA ZARCO MD         SYSTEM ID:  [id number]   US Renal Transplant without Doppler    Narrative    PROCEDURE: US RENAL TRANSPLANT WITHOUT DOPPLER 10/24/2024 12:21 PM    HISTORY: acute kidney injury    COMPARISONS: None.    TECHNIQUE: Imaging of renal transplant. Evaluation is limited by body  habitus.    FINDINGS: Transplanted kidney lies in the left lower quadrant. It  measures 13.8 x 6.7 x 6.4 cm. No renal mass or hydronephrosis is seen.  There is no significant cortical loss.    Bladder is grossly normal.         Impression    IMPRESSION: No mass or hydronephrosis in the transplanted kidney.    ALHAJI DE JESUS MD         SYSTEM ID:  [id number]   US Lower Extremity Venous Duplex Right    Narrative    Exam:US LOWER EXTREMITY VENOUS DUPLEX RIGHT    History: Right leg pain and swelling    Comparisons: 10/22/2024    Technique: Venous duplex ultrasonography of the right lower extremity  was performed.     Findings: The common femoral vein, superficial femoral vein and  popliteal vein are fully compressible with spontaneous and augmentable  venous flow.           Impression    Impression: No evidence of deep venous thrombosis within the right  lower extremity.    AMBIKA HERRERA MD         SYSTEM ID:  [id number]   CT Tibia  Fibula Lower Leg Right wo Contrast    Narrative    Exam: CT TIBIA FIBULA LOWER LEG RIGHT WO CONTRAST    Exam reason: swelling , sepsis cellulitis not improving.  immunocompromised. concern for abscess , renal transplant    Technique: Using helical CT technique, axial images are obtained  through the right lower leg. Coronal and sagittal reformats were  performed. No intravenous contrast was utilized.     This CT was performed using one or more of the following dose  reduction techniques: automated exposure control, adjustment of the mA  and/or kV according to patient size, and/or use of iterative  reconstruction technique.    Comparison: None.    Findings:    No acute fracture or dislocation.    Mild medial compartment degenerative change in the knee.    Vascular calcifications are noted. There are also scattered  subcutaneous soft tissue calcifications.    There is skin thickening and subcutaneous fat stranding surrounding  the lower extremity without a discrete fluid collection or  subcutaneous air. No involvement of the deep compartment.      Impression    Impression:    Skin thickening and subcutaneous fat stranding surrounding the lower  extremity, compatible with cellulitis. No discrete fluid collection to  suggest abscess. No air within the soft tissues.    VELMA GILBERT MD         SYSTEM ID:  [id number]   CT Femur Thigh Right w/o Contrast    Narrative    Exam: CT FEMUR THIGH RIGHT W/O CONTRAST    Exam reason: swelling , sepsis cellullitis not improving.  immunocompromised. concern for abscess , renal transplant    Technique: Using helical CT technique, axial images are obtained  through the right femur. Coronal and sagittal reformats were  performed. No intravenous contrast was utilized.     This CT was performed using one or more of the following dose  reduction techniques: automated exposure control, adjustment of the mA  and/or kV according to patient size, and/or use of iterative  reconstruction  technique.    Comparison: None.    Findings:    There is no evidence of fracture or dislocation.      Mild to moderate degenerative change of the right hip and mild medial  compartment joint space narrowing in the knee.    Small knee joint effusion.    There is skin thickening and subcutaneous fat stranding along the  lateral and posterior aspects of the right thigh, without involvement  of the deeper compartments. No air within the soft tissues. No  discrete focal fluid collection.      Impression    Impression:    Skin thickening and subcutaneous fat stranding along the lateral and  posterior aspects of the right thigh, compatible with cellulitis. No  discrete fluid collection to suggest abscess. No air within the soft  tissues.    VELMA GILBERT MD         SYSTEM ID:  [id number]   CT Foot Right w/o Contrast    Narrative    Exam: CT FOOT RIGHT W/O CONTRAST    Exam reason: swelling , sepsis cellulitis not improving.  immunocompromised. concern for abscess , renal transplant    Technique: Using helical CT technique, axial images are obtained  through the right foot. Coronal and sagittal reformats were performed.  No intravenous contrast was utilized.     This CT was performed using one or more of the following dose  reduction techniques: automated exposure control, adjustment of the mA  and/or kV according to patient size, and/or use of iterative  reconstruction technique.    Comparison: None.    Findings:    No acute fracture or dislocation.     A surgical screw is present in the base of the fifth metatarsal.     There are scattered mid foot degenerative changes. Mild first MTP  degenerative change. Small plantar calcaneal spur.    Vascular calcifications are noted.    There is skin thickening and considerable subcutaneous fat stranding  about the foot, most notably along the dorsal aspect of the foot. No  discrete fluid collection within the limits of this noncontrast exam.  No air within the soft tissues.       Impression    Impression:    Skin thickening and considerable subcutaneous fat stranding  surrounding the foot, worst on the dorsal aspect of the foot,  compatible with cellulitis. There is no discrete fluid collection  within the limits of this noncontrast exam. No air within the soft  tissues.    VELMA GILBERT MD         SYSTEM ID:  [id number]   XR Chest 1 View    Narrative    PROCEDURE:  XR CHEST 1 VIEW    HISTORY:  shortness of breath.     COMPARISON:  4/28/2024    FINDINGS:   The cardiac silhouette is normal in size. The pulmonary vasculature is  normal.  The lungs are clear. No pleural effusion or pneumothorax. Old  healed left-sided rib fractures are noted.      Impression    IMPRESSION:  No acute cardiopulmonary disease.      AMBIKA HERRERA MD         SYSTEM ID:  [id number]

## 2024-11-05 NOTE — PLAN OF CARE
Goal Outcome Evaluation:       A&Ox4, patient rated his leg pain at a 6/10 after breakfast. Complained of difficulty breathing due to nasal packaging. ENT visited patient stating they are not comfortable removing the packaging yet. Packaging was repositioned with relief. Patient ambulated in the room from bed to chair and chair to bathroom. Dyspnea upon exertion. Patient had a BM, soft formed stool brown in color. RLE elevated while laying in bed. RLE has begun weeping a yellow discharge. Lennox placed under patients leg. Discharge ordered for this afternoon.

## 2024-11-05 NOTE — PLAN OF CARE
Patient discharged at 2:39 PM via wheel chair accompanied by mother, father, brother, and sister and staff. Prescriptions sent with patient to fill . All belongings sent with patient.     Discharge instructions reviewed with Patient. Listed belongings gathered and returned to patient. Clothing and personal belongings.    Patient discharged to [facility].   Report called to Nursing Home:  LILIANA Kelly    Surgical Patient   Did patient receive discharge instruction on wound care and recognition of infection symptoms? Yes    MISC  Follow up appointment made:   patient has to established transportation.   Home medications returned to patient: No  Patient reports pain was well managed at discharge: Yes

## 2024-11-05 NOTE — PROGRESS NOTES
11/05/24 1200   Appointment Info   Signing Clinician's Name / Credentials (OT) SENIA Thorne, OTR/L   Appointment Canceled Reason (OT) With other staff/provider   Appointment Cancel Comments (OT) Attempted OT treatment twice this morning. Pt was with nurses both times. Will attempt this afternoon, as able.

## 2024-11-05 NOTE — PROGRESS NOTES
"[facility]    Hospitalist Progress Note    Date of Service (when I saw the patient): 11/05/2024    Assessment & Plan       Cellulitis of right lower leg: Improving, on Clindamycin for total 14 day course-tolerating well. PT/OT consulted regarding mobility.       Sepsis without acute organ dysfunction (H): Resolved. Due to cellulitis.       Epistaxis: Recurrent, seen by ENT 10/30 and bilateral nose was assessed. Right nare plug in place.   -11/5: Will contact ENT about removing packing today as it has become intolerable to the patient and he has not had any bleeding x48hrs       Chronic obstructive pulmonary disease, unspecified COPD type (H)    Pulmonary hypertension (H)    Type 2 diabetes mellitus with diabetic neuropathy, with long-term current use of insulin (H)    Chronic systolic heart failure (H)    Immunosuppressed status (H)    Kidney replaced by transplant       # Hypertension: Noted on problem list    # Chronic heart failure with preserved ejection fraction: heart failure noted on problem list and last echo with EF >50%     # Severe Obesity: Estimated body mass index is 51.63 kg/m  as calculated from the following:    Height as of this encounter: 1.676 m (5' 6\").    Weight as of this encounter: 145.1 kg (319 lb 14.2 oz).          DVT Prophylaxis: Low Risk/Ambulatory with no VTE prophylaxis indicated  Code Status: Full Code    Disposition: Expected discharge in 1-2 days once placement found.    Nicolette Cooper CNP    Interval History   Denies chest pain, abdominal pain.     -Data reviewed today: I reviewed all new labs and imaging results over the last 24 hours.     Physical Exam   Temp: 99.9  F (37.7  C) Temp src: Tympanic BP: 168/65 Pulse: 85   Resp: 18 SpO2: 95 % O2 Device: None (Room air)    Vitals:    10/30/24 0500 10/31/24 0500 11/01/24 0546   Weight: 142 kg (313 lb 0.9 oz) 143.8 kg (317 lb 0.3 oz) 145.1 kg (319 lb 14.2 oz)     Vital Signs with Ranges  Temp:  [98.5  F (36.9  C)-99.9  F " (37.7  C)] 99.9  F (37.7  C)  Pulse:  [85-91] 85  Resp:  [18-20] 18  BP: (139-168)/(42-70) 168/65  SpO2:  [92 %-96 %] 95 %  I/O last 3 completed shifts:  In: 840 [P.O.:840]  Out: 780 [Urine:780]    Peripheral IV 10/26/24 Anterior;Right Lower forearm (Active)   Site Assessment WDL except 11/05/24 0521   Line Status Saline locked 11/05/24 0521   Dressing Transparent 11/05/24 0521   Dressing Status old drainage 11/05/24 0521   Line Intervention Flushed 11/05/24 0521   Phlebitis Scale 0-->no symptoms 11/05/24 0521   Infiltration? no 11/03/24 0827   Number of days: 10       Rash 10/22/24 2100 Right lower leg other (see comments) (Active)   Distribution regional 11/03/24 0958   Color red 11/03/24 0958   Configuration/Shape asymmetric 11/03/24 0958   Borders indistinct;irregular 11/03/24 0958   Characteristics burning;edema;firm;pain/discomfort 10/28/24 0301   Care, Rash open to air 10/28/24 0301   Number of days: 14     Line/device assessment(s) completed for medical necessity    Constitutional: Awake,alert, no acute distress  Respiratory: Clear bilaterally but diminished, no crackles, rhonchi or wheezes   Cardiovascular: HRR, no murmurs, rubs, thrills.  GI: Soft,nontender, bowel sounds are hypoactve  Skin/Integumen: No rashes, open areas or unusual bruising  Other:      Medications   Current Facility-Administered Medications   Medication Dose Route Frequency Provider Last Rate Last Admin     Current Facility-Administered Medications   Medication Dose Route Frequency Provider Last Rate Last Admin    acetaminophen (TYLENOL) tablet 975 mg  975 mg Oral TID Ivy Bruner, DO   975 mg at 11/05/24 0916    acetaZOLAMIDE (DIAMOX SEQUEL) 12 hr capsule 500 mg  500 mg Oral QAM Ivy Bruner, DO   500 mg at 11/05/24 0917    allopurinol (ZYLOPRIM) tablet 300 mg  300 mg Oral QAM Jose Brunery Yevgenivich, DO   300 mg at 11/05/24 0917    amLODIPine (NORVASC) tablet 10 mg  10 mg Oral Daily Zohreh,  Ivy Styles DO   10 mg at 11/05/24 0915    [Held by provider] aspirin EC tablet 81 mg  81 mg Oral At Bedtime Ivy Bruner DO   81 mg at 11/01/24 2051    calcium carbonate (TUMS) chewable tablet 2,000 mg  2,000 mg Oral Thee Varghese MD   2,000 mg at 11/05/24 0914    cholecalciferol (VITAMIN D3) capsule 125 mcg  125 mcg Oral BERE Thee Travis MD   125 mcg at 11/05/24 0917    clindamycin (CLEOCIN) capsule 450 mg  450 mg Oral Q6H [facility] Ivy Bruner DO   450 mg at 11/05/24 1001    cloNIDine (CATAPRES) tablet 0.1 mg  0.1 mg Oral TID Ivy Bruner DO   0.1 mg at 11/05/24 0917    [Held by provider] clopidogrel (PLAVIX) tablet 75 mg  75 mg Oral Daily Ivy Bruner DO        ezetimibe (ZETIA) tablet 10 mg  10 mg Oral BERE Thee Travis MD   10 mg at 11/05/24 0918    Fluticasone-Umeclidin-Vilant (TRELEGY ELLIPTA) 100-62.5-25 MCG/ACT oral inhaler 1 puff  1 puff Inhalation Daily Thee Travis MD   1 puff at 11/04/24 0940    furosemide (LASIX) tablet 80 mg  80 mg Oral At Bedtime Ivy Bruner, DO   80 mg at 11/04/24 2246    gabapentin (NEURONTIN) capsule 300 mg  300 mg Oral BID Thee Travis MD   300 mg at 11/05/24 0916    guaiFENesin (MUCINEX) 12 hr tablet 600 mg  600 mg Oral BID Ivy Bruner DO   600 mg at 11/05/24 0918    insulin glargine (LANTUS PEN) injection 10 Units  10 Units Subcutaneous At Bedtime Thee Travis MD   10 Units at 11/04/24 2255    insulin lispro (HumaLOG KWIKPEN) injection (RAPID ACTING) 1-5 Units  1-5 Units Subcutaneous At Bedtime Ivy Bruner,         insulin lispro (HumaLOG KWIKPEN) injection (RAPID ACTING) 1-7 Units  1-7 Units Subcutaneous TID AC Thee Travis MD   1 Units at 11/05/24 0959    isosorbide mononitrate (IMDUR) 24 hr tablet 60 mg  60 mg Oral QAM Thee Travis MD   60 mg at 11/05/24 0913    levothyroxine (SYNTHROID/LEVOTHROID) tablet 75 mcg  75 mcg Oral  QAThee Chang MD   75 mcg at 11/05/24 0915    Lidocaine (LIDOCARE) 4 % Patch 2 patch  2 patch Transdermal Q24h Ivy Bruner DO        mycophenolate (GENERIC EQUIVALENT) tablet 1,000 mg  1,000 mg Oral BID Thee Travis MD   1,000 mg at 11/05/24 0912    predniSONE (DELTASONE) tablet 10 mg  10 mg Oral Thee Varghese MD   10 mg at 11/05/24 0917    rosuvastatin (CRESTOR) tablet 40 mg  40 mg Oral At Bedtime Ivy Bruner DO   40 mg at 11/04/24 2247    saccharomyces boulardii (FLORASTOR) capsule 250 mg  250 mg Oral BID Fletcher Norton MD   250 mg at 11/05/24 0913    sodium chloride (PF) 0.9% PF flush 3 mL  3 mL Intracatheter Q8H Thee Travis MD   3 mL at 11/05/24 0521    spironolactone (ALDACTONE) tablet 25 mg  25 mg Oral Nicolette Boyle NP   25 mg at 11/05/24 0913       Data   Recent Labs   Lab 11/05/24  0925 11/05/24  0629 11/05/24  0205 11/04/24  0942 11/04/24  0621 11/02/24  0920 11/02/24  0534 11/01/24  0501 11/01/24  0444   WBC  --  7.4  --   --  7.5  --  7.2  --  7.7   HGB  --  10.4*  --   --  10.6*  --  10.2*  --  10.8*   MCV  --  94  --   --  94  --  93  --  93   PLT  --  228  --   --  192  --  153  --  137*   NA  --  136  --   --  135  --  129*  --  130*   POTASSIUM  --  3.9  --   --  4.1  --  4.2  --  4.2   CHLORIDE  --  101  --   --  98  --  96*  --  95*   CO2  --  18*  --   --  20*  --  15*  --  18*   BUN  --  70.2*  --   --  73.3*  --  79.5*  --  74.7*   CR  --  2.88*  --   --  2.71*  --  3.07*  --  2.87*   ANIONGAP  --  17*  --   --  17*  --  18*  --  17*   HANNAH  --  8.6*  --   --  9.0  --  8.9  --  9.3   * 132* 143*   < > 131*   < > 137*   < > 139*   ALBUMIN  --   --   --   --   --   --  2.6*  --  2.9*   PROTTOTAL  --   --   --   --   --   --  6.3*  --  6.4   BILITOTAL  --   --   --   --   --   --  0.3  --  0.3   ALKPHOS  --   --   --   --   --   --  72  --  76   ALT  --   --   --   --   --   --  36  --  32   AST  --   --   --   --   --   --  38   --  30    < > = values in this interval not displayed.       No results found for this or any previous visit (from the past 24 hours).

## 2024-11-12 ENCOUNTER — TELEPHONE (OUTPATIENT)
Dept: OTOLARYNGOLOGY | Facility: OTHER | Age: 69
End: 2024-11-12

## 2024-11-12 NOTE — TELEPHONE ENCOUNTER
Patient's daughter called to see if patient appointment is necessary for today. He was scheduled earlier today with Evonne Pritchett for epistaxis; however, Evonne called in, so patient had to be moved to the afternoon. He is currently at a nursing home and they can't get him a ride this afternoon. Patient is unable to get in his daughter's car. Patient was seen 11/6 at [facility]. Cautery with silver nitrate performed. Patient has nasopore in his nose, so no packing needs to be removed. Patient does not need to be seen today; however, he should follow-up with ENT for further evaluation. Call transferred to [facility] to schedule within one month.

## 2024-11-24 ENCOUNTER — HEALTH MAINTENANCE LETTER (OUTPATIENT)
Age: 69
End: 2024-11-24

## 2024-11-25 ENCOUNTER — OFFICE VISIT (OUTPATIENT)
Dept: OTOLARYNGOLOGY | Facility: OTHER | Age: 69
End: 2024-11-25
Attending: NURSE PRACTITIONER
Payer: COMMERCIAL

## 2024-11-25 VITALS
WEIGHT: 315 LBS | OXYGEN SATURATION: 97 % | DIASTOLIC BLOOD PRESSURE: 60 MMHG | TEMPERATURE: 99.3 F | HEART RATE: 96 BPM | BODY MASS INDEX: 50.62 KG/M2 | HEIGHT: 66 IN | SYSTOLIC BLOOD PRESSURE: 160 MMHG

## 2024-11-25 DIAGNOSIS — R04.0 EPISTAXIS: Primary | ICD-10-CM

## 2024-11-25 PROCEDURE — 31231 NASAL ENDOSCOPY DX: CPT | Performed by: NURSE PRACTITIONER

## 2024-11-25 PROCEDURE — G0463 HOSPITAL OUTPT CLINIC VISIT: HCPCS | Mod: 25

## 2024-11-25 PROCEDURE — 30901 CONTROL OF NOSEBLEED: CPT | Performed by: NURSE PRACTITIONER

## 2024-11-25 RX ORDER — OXYCODONE HYDROCHLORIDE 5 MG/1
1 TABLET ORAL EVERY 6 HOURS PRN
COMMUNITY
Start: 2024-11-07

## 2024-11-25 RX ORDER — CLINDAMYCIN HYDROCHLORIDE 150 MG/1
CAPSULE ORAL
COMMUNITY

## 2024-11-25 RX ORDER — GINSENG 100 MG
CAPSULE ORAL ONCE
Status: COMPLETED | OUTPATIENT
Start: 2024-11-25 | End: 2024-11-25

## 2024-11-25 RX ADMIN — Medication: at 14:30

## 2024-11-25 ASSESSMENT — PAIN SCALES - GENERAL: PAINLEVEL_OUTOF10: MODERATE PAIN (4)

## 2024-11-25 NOTE — PROGRESS NOTES
Otolaryngology Note         Chief Complaint:     Patient presents with:  Ent Problem: Remove nasal plug           History of Present Illness:     Eduar Isaacs presents for follow up epistaxis on 11/17/24.  He was seen in the ED and nasal cautery completed and rhino rocket placed.      History of CHF and is on ASA and Plavix   He was seen by Dr Aj while he was hospitalized and cautery completed, he was packed with nasoapore.      Back to ED on 11/17 and 11/6 for epistaxis.      Rhino rocket pulled at the NH 6 days ago.  He continues with occasional dripping form the right nostril  Currently using vaseline in his nose and ayr gel  Right nostril bled after   Slight oozing off and on  Rhino rocket came out 6 days ago.     Currently back at home, no longer in NH    Reports no nasal trauma or nasal surgery. Denies nasal injury.      + history of hypertension,  today  No history of bleeding or clotting disorders.    No concerns for easy bruising, abnormal bleeding, bloody or black stools.            Medications:     Current Outpatient Rx   Medication Sig Dispense Refill    acetaminophen (TYLENOL) 500 MG tablet Take three tablets (1,500 mg) by mouth 1 to 2 times daily as needed for pain. Acetaminophen should be limited to 4,000 mg per day from all sources.      acetaZOLAMIDE (DIAMOX SEQUEL) 500 MG 12 hr capsule Take 500 mg by mouth every morning      albuterol (PROAIR HFA/PROVENTIL HFA/VENTOLIN HFA) 108 (90 Base) MCG/ACT inhaler Inhale 2 puffs into the lungs every 6 hours as needed for shortness of breath or wheezing      allopurinol (ZYLOPRIM) 300 MG tablet Take 300 mg by mouth every morning      amLODIPine (NORVASC) 10 MG tablet Take 1 tablet (10 mg) by mouth daily. 30 tablet 0    aspirin (ASA) 81 MG EC tablet Take 81 mg by mouth At Bedtime      blood glucose (NO BRAND SPECIFIED) test strip 1 strip by In Vitro route 4 times daily (before meals and nightly) Use to test blood sugar 4 times daily or  as directed. 100 strip 0    blood glucose monitoring (SOFTCLIX) lancets 1 each by In Vitro route 4 times daily (before meals and nightly) Use to monitor blood glucose once daily as directed. 200 each 0    calcium carbonate antacid 1000 MG CHEW Take 2 tablets by mouth every morning      Cholecalciferol (VITAMIN D3) 25 MCG (1000 UT) CAPS Take 1 capsule by mouth every morning.      cloNIDine (CATAPRES) 0.1 MG tablet Take 1 tablet (0.1 mg) by mouth 3 times daily. 90 tablet 0    clopidogrel (PLAVIX) 75 MG tablet Take 1 tablet (75 mg) by mouth every morning.      Continuous Glucose  (DEXCOM G7 ) SANAZ Use to read blood sugars as per 's instructions.      Continuous Glucose Sensor (DEXCOM G7 SENSOR) MISC CHANGE EVERY 10 DAYS 9 each 3    ezetimibe (ZETIA) 10 MG tablet Take 10 mg by mouth every morning      fish oil-omega-3 fatty acids 1000 MG capsule Take 1 g by mouth 2 times daily.      fluticasone (FLONASE) 50 MCG/ACT nasal spray Spray 1 spray into both nostrils daily as needed      furosemide (LASIX) 40 MG tablet Take 1 tablet (40 mg) by mouth daily. 30 tablet 0    furosemide (LASIX) 40 MG tablet Take 2 tablets (80 mg) by mouth at bedtime 60 tablet 0    gabapentin (NEURONTIN) 300 MG capsule Take 300 mg by mouth 2 times daily      GNP ULTICARE PEN NEEDLES 32G X 6 MM miscellaneous Use 5 times daily for insulin injections as directed.      HUMALOG KWIKPEN 100 UNIT/ML soln Inject 15 Units subcutaneously 3 times daily (before meals). -hold for BG < 120      hydrOXYzine HCl (ATARAX) 25 MG tablet Take 1 tablet (25 mg) by mouth every 6 hours as needed for anxiety. 30 tablet 0    insulin glargine (LANTUS PEN) 100 UNIT/ML pen Inject 25 Units Subcutaneous 2 times daily 15 mL 0    isosorbide mononitrate (IMDUR) 30 MG 24 hr tablet Take 2 tablets (60 mg) by mouth every morning. 30 tablet 0    levothyroxine (SYNTHROID/LEVOTHROID) 75 MCG tablet Take 75 mcg by mouth every morning. Takes during the night upon  waking to use restroom.      mycophenolate (GENERIC EQUIVALENT) 500 MG tablet Take 1,000 mg by mouth 2 times daily  DX. Z94.0 TRANSPLANT DATE 2000.      nitroGLYcerin (NITROSTAT) 0.4 MG sublingual tablet Place 0.4 mg under the tongue every 5 minutes as needed for chest pain      oxyCODONE (ROXICODONE) 5 MG tablet Take 1 tablet by mouth every 6 hours as needed.      predniSONE (DELTASONE) 5 MG tablet Take 10 mg by mouth every morning      rosuvastatin (CRESTOR) 40 MG tablet Take 40 mg by mouth At Bedtime       saccharomyces boulardii (FLORASTOR) 250 MG capsule Take 1 capsule (250 mg) by mouth 2 times daily. 60 capsule 0    sarilumab (KEVZARA) 200 MG/1.14ML injection Inject 1.14 mLs (200 mg) subcutaneously every 14 days. On Wednesdays Hold while at [facility]      Semaglutide-Weight Management (WEGOVY) 0.25 MG/0.5ML pen Inject 0.25 mg subcutaneously once a week. On Wednesday. 2 mL 0    spironolactone (ALDACTONE) 25 MG tablet Take 25 mg by mouth at bedtime.      TRELEGY ELLIPTA 100-62.5-25 MCG/INH oral inhaler Inhale 1 puff into the lungs every morning      triamcinolone (KENALOG) 0.1 % external cream Apply topically to lower legs 3 times per week at bedtime. Avoid open sores.      clindamycin (CLEOCIN) 150 MG capsule Take by mouth.              Allergies:     Allergies: Cefepime, Codeine, Niacin, Amoxicillin, Prilosec [omeprazole], Vancomycin, Aldactone [spironolactone], Atorvastatin, Ciprofloxacin, and Semaglutide          Past Medical History:     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    Congestive heart failure  (H)     COPD (chronic obstructive pulmonary disease) (H)     Coronary artery disease involving native coronary artery 11/12/2022    Coronary atherosclerosis 10/21/2016    Diabetes (H)     Dyslipidemia 10/14/2010    Gram-negative sepsis with organ dysfunction (H) 06/01/2023    Hallux rigidus 04/20/2009    Hallux varus 04/20/2009    History of kidney transplant 10/14/2010    Hx of gout 06/16/2023    Hypertension     Hyponatremia 11/12/2022    Hypophosphatemia 12/04/2014    Hypothyroidism, unspecified type 06/19/2023    Immunocompromised due to corticosteroids (H) 05/31/2023    Immunosuppressive management encounter following kidney transplant 10/19/2009    Long term current use of systemic steroids 10/19/2015    Metatarsus adductus 04/20/2009    Morbid obesity (H) 11/12/2022    Myocardial infarction (H)     Neuromuscular disorder (H)     JYOTSNA (obstructive sleep apnea) 11/12/2022    Pulmonary hypertension (H) 06/19/2023    Secondary renal hyperparathyroidism (H) 05/06/2014    Formatting of this note might be different from the original.  IMO Update      Sepsis, due to unspecified organism, unspecified whether acute organ dysfunction present (H) 05/31/2023    Septic shock (H) 06/01/2023    Severe episode of recurrent major depressive disorder, without psychotic features (H) 06/19/2023    Stented coronary artery 05/20/2015    Urinary tract infection without hematuria, site unspecified 06/25/2023            Past Surgical History:     Past Surgical History:   Procedure Laterality Date    CARDIAC SURGERY      COLONOSCOPY - HIM SCAN  04/08/2012    [facility], polyps, adenomatous    ORTHOPEDIC SURGERY      TRANSPLANT  10/25/2000    Kidney at [place]       ENT family history reviewed         Social History:     Social History     Tobacco Use    Smoking status: Former     Types: Cigarettes    Smokeless tobacco: Never    Tobacco comments:     Quit 1998   Substance Use Topics    Alcohol use: Yes     Comment: rarely hear and there  "   Drug use: Never            Review of Systems:     ROS: See HPI         Physical Exam:     BP (!) 160/60 (BP Location: Left arm, Patient Position: Sitting, Cuff Size: Adult Large)   Pulse 96   Temp 99.3  F (37.4  C) (Tympanic)   Ht 1.676 m (5' 6\")   Wt 144.7 kg (319 lb)   SpO2 97%   BMI 51.49 kg/m    General - The patient is well nourished and well developed, and appears to have good nutritional status.  Alert and oriented to person and place, answers questions and cooperates with examination appropriately.   Head and Face - Normocephalic and atraumatic, with no gross asymmetry noted.  The facial nerve is intact, with strong symmetric movements.  Voice and Breathing - The patient was breathing comfortably without the use of accessory muscles. There was no wheezing, stridor. The patients voice was clear and strong, and had appropriate pitch and quality.  Ears - External ear normal. Canals are patent. Right tympanic membrane is intact without effusion, retraction or mass. Left tympanic membrane is intact without effusion, retraction or mass.  Eyes - Extraocular movements intact, sclera were not icteric or injected  Mouth - Examination of the oral cavity showed pink, healthy oral mucosa. Dentition in good condition. No lesions or ulcerations noted. The tongue was mobile and midline. Large tongue  Throat - The walls of the oropharynx were smooth, pink, moist, symmetric, and had no lesions or ulcerations.  The tonsillar pillars and soft palate were symmetric. The uvula was midline on elevation.    Neck - Thick neck limits exam  Nose - External contour is symmetric, no gross deflection or scars.  Nasal mucosa is pink and moist with no abnormal mucus.      To evaluate the nose and sinuses, I performed rigid nasal endoscopy.  I sprayed both nares with 2 sprays lidocaine and neosynephrine.     I began with the RIGHT side using a 0 degree rigid nasal endoscope, and then similarly examined the LEFT side     Findings: " right septum with excoriation without bleeding, left septum with crusting, bleeding after debridement.  Cautery completed left caudal septum.   Inferior turbinates:  scabbing right, left is grossly unremarkable  Middle turbinate and middle meatus: Grossly unremarkable  The superior meatus is examined and unremarkable  The sphenoethmoidal recesses examined and appears normal.  Mucosa is healthy throughout,  no polyps nor polypoid degeneration  Nasopharynx clear, ET patent, no edema  The patient tolerated the procedure well      Nasal Cautery - Options were explained to the patient regarding conservative measures versus nasal cautery in the clinic today.  The patient wished to proceed with cautery. I anesthetized the nose with topical lidocaine and neosynepherine.   I then applied silver nitrate to the actively bleeding vessels in the caudal septum on the left starting distally, and working my way back to the vessels  point of entry onto the nasal mucosa.  After completion, I placed a small piece of gelfoam on the right caudal septum over the excoriated area.  Bacitracin was placed over the area that was cauterized.  The patient tolerated the procedure well.         Assessment and Plan:       ICD-10-CM    1. Epistaxis  R04.0 lidocaine 2%-oxymetazoline 0.025% nasal solution 2 spray     bacitracin ointment        Use over the counter nasal saline spray (ocean nasal spray, ramez med spray, ayr spray)  3 x daily and before bed,     Apply Aquaphor ointment in the left nostril. Hold off applying Aquaphor ointment to the right nostril for a couple days to allow the gel foam packing to adhere.    No bending, straining or lifting over 10 pounds.    Follow up with Kyleigh Pritchett NP in 2 weeks.  A/P  - The patient has been cauterized today for epistaxis.  I counseled them on keeping the head above the level of the heart at all times for the next week.  No picking or rubbing at the cauterized side of the nose, or blowing for 1  week.   No lifting bending or straining for 2 weeks, 10 pound weight limit.  Sneeze with mouth open.  The patient was counseled that in the event of heavy bleeding, to apply pressure to front of nose, lean forward and spit out blood.  Apply afrin nasal spray to side of bleed until is slows or stops.  If bleeding persists or is worrisome, present to the emergency room.    To prevent epistaxis:   Use over the counter nasal saline spray (ocean nasal spray, ramez med spray, ayr spray)  3 x daily and before bed, then apply aquaphor ointment.   Start today even if packing in place.  Complete the antibiotic if prescribed  No bending, straining or lifting over 10 pounds.  Follow up in 2 weeks for recheck.     Kyleigh VALDES  [facility]'

## 2024-11-25 NOTE — PATIENT INSTRUCTIONS
Thank you for allowing Kyleigh Pritchett and our ENT team to participate in your care.  If your medications are too expensive, please give the nurse a call.  We can possibly change this medication.  If you have a scheduling or an appointment question please contact our Health Unit Coordinator at their direct line [phone] ext [phone].   ALL nursing questions or concerns can be directed to your ENT nurse at: [phone] - Mxl     Use over the counter nasal saline spray (ocean nasal spray, ramez med spray, ayr spray)  3 x daily and before bed,     Apply Aquaphor ointment in the left nostril. Hold off applying Aquaphor ointment to the right nostril for a couple days to allow the gel foam packing to adhere.    No bending, straining or lifting over 10 pounds.    Follow up with Kyleigh Pritchett NP in 2 weeks.

## 2024-11-25 NOTE — LETTER
11/25/2024      Eduar Isaacs  [address]  [place] [address]      Dear Colleague,    Thank you for referring your patient, Eduar Isaacs, to the [facility]. Please see a copy of my visit note below.      Otolaryngology Note         Chief Complaint:     Patient presents with:  Ent Problem: Remove nasal plug           History of Present Illness:     Eduar Isaacs presents for follow up epistaxis on 11/17/24.  He was seen in the ED and nasal cautery completed and rhino rocket placed.      History of CHF and is on ASA and Plavix   He was seen by Dr Aj while he was hospitalized and cautery completed, he was packed with nasoapore.      Back to ED on 11/17 and 11/6 for epistaxis.      Rhino rocket pulled at the NH 6 days ago.  He continues with occasional dripping form the right nostril  Currently using vaseline in his nose and ayr gel  Right nostril bled after   Slight oozing off and on  Rhino rocket came out 6 days ago.     Currently back at home, no longer in NH    Reports no nasal trauma or nasal surgery. Denies nasal injury.      + history of hypertension,  today  No history of bleeding or clotting disorders.    No concerns for easy bruising, abnormal bleeding, bloody or black stools.            Medications:     Current Outpatient Rx   Medication Sig Dispense Refill     acetaminophen (TYLENOL) 500 MG tablet Take three tablets (1,500 mg) by mouth 1 to 2 times daily as needed for pain. Acetaminophen should be limited to 4,000 mg per day from all sources.       acetaZOLAMIDE (DIAMOX SEQUEL) 500 MG 12 hr capsule Take 500 mg by mouth every morning       albuterol (PROAIR HFA/PROVENTIL HFA/VENTOLIN HFA) 108 (90 Base) MCG/ACT inhaler Inhale 2 puffs into the lungs every 6 hours as needed for shortness of breath or wheezing       allopurinol (ZYLOPRIM) 300 MG tablet Take 300 mg by mouth every morning       amLODIPine (NORVASC) 10 MG tablet Take 1 tablet (10 mg) by mouth  daily. 30 tablet 0     aspirin (ASA) 81 MG EC tablet Take 81 mg by mouth At Bedtime       blood glucose (NO BRAND SPECIFIED) test strip 1 strip by In Vitro route 4 times daily (before meals and nightly) Use to test blood sugar 4 times daily or as directed. 100 strip 0     blood glucose monitoring (SOFTCLIX) lancets 1 each by In Vitro route 4 times daily (before meals and nightly) Use to monitor blood glucose once daily as directed. 200 each 0     calcium carbonate antacid 1000 MG CHEW Take 2 tablets by mouth every morning       Cholecalciferol (VITAMIN D3) 25 MCG (1000 UT) CAPS Take 1 capsule by mouth every morning.       cloNIDine (CATAPRES) 0.1 MG tablet Take 1 tablet (0.1 mg) by mouth 3 times daily. 90 tablet 0     clopidogrel (PLAVIX) 75 MG tablet Take 1 tablet (75 mg) by mouth every morning.       Continuous Glucose  (DEXCOM G7 ) SANAZ Use to read blood sugars as per 's instructions.       Continuous Glucose Sensor (DEXCOM G7 SENSOR) MISC CHANGE EVERY 10 DAYS 9 each 3     ezetimibe (ZETIA) 10 MG tablet Take 10 mg by mouth every morning       fish oil-omega-3 fatty acids 1000 MG capsule Take 1 g by mouth 2 times daily.       fluticasone (FLONASE) 50 MCG/ACT nasal spray Spray 1 spray into both nostrils daily as needed       furosemide (LASIX) 40 MG tablet Take 1 tablet (40 mg) by mouth daily. 30 tablet 0     furosemide (LASIX) 40 MG tablet Take 2 tablets (80 mg) by mouth at bedtime 60 tablet 0     gabapentin (NEURONTIN) 300 MG capsule Take 300 mg by mouth 2 times daily       GNP ULTICARE PEN NEEDLES 32G X 6 MM miscellaneous Use 5 times daily for insulin injections as directed.       HUMALOG KWIKPEN 100 UNIT/ML soln Inject 15 Units subcutaneously 3 times daily (before meals). -hold for BG < 120       hydrOXYzine HCl (ATARAX) 25 MG tablet Take 1 tablet (25 mg) by mouth every 6 hours as needed for anxiety. 30 tablet 0     insulin glargine (LANTUS PEN) 100 UNIT/ML pen Inject 25 Units  Subcutaneous 2 times daily 15 mL 0     isosorbide mononitrate (IMDUR) 30 MG 24 hr tablet Take 2 tablets (60 mg) by mouth every morning. 30 tablet 0     levothyroxine (SYNTHROID/LEVOTHROID) 75 MCG tablet Take 75 mcg by mouth every morning. Takes during the night upon waking to use restroom.       mycophenolate (GENERIC EQUIVALENT) 500 MG tablet Take 1,000 mg by mouth 2 times daily  DX. Z94.0 TRANSPLANT DATE 2000.       nitroGLYcerin (NITROSTAT) 0.4 MG sublingual tablet Place 0.4 mg under the tongue every 5 minutes as needed for chest pain       oxyCODONE (ROXICODONE) 5 MG tablet Take 1 tablet by mouth every 6 hours as needed.       predniSONE (DELTASONE) 5 MG tablet Take 10 mg by mouth every morning       rosuvastatin (CRESTOR) 40 MG tablet Take 40 mg by mouth At Bedtime        saccharomyces boulardii (FLORASTOR) 250 MG capsule Take 1 capsule (250 mg) by mouth 2 times daily. 60 capsule 0     sarilumab (KEVZARA) 200 MG/1.14ML injection Inject 1.14 mLs (200 mg) subcutaneously every 14 days. On Wednesdays Hold while at [facility]       Semaglutide-Weight Management (WEGOVY) 0.25 MG/0.5ML pen Inject 0.25 mg subcutaneously once a week. On Wednesday. 2 mL 0     spironolactone (ALDACTONE) 25 MG tablet Take 25 mg by mouth at bedtime.       TRELEGY ELLIPTA 100-62.5-25 MCG/INH oral inhaler Inhale 1 puff into the lungs every morning       triamcinolone (KENALOG) 0.1 % external cream Apply topically to lower legs 3 times per week at bedtime. Avoid open sores.       clindamycin (CLEOCIN) 150 MG capsule Take by mouth.              Allergies:     Allergies: Cefepime, Codeine, Niacin, Amoxicillin, Prilosec [omeprazole], Vancomycin, Aldactone [spironolactone], Atorvastatin, Ciprofloxacin, and Semaglutide          Past Medical History:     Past Medical History:   Diagnosis Date     Acute on chronic systolic congestive heart failure (H) 06/01/2023     Acute renal failure, unspecified acute renal failure type (H) 05/31/2023     Anemia of renal  disease 06/19/2023     Arthritis      Asymptomatic hyperuricemia 10/19/2015     Cardiomyopathy, secondary (H) 06/01/2023     Chronic obstructive pulmonary disease, unspecified COPD type (H) 06/19/2023     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     Congestive heart failure (H)      COPD (chronic obstructive pulmonary disease) (H)      Coronary artery disease involving native coronary artery 11/12/2022     Coronary atherosclerosis 10/21/2016     Diabetes (H)      Dyslipidemia 10/14/2010     Gram-negative sepsis with organ dysfunction (H) 06/01/2023     Hallux rigidus 04/20/2009     Hallux varus 04/20/2009     History of kidney transplant 10/14/2010     Hx of gout 06/16/2023     Hypertension      Hyponatremia 11/12/2022     Hypophosphatemia 12/04/2014     Hypothyroidism, unspecified type 06/19/2023     Immunocompromised due to corticosteroids (H) 05/31/2023     Immunosuppressive management encounter following kidney transplant 10/19/2009     Long term current use of systemic steroids 10/19/2015     Metatarsus adductus 04/20/2009     Morbid obesity (H) 11/12/2022     Myocardial infarction (H)      Neuromuscular disorder (H)      JYOTSNA (obstructive sleep apnea) 11/12/2022     Pulmonary hypertension (H) 06/19/2023     Secondary renal hyperparathyroidism (H) 05/06/2014    Formatting of this note might be different from the original.  IMO Update       Sepsis, due to unspecified organism, unspecified whether acute organ dysfunction present (H) 05/31/2023     Septic shock (H) 06/01/2023     Severe episode of recurrent major depressive disorder, without psychotic features (H) 06/19/2023     Stented coronary artery 05/20/2015     Urinary tract infection without hematuria, site unspecified 06/25/2023            Past Surgical History:     Past Surgical History:   Procedure Laterality Date     CARDIAC SURGERY       COLONOSCOPY - HIM SCAN  04/08/2012    [facility], polyps, adenomatous     ORTHOPEDIC SURGERY       TRANSPLANT  " 10/25/2000    [facility]       ENT family history reviewed         Social History:     Social History     Tobacco Use     Smoking status: Former     Types: Cigarettes     Smokeless tobacco: Never     Tobacco comments:     Quit 1998   Substance Use Topics     Alcohol use: Yes     Comment: rarely hear and there     Drug use: Never            Review of Systems:     ROS: See HPI         Physical Exam:     BP (!) 160/60 (BP Location: Left arm, Patient Position: Sitting, Cuff Size: Adult Large)   Pulse 96   Temp 99.3  F (37.4  C) (Tympanic)   Ht 1.676 m (5' 6\")   Wt 144.7 kg (319 lb)   SpO2 97%   BMI 51.49 kg/m    General - The patient is well nourished and well developed, and appears to have good nutritional status.  Alert and oriented to person and place, answers questions and cooperates with examination appropriately.   Head and Face - Normocephalic and atraumatic, with no gross asymmetry noted.  The facial nerve is intact, with strong symmetric movements.  Voice and Breathing - The patient was breathing comfortably without the use of accessory muscles. There was no wheezing, stridor. The patients voice was clear and strong, and had appropriate pitch and quality.  Ears - External ear normal. Canals are patent. Right tympanic membrane is intact without effusion, retraction or mass. Left tympanic membrane is intact without effusion, retraction or mass.  Eyes - Extraocular movements intact, sclera were not icteric or injected  Mouth - Examination of the oral cavity showed pink, healthy oral mucosa. Dentition in good condition. No lesions or ulcerations noted. The tongue was mobile and midline. Large tongue  Throat - The walls of the oropharynx were smooth, pink, moist, symmetric, and had no lesions or ulcerations.  The tonsillar pillars and soft palate were symmetric. The uvula was midline on elevation.    Neck - Thick neck limits exam  Nose - External contour is symmetric, no gross deflection or scars.  " Nasal mucosa is pink and moist with no abnormal mucus.      To evaluate the nose and sinuses, I performed rigid nasal endoscopy.  I sprayed both nares with 2 sprays lidocaine and neosynephrine.     I began with the RIGHT side using a 0 degree rigid nasal endoscope, and then similarly examined the LEFT side     Findings: right septum with excoriation without bleeding, left septum with crusting, bleeding after debridement.  Cautery completed left caudal septum.   Inferior turbinates:  scabbing right, left is grossly unremarkable  Middle turbinate and middle meatus: Grossly unremarkable  The superior meatus is examined and unremarkable  The sphenoethmoidal recesses examined and appears normal.  Mucosa is healthy throughout,  no polyps nor polypoid degeneration  Nasopharynx clear, ET patent, no edema  The patient tolerated the procedure well      Nasal Cautery - Options were explained to the patient regarding conservative measures versus nasal cautery in the clinic today.  The patient wished to proceed with cautery. I anesthetized the nose with topical lidocaine and neosynepherine.   I then applied silver nitrate to the actively bleeding vessels in the caudal septum on the left starting distally, and working my way back to the vessels  point of entry onto the nasal mucosa.  After completion, I placed a small piece of gelfoam on the right caudal septum over the excoriated area.  Bacitracin was placed over the area that was cauterized.  The patient tolerated the procedure well.         Assessment and Plan:       ICD-10-CM    1. Epistaxis  R04.0 lidocaine 2%-oxymetazoline 0.025% nasal solution 2 spray     bacitracin ointment        Use over the counter nasal saline spray (ocean nasal spray, ramez med spray, ayr spray)  3 x daily and before bed,     Apply Aquaphor ointment in the left nostril. Hold off applying Aquaphor ointment to the right nostril for a couple days to allow the gel foam packing to adhere.    No bending,  straining or lifting over 10 pounds.    Follow up with Kyleigh Pritchett NP in 2 weeks.  A/P  - The patient has been cauterized today for epistaxis.  I counseled them on keeping the head above the level of the heart at all times for the next week.  No picking or rubbing at the cauterized side of the nose, or blowing for 1 week.   No lifting bending or straining for 2 weeks, 10 pound weight limit.  Sneeze with mouth open.  The patient was counseled that in the event of heavy bleeding, to apply pressure to front of nose, lean forward and spit out blood.  Apply afrin nasal spray to side of bleed until is slows or stops.  If bleeding persists or is worrisome, present to the emergency room.    To prevent epistaxis:   Use over the counter nasal saline spray (ocean nasal spray, ramez med spray, ayr spray)  3 x daily and before bed, then apply aquaphor ointment.   Start today even if packing in place.  Complete the antibiotic if prescribed  No bending, straining or lifting over 10 pounds.  Follow up in 2 weeks for recheck.     Kyleigh VALDES  [facility]      Again, thank you for allowing me to participate in the care of your patient.        Sincerely,        Kyleigh Pritchett NP

## 2024-11-27 ENCOUNTER — HOSPITAL ENCOUNTER (EMERGENCY)
Facility: HOSPITAL | Age: 69
Discharge: HOME | End: 2024-11-27
Attending: NURSE PRACTITIONER
Payer: COMMERCIAL

## 2024-11-27 VITALS
OXYGEN SATURATION: 96 % | SYSTOLIC BLOOD PRESSURE: 163 MMHG | HEART RATE: 95 BPM | TEMPERATURE: 97.8 F | DIASTOLIC BLOOD PRESSURE: 97 MMHG | RESPIRATION RATE: 16 BRPM

## 2024-11-27 DIAGNOSIS — R04.0 EPISTAXIS: ICD-10-CM

## 2024-11-27 PROCEDURE — 250N000011 HC RX IP 250 OP 636: Performed by: NURSE PRACTITIONER

## 2024-11-27 PROCEDURE — 99283 EMERGENCY DEPT VISIT LOW MDM: CPT

## 2024-11-27 PROCEDURE — C9046 COCAINE HCL NASAL SOLUTION: HCPCS | Performed by: NURSE PRACTITIONER

## 2024-11-27 PROCEDURE — 250N000009 HC RX 250: Performed by: NURSE PRACTITIONER

## 2024-11-27 PROCEDURE — 99283 EMERGENCY DEPT VISIT LOW MDM: CPT | Performed by: NURSE PRACTITIONER

## 2024-11-27 RX ORDER — OXYMETAZOLINE HYDROCHLORIDE 0.05 G/100ML
3 SPRAY NASAL ONCE
Status: COMPLETED | OUTPATIENT
Start: 2024-11-27 | End: 2024-11-27

## 2024-11-27 RX ORDER — COCAINE HYDROCHLORIDE 40 MG/ML
SOLUTION NASAL ONCE
Status: COMPLETED | OUTPATIENT
Start: 2024-11-27 | End: 2024-11-27

## 2024-11-27 RX ADMIN — OXYMETAZOLINE HYDROCHLORIDE 3 SPRAY: 0.05 SOLUTION NASAL at 12:12

## 2024-11-27 RX ADMIN — COCAINE HYDROCHLORIDE: 40 SOLUTION NASAL at 12:12

## 2024-11-27 ASSESSMENT — ENCOUNTER SYMPTOMS
ENDOCRINE NEGATIVE: 1
CONSTITUTIONAL NEGATIVE: 1
NEUROLOGICAL NEGATIVE: 1
RESPIRATORY NEGATIVE: 1
PSYCHIATRIC NEGATIVE: 1
HEMATOLOGIC/LYMPHATIC NEGATIVE: 1
GASTROINTESTINAL NEGATIVE: 1
EYES NEGATIVE: 1
MUSCULOSKELETAL NEGATIVE: 1
ALLERGIC/IMMUNOLOGIC NEGATIVE: 1
CARDIOVASCULAR NEGATIVE: 1

## 2024-11-27 ASSESSMENT — ACTIVITIES OF DAILY LIVING (ADL): ADLS_ACUITY_SCORE: 58

## 2024-11-27 NOTE — ED NOTES
"Patent presents with c/o nose bleed that started about 5 weeks ago and has been intermittent since. Reports seeing ENT earlier this week and was cauterized but \"they couldn't get it all.\" Patient reports calling ENT but unable to get into be seen today. Reports having another appt with ENT on the 27th. Reports mainly the right nostril bleeding, but will backup into left at time. Denies any pain, denies any lightheadedness or feeling dizzy, patient is currently on blood thinners.   "

## 2024-11-27 NOTE — ED PROVIDER NOTES
History     Chief Complaint   Patient presents with    Epistaxis     HPI  Eduar Isaacs is a 69 year old individual with history of stage III chronic kidney disease, hypertension, hyperparathyroidism due to renal issues, congestive heart failure, cardiomyopathy, COPD, hypothyroidism, DMT2, comes in for epistaxis.  Patient states has been having intermittent nosebleeds for the past 5 weeks.  Was seen by ENT with cauterization but continued to have intermittent dripping.  States that he again started bleeding around 06 100 today and bleeding continued so comes in.  Patient states is on aspirin and clopidogrel for heart problems.  Right now denies any sensation of blood going down the back of the throat.  No dizziness or lightheadedness.  Denies any trauma that precipitated this.    Allergies:  Allergies   Allergen Reactions    Cefepime Other (See Comments)     pain, kidney function off.    Codeine Shortness Of Breath    Niacin Itching and Rash    Amoxicillin Itching and Rash    Prilosec [Omeprazole] Itching and Rash    Vancomycin Hives and Rash    Aldactone [Spironolactone] Diarrhea     Patient reports he was having issues with higher dose. Is currently taking 25 mg every morning without issue.    Atorvastatin Muscle Pain (Myalgia)    Ciprofloxacin Rash     Tolerated Levaquin 6/2023 without any rash or other reaction    Semaglutide Diarrhea       Problem List:    Patient Active Problem List    Diagnosis Date Noted    Epistaxis 11/04/2024     Priority: Medium    Kidney replaced by transplant 10/23/2024     Priority: Medium    Immunosuppressed status (H) 10/22/2024     Priority: Medium    Cellulitis of right lower leg 10/22/2024     Priority: Medium    Chronic systolic heart failure (H) 04/29/2024     Priority: Medium    Bacteremia 04/29/2024     Priority: Medium    Acute cystitis without hematuria 04/28/2024     Priority: Medium    Anemia of renal disease 06/19/2023     Priority: Medium    Chronic obstructive  pulmonary disease, unspecified COPD type (H) 06/19/2023     Priority: Medium    Hypothyroidism, unspecified type 06/19/2023     Priority: Medium    Pulmonary hypertension (H) 06/19/2023     Priority: Medium    Severe episode of recurrent major depressive disorder, without psychotic features (H) 06/19/2023     Priority: Medium    Hx of gout 06/16/2023     Priority: Medium    Type 2 diabetes mellitus with diabetic neuropathy, with long-term current use of insulin (H) 06/16/2023     Priority: Medium    Acute on chronic systolic congestive heart failure (H) 06/01/2023     Priority: Medium    Cardiomyopathy, secondary (H) 06/01/2023     Priority: Medium    Immunocompromised due to corticosteroids (H) 05/31/2023     Priority: Medium    JYOTSNA (obstructive sleep apnea) 11/12/2022     Priority: Medium    Sepsis without acute organ dysfunction (H) 05/21/2021     Priority: Medium    Morbid obesity with BMI of 45.0-49.9, adult (H) 10/21/2016     Priority: Medium    Asymptomatic hyperuricemia 10/19/2015     Priority: Medium    Long term current use of systemic steroids 10/19/2015     Priority: Medium    Secondary renal hyperparathyroidism (H) 05/06/2014     Priority: Medium     Formatting of this note might be different from the original.  IMO Update      Hypertension 10/15/2012     Priority: Medium    Dyslipidemia 10/14/2010     Priority: Medium    History of kidney transplant 10/14/2010     Priority: Medium    Chronic kidney disease, stage III (moderate) (H) 12/04/2006     Priority: Medium        Past Medical History:    Past Medical History:   Diagnosis Date    Acute on chronic systolic congestive heart failure (H) 06/01/2023    Acute renal failure, unspecified acute renal failure type (H) 05/31/2023    Anemia of renal disease 06/19/2023    Arthritis     Asymptomatic hyperuricemia 10/19/2015    Cardiomyopathy, secondary (H) 06/01/2023    Chronic obstructive pulmonary disease, unspecified COPD type (H) 06/19/2023    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    Congestive heart failure (H)     COPD (chronic obstructive pulmonary disease) (H)     Coronary artery disease involving native coronary artery 11/12/2022    Coronary atherosclerosis 10/21/2016    Diabetes (H)     Dyslipidemia 10/14/2010    Gram-negative sepsis with organ dysfunction (H) 06/01/2023    Hallux rigidus 04/20/2009    Hallux varus 04/20/2009    History of kidney transplant 10/14/2010    Hx of gout 06/16/2023    Hypertension     Hyponatremia 11/12/2022    Hypophosphatemia 12/04/2014    Hypothyroidism, unspecified type 06/19/2023    Immunocompromised due to corticosteroids (H) 05/31/2023    Immunosuppressive management encounter following kidney transplant 10/19/2009    Long term current use of systemic steroids 10/19/2015    Metatarsus adductus 04/20/2009    Morbid obesity (H) 11/12/2022    Myocardial infarction (H)     Neuromuscular disorder (H)     JYOTSNA (obstructive sleep apnea) 11/12/2022    Pulmonary hypertension (H) 06/19/2023    Secondary renal hyperparathyroidism (H) 05/06/2014    Sepsis, due to unspecified organism, unspecified whether acute organ dysfunction present (H) 05/31/2023    Septic shock (H) 06/01/2023    Severe episode of recurrent major depressive disorder, without psychotic features (H) 06/19/2023    Stented coronary artery 05/20/2015    Urinary tract infection without hematuria, site unspecified 06/25/2023       Past Surgical History:    Past Surgical History:   Procedure Laterality Date    CARDIAC SURGERY      COLONOSCOPY - HIM SCAN  04/08/2012    [facility], polyps, adenomatous    ORTHOPEDIC SURGERY      TRANSPLANT  10/25/2000    [facility]ney at [place]       Family History:    No family history on file.    Social History:  Marital Status:   [5]  Social History     Tobacco Use    Smoking status: Former     Types: Cigarettes    Smokeless tobacco: Never    Tobacco comments:     Quit 1998   Substance Use Topics    Alcohol use: Yes     Comment:  rarely hear and there    Drug use: Never        Medications:    acetaminophen (TYLENOL) 500 MG tablet  acetaZOLAMIDE (DIAMOX SEQUEL) 500 MG 12 hr capsule  albuterol (PROAIR HFA/PROVENTIL HFA/VENTOLIN HFA) 108 (90 Base) MCG/ACT inhaler  allopurinol (ZYLOPRIM) 300 MG tablet  amLODIPine (NORVASC) 10 MG tablet  aspirin (ASA) 81 MG EC tablet  blood glucose (NO BRAND SPECIFIED) test strip  blood glucose monitoring (SOFTCLIX) lancets  calcium carbonate antacid 1000 MG CHEW  Cholecalciferol (VITAMIN D3) 25 MCG (1000 UT) CAPS  clindamycin (CLEOCIN) 150 MG capsule  cloNIDine (CATAPRES) 0.1 MG tablet  clopidogrel (PLAVIX) 75 MG tablet  Continuous Glucose  (DEXCOM G7 ) SANAZ  Continuous Glucose Sensor (DEXCOM G7 SENSOR) MISC  ezetimibe (ZETIA) 10 MG tablet  fish oil-omega-3 fatty acids 1000 MG capsule  fluticasone (FLONASE) 50 MCG/ACT nasal spray  furosemide (LASIX) 40 MG tablet  furosemide (LASIX) 40 MG tablet  gabapentin (NEURONTIN) 300 MG capsule  GNP ULTICARE PEN NEEDLES 32G X 6 MM miscellaneous  HUMALOG KWIKPEN 100 UNIT/ML soln  hydrOXYzine HCl (ATARAX) 25 MG tablet  insulin glargine (LANTUS PEN) 100 UNIT/ML pen  isosorbide mononitrate (IMDUR) 30 MG 24 hr tablet  levothyroxine (SYNTHROID/LEVOTHROID) 75 MCG tablet  mycophenolate (GENERIC EQUIVALENT) 500 MG tablet  nitroGLYcerin (NITROSTAT) 0.4 MG sublingual tablet  oxyCODONE (ROXICODONE) 5 MG tablet  predniSONE (DELTASONE) 5 MG tablet  rosuvastatin (CRESTOR) 40 MG tablet  saccharomyces boulardii (FLORASTOR) 250 MG capsule  sarilumab (KEVZARA) 200 MG/1.14ML injection  Semaglutide-Weight Management (WEGOVY) 0.25 MG/0.5ML pen  spironolactone (ALDACTONE) 25 MG tablet  TRELEGY ELLIPTA 100-62.5-25 MCG/INH oral inhaler  triamcinolone (KENALOG) 0.1 % external cream          Review of Systems   Constitutional: Negative.    HENT:  Positive for nosebleeds.    Eyes: Negative.    Respiratory: Negative.     Cardiovascular: Negative.    Gastrointestinal: Negative.     Endocrine: Negative.    Genitourinary: Negative.    Musculoskeletal: Negative.    Skin: Negative.    Allergic/Immunologic: Negative.    Neurological: Negative.    Hematological: Negative.    Psychiatric/Behavioral: Negative.         Physical Exam   BP: 165/83  Pulse: 97  Temp: 97.8  F (36.6  C)  Resp: 18  SpO2: 97 %      GENERAL APPEARANCE:  The patient is a 69 year old well-developed, well-nourished individual that appears as stated age.  NT: No septal deviation or turbinate hypertrophy present.  Blood clotting and scar tissue noted in left nostril on septum and inferior edge.  No obvious active bleeding present.  No blood in posterior nasopharynx noted.    LUNGS:  Breathing is easy.   PSYCHIATRIC:  The patient is awake, alert, and oriented x4.  Recent and remote memory is intact.  Appropriate mood and affect.  Calm and cooperative with history and physical exam.      ED Course     ED Course as of 11/27/24 1246   Wed Nov 27, 2024   1152 In to see patient and history/physical completed.    1158 Nose unclamped.  No obvious bleeding.  Will monitor.   1241 Patient has no bleeding since arrival.  Will discharge home to do epistaxis instructions as discussed in AVS.  Return precautions given.               No results found for this or any previous visit (from the past 24 hours).    Medications   cocaine nasal solution SOLN ( Topical $Given 11/27/24 1212)   oxymetazoline (AFRIN) 0.05 % spray 3 spray (3 sprays Both Nostrils $Given 11/27/24 1212)       Assessments & Plan (with Medical Decision Making)     I have reviewed the nursing notes.    I have reviewed the findings, diagnosis, plan and need for follow up with the patient.    Summary:  Patient presents to the ER today for epistaxis.  Potential diagnosis which have been considered and evaluated include anterior bleed, posterior bleed, septal hematoma, as well as others. Many of these have been excluded using the various modalities and assessment as noted on the chart.  At the present time, the diagnosis given seems to be the most likely epistaxis.  Upon arrival, vitals signs are normal.  The patient is alert and oriented no distress.  Patient has no respiratory abnormalities.  No blood noted down posterior nares or pharynx.  Patient was clamped on arrival in triage.  Upon physical examination patient has blood clot/scar tissue noted to the left nasal septum and inferior wall.  No active bleeding noted.  Oxymetazoline 3 sprays of bilateral nostril was given.  Patient monitored with no bleeding noted.  Patient had no bleeding throughout ER stay.  Will discharge patient home with epistaxis instructions as discussed in AVS.  No picking or blowing nose education given.  Return to ER if new or worse symptoms, otherwise follow-up with PCP and ENT.  Patient verbalized understanding to the plan of care.  Patient discharged home.        Critical Care Time: None    Impression and plan discussed with patient. Questions answered, concerns addressed, indications for urgent re-evaluation reviewed, and  given. Patient/Parent/Caregiver agree with treatment plan and have no further questions at this time.  AVS provided at discharge.    This note was created by the Dragon Voice Dictation System. Inadvertent typographical errors, due to software recognition problems, may still exist.             New Prescriptions    No medications on file       Final diagnoses:   Epistaxis       11/27/2024   [facility]       Maikel Arcos APRN CNP  11/27/24 1246

## 2024-11-27 NOTE — ED TRIAGE NOTES
"Patient presents c/o epistaxis. States he has an intermittent nose bleed x5 weeks. Was cauterized a few days ago by ENT \"but they couldn't get all of it\". States he called ENT and was instructed to be seen in ED. Today bleeding started around 0600, describes as constant trickle with lots of clots. Is on blood thinners. Given nose clamp in triage.   "

## 2024-11-27 NOTE — DISCHARGE INSTRUCTIONS
Nose Bleed:  LEAVE YOUR NOSE ALONE IF IT IS NOT BLEEDING!!!!  This means no blowing, sniffing, rubbing, picking, etc for 12 hours.  After that apply petroleum jelly or bacitracin ointment in the nares 2 times each day for the next 4-5 days.    If your nose does start to bleed again, follow these instructions:  #1.  Blow your nose as hard as he can to clear out all clots and discharge.  #2.  Immediately squirt 2 sprays of Afrin up each nostril.  #3.  Place nose clamp on nose and leave in place for 15-30 minutes.  DO NOT TOUCH OR MANIPULATE THE NOSE CLAMP AT ALL UNTIL THE 15-30 MINUTES IS UP!!!  #4.   If you have excessive bleeding down the back of your throat while the clamp is in place, go in for immediate treatment.  Otherwise, remove clamp to see if the bleeding has stopped.  If it has, leave your nose alone so bleeding does not restart.  If it does restart bleeding, come in for evaluation at clinic or ER.     During the dry months, you can use saline spray to keep your nostrils moisturized as needed.          Follow-up with your primary care provider for reevaluation.  Contact your primary care provider if you have any questions or concerns.  Do not hesitate to return to the ER if any new or worsening symptoms.     Please read the attached instructions (if any).  They highlight more specific treatments and interventions for you at home.              Thank you for letting me participate in your care and wish you a fast and uneventful recovery,    Maikel PRESLEY, CNP    Do not hesitate to contact me with questions or concerns.  [email or web address]

## 2024-12-17 ENCOUNTER — OFFICE VISIT (OUTPATIENT)
Dept: OTOLARYNGOLOGY | Facility: OTHER | Age: 69
End: 2024-12-17
Attending: NURSE PRACTITIONER
Payer: COMMERCIAL

## 2024-12-17 VITALS
WEIGHT: 293 LBS | SYSTOLIC BLOOD PRESSURE: 158 MMHG | DIASTOLIC BLOOD PRESSURE: 76 MMHG | TEMPERATURE: 97.5 F | RESPIRATION RATE: 18 BRPM | HEIGHT: 67 IN | HEART RATE: 74 BPM | OXYGEN SATURATION: 97 % | BODY MASS INDEX: 45.99 KG/M2

## 2024-12-17 DIAGNOSIS — R04.0 EPISTAXIS: Primary | ICD-10-CM

## 2024-12-17 DIAGNOSIS — Z98.890 H/O OF NASAL CAUTERIZATION: ICD-10-CM

## 2024-12-17 PROCEDURE — G0463 HOSPITAL OUTPT CLINIC VISIT: HCPCS | Mod: 25

## 2024-12-17 PROCEDURE — 30901 CONTROL OF NOSEBLEED: CPT | Performed by: NURSE PRACTITIONER

## 2024-12-17 PROCEDURE — 31231 NASAL ENDOSCOPY DX: CPT | Performed by: NURSE PRACTITIONER

## 2024-12-17 RX ORDER — GINSENG 100 MG
CAPSULE ORAL ONCE
Status: COMPLETED | OUTPATIENT
Start: 2024-12-17 | End: 2024-12-17

## 2024-12-17 RX ADMIN — Medication: at 09:13

## 2024-12-17 ASSESSMENT — PAIN SCALES - GENERAL: PAINLEVEL_OUTOF10: NO PAIN (0)

## 2024-12-17 NOTE — Clinical Note
12/17/2024      Eduar Isaacs  [address]  [place] [address]      Dear Colleague,    Thank you for referring your patient, Eduar Isaacs, to the [facility]. Please see a copy of my visit note below.    No notes on file    Again, thank you for allowing me to participate in the care of your patient.        Sincerely,        Kyleigh Pritchett NP

## 2024-12-17 NOTE — LETTER
carbonate antacid 1000 MG CHEW Take 2 tablets by mouth every morning       Cholecalciferol (VITAMIN D3) 25 MCG (1000 UT) CAPS Take 1 capsule by mouth every morning.       clindamycin (CLEOCIN) 150 MG capsule Take by mouth. (Patient not taking: Reported on 12/20/2024)       cloNIDine (CATAPRES) 0.1 MG tablet Take 1 tablet (0.1 mg) by mouth 3 times daily. (Patient not taking: Reported on 12/20/2024) 90 tablet 0     clopidogrel (PLAVIX) 75 MG tablet Take 1 tablet (75 mg) by mouth every morning.       Continuous Glucose  (DEXCOM G7 ) SANAZ Use to read blood sugars as per 's instructions.       Continuous Glucose Sensor (DEXCOM G7 SENSOR) MISC CHANGE EVERY 10 DAYS 9 each 3     ezetimibe (ZETIA) 10 MG tablet Take 10 mg by mouth every morning       fish oil-omega-3 fatty acids 1000 MG capsule Take 1 g by mouth 2 times daily.       fluticasone (FLONASE) 50 MCG/ACT nasal spray Spray 1 spray into both nostrils daily as needed       furosemide (LASIX) 40 MG tablet Take 1 tablet (40 mg) by mouth daily. 30 tablet 0     furosemide (LASIX) 40 MG tablet Take 2 tablets (80 mg) by mouth at bedtime 60 tablet 0     gabapentin (NEURONTIN) 300 MG capsule Take 300 mg by mouth 2 times daily       GNP ULTICARE PEN NEEDLES 32G X 6 MM miscellaneous Use 5 times daily for insulin injections as directed.       HUMALOG KWIKPEN 100 UNIT/ML soln Inject 15 Units subcutaneously 3 times daily (before meals). -hold for BG < 120       hydrOXYzine HCl (ATARAX) 25 MG tablet Take 1 tablet (25 mg) by mouth every 6 hours as needed for anxiety. 30 tablet 0     insulin glargine (LANTUS PEN) 100 UNIT/ML pen Inject 25 Units Subcutaneous 2 times daily 15 mL 0     isosorbide mononitrate (IMDUR) 30 MG 24 hr tablet Take 2 tablets (60 mg) by mouth every morning. 30 tablet 0     levothyroxine (SYNTHROID/LEVOTHROID) 75 MCG tablet Take 75 mcg by mouth every morning. Takes during the night upon waking to use restroom.       mycophenolate  (GENERIC EQUIVALENT) 500 MG tablet Take 1,000 mg by mouth 2 times daily  DX. Z94.0 TRANSPLANT DATE 2000.       nitroGLYcerin (NITROSTAT) 0.4 MG sublingual tablet Place 0.4 mg under the tongue every 5 minutes as needed for chest pain (Patient not taking: Reported on 12/20/2024)       oxyCODONE (ROXICODONE) 5 MG tablet Take 1 tablet by mouth every 6 hours as needed.       predniSONE (DELTASONE) 5 MG tablet Take 10 mg by mouth every morning       rosuvastatin (CRESTOR) 40 MG tablet Take 40 mg by mouth At Bedtime        sarilumab (KEVZARA) 200 MG/1.14ML injection Inject 1.14 mLs (200 mg) subcutaneously every 14 days. On Wednesdays Hold while at [facility] (Patient not taking: Reported on 12/20/2024)       Semaglutide-Weight Management (WEGOVY) 0.25 MG/0.5ML pen Inject 0.25 mg subcutaneously once a week. On Wednesday. 2 mL 0     spironolactone (ALDACTONE) 25 MG tablet Take 25 mg by mouth at bedtime.       TRELEGY ELLIPTA 100-62.5-25 MCG/INH oral inhaler Inhale 1 puff into the lungs every morning       triamcinolone (KENALOG) 0.1 % external cream Apply topically to lower legs 3 times per week at bedtime. Avoid open sores.       famotidine (PEPCID) 20 MG tablet Take 1 tablet (20 mg) by mouth 2 times daily. 60 tablet 1            Allergies:     Allergies: Cefepime, Codeine, Niacin, Amoxicillin, Prilosec [omeprazole], Vancomycin, Aldactone [spironolactone], Atorvastatin, Ciprofloxacin, and Semaglutide          Past Medical History:     Past Medical History:   Diagnosis Date     Acute on chronic systolic congestive heart failure (H) 06/01/2023     Acute renal failure, unspecified acute renal failure type (H) 05/31/2023     Anemia of renal disease 06/19/2023     Arthritis      Asymptomatic hyperuricemia 10/19/2015     Cardiomyopathy, secondary (H) 06/01/2023     Chronic obstructive pulmonary disease, unspecified COPD type (H) 06/19/2023     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     Congestive heart failure (H)      COPD  (chronic obstructive pulmonary disease) (H)      Coronary artery disease involving native coronary artery 11/12/2022     Coronary atherosclerosis 10/21/2016     Diabetes (H)      Dyslipidemia 10/14/2010     Gram-negative sepsis with organ dysfunction (H) 06/01/2023     Hallux rigidus 04/20/2009     Hallux varus 04/20/2009     History of kidney transplant 10/14/2010     Hx of gout 06/16/2023     Hypertension      Hyponatremia 11/12/2022     Hypophosphatemia 12/04/2014     Hypothyroidism, unspecified type 06/19/2023     Immunocompromised due to corticosteroids (H) 05/31/2023     Immunosuppressive management encounter following kidney transplant 10/19/2009     Long term current use of systemic steroids 10/19/2015     Metatarsus adductus 04/20/2009     Morbid obesity (H) 11/12/2022     Myocardial infarction (H)      Neuromuscular disorder (H)      JYOTSNA (obstructive sleep apnea) 11/12/2022     Pulmonary hypertension (H) 06/19/2023     Secondary renal hyperparathyroidism (H) 05/06/2014    Formatting of this note might be different from the original.  IMO Update       Sepsis, due to unspecified organism, unspecified whether acute organ dysfunction present (H) 05/31/2023     Septic shock (H) 06/01/2023     Severe episode of recurrent major depressive disorder, without psychotic features (H) 06/19/2023     Stented coronary artery 05/20/2015     Urinary tract infection without hematuria, site unspecified 06/25/2023            Past Surgical History:     Past Surgical History:   Procedure Laterality Date     CARDIAC SURGERY       COLONOSCOPY - HIM SCAN  04/08/2012    [facility], polyps, adenomatous     ORTHOPEDIC SURGERY       TRANSPLANT  10/25/2000    Kidney at [place]            Social History:     Social History     Tobacco Use     Smoking status: Former     Types: Cigarettes     Passive exposure: Past     Smokeless tobacco: Never     Tobacco comments:     Quit 1998   Vaping Use     Vaping status: Never Used   Substance Use  "Topics     Alcohol use: Yes     Comment: rarely hear and there     Drug use: Never            Review of Systems:     ROS: See HPI         Physical Exam:     BP (!) 158/76 (BP Location: Right arm, Patient Position: Sitting, Cuff Size: Adult Large)   Pulse 74   Temp 97.5  F (36.4  C) (Tympanic)   Resp 18   Ht 1.689 m (5' 6.5\")   Wt 132.9 kg (293 lb)   SpO2 97%   BMI 46.58 kg/m      General - The patient is well nourished and well developed, and appears to have good nutritional status.  Alert and oriented to person and place, answers questions and cooperates with examination appropriately.   Head and Face - Normocephalic and atraumatic, with no gross asymmetry noted.  The facial nerve is intact, with strong symmetric movements.  Voice and Breathing - The patient was breathing comfortably without the use of accessory muscles. There was no wheezing, stridor. The patients voice was clear and strong, and had appropriate pitch and quality.  Ears - External ear normal. Canals are patent. Right tympanic membrane is intact without effusion, retraction or mass. Left tympanic membrane is intact without effusion, retraction or mass.  Nose - External contour is symmetric, no gross deflection or scars.  Nasal mucosa is pink and moist with no abnormal mucus.  The septum and turbinates were evaluated with nasal speculum, crusting noted on the bilateral anterior nasal septum.  No active bleeding.     To evaluate the nose and sinuses, I performed rigid nasal endoscopy.  I sprayed both nares with 2 sprays lidocaine and neosynephrine.     I began with the RIGHT side using a 0 degree rigid nasal endoscope, and then similarly examined the LEFT side     Findings:  crusting on the bilateral caudal septum, suction debrided with #8 rogers.  Oozing of blood bilaterally, I was able to stop the bleeding with scant use of cautery bilaterally.    Inferior turbinates:  unremarkable.   Middle turbinate and middle meatus: unremarkable  The " patient tolerated the procedure well              Assessment and Plan:       ICD-10-CM    1. Epistaxis  R04.0 lidocaine 2%-oxymetazoline 0.025% nasal solution 2 spray     bacitracin ointment      2. H/O of nasal cauterization  Z98.890         Continue saline and ayrgel for a month, then use as needed  Follow up as needed     Kyleigh VALDES  [facility]    Again, thank you for allowing me to participate in the care of your patient.        Sincerely,        Kyleigh Pritchett NP

## 2024-12-17 NOTE — PATIENT INSTRUCTIONS
Thank you for allowing Kyleigh Pritchett and our ENT team to participate in your care.  If your medications are too expensive, please give the nurse a call.  We can possibly change this medication.  If you have a scheduling or an appointment question please contact our Health Unit Coordinator at their direct line [phone] ext [phone].   ALL nursing questions or concerns can be directed to your ENT nurse at: [phone] - [place]

## 2024-12-17 NOTE — PROGRESS NOTES
Otolaryngology Note         Chief Complaint:     Patient presents with:  Follow Up  Epistaxis           History of Present Illness:     Eduar Isaacs is a 69 year old male seen today for follow up epistaxis.       He has had few bleeds over the past couple of          Medications:     Current Outpatient Rx   Medication Sig Dispense Refill    acetaminophen (TYLENOL) 500 MG tablet Take three tablets (1,500 mg) by mouth 1 to 2 times daily as needed for pain. Acetaminophen should be limited to 4,000 mg per day from all sources.      acetaZOLAMIDE (DIAMOX SEQUEL) 500 MG 12 hr capsule Take 500 mg by mouth every morning      albuterol (PROAIR HFA/PROVENTIL HFA/VENTOLIN HFA) 108 (90 Base) MCG/ACT inhaler Inhale 2 puffs into the lungs every 6 hours as needed for shortness of breath or wheezing      allopurinol (ZYLOPRIM) 300 MG tablet Take 300 mg by mouth every morning      amLODIPine (NORVASC) 10 MG tablet Take 1 tablet (10 mg) by mouth daily. 30 tablet 0    aspirin (ASA) 81 MG EC tablet Take 81 mg by mouth At Bedtime      blood glucose (NO BRAND SPECIFIED) test strip 1 strip by In Vitro route 4 times daily (before meals and nightly) Use to test blood sugar 4 times daily or as directed. 100 strip 0    blood glucose monitoring (SOFTCLIX) lancets 1 each by In Vitro route 4 times daily (before meals and nightly) Use to monitor blood glucose once daily as directed. 200 each 0    calcium carbonate antacid 1000 MG CHEW Take 2 tablets by mouth every morning      Cholecalciferol (VITAMIN D3) 25 MCG (1000 UT) CAPS Take 1 capsule by mouth every morning.      clindamycin (CLEOCIN) 150 MG capsule Take by mouth.      cloNIDine (CATAPRES) 0.1 MG tablet Take 1 tablet (0.1 mg) by mouth 3 times daily. 90 tablet 0    clopidogrel (PLAVIX) 75 MG tablet Take 1 tablet (75 mg) by mouth every morning.      Continuous Glucose  (DEXCOM G7 ) SANAZ Use to read blood sugars as per 's instructions.      Continuous  Glucose Sensor (DEXCOM G7 SENSOR) MISC CHANGE EVERY 10 DAYS 9 each 3    ezetimibe (ZETIA) 10 MG tablet Take 10 mg by mouth every morning      fish oil-omega-3 fatty acids 1000 MG capsule Take 1 g by mouth 2 times daily.      fluticasone (FLONASE) 50 MCG/ACT nasal spray Spray 1 spray into both nostrils daily as needed      furosemide (LASIX) 40 MG tablet Take 1 tablet (40 mg) by mouth daily. 30 tablet 0    furosemide (LASIX) 40 MG tablet Take 2 tablets (80 mg) by mouth at bedtime 60 tablet 0    gabapentin (NEURONTIN) 300 MG capsule Take 300 mg by mouth 2 times daily      GNP ULTICARE PEN NEEDLES 32G X 6 MM miscellaneous Use 5 times daily for insulin injections as directed.      HUMALOG KWIKPEN 100 UNIT/ML soln Inject 15 Units subcutaneously 3 times daily (before meals). -hold for BG < 120      hydrOXYzine HCl (ATARAX) 25 MG tablet Take 1 tablet (25 mg) by mouth every 6 hours as needed for anxiety. 30 tablet 0    insulin glargine (LANTUS PEN) 100 UNIT/ML pen Inject 25 Units Subcutaneous 2 times daily 15 mL 0    isosorbide mononitrate (IMDUR) 30 MG 24 hr tablet Take 2 tablets (60 mg) by mouth every morning. 30 tablet 0    levothyroxine (SYNTHROID/LEVOTHROID) 75 MCG tablet Take 75 mcg by mouth every morning. Takes during the night upon waking to use restroom.      mycophenolate (GENERIC EQUIVALENT) 500 MG tablet Take 1,000 mg by mouth 2 times daily  DX. Z94.0 TRANSPLANT DATE 2000.      nitroGLYcerin (NITROSTAT) 0.4 MG sublingual tablet Place 0.4 mg under the tongue every 5 minutes as needed for chest pain      oxyCODONE (ROXICODONE) 5 MG tablet Take 1 tablet by mouth every 6 hours as needed.      predniSONE (DELTASONE) 5 MG tablet Take 10 mg by mouth every morning      rosuvastatin (CRESTOR) 40 MG tablet Take 40 mg by mouth At Bedtime       sarilumab (KEVZARA) 200 MG/1.14ML injection Inject 1.14 mLs (200 mg) subcutaneously every 14 days. On Wednesdays Hold while at [facility]      Semaglutide-Weight Management (WEGOVY) 0.25  MG/0.5ML pen Inject 0.25 mg subcutaneously once a week. On Wednesday. 2 mL 0    spironolactone (ALDACTONE) 25 MG tablet Take 25 mg by mouth at bedtime.      TRELEGY ELLIPTA 100-62.5-25 MCG/INH oral inhaler Inhale 1 puff into the lungs every morning      triamcinolone (KENALOG) 0.1 % external cream Apply topically to lower legs 3 times per week at bedtime. Avoid open sores.              Allergies:     Allergies: Cefepime, Codeine, Niacin, Amoxicillin, Prilosec [omeprazole], Vancomycin, Aldactone [spironolactone], Atorvastatin, Ciprofloxacin, and Semaglutide          Past Medical History:     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    Congestive heart failure (H)     COPD (chronic obstructive pulmonary disease) (H)     Coronary artery disease involving native coronary artery 11/12/2022    Coronary atherosclerosis 10/21/2016    Diabetes (H)     Dyslipidemia 10/14/2010    Gram-negative sepsis with organ dysfunction (H) 06/01/2023    Hallux rigidus 04/20/2009    Hallux varus 04/20/2009    History of kidney transplant 10/14/2010    Hx of gout 06/16/2023    Hypertension     Hyponatremia 11/12/2022    Hypophosphatemia 12/04/2014    Hypothyroidism, unspecified type 06/19/2023    Immunocompromised due to corticosteroids (H) 05/31/2023    Immunosuppressive management encounter following kidney transplant 10/19/2009    Long term current use of systemic steroids 10/19/2015    Metatarsus adductus 04/20/2009    Morbid obesity (H) 11/12/2022    Myocardial infarction (H)     Neuromuscular disorder (H)     JYOTSNA (obstructive  "sleep apnea) 11/12/2022    Pulmonary hypertension (H) 06/19/2023    Secondary renal hyperparathyroidism (H) 05/06/2014    Formatting of this note might be different from the original.  IMO Update      Sepsis, due to unspecified organism, unspecified whether acute organ dysfunction present (H) 05/31/2023    Septic shock (H) 06/01/2023    Severe episode of recurrent major depressive disorder, without psychotic features (H) 06/19/2023    Stented coronary artery 05/20/2015    Urinary tract infection without hematuria, site unspecified 06/25/2023            Past Surgical History:     Past Surgical History:   Procedure Laterality Date    CARDIAC SURGERY      COLONOSCOPY - HIM SCAN  04/08/2012    [facility], polyps, adenomatous    ORTHOPEDIC SURGERY      TRANSPLANT  10/25/2000    Kidney at [facility] family history reviewed         Social History:     Social History     Tobacco Use    Smoking status: Former     Types: Cigarettes    Smokeless tobacco: Never    Tobacco comments:     Quit 1998   Substance Use Topics    Alcohol use: Yes     Comment: rarely hear and there    Drug use: Never            Review of Systems:     ROS: See HPI         Physical Exam:     BP (!) 158/76 (BP Location: Right arm, Patient Position: Sitting, Cuff Size: Adult Large)   Pulse 74   Temp 97.5  F (36.4  C) (Tympanic)   Resp 18   Ht 1.689 m (5' 6.5\")   Wt 132.9 kg (293 lb)   SpO2 97%   BMI 46.58 kg/m    General - The patient is well nourished and well developed, and appears to have good nutritional status.  Alert and oriented to person and place, answers questions and cooperates with examination appropriately.   Head and Face - Normocephalic and atraumatic, with no gross asymmetry noted.  The facial nerve is intact, with strong symmetric movements.  Voice and Breathing - The patient was breathing comfortably without the use of accessory muscles. There was no wheezing, stridor. The patients voice was clear and strong, and had " appropriate pitch and quality.  Ears - External ear normal. Canals are patent. Right tympanic membrane is intact without effusion, retraction or mass. Left tympanic membrane is intact without effusion, retraction or mass.  Eyes - Extraocular movements intact, sclera were not icteric or injected.  Mouth - Examination of the oral cavity showed pink, healthy oral mucosa. Dentition in good condition. No lesions or ulcerations noted. The tongue was mobile and midline.   Throat - The walls of the oropharynx were smooth, pink, moist, symmetric, and had no lesions or ulcerations.  The tonsillar pillars and soft palate were symmetric. The uvula was midline on elevation.    Neck - Normal midline excursion of the laryngotracheal complex during swallowing.  Full range of motion on passive movement.  Palpation of the occipital, submental, submandibular, internal jugular chain, and supraclavicular nodes did not demonstrate any abnormal lymph nodes or masses.  Palpation of the thyroid was soft and smooth, with no nodules or goiter appreciated.  The trachea was mobile and midline.  Nose - External contour is symmetric, no gross deflection or scars.  Nasal mucosa is pink and moist with no abnormal mucus.  The septum and turbinates were evaluated: ***.  No polyps, masses, or purulence noted on examination.         Assessment and Plan:       ICD-10-CM    1. Epistaxis  R04.0 lidocaine 2%-oxymetazoline 0.025% nasal solution 2 spray               Kyleigh Pritchett NPSANTOSH  [facility]

## 2024-12-18 ENCOUNTER — TELEPHONE (OUTPATIENT)
Dept: FAMILY MEDICINE | Facility: OTHER | Age: 69
End: 2024-12-18

## 2024-12-18 NOTE — TELEPHONE ENCOUNTER
Care Team 1  /  [facility]    Patient came in today (12/18/24) and thought he had an appointment. I explained the next appointment with Lyly was not until April.  He would like to talk to her regarding his diabetes.  Phone is [phone]     Thank you

## 2024-12-18 NOTE — TELEPHONE ENCOUNTER
This patient was in hospital for a month.  Would like to see you for his diabetes.  He doesn't not have an appt with you until April.  I put him on for Friday.

## 2024-12-20 PROBLEM — E78.5 HYPERLIPIDEMIA ASSOCIATED WITH TYPE 2 DIABETES MELLITUS (H): Status: ACTIVE | Noted: 2024-11-17

## 2024-12-20 PROBLEM — E11.69 HYPERLIPIDEMIA ASSOCIATED WITH TYPE 2 DIABETES MELLITUS (H): Status: ACTIVE | Noted: 2024-11-17

## 2025-01-14 ENCOUNTER — OFFICE VISIT (OUTPATIENT)
Dept: OTOLARYNGOLOGY | Facility: OTHER | Age: 70
End: 2025-01-14
Attending: NURSE PRACTITIONER
Payer: MEDICARE

## 2025-01-14 VITALS
DIASTOLIC BLOOD PRESSURE: 70 MMHG | RESPIRATION RATE: 20 BRPM | TEMPERATURE: 98.5 F | HEIGHT: 67 IN | BODY MASS INDEX: 47.24 KG/M2 | WEIGHT: 301 LBS | SYSTOLIC BLOOD PRESSURE: 148 MMHG | HEART RATE: 82 BPM | OXYGEN SATURATION: 97 %

## 2025-01-14 DIAGNOSIS — R04.0 EPISTAXIS: Primary | ICD-10-CM

## 2025-01-14 DIAGNOSIS — Z98.890 H/O OF NASAL CAUTERIZATION: ICD-10-CM

## 2025-01-14 PROCEDURE — G0463 HOSPITAL OUTPT CLINIC VISIT: HCPCS | Mod: 25 | Performed by: NURSE PRACTITIONER

## 2025-01-14 PROCEDURE — 31231 NASAL ENDOSCOPY DX: CPT | Performed by: NURSE PRACTITIONER

## 2025-01-14 PROCEDURE — 99213 OFFICE O/P EST LOW 20 MIN: CPT | Mod: 25 | Performed by: NURSE PRACTITIONER

## 2025-01-14 RX ORDER — GINSENG 100 MG
CAPSULE ORAL ONCE
Status: COMPLETED | OUTPATIENT
Start: 2025-01-14 | End: 2025-01-14

## 2025-01-14 RX ORDER — SEMAGLUTIDE 0.5 MG/.5ML
0.5 INJECTION, SOLUTION SUBCUTANEOUS
COMMUNITY
Start: 2024-12-19

## 2025-01-14 RX ADMIN — Medication: at 14:04

## 2025-01-14 ASSESSMENT — PAIN SCALES - GENERAL: PAINLEVEL_OUTOF10: NO PAIN (0)

## 2025-01-14 NOTE — PROGRESS NOTES
Otolaryngology Note         Chief Complaint:     Patient presents with:  Epistaxis           History of Present Illness:     Eduar Isaacs is a 69 year old male seen today for follow-up epistaxis.  He was last seen in ENT on 12/17/2024 by this provider for the same.  On plavix.      He had a long oozy epistaxis on the left a few days ago and on the right recently.  No heavy bleeding, the bleeding is protracted and oozing.  No feeling of blood down the throat.  He has not required any ER visits for epistaxis.      Recent head cold with nasal congestion and rhinorrhea. Improved.         Medications:     Current Outpatient Rx   Medication Sig Dispense Refill    acetaminophen (TYLENOL) 500 MG tablet Take three tablets (1,500 mg) by mouth 1 to 2 times daily as needed for pain. Acetaminophen should be limited to 4,000 mg per day from all sources.      acetaZOLAMIDE (DIAMOX SEQUEL) 500 MG 12 hr capsule Take 500 mg by mouth every morning.      albuterol (PROAIR HFA/PROVENTIL HFA/VENTOLIN HFA) 108 (90 Base) MCG/ACT inhaler Inhale 2 puffs into the lungs every 6 hours as needed for shortness of breath or wheezing      allopurinol (ZYLOPRIM) 300 MG tablet Take 300 mg by mouth every morning      amLODIPine (NORVASC) 10 MG tablet Take 1 tablet (10 mg) by mouth daily. 30 tablet 0    aspirin (ASA) 81 MG EC tablet Take 81 mg by mouth At Bedtime      blood glucose (NO BRAND SPECIFIED) test strip 1 strip by In Vitro route 4 times daily (before meals and nightly) Use to test blood sugar 4 times daily or as directed. 100 strip 0    blood glucose monitoring (SOFTCLIX) lancets 1 each by In Vitro route 4 times daily (before meals and nightly) Use to monitor blood glucose once daily as directed. 200 each 0    calcium carbonate antacid 1000 MG CHEW Take 2 tablets by mouth every morning      Cholecalciferol (VITAMIN D3) 25 MCG (1000 UT) CAPS Take 1 capsule by mouth every morning.      cloNIDine (CATAPRES) 0.1 MG tablet Take 1 tablet  (0.1 mg) by mouth 3 times daily. 90 tablet 0    clopidogrel (PLAVIX) 75 MG tablet Take 1 tablet (75 mg) by mouth every morning.      Continuous Glucose  (DEXCOM G7 ) SANAZ Use to read blood sugars as per 's instructions.      Continuous Glucose Sensor (DEXCOM G7 SENSOR) MISC CHANGE EVERY 10 DAYS 9 each 3    ezetimibe (ZETIA) 10 MG tablet Take 10 mg by mouth every morning      famotidine (PEPCID) 20 MG tablet Take 1 tablet (20 mg) by mouth 2 times daily. 60 tablet 1    fish oil-omega-3 fatty acids 1000 MG capsule Take 1 g by mouth 2 times daily.      fluticasone (FLONASE) 50 MCG/ACT nasal spray Spray 1 spray into both nostrils daily as needed      furosemide (LASIX) 40 MG tablet Take 1 tablet (40 mg) by mouth daily. 30 tablet 0    furosemide (LASIX) 40 MG tablet Take 2 tablets (80 mg) by mouth at bedtime 60 tablet 0    gabapentin (NEURONTIN) 300 MG capsule Take 300 mg by mouth 2 times daily      GNP ULTICARE PEN NEEDLES 32G X 6 MM miscellaneous Use 5 times daily for insulin injections as directed.      HUMALOG KWIKPEN 100 UNIT/ML soln Inject 15 Units subcutaneously 3 times daily (before meals). -hold for BG < 120      hydrOXYzine HCl (ATARAX) 25 MG tablet Take 1 tablet (25 mg) by mouth every 6 hours as needed for anxiety. 30 tablet 0    insulin glargine (LANTUS PEN) 100 UNIT/ML pen Inject 25 Units Subcutaneous 2 times daily 15 mL 0    isosorbide mononitrate (IMDUR) 30 MG 24 hr tablet Take 2 tablets (60 mg) by mouth every morning. 30 tablet 0    levothyroxine (SYNTHROID/LEVOTHROID) 75 MCG tablet Take 75 mcg by mouth every morning. Takes during the night upon waking to use restroom.      mycophenolate (GENERIC EQUIVALENT) 500 MG tablet Take 1,000 mg by mouth 2 times daily  DX. Z94.0 TRANSPLANT DATE 2000.      nitroGLYcerin (NITROSTAT) 0.4 MG sublingual tablet Place 0.4 mg under the tongue every 5 minutes as needed for chest pain.      oxyCODONE (ROXICODONE) 5 MG tablet Take 1 tablet by mouth  every 6 hours as needed.      predniSONE (DELTASONE) 5 MG tablet Take 10 mg by mouth every morning      rosuvastatin (CRESTOR) 40 MG tablet Take 40 mg by mouth At Bedtime       spironolactone (ALDACTONE) 25 MG tablet Take 25 mg by mouth at bedtime.      TRELEGY ELLIPTA 100-62.5-25 MCG/INH oral inhaler Inhale 1 puff into the lungs every morning      triamcinolone (KENALOG) 0.1 % external cream Apply topically to lower legs 3 times per week at bedtime. Avoid open sores.      WEGOVY 0.5 MG/0.5ML pen Inject 0.5 mg subcutaneously every 7 days.  0.5 MG (0.5 ML) SUBCUTANEOUSLY EVERY WEEK; ADMINISTER WEEKS 5 THROUGH 8 OF THERAPY      clindamycin (CLEOCIN) 150 MG capsule Take by mouth. (Patient not taking: Reported on 1/14/2025)      sarilumab (KEVZARA) 200 MG/1.14ML injection Inject 1.14 mLs (200 mg) subcutaneously every 14 days. On Wednesdays Hold while at [facility] (Patient not taking: Reported on 1/14/2025)      Semaglutide-Weight Management (WEGOVY) 0.25 MG/0.5ML pen Inject 0.25 mg subcutaneously once a week. On Wednesday. (Patient not taking: Reported on 1/14/2025) 2 mL 0            Allergies:     Allergies: Cefepime, Codeine, Niacin, Amoxicillin, Prilosec [omeprazole], Vancomycin, Aldactone [spironolactone], Atorvastatin, Ciprofloxacin, and Semaglutide          Past Medical History:     Past Medical History:   Diagnosis Date    Acute on chronic systolic congestive heart failure (H) 06/01/2023    Acute renal failure, unspecified acute renal failure type 05/31/2023    Anemia of renal disease 06/19/2023    Arthritis     Asymptomatic hyperuricemia 10/19/2015    Cardiomyopathy, secondary (H) 06/01/2023    Chronic obstructive pulmonary disease, unspecified COPD type (H) 06/19/2023    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    Congestive heart failure (H)     COPD (chronic obstructive pulmonary disease) (H)     Coronary artery disease involving native coronary artery 11/12/2022    Coronary atherosclerosis 10/21/2016     Diabetes (H)     Dyslipidemia 10/14/2010    Gram-negative sepsis with organ dysfunction (H) 06/01/2023    Hallux rigidus 04/20/2009    Hallux varus 04/20/2009    History of kidney transplant 10/14/2010    Hx of gout 06/16/2023    Hypertension     Hyponatremia 11/12/2022    Hypophosphatemia 12/04/2014    Hypothyroidism, unspecified type 06/19/2023    Immunocompromised due to corticosteroids 05/31/2023    Immunosuppressive management encounter following kidney transplant 10/19/2009    Long term current use of systemic steroids 10/19/2015    Metatarsus adductus 04/20/2009    Morbid obesity (H) 11/12/2022    Myocardial infarction (H)     Neuromuscular disorder (H)     JYOTSNA (obstructive sleep apnea) 11/12/2022    Pulmonary hypertension (H) 06/19/2023    Secondary renal hyperparathyroidism 05/06/2014    Formatting of this note might be different from the original.  IMO Update      Sepsis, due to unspecified organism, unspecified whether acute organ dysfunction present (H) 05/31/2023    Septic shock (H) 06/01/2023    Severe episode of recurrent major depressive disorder, without psychotic features (H) 06/19/2023    Stented coronary artery 05/20/2015    Urinary tract infection without hematuria, site unspecified 06/25/2023            Past Surgical History:     Past Surgical History:   Procedure Laterality Date    CARDIAC SURGERY      COLONOSCOPY - HIM SCAN  04/08/2012    [facility], polyps, adenomatous    ORTHOPEDIC SURGERY      TRANSPLANT  10/25/2000    Kidney at [facility] family history reviewed         Social History:     Social History     Tobacco Use    Smoking status: Former     Types: Cigarettes     Passive exposure: Past    Smokeless tobacco: Never    Tobacco comments:     Quit 1998   Vaping Use    Vaping status: Never Used   Substance Use Topics    Alcohol use: Yes     Comment: rarely hear and there    Drug use: Never            Review of Systems:     ROS: See HPI         Physical Exam:     BP (!) 148/70 (BP  "Location: Right arm, Patient Position: Sitting, Cuff Size: Adult Large)   Pulse 82   Temp 98.5  F (36.9  C) (Tympanic)   Resp 20   Ht 1.702 m (5' 7\")   Wt 136.5 kg (301 lb)   SpO2 97%   BMI 47.14 kg/m      General - The patient is well nourished and well developed, and appears to have good nutritional status.  Alert and oriented to person and place, answers questions and cooperates with examination appropriately.   Head and Face - Normocephalic and atraumatic, with no gross asymmetry noted.  The facial nerve is intact, with strong symmetric movements.  Voice and Breathing - The patient was breathing comfortably without the use of accessory muscles. There was no wheezing, stridor. The patients voice was clear and strong, and had appropriate pitch and quality.  Ears - External ear normal. Canals are patent. Right tympanic membrane is intact without effusion, retraction or mass. Left tympanic membrane is intact without effusion, retraction or mass.  Eyes - Extraocular movements intact, sclera were not icteric or injected.  Mouth - Examination of the oral cavity showed pink, healthy oral mucosa. Dentition in good condition. No lesions or ulcerations noted. The tongue was mobile and midline.   Throat - The walls of the oropharynx were smooth, pink, moist, symmetric, and had no lesions or ulcerations.  The tonsillar pillars and soft palate were symmetric. The uvula was midline on elevation.    Neck - Thick neck limits exam, however no david palpable lymphadenopathy, normal range of motion.  Nose - External contour is symmetric, no gross deflection or scars.  Nasal mucosa is pink and moist with no abnormal mucus.  The septum and turbinates were evaluated with nasal speculum, there is mild irritation and a healing area of cautery on the right septum, on the very caudal left septum there is excoriation and crusting    To evaluate the nose and sinuses, I performed rigid nasal endoscopy.  I sprayed both nares with 2 " sprays lidocaine and neosynephrine.     I began with the RIGHT side using a 0 degree rigid nasal endoscope, and then similarly examined the LEFT side     Findings: there is mild irritation and a healing area of cautery on the right septum, on the very caudal left septum there is excoriation and crusting.  I suction debrided the left caudal septum with 8 Carney.  There was slight oozing of blood once the crusting was removed, I used scant cautery to achieve hemostasis.  I then applied bacitracin to the anterior caudal septum.    Inferior turbinates: Unremarkable  Middle turbinate and middle meatus: Unremarkable  The superior meatus is examined and unremarkable  Sphenoethmoidal recesses examined and unremarkable  Nasopharynx clear, ET patent, no edema  The patient tolerated the procedure well              Assessment and Plan:       ICD-10-CM    1. Epistaxis  R04.0 lidocaine 2%-oxymetazoline 0.025% nasal solution 2 spray     bacitracin ointment      2. H/O of nasal cauterization  Z98.890         Continue use of Aquaphor and saline  Use over the counter nasal saline spray (ocean nasal spray, ramez med spray, ayr spray)  3 x daily and before bed,      Apply Aquaphor ointment in the left nostril. Hold off applying Aquaphor ointment to the right nostril for a couple days to allow the gel foam packing to adhere.     No bending, straining or lifting over 10 pounds.    Follow up as needed     Kyleigh Pritchett NP-C  [facility]

## 2025-01-14 NOTE — PATIENT INSTRUCTIONS
Thank you for allowing Kyleigh Pritchett and our ENT team to participate in your care.  If your medications are too expensive, please give the nurse a call.  We can possibly change this medication.  If you have a scheduling or an appointment question please contact our Health Unit Coordinator at their direct line [phone] ext [phone].   ALL nursing questions or concerns can be directed to your ENT nurse at: [phone] - Ztw

## 2025-01-14 NOTE — LETTER
1/14/2025      Eduar Isaacs  [address]  [place] [address]      Dear Colleague,    Thank you for referring your patient, Eduar Isaacs, to the [facility]. Please see a copy of my visit note below.    Otolaryngology Note         Chief Complaint:     Patient presents with:  Epistaxis           History of Present Illness:     Eduar Isaacs is a 69 year old male seen today for follow-up epistaxis.  He was last seen in ENT on 12/17/2024 by this provider for the same.  On plavix.      He had a long oozy epistaxis on the left a few days ago and on the right recently.  No heavy bleeding, the bleeding is protracted and oozing.  No feeling of blood down the throat.  He has not required any ER visits for epistaxis.      Recent head cold with nasal congestion and rhinorrhea. Improved.         Medications:     Current Outpatient Rx   Medication Sig Dispense Refill     acetaminophen (TYLENOL) 500 MG tablet Take three tablets (1,500 mg) by mouth 1 to 2 times daily as needed for pain. Acetaminophen should be limited to 4,000 mg per day from all sources.       acetaZOLAMIDE (DIAMOX SEQUEL) 500 MG 12 hr capsule Take 500 mg by mouth every morning.       albuterol (PROAIR HFA/PROVENTIL HFA/VENTOLIN HFA) 108 (90 Base) MCG/ACT inhaler Inhale 2 puffs into the lungs every 6 hours as needed for shortness of breath or wheezing       allopurinol (ZYLOPRIM) 300 MG tablet Take 300 mg by mouth every morning       amLODIPine (NORVASC) 10 MG tablet Take 1 tablet (10 mg) by mouth daily. 30 tablet 0     aspirin (ASA) 81 MG EC tablet Take 81 mg by mouth At Bedtime       blood glucose (NO BRAND SPECIFIED) test strip 1 strip by In Vitro route 4 times daily (before meals and nightly) Use to test blood sugar 4 times daily or as directed. 100 strip 0     blood glucose monitoring (SOFTCLIX) lancets 1 each by In Vitro route 4 times daily (before meals and nightly) Use to monitor blood glucose once daily as  directed. 200 each 0     calcium carbonate antacid 1000 MG CHEW Take 2 tablets by mouth every morning       Cholecalciferol (VITAMIN D3) 25 MCG (1000 UT) CAPS Take 1 capsule by mouth every morning.       cloNIDine (CATAPRES) 0.1 MG tablet Take 1 tablet (0.1 mg) by mouth 3 times daily. 90 tablet 0     clopidogrel (PLAVIX) 75 MG tablet Take 1 tablet (75 mg) by mouth every morning.       Continuous Glucose  (DEXCOM G7 ) SANAZ Use to read blood sugars as per 's instructions.       Continuous Glucose Sensor (DEXCOM G7 SENSOR) MISC CHANGE EVERY 10 DAYS 9 each 3     ezetimibe (ZETIA) 10 MG tablet Take 10 mg by mouth every morning       famotidine (PEPCID) 20 MG tablet Take 1 tablet (20 mg) by mouth 2 times daily. 60 tablet 1     fish oil-omega-3 fatty acids 1000 MG capsule Take 1 g by mouth 2 times daily.       fluticasone (FLONASE) 50 MCG/ACT nasal spray Spray 1 spray into both nostrils daily as needed       furosemide (LASIX) 40 MG tablet Take 1 tablet (40 mg) by mouth daily. 30 tablet 0     furosemide (LASIX) 40 MG tablet Take 2 tablets (80 mg) by mouth at bedtime 60 tablet 0     gabapentin (NEURONTIN) 300 MG capsule Take 300 mg by mouth 2 times daily       GNP ULTICARE PEN NEEDLES 32G X 6 MM miscellaneous Use 5 times daily for insulin injections as directed.       HUMALOG KWIKPEN 100 UNIT/ML soln Inject 15 Units subcutaneously 3 times daily (before meals). -hold for BG < 120       hydrOXYzine HCl (ATARAX) 25 MG tablet Take 1 tablet (25 mg) by mouth every 6 hours as needed for anxiety. 30 tablet 0     insulin glargine (LANTUS PEN) 100 UNIT/ML pen Inject 25 Units Subcutaneous 2 times daily 15 mL 0     isosorbide mononitrate (IMDUR) 30 MG 24 hr tablet Take 2 tablets (60 mg) by mouth every morning. 30 tablet 0     levothyroxine (SYNTHROID/LEVOTHROID) 75 MCG tablet Take 75 mcg by mouth every morning. Takes during the night upon waking to use restroom.       mycophenolate (GENERIC EQUIVALENT) 500  MG tablet Take 1,000 mg by mouth 2 times daily  DX. Z94.0 TRANSPLANT DATE 2000.       nitroGLYcerin (NITROSTAT) 0.4 MG sublingual tablet Place 0.4 mg under the tongue every 5 minutes as needed for chest pain.       oxyCODONE (ROXICODONE) 5 MG tablet Take 1 tablet by mouth every 6 hours as needed.       predniSONE (DELTASONE) 5 MG tablet Take 10 mg by mouth every morning       rosuvastatin (CRESTOR) 40 MG tablet Take 40 mg by mouth At Bedtime        spironolactone (ALDACTONE) 25 MG tablet Take 25 mg by mouth at bedtime.       TRELEGY ELLIPTA 100-62.5-25 MCG/INH oral inhaler Inhale 1 puff into the lungs every morning       triamcinolone (KENALOG) 0.1 % external cream Apply topically to lower legs 3 times per week at bedtime. Avoid open sores.       WEGOVY 0.5 MG/0.5ML pen Inject 0.5 mg subcutaneously every 7 days.  0.5 MG (0.5 ML) SUBCUTANEOUSLY EVERY WEEK; ADMINISTER WEEKS 5 THROUGH 8 OF THERAPY       clindamycin (CLEOCIN) 150 MG capsule Take by mouth. (Patient not taking: Reported on 1/14/2025)       sarilumab (KEVZARA) 200 MG/1.14ML injection Inject 1.14 mLs (200 mg) subcutaneously every 14 days. On Wednesdays Hold while at [facility] (Patient not taking: Reported on 1/14/2025)       Semaglutide-Weight Management (WEGOVY) 0.25 MG/0.5ML pen Inject 0.25 mg subcutaneously once a week. On Wednesday. (Patient not taking: Reported on 1/14/2025) 2 mL 0            Allergies:     Allergies: Cefepime, Codeine, Niacin, Amoxicillin, Prilosec [omeprazole], Vancomycin, Aldactone [spironolactone], Atorvastatin, Ciprofloxacin, and Semaglutide          Past Medical History:     Past Medical History:   Diagnosis Date     Acute on chronic systolic congestive heart failure (H) 06/01/2023     Acute renal failure, unspecified acute renal failure type 05/31/2023     Anemia of renal disease 06/19/2023     Arthritis      Asymptomatic hyperuricemia 10/19/2015     Cardiomyopathy, secondary (H) 06/01/2023     Chronic obstructive pulmonary disease,  unspecified COPD type (H) 06/19/2023     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     Congestive heart failure (H)      COPD (chronic obstructive pulmonary disease) (H)      Coronary artery disease involving native coronary artery 11/12/2022     Coronary atherosclerosis 10/21/2016     Diabetes (H)      Dyslipidemia 10/14/2010     Gram-negative sepsis with organ dysfunction (H) 06/01/2023     Hallux rigidus 04/20/2009     Hallux varus 04/20/2009     History of kidney transplant 10/14/2010     Hx of gout 06/16/2023     Hypertension      Hyponatremia 11/12/2022     Hypophosphatemia 12/04/2014     Hypothyroidism, unspecified type 06/19/2023     Immunocompromised due to corticosteroids 05/31/2023     Immunosuppressive management encounter following kidney transplant 10/19/2009     Long term current use of systemic steroids 10/19/2015     Metatarsus adductus 04/20/2009     Morbid obesity (H) 11/12/2022     Myocardial infarction (H)      Neuromuscular disorder (H)      JYOTSNA (obstructive sleep apnea) 11/12/2022     Pulmonary hypertension (H) 06/19/2023     Secondary renal hyperparathyroidism 05/06/2014    Formatting of this note might be different from the original.  IMO Update       Sepsis, due to unspecified organism, unspecified whether acute organ dysfunction present (H) 05/31/2023     Septic shock (H) 06/01/2023     Severe episode of recurrent major depressive disorder, without psychotic features (H) 06/19/2023     Stented coronary artery 05/20/2015     Urinary tract infection without hematuria, site unspecified 06/25/2023            Past Surgical History:     Past Surgical History:   Procedure Laterality Date     CARDIAC SURGERY       COLONOSCOPY - HIM SCAN  04/08/2012    [facility], polyps, adenomatous     ORTHOPEDIC SURGERY       TRANSPLANT  10/25/2000    [facility]       ENT family history reviewed         Social History:     Social History     Tobacco Use     Smoking status: Former     Types:  "Cigarettes     Passive exposure: Past     Smokeless tobacco: Never     Tobacco comments:     Quit 1998   Vaping Use     Vaping status: Never Used   Substance Use Topics     Alcohol use: Yes     Comment: rarely hear and there     Drug use: Never            Review of Systems:     ROS: See HPI         Physical Exam:     BP (!) 148/70 (BP Location: Right arm, Patient Position: Sitting, Cuff Size: Adult Large)   Pulse 82   Temp 98.5  F (36.9  C) (Tympanic)   Resp 20   Ht 1.702 m (5' 7\")   Wt 136.5 kg (301 lb)   SpO2 97%   BMI 47.14 kg/m      General - The patient is well nourished and well developed, and appears to have good nutritional status.  Alert and oriented to person and place, answers questions and cooperates with examination appropriately.   Head and Face - Normocephalic and atraumatic, with no gross asymmetry noted.  The facial nerve is intact, with strong symmetric movements.  Voice and Breathing - The patient was breathing comfortably without the use of accessory muscles. There was no wheezing, stridor. The patients voice was clear and strong, and had appropriate pitch and quality.  Ears - External ear normal. Canals are patent. Right tympanic membrane is intact without effusion, retraction or mass. Left tympanic membrane is intact without effusion, retraction or mass.  Eyes - Extraocular movements intact, sclera were not icteric or injected.  Mouth - Examination of the oral cavity showed pink, healthy oral mucosa. Dentition in good condition. No lesions or ulcerations noted. The tongue was mobile and midline.   Throat - The walls of the oropharynx were smooth, pink, moist, symmetric, and had no lesions or ulcerations.  The tonsillar pillars and soft palate were symmetric. The uvula was midline on elevation.    Neck - Thick neck limits exam, however no david palpable lymphadenopathy, normal range of motion.  Nose - External contour is symmetric, no gross deflection or scars.  Nasal mucosa is pink and " moist with no abnormal mucus.  The septum and turbinates were evaluated with nasal speculum, there is mild irritation and a healing area of cautery on the right septum, on the very caudal left septum there is excoriation and crusting    To evaluate the nose and sinuses, I performed rigid nasal endoscopy.  I sprayed both nares with 2 sprays lidocaine and neosynephrine.     I began with the RIGHT side using a 0 degree rigid nasal endoscope, and then similarly examined the LEFT side     Findings: there is mild irritation and a healing area of cautery on the right septum, on the very caudal left septum there is excoriation and crusting.  I suction debrided the left caudal septum with 8 Carney.  There was slight oozing of blood once the crusting was removed, I used scant cautery to achieve hemostasis.  I then applied bacitracin to the anterior caudal septum.    Inferior turbinates: Unremarkable  Middle turbinate and middle meatus: Unremarkable  The superior meatus is examined and unremarkable  Sphenoethmoidal recesses examined and unremarkable  Nasopharynx clear, ET patent, no edema  The patient tolerated the procedure well              Assessment and Plan:       ICD-10-CM    1. Epistaxis  R04.0 lidocaine 2%-oxymetazoline 0.025% nasal solution 2 spray     bacitracin ointment      2. H/O of nasal cauterization  Z98.890         Continue use of Aquaphor and saline  Use over the counter nasal saline spray (ocean nasal spray, ramez med spray, ayr spray)  3 x daily and before bed,      Apply Aquaphor ointment in the left nostril. Hold off applying Aquaphor ointment to the right nostril for a couple days to allow the gel foam packing to adhere.     No bending, straining or lifting over 10 pounds.    Follow up as needed     Kyleigh Pritchett NP-C  [facility]    Again, thank you for allowing me to participate in the care of your patient.        Sincerely,        Kyleigh Pritchett NP    Electronically signed

## 2025-01-21 ENCOUNTER — TRANSFERRED RECORDS (OUTPATIENT)
Dept: HEALTH INFORMATION MANAGEMENT | Facility: HOSPITAL | Age: 70
End: 2025-01-21

## 2025-01-21 LAB
ALBUMIN (EXTERNAL): 4.3 G/DL (ref 3.5–5)
ALKALINE PHOSPHATASE (EXTERNAL): 47 U/L (ref 45–122)
ALT SERPL-CCNC: <7 U/L (ref 18–65)
AST SERPL-CCNC: 10 U/L (ref 10–40)
BILIRUB SERPL-MCNC: 0.3 MG/DL (ref 0.2–1)
BILIRUBIN DIRECT (EXTERNAL): 0.1 MG/DL (ref 0–0.2)
CHOLESTEROL (EXTERNAL): 153 MG/DL
HDLC SERPL-MCNC: 53 MG/DL
LDL CHOLESTEROL CALCULATED (EXTERNAL): 56 MG/DL
PROTEIN TOTAL (EXTERNAL): 6.4 G/DL (ref 6–8)
TRIGLYCERIDES (EXTERNAL): 222 MG/DL

## 2025-03-06 NOTE — PROVIDER NOTIFICATION
Health Maintenance       Respiratory Syncytial Virus (RSV) Vaccine 60+ (1 - 1-dose 75+ series)  Never done    Shingles Vaccine (3 of 3)  Overdue since 12/3/2022    COVID-19 Vaccine (6 - 2024-25 season)  Overdue since 9/1/2024    Medicare Advantage- Medicare Wellness Visit (Yearly - January to December)  Due since 1/1/2025           Following review of the above:  Patient is not proceeding with: COVID-19, Respiratory Syncytial Virus (RSV), and Shingles    Note: Refer to final orders and clinician documentation.       Provider ordered IV Tylenol for pt. Increasing fever up to 104F. Clarified giving 1,000mg of IV tylenol after pt. Had already received 975mg in ER and 650mg on medsurg unit. Provider stated to go ahead and give IV tylenol now.

## 2025-03-09 ENCOUNTER — HEALTH MAINTENANCE LETTER (OUTPATIENT)
Age: 70
End: 2025-03-09

## 2025-03-10 ENCOUNTER — TRANSFERRED RECORDS (OUTPATIENT)
Dept: HEALTH INFORMATION MANAGEMENT | Facility: HOSPITAL | Age: 70
End: 2025-03-10

## 2025-03-10 LAB
ALBUMIN (URINE) MG/SPEC: 787 MG/L
ALBUMIN/CREATININE RATIO: 2322 MG/GM (ref 0–30)
ANION GAP SERPL CALC-SCNC: 10 MMOL/L (ref 5–15)
BUN SERPL-MCNC: 40 MG/DL (ref 8–23)
CALCIUM (EXTERNAL): 8.4 MG/DL (ref 8.5–10.5)
CHLORIDE (EXTERNAL): 108 MMOL/L (ref 98–107)
CO2 (EXTERNAL): 22 MMOL/L (ref 23–32)
CREATININE (EXTERNAL): 2.22 MG/DL (ref 0.8–1.5)
CREATININE (URINE): 33.9 MG/DL
ERYTHROCYTE [DISTWIDTH] IN BLOOD BY AUTOMATED COUNT: 15 K/UL (ref 11.6–14.4)
GFR ESTIMATED (EXTERNAL): 32 ML/MIN/1.73M2
GFR ESTIMATED (IF AFRICAN AMERICAN) (EXTERNAL): ABNORMAL ML/MIN/1.73M2
GLUCOSE (EXTERNAL): 121 MG/DL (ref 70–100)
HEMATOCRIT (EXTERNAL): 32.6 % (ref 40.1–51)
HEMOGLOBIN (EXTERNAL): 9.9 G/DL (ref 13.7–17.5)
MCH RBC QN AUTO: 28 PG (ref 25.6–32.2)
MCHC RBC AUTO-ENTMCNC: 30.4 G/DL (ref 32.2–36)
MCV RBC AUTO: 92.4 FL (ref 80–98)
PLATELET COUNT (EXTERNAL): 205 (ref 150–450)
POTASSIUM (EXTERNAL): 4.1 MMOL/L (ref 3.5–5.1)
RBC # BLD AUTO: 3.53 M/UL (ref 4.63–6.08)
SODIUM (EXTERNAL): 140 MMOL/L (ref 136–145)
WBC COUNT (EXTERNAL): 9.4 K/UL (ref 4–10)

## 2025-04-14 DIAGNOSIS — Z79.4 TYPE 2 DIABETES MELLITUS WITH HYPERGLYCEMIA, WITH LONG-TERM CURRENT USE OF INSULIN (H): Primary | ICD-10-CM

## 2025-04-14 DIAGNOSIS — E11.65 TYPE 2 DIABETES MELLITUS WITH HYPERGLYCEMIA, WITH LONG-TERM CURRENT USE OF INSULIN (H): Primary | ICD-10-CM

## 2025-04-16 ENCOUNTER — TRANSFERRED RECORDS (OUTPATIENT)
Dept: OTHER | Facility: HOSPITAL | Age: 70
End: 2025-04-16

## 2025-05-30 ENCOUNTER — MEDICAL CORRESPONDENCE (OUTPATIENT)
Dept: HEALTH INFORMATION MANAGEMENT | Facility: HOSPITAL | Age: 70
End: 2025-05-30

## 2025-07-01 DIAGNOSIS — E11.65 TYPE 2 DIABETES MELLITUS WITH HYPERGLYCEMIA, WITH LONG-TERM CURRENT USE OF INSULIN (H): ICD-10-CM

## 2025-07-01 DIAGNOSIS — Z79.4 TYPE 2 DIABETES MELLITUS WITH HYPERGLYCEMIA, WITH LONG-TERM CURRENT USE OF INSULIN (H): ICD-10-CM

## 2025-07-01 RX ORDER — ACYCLOVIR 400 MG/1
1 TABLET ORAL
Qty: 9 EACH | Refills: 3 | Status: SHIPPED | OUTPATIENT
Start: 2025-07-01

## 2025-07-01 NOTE — TELEPHONE ENCOUNTER
Dexcom sensor      Last Written Prescription Date:  04/18/25 to [facility]  Last Fill Quantity: 9,   # refills: 3  Last Office Visit: 04/18/25  Future Office visit:

## 2025-07-30 ENCOUNTER — MEDICAL CORRESPONDENCE (OUTPATIENT)
Dept: HEALTH INFORMATION MANAGEMENT | Facility: CLINIC | Age: 70
End: 2025-07-30

## 2025-08-03 ENCOUNTER — HEALTH MAINTENANCE LETTER (OUTPATIENT)
Age: 70
End: 2025-08-03

## (undated) RX ORDER — GINSENG 100 MG
CAPSULE ORAL
Status: DISPENSED
Start: 2024-12-17

## (undated) RX ORDER — GINSENG 100 MG
CAPSULE ORAL
Status: DISPENSED
Start: 2025-01-14

## (undated) RX ORDER — BACITRACIN ZINC 500 [USP'U]/G
OINTMENT TOPICAL
Status: DISPENSED
Start: 2024-11-25